# Patient Record
Sex: MALE | Race: BLACK OR AFRICAN AMERICAN | NOT HISPANIC OR LATINO | Employment: UNEMPLOYED | URBAN - METROPOLITAN AREA
[De-identification: names, ages, dates, MRNs, and addresses within clinical notes are randomized per-mention and may not be internally consistent; named-entity substitution may affect disease eponyms.]

---

## 2023-09-07 ENCOUNTER — HOSPITAL ENCOUNTER (OUTPATIENT)
Dept: DATA CONVERSION | Facility: HOSPITAL | Age: 58
Discharge: HOME | End: 2023-09-07
Payer: MEDICARE

## 2023-09-07 DIAGNOSIS — M25.569 PAIN IN UNSPECIFIED KNEE: ICD-10-CM

## 2023-11-24 ENCOUNTER — APPOINTMENT (OUTPATIENT)
Dept: RADIOLOGY | Facility: HOSPITAL | Age: 58
DRG: 270 | End: 2023-11-24
Payer: MEDICARE

## 2023-11-24 ENCOUNTER — HOSPITAL ENCOUNTER (INPATIENT)
Facility: HOSPITAL | Age: 58
LOS: 19 days | Discharge: LEFT BEFORE DISCHARGE | DRG: 270 | End: 2023-12-14
Attending: EMERGENCY MEDICINE | Admitting: STUDENT IN AN ORGANIZED HEALTH CARE EDUCATION/TRAINING PROGRAM
Payer: MEDICARE

## 2023-11-24 DIAGNOSIS — I82.402: ICD-10-CM

## 2023-11-24 DIAGNOSIS — Z01.810 ENCOUNTER FOR PREPROCEDURAL CARDIOVASCULAR EXAMINATION: ICD-10-CM

## 2023-11-24 DIAGNOSIS — I74.3 EMBOLISM AND THROMBOSIS OF ARTERIES OF THE LOWER EXTREMITIES (MULTI): ICD-10-CM

## 2023-11-24 DIAGNOSIS — Z01.818 ENCOUNTER FOR OTHER PREPROCEDURAL EXAMINATION: ICD-10-CM

## 2023-11-24 DIAGNOSIS — I70.90 ARTERIAL OCCLUSION: Primary | ICD-10-CM

## 2023-11-24 DIAGNOSIS — I97.89: ICD-10-CM

## 2023-11-24 DIAGNOSIS — I73.9 PERIPHERAL VASCULAR DISEASE, UNSPECIFIED (CMS-HCC): ICD-10-CM

## 2023-11-24 DIAGNOSIS — I70.222 CRITICAL LIMB ISCHEMIA OF LEFT LOWER EXTREMITY (MULTI): ICD-10-CM

## 2023-11-24 DIAGNOSIS — I79.8 OTHER DISORDERS OF ARTERIES, ARTERIOLES AND CAPILLARIES IN DISEASES CLASSIFIED ELSEWHERE (CMS-HCC): ICD-10-CM

## 2023-11-24 DIAGNOSIS — I70.92 CHRONIC TOTAL OCCLUSION OF ARTERY OF THE EXTREMITIES (CMS-HCC): ICD-10-CM

## 2023-11-24 DIAGNOSIS — Z98.62 S/P PERIPHERAL ARTERY ANGIOPLASTY: ICD-10-CM

## 2023-11-24 DIAGNOSIS — I50.20 HFREF (HEART FAILURE WITH REDUCED EJECTION FRACTION) (MULTI): ICD-10-CM

## 2023-11-24 DIAGNOSIS — Z99.2 ESRD ON HEMODIALYSIS (MULTI): ICD-10-CM

## 2023-11-24 DIAGNOSIS — I70.245 ATHEROSCLEROSIS OF NATIVE ARTERIES OF LEFT LEG WITH ULCERATION OF OTHER PART OF FOOT (MULTI): ICD-10-CM

## 2023-11-24 DIAGNOSIS — I74.3 ARTERIAL EMBOLUS AND THROMBOSIS OF LOWER EXTREMITY (MULTI): ICD-10-CM

## 2023-11-24 DIAGNOSIS — I74.3: ICD-10-CM

## 2023-11-24 DIAGNOSIS — N18.6 ESRD ON HEMODIALYSIS (MULTI): ICD-10-CM

## 2023-11-24 DIAGNOSIS — R29.898 LEFT LEG WEAKNESS: ICD-10-CM

## 2023-11-24 DIAGNOSIS — L97.529: ICD-10-CM

## 2023-11-24 LAB
ANION GAP SERPL CALC-SCNC: 18 MMOL/L (ref 10–20)
BASOPHILS # BLD AUTO: 0.04 X10*3/UL (ref 0–0.1)
BASOPHILS NFR BLD AUTO: 0.6 %
BUN SERPL-MCNC: 75 MG/DL (ref 6–23)
CALCIUM SERPL-MCNC: 9.1 MG/DL (ref 8.6–10.6)
CHLORIDE SERPL-SCNC: 100 MMOL/L (ref 98–107)
CO2 SERPL-SCNC: 20 MMOL/L (ref 21–32)
CREAT SERPL-MCNC: 6.06 MG/DL (ref 0.5–1.3)
CRP SERPL-MCNC: 4.15 MG/DL
EOSINOPHIL # BLD AUTO: 0.2 X10*3/UL (ref 0–0.7)
EOSINOPHIL NFR BLD AUTO: 2.9 %
ERYTHROCYTE [DISTWIDTH] IN BLOOD BY AUTOMATED COUNT: 12.7 % (ref 11.5–14.5)
ERYTHROCYTE [SEDIMENTATION RATE] IN BLOOD BY WESTERGREN METHOD: 105 MM/H (ref 0–20)
GFR SERPL CREATININE-BSD FRML MDRD: 10 ML/MIN/1.73M*2
GLUCOSE SERPL-MCNC: 235 MG/DL (ref 74–99)
HCT VFR BLD AUTO: 29.7 % (ref 41–52)
HGB BLD-MCNC: 9.5 G/DL (ref 13.5–17.5)
IMM GRANULOCYTES # BLD AUTO: 0.03 X10*3/UL (ref 0–0.7)
IMM GRANULOCYTES NFR BLD AUTO: 0.4 % (ref 0–0.9)
INR PPP: 1.1 (ref 0.9–1.1)
LYMPHOCYTES # BLD AUTO: 1.71 X10*3/UL (ref 1.2–4.8)
LYMPHOCYTES NFR BLD AUTO: 24.4 %
MCH RBC QN AUTO: 27.6 PG (ref 26–34)
MCHC RBC AUTO-ENTMCNC: 32 G/DL (ref 32–36)
MCV RBC AUTO: 86 FL (ref 80–100)
MONOCYTES # BLD AUTO: 0.71 X10*3/UL (ref 0.1–1)
MONOCYTES NFR BLD AUTO: 10.1 %
NEUTROPHILS # BLD AUTO: 4.31 X10*3/UL (ref 1.2–7.7)
NEUTROPHILS NFR BLD AUTO: 61.6 %
NRBC BLD-RTO: 0 /100 WBCS (ref 0–0)
PLATELET # BLD AUTO: 391 X10*3/UL (ref 150–450)
POTASSIUM SERPL-SCNC: 5.3 MMOL/L (ref 3.5–5.3)
PROTHROMBIN TIME: 12.7 SECONDS (ref 9.8–12.8)
RBC # BLD AUTO: 3.44 X10*6/UL (ref 4.5–5.9)
SODIUM SERPL-SCNC: 133 MMOL/L (ref 136–145)
WBC # BLD AUTO: 7 X10*3/UL (ref 4.4–11.3)

## 2023-11-24 PROCEDURE — 73502 X-RAY EXAM HIP UNI 2-3 VIEWS: CPT | Mod: LEFT SIDE | Performed by: RADIOLOGY

## 2023-11-24 PROCEDURE — 2500000004 HC RX 250 GENERAL PHARMACY W/ HCPCS (ALT 636 FOR OP/ED): Performed by: EMERGENCY MEDICINE

## 2023-11-24 PROCEDURE — 83036 HEMOGLOBIN GLYCOSYLATED A1C: CPT

## 2023-11-24 PROCEDURE — 86140 C-REACTIVE PROTEIN: CPT | Performed by: NURSE PRACTITIONER

## 2023-11-24 PROCEDURE — 75635 CT ANGIO ABDOMINAL ARTERIES: CPT | Performed by: STUDENT IN AN ORGANIZED HEALTH CARE EDUCATION/TRAINING PROGRAM

## 2023-11-24 PROCEDURE — 85652 RBC SED RATE AUTOMATED: CPT | Performed by: NURSE PRACTITIONER

## 2023-11-24 PROCEDURE — 72131 CT LUMBAR SPINE W/O DYE: CPT | Performed by: RADIOLOGY

## 2023-11-24 PROCEDURE — 99222 1ST HOSP IP/OBS MODERATE 55: CPT | Performed by: SURGERY

## 2023-11-24 PROCEDURE — 70450 CT HEAD/BRAIN W/O DYE: CPT

## 2023-11-24 PROCEDURE — 73552 X-RAY EXAM OF FEMUR 2/>: CPT | Mod: LT,FY

## 2023-11-24 PROCEDURE — 73502 X-RAY EXAM HIP UNI 2-3 VIEWS: CPT | Mod: LT,FY

## 2023-11-24 PROCEDURE — 75635 CT ANGIO ABDOMINAL ARTERIES: CPT

## 2023-11-24 PROCEDURE — 36415 COLL VENOUS BLD VENIPUNCTURE: CPT | Performed by: NURSE PRACTITIONER

## 2023-11-24 PROCEDURE — 99221 1ST HOSP IP/OBS SF/LOW 40: CPT | Performed by: STUDENT IN AN ORGANIZED HEALTH CARE EDUCATION/TRAINING PROGRAM

## 2023-11-24 PROCEDURE — 96361 HYDRATE IV INFUSION ADD-ON: CPT

## 2023-11-24 PROCEDURE — 73552 X-RAY EXAM OF FEMUR 2/>: CPT | Mod: LEFT SIDE | Performed by: RADIOLOGY

## 2023-11-24 PROCEDURE — 99285 EMERGENCY DEPT VISIT HI MDM: CPT | Performed by: NURSE PRACTITIONER

## 2023-11-24 PROCEDURE — 85610 PROTHROMBIN TIME: CPT | Performed by: NURSE PRACTITIONER

## 2023-11-24 PROCEDURE — 72131 CT LUMBAR SPINE W/O DYE: CPT

## 2023-11-24 PROCEDURE — 99285 EMERGENCY DEPT VISIT HI MDM: CPT | Performed by: EMERGENCY MEDICINE

## 2023-11-24 PROCEDURE — 70450 CT HEAD/BRAIN W/O DYE: CPT | Performed by: RADIOLOGY

## 2023-11-24 PROCEDURE — 2500000004 HC RX 250 GENERAL PHARMACY W/ HCPCS (ALT 636 FOR OP/ED)

## 2023-11-24 PROCEDURE — 96374 THER/PROPH/DIAG INJ IV PUSH: CPT

## 2023-11-24 PROCEDURE — 85025 COMPLETE CBC W/AUTO DIFF WBC: CPT | Performed by: NURSE PRACTITIONER

## 2023-11-24 PROCEDURE — 2500000001 HC RX 250 WO HCPCS SELF ADMINISTERED DRUGS (ALT 637 FOR MEDICARE OP): Performed by: EMERGENCY MEDICINE

## 2023-11-24 PROCEDURE — 2550000001 HC RX 255 CONTRASTS: Performed by: EMERGENCY MEDICINE

## 2023-11-24 PROCEDURE — 80048 BASIC METABOLIC PNL TOTAL CA: CPT | Performed by: NURSE PRACTITIONER

## 2023-11-24 PROCEDURE — 80061 LIPID PANEL: CPT

## 2023-11-24 RX ORDER — HEPARIN SODIUM 5000 [USP'U]/ML
10000 INJECTION, SOLUTION INTRAVENOUS; SUBCUTANEOUS ONCE
Status: COMPLETED | OUTPATIENT
Start: 2023-11-24 | End: 2023-11-24

## 2023-11-24 RX ORDER — GABAPENTIN 300 MG/1
300 CAPSULE ORAL ONCE
Status: COMPLETED | OUTPATIENT
Start: 2023-11-24 | End: 2023-11-24

## 2023-11-24 RX ORDER — HEPARIN SODIUM 5000 [USP'U]/ML
5000-10000 INJECTION, SOLUTION INTRAVENOUS; SUBCUTANEOUS EVERY 4 HOURS PRN
Status: DISCONTINUED | OUTPATIENT
Start: 2023-11-24 | End: 2023-12-06

## 2023-11-24 RX ORDER — HEPARIN SODIUM 10000 [USP'U]/100ML
0-4500 INJECTION, SOLUTION INTRAVENOUS CONTINUOUS
Status: DISCONTINUED | OUTPATIENT
Start: 2023-11-24 | End: 2023-12-06

## 2023-11-24 RX ORDER — CARVEDILOL 12.5 MG/1
25 TABLET ORAL ONCE
Status: COMPLETED | OUTPATIENT
Start: 2023-11-24 | End: 2023-11-24

## 2023-11-24 RX ADMIN — HEPARIN SODIUM 2000 UNITS/HR: 10000 INJECTION, SOLUTION INTRAVENOUS at 20:40

## 2023-11-24 RX ADMIN — HEPARIN SODIUM 10000 UNITS: 5000 INJECTION INTRAVENOUS; SUBCUTANEOUS at 20:46

## 2023-11-24 RX ADMIN — IOHEXOL 150 ML: 350 INJECTION, SOLUTION INTRAVENOUS at 19:03

## 2023-11-24 RX ADMIN — SODIUM CHLORIDE, SODIUM LACTATE, POTASSIUM CHLORIDE, AND CALCIUM CHLORIDE 500 ML: 600; 310; 30; 20 INJECTION, SOLUTION INTRAVENOUS at 20:00

## 2023-11-24 RX ADMIN — GABAPENTIN 300 MG: 300 CAPSULE ORAL at 22:03

## 2023-11-24 RX ADMIN — CARVEDILOL 25 MG: 12.5 TABLET, FILM COATED ORAL at 22:03

## 2023-11-24 ASSESSMENT — LIFESTYLE VARIABLES
EVER HAD A DRINK FIRST THING IN THE MORNING TO STEADY YOUR NERVES TO GET RID OF A HANGOVER: NO
REASON UNABLE TO ASSESS: YES
EVER FELT BAD OR GUILTY ABOUT YOUR DRINKING: NO
HAVE PEOPLE ANNOYED YOU BY CRITICIZING YOUR DRINKING: NO
HAVE YOU EVER FELT YOU SHOULD CUT DOWN ON YOUR DRINKING: NO

## 2023-11-24 ASSESSMENT — PAIN DESCRIPTION - LOCATION: LOCATION: LEG

## 2023-11-24 ASSESSMENT — ENCOUNTER SYMPTOMS
DYSURIA: 0
JOINT SWELLING: 0
LIGHT-HEADEDNESS: 0
VOMITING: 0
SHORTNESS OF BREATH: 0
NAUSEA: 0
PALPITATIONS: 0
CHEST TIGHTNESS: 0
FATIGUE: 0
BLOOD IN STOOL: 0
APPETITE CHANGE: 0
CHILLS: 0
CONSTIPATION: 0
WHEEZING: 0
FEVER: 0
WEAKNESS: 0
HEADACHES: 0
ABDOMINAL DISTENTION: 0
DIARRHEA: 0
TROUBLE SWALLOWING: 0
MYALGIAS: 0
SORE THROAT: 0
FREQUENCY: 0
HEMATURIA: 0
COUGH: 0
ABDOMINAL PAIN: 0
DIZZINESS: 0

## 2023-11-24 ASSESSMENT — COLUMBIA-SUICIDE SEVERITY RATING SCALE - C-SSRS
1. IN THE PAST MONTH, HAVE YOU WISHED YOU WERE DEAD OR WISHED YOU COULD GO TO SLEEP AND NOT WAKE UP?: NO
6. HAVE YOU EVER DONE ANYTHING, STARTED TO DO ANYTHING, OR PREPARED TO DO ANYTHING TO END YOUR LIFE?: NO
2. HAVE YOU ACTUALLY HAD ANY THOUGHTS OF KILLING YOURSELF?: NO

## 2023-11-24 ASSESSMENT — PAIN SCALES - GENERAL: PAINLEVEL_OUTOF10: 7

## 2023-11-24 ASSESSMENT — PAIN - FUNCTIONAL ASSESSMENT: PAIN_FUNCTIONAL_ASSESSMENT: 0-10

## 2023-11-24 ASSESSMENT — PAIN DESCRIPTION - PAIN TYPE: TYPE: ACUTE PAIN
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
## 2023-11-24 NOTE — Clinical Note
Vessel(s): left SFA, left popliteal artery, left peroneal artery, left tibio-peroneal trunk and left dorsalis pedis artery. Injected with hand injections. Single view taken.

## 2023-11-24 NOTE — Clinical Note
Vessel(s): left peroneal artery and left tibio-peroneal trunk. Injected with hand injections. Multiple views taken.

## 2023-11-24 NOTE — PROGRESS NOTES
The patient has an emergent need for contrast administration during CT scan to evaluate for life and/or limb threatening pathology. The risks of possible kidney injury are outweighed by the risks of delay or inability to make the diagnosis. Risks vs benefits were discussed with the patient and is in full agreement to proceed with diagnostic testing with contrast.     Iris Palomino MD 11/24/23 at 6:16 PM

## 2023-11-24 NOTE — Clinical Note
Mechanical thrombectomy. Aspiration catheter inserted. Pass # = 1. Aspirations started. Total fluid removed = 27 mL. Duration = 25 sec. Left Popliteal Artery

## 2023-11-24 NOTE — Clinical Note
Vessel(s): left SFA, left popliteal artery, left PTA, left peroneal artery, left TATE, left tibio-peroneal trunk and left dorsalis pedis artery. Injected with hand injections. Multiple views taken.

## 2023-11-24 NOTE — Clinical Note
Sheath was inserted in the left dorsalis pedis artery. Ultrasound guidance was used. Micropuncture sheath 4F

## 2023-11-24 NOTE — Clinical Note
Vessel(s): left iliac artery, left CFA, left PFA, left SFA, left popliteal artery, left PTA, left peroneal artery, left TATE, left tibio-peroneal trunk and left dorsalis pedis artery. Injected with hand injections. Multiple views taken.

## 2023-11-24 NOTE — Clinical Note
Mechanical thrombectomy. Aspiration catheter inserted. Pass # = 2. Aspirations started. Total fluid removed = 15 mL. Duration = 20 sec.

## 2023-11-24 NOTE — Clinical Note
Patient Clipped and Prepped: groin, left pedal and left popliteal. Prepped with Betadine and draped in sterile fashion.

## 2023-11-24 NOTE — ED PROVIDER NOTES
Chief Complaint   Patient presents with    Leg Pain       HPI       57 year old male presents to the Emergency Department today complaining of  a 2-3 day history of left upper leg weakness. Feels like his leg is heavy. Notes that when he attempts to bear weight on his left lower extremity, his left knee will give out causing him to fall to the ground. Reports that he has fallen several times during this time frame. Denies any leg pain. Notes to having paresthesias to the left hand as well. Denies any associated fever, chills, headache, neck pain, chest pain, shortness of breath, abdominal pain, nausea, vomiting, diarrhea, constipation, hematemesis, hematochezia, melena, urinary symptoms, or problems with bowel or bladder function.       History provided by:  Patient             Patient History   History reviewed. No pertinent past medical history.  History reviewed. No pertinent surgical history.  No family history on file.  Social History     Tobacco Use    Smoking status: Not on file    Smokeless tobacco: Not on file   Substance Use Topics    Alcohol use: Not on file    Drug use: Not on file           Physical Exam  Constitutional:       Appearance: Normal appearance.   HENT:      Head: Normocephalic.      Right Ear: Tympanic membrane, ear canal and external ear normal.      Left Ear: Tympanic membrane, ear canal and external ear normal.      Nose: Nose normal. No septal deviation.      Right Turbinates: Not enlarged.      Left Turbinates: Not enlarged.      Mouth/Throat:      Lips: Pink.      Mouth: Mucous membranes are moist.      Dentition: No dental caries.      Tongue: No lesions.      Pharynx: Oropharynx is clear. Uvula midline. No oropharyngeal exudate or posterior oropharyngeal erythema.      Tonsils: No tonsillar exudate. 1+ on the right. 1+ on the left.   Eyes:      General: Lids are normal.      Conjunctiva/sclera: Conjunctivae normal.      Pupils: Pupils are equal, round, and reactive to light.    Cardiovascular:      Rate and Rhythm: Normal rate and regular rhythm.      Pulses:           Radial pulses are 3+ on the right side and 3+ on the left side.        Dorsalis pedis pulses are 3+ on the right side and 3+ on the left side.      Heart sounds: Normal heart sounds. No murmur heard.     No friction rub. No gallop.   Pulmonary:      Effort: Pulmonary effort is normal. No respiratory distress.      Breath sounds: Normal breath sounds. No wheezing, rhonchi or rales.   Abdominal:      General: Abdomen is flat. Bowel sounds are normal.      Palpations: Abdomen is soft.      Tenderness: There is no abdominal tenderness. There is no right CVA tenderness, left CVA tenderness, guarding or rebound. Negative signs include Cortes's sign and McBurney's sign.   Musculoskeletal:         General: No swelling or deformity.      Cervical back: Full passive range of motion without pain and neck supple.      Right lower leg: No edema.      Left lower leg: No edema.      Comments: No edema, cyanosis, or clubbing noted. No spinous process tenderness, but does have bilateral paraspinal muscle tenderness to the lumbar sacral region. No saddle paresthesias. Negative straight leg raises. Able to plantarflex and dorsiflex bilateral great toes without difficulty.   Lymphadenopathy:      Cervical: No cervical adenopathy.   Skin:     General: Skin is warm.      Capillary Refill: Capillary refill takes less than 2 seconds.      Coloration: Skin is not jaundiced.      Findings: No petechiae or rash. Rash is not purpuric.   Neurological:      General: No focal deficit present.      Mental Status: He is alert and oriented to person, place, and time. Mental status is at baseline.      Sensory: Sensation is intact.      Motor: Weakness present.      Comments: Weakness noted of the upper portion of the left lower extremity.    Psychiatric:         Attention and Perception: Attention normal.         Mood and Affect: Mood normal.          Speech: Speech normal.         Behavior: Behavior normal. Behavior is cooperative.         Labs Reviewed - No data to display    No orders to display            ED Course & MDM            Medical Decision Making  Patient was seen and evaluated by Dr. Nicholson. Saline lock was established with labs drawn and results as above. Anemia is baseline. Remaining blood counts, PT/INR, and electrolytes were unremarkable. Kidney function is worse BUN/Creatinine- 75/6.06 today and 52/2.35 back in December 2021. Inflammatory markers are elevated as well. Left hip/femur x-rays and CT scans of his lumbar spine and head all show no acute pathology. Neurology was consulted secondary to the left lower extremity weakness. Care was signed out to oncoming staff pending neuro evaluation. He will need admission based on his worsening renal failure.            Your medication list      You have not been prescribed any medications.           Procedure  Procedures     Ariel Bone, BHARGAV-CNP  11/24/23 2401

## 2023-11-24 NOTE — Clinical Note
Mechanical thrombectomy. Aspiration catheter inserted. Pass # = 3. Aspirations started. Total fluid removed = 15 mL. Duration = 20 sec.

## 2023-11-24 NOTE — ED TRIAGE NOTES
C/o L sided back, hip, and leg pain since yesterday morning. Pt reports that the leg feels weak and heavy also. Hx of sciatica. Reports that it caused him to fall twice yesterday.

## 2023-11-24 NOTE — Clinical Note
R IJ 13Fr x 15cm temp HD catheter placed. All ports aspirated, flushed, locked, and capped. Dressing C/D/I. Total 50mcg fentanyl given. VSS t/o procedure.

## 2023-11-24 NOTE — Clinical Note
Vessel(s): left peroneal artery, left tibio-peroneal trunk and left dorsalis pedis artery. Injected with hand injections. Multiple views taken.

## 2023-11-25 ENCOUNTER — APPOINTMENT (OUTPATIENT)
Dept: RADIOLOGY | Facility: HOSPITAL | Age: 58
DRG: 270 | End: 2023-11-25
Payer: MEDICARE

## 2023-11-25 ENCOUNTER — APPOINTMENT (OUTPATIENT)
Dept: CARDIOLOGY | Facility: HOSPITAL | Age: 58
DRG: 270 | End: 2023-11-25
Payer: MEDICARE

## 2023-11-25 PROBLEM — I70.90 ARTERIAL OCCLUSION: Status: ACTIVE | Noted: 2023-11-25

## 2023-11-25 LAB
AORTIC VALVE PEAK VELOCITY: 1.21
AV PEAK GRADIENT: 5.9
AVA (PEAK VEL): 3.71
CHOLEST SERPL-MCNC: 244 MG/DL (ref 0–199)
CHOLESTEROL/HDL RATIO: 6.8
EST. AVERAGE GLUCOSE BLD GHB EST-MCNC: 315 MG/DL
GLUCOSE BLD MANUAL STRIP-MCNC: 144 MG/DL (ref 74–99)
GLUCOSE BLD MANUAL STRIP-MCNC: 191 MG/DL (ref 74–99)
GLUCOSE BLD MANUAL STRIP-MCNC: 253 MG/DL (ref 74–99)
GLUCOSE BLD MANUAL STRIP-MCNC: 97 MG/DL (ref 74–99)
HBA1C MFR BLD: 12.6 %
HDLC SERPL-MCNC: 36 MG/DL
LDLC SERPL CALC-MCNC: 159 MG/DL
LEFT VENTRICLE INTERNAL DIMENSION DIASTOLE: 5.2 (ref 3.5–6)
LEFT VENTRICULAR OUTFLOW TRACT DIAMETER: 2.5
MITRAL VALVE E/A RATIO: 1.4
MITRAL VALVE E/E' RATIO: 20.37
NON HDL CHOLESTEROL: 208 MG/DL (ref 0–149)
RIGHT VENTRICLE FREE WALL PEAK S': 12.5
TRIGL SERPL-MCNC: 247 MG/DL (ref 0–149)
UFH PPP CHRO-ACNC: 0.4 IU/ML
UFH PPP CHRO-ACNC: 0.5 IU/ML
UFH PPP CHRO-ACNC: 0.9 IU/ML
VLDL: 49 MG/DL (ref 0–40)

## 2023-11-25 PROCEDURE — 99233 SBSQ HOSP IP/OBS HIGH 50: CPT

## 2023-11-25 PROCEDURE — 36415 COLL VENOUS BLD VENIPUNCTURE: CPT | Performed by: EMERGENCY MEDICINE

## 2023-11-25 PROCEDURE — 1200000002 HC GENERAL ROOM WITH TELEMETRY DAILY

## 2023-11-25 PROCEDURE — 2500000001 HC RX 250 WO HCPCS SELF ADMINISTERED DRUGS (ALT 637 FOR MEDICARE OP): Performed by: INTERNAL MEDICINE

## 2023-11-25 PROCEDURE — 2500000001 HC RX 250 WO HCPCS SELF ADMINISTERED DRUGS (ALT 637 FOR MEDICARE OP)

## 2023-11-25 PROCEDURE — 85520 HEPARIN ASSAY: CPT | Performed by: EMERGENCY MEDICINE

## 2023-11-25 PROCEDURE — 2500000004 HC RX 250 GENERAL PHARMACY W/ HCPCS (ALT 636 FOR OP/ED): Performed by: EMERGENCY MEDICINE

## 2023-11-25 PROCEDURE — 2500000004 HC RX 250 GENERAL PHARMACY W/ HCPCS (ALT 636 FOR OP/ED): Performed by: INTERNAL MEDICINE

## 2023-11-25 PROCEDURE — 99223 1ST HOSP IP/OBS HIGH 75: CPT

## 2023-11-25 PROCEDURE — 2500000002 HC RX 250 W HCPCS SELF ADMINISTERED DRUGS (ALT 637 FOR MEDICARE OP, ALT 636 FOR OP/ED)

## 2023-11-25 PROCEDURE — 82947 ASSAY GLUCOSE BLOOD QUANT: CPT

## 2023-11-25 PROCEDURE — 2500000004 HC RX 250 GENERAL PHARMACY W/ HCPCS (ALT 636 FOR OP/ED)

## 2023-11-25 PROCEDURE — 93306 TTE W/DOPPLER COMPLETE: CPT

## 2023-11-25 PROCEDURE — 70450 CT HEAD/BRAIN W/O DYE: CPT | Performed by: RADIOLOGY

## 2023-11-25 PROCEDURE — 70450 CT HEAD/BRAIN W/O DYE: CPT

## 2023-11-25 PROCEDURE — 93306 TTE W/DOPPLER COMPLETE: CPT | Performed by: INTERNAL MEDICINE

## 2023-11-25 RX ORDER — AMLODIPINE BESYLATE 10 MG/1
10 TABLET ORAL DAILY
Status: DISCONTINUED | OUTPATIENT
Start: 2023-11-25 | End: 2023-12-14 | Stop reason: HOSPADM

## 2023-11-25 RX ORDER — ASPIRIN 81 MG/1
81 TABLET ORAL DAILY
COMMUNITY

## 2023-11-25 RX ORDER — ALLOPURINOL 100 MG/1
100 TABLET ORAL DAILY
COMMUNITY

## 2023-11-25 RX ORDER — INSULIN LISPRO 100 [IU]/ML
7 INJECTION, SOLUTION INTRAVENOUS; SUBCUTANEOUS
Status: DISCONTINUED | OUTPATIENT
Start: 2023-11-25 | End: 2023-12-14 | Stop reason: HOSPADM

## 2023-11-25 RX ORDER — LISINOPRIL 40 MG/1
40 TABLET ORAL DAILY
Status: ON HOLD | COMMUNITY
End: 2023-12-05 | Stop reason: ALTCHOICE

## 2023-11-25 RX ORDER — SODIUM BICARBONATE 650 MG/1
650 TABLET ORAL 3 TIMES DAILY
Status: DISCONTINUED | OUTPATIENT
Start: 2023-11-25 | End: 2023-11-28

## 2023-11-25 RX ORDER — CHLORTHALIDONE 50 MG/1
50 TABLET ORAL DAILY
Status: ON HOLD | COMMUNITY
Start: 2021-11-19 | End: 2023-12-05 | Stop reason: ALTCHOICE

## 2023-11-25 RX ORDER — CALCITRIOL 0.25 UG/1
0.25 CAPSULE ORAL DAILY
Status: DISCONTINUED | OUTPATIENT
Start: 2023-11-25 | End: 2023-12-14 | Stop reason: HOSPADM

## 2023-11-25 RX ORDER — POTASSIUM CHLORIDE 20 MEQ/1
20 TABLET, EXTENDED RELEASE ORAL DAILY
Status: ON HOLD | COMMUNITY
Start: 2023-07-29 | End: 2023-12-05 | Stop reason: ALTCHOICE

## 2023-11-25 RX ORDER — CARVEDILOL 25 MG/1
25 TABLET ORAL
COMMUNITY

## 2023-11-25 RX ORDER — ACETAMINOPHEN 325 MG/1
975 TABLET ORAL 3 TIMES DAILY PRN
Status: DISCONTINUED | OUTPATIENT
Start: 2023-11-25 | End: 2023-11-26

## 2023-11-25 RX ORDER — INSULIN GLARGINE 100 [IU]/ML
20 INJECTION, SOLUTION SUBCUTANEOUS NIGHTLY
Status: DISCONTINUED | OUTPATIENT
Start: 2023-11-25 | End: 2023-12-03

## 2023-11-25 RX ORDER — OXYCODONE HYDROCHLORIDE 5 MG/1
5 TABLET ORAL EVERY 4 HOURS PRN
Status: DISCONTINUED | OUTPATIENT
Start: 2023-11-25 | End: 2023-11-25

## 2023-11-25 RX ORDER — CALCITRIOL 0.25 UG/1
0.25 CAPSULE ORAL
COMMUNITY

## 2023-11-25 RX ORDER — DEXTROSE MONOHYDRATE 100 MG/ML
0.3 INJECTION, SOLUTION INTRAVENOUS ONCE AS NEEDED
Status: DISCONTINUED | OUTPATIENT
Start: 2023-11-25 | End: 2023-12-14 | Stop reason: HOSPADM

## 2023-11-25 RX ORDER — PANTOPRAZOLE SODIUM 20 MG/1
20 TABLET, DELAYED RELEASE ORAL
Status: ON HOLD | COMMUNITY
End: 2023-12-05 | Stop reason: ALTCHOICE

## 2023-11-25 RX ORDER — OXYCODONE HYDROCHLORIDE 5 MG/1
5 TABLET ORAL EVERY 6 HOURS PRN
Status: DISCONTINUED | OUTPATIENT
Start: 2023-11-25 | End: 2023-12-14

## 2023-11-25 RX ORDER — SILDENAFIL 50 MG/1
50 TABLET, FILM COATED ORAL AS NEEDED
COMMUNITY
Start: 2021-10-26

## 2023-11-25 RX ORDER — TORSEMIDE 20 MG/1
40 TABLET ORAL 2 TIMES DAILY
COMMUNITY

## 2023-11-25 RX ORDER — TORSEMIDE 20 MG/1
100 TABLET ORAL DAILY
Status: DISCONTINUED | OUTPATIENT
Start: 2023-11-25 | End: 2023-12-14 | Stop reason: HOSPADM

## 2023-11-25 RX ORDER — GABAPENTIN 300 MG/1
300 CAPSULE ORAL 2 TIMES DAILY
Status: DISCONTINUED | OUTPATIENT
Start: 2023-11-25 | End: 2023-11-25

## 2023-11-25 RX ORDER — ASPIRIN 81 MG/1
81 TABLET ORAL DAILY
Status: DISCONTINUED | OUTPATIENT
Start: 2023-11-25 | End: 2023-12-14 | Stop reason: HOSPADM

## 2023-11-25 RX ORDER — INSULIN LISPRO 100 [IU]/ML
10 INJECTION, SOLUTION INTRAVENOUS; SUBCUTANEOUS
COMMUNITY

## 2023-11-25 RX ORDER — PANTOPRAZOLE SODIUM 20 MG/1
20 TABLET, DELAYED RELEASE ORAL
Status: DISCONTINUED | OUTPATIENT
Start: 2023-11-25 | End: 2023-12-14 | Stop reason: HOSPADM

## 2023-11-25 RX ORDER — POLYETHYLENE GLYCOL 3350 17 G/17G
17 POWDER, FOR SOLUTION ORAL DAILY
Status: DISCONTINUED | OUTPATIENT
Start: 2023-11-25 | End: 2023-11-27

## 2023-11-25 RX ORDER — AMLODIPINE BESYLATE 10 MG/1
10 TABLET ORAL DAILY
COMMUNITY

## 2023-11-25 RX ORDER — SIMVASTATIN 20 MG/1
20 TABLET, FILM COATED ORAL NIGHTLY
COMMUNITY
Start: 2023-09-15

## 2023-11-25 RX ORDER — DEXTROSE 50 % IN WATER (D50W) INTRAVENOUS SYRINGE
25
Status: DISCONTINUED | OUTPATIENT
Start: 2023-11-25 | End: 2023-12-14 | Stop reason: HOSPADM

## 2023-11-25 RX ORDER — INSULIN GLARGINE 100 [IU]/ML
40 INJECTION, SOLUTION SUBCUTANEOUS NIGHTLY
COMMUNITY

## 2023-11-25 RX ORDER — ATORVASTATIN CALCIUM 80 MG/1
80 TABLET, FILM COATED ORAL NIGHTLY
Status: DISCONTINUED | OUTPATIENT
Start: 2023-11-25 | End: 2023-12-14 | Stop reason: HOSPADM

## 2023-11-25 RX ORDER — CARVEDILOL 12.5 MG/1
25 TABLET ORAL 2 TIMES DAILY
Status: DISCONTINUED | OUTPATIENT
Start: 2023-11-25 | End: 2023-12-14 | Stop reason: HOSPADM

## 2023-11-25 RX ORDER — GABAPENTIN 100 MG/1
100 CAPSULE ORAL EVERY EVENING
Status: DISCONTINUED | OUTPATIENT
Start: 2023-11-25 | End: 2023-12-14 | Stop reason: HOSPADM

## 2023-11-25 RX ORDER — GABAPENTIN 300 MG/1
300 CAPSULE ORAL 2 TIMES DAILY
COMMUNITY

## 2023-11-25 RX ORDER — INSULIN LISPRO 100 [IU]/ML
0-5 INJECTION, SOLUTION INTRAVENOUS; SUBCUTANEOUS
Status: DISCONTINUED | OUTPATIENT
Start: 2023-11-25 | End: 2023-12-14 | Stop reason: HOSPADM

## 2023-11-25 RX ADMIN — GABAPENTIN 300 MG: 300 CAPSULE ORAL at 02:50

## 2023-11-25 RX ADMIN — INSULIN GLARGINE 20 UNITS: 100 INJECTION, SOLUTION SUBCUTANEOUS at 02:50

## 2023-11-25 RX ADMIN — SODIUM BICARBONATE 650 MG: 650 TABLET ORAL at 20:29

## 2023-11-25 RX ADMIN — INSULIN LISPRO 7 UNITS: 100 INJECTION, SOLUTION INTRAVENOUS; SUBCUTANEOUS at 10:53

## 2023-11-25 RX ADMIN — POLYETHYLENE GLYCOL 3350 17 G: 17 POWDER, FOR SOLUTION ORAL at 10:08

## 2023-11-25 RX ADMIN — OXYCODONE HYDROCHLORIDE 5 MG: 5 TABLET ORAL at 20:28

## 2023-11-25 RX ADMIN — CALCITRIOL CAPSULES 0.25 MCG 0.25 MCG: 0.25 CAPSULE ORAL at 12:48

## 2023-11-25 RX ADMIN — GABAPENTIN 100 MG: 100 CAPSULE ORAL at 20:29

## 2023-11-25 RX ADMIN — INSULIN LISPRO 3 UNITS: 100 INJECTION, SOLUTION INTRAVENOUS; SUBCUTANEOUS at 13:00

## 2023-11-25 RX ADMIN — GABAPENTIN 300 MG: 300 CAPSULE ORAL at 10:08

## 2023-11-25 RX ADMIN — PANTOPRAZOLE SODIUM 20 MG: 20 TABLET, DELAYED RELEASE ORAL at 10:08

## 2023-11-25 RX ADMIN — ATORVASTATIN CALCIUM 80 MG: 80 TABLET, FILM COATED ORAL at 20:29

## 2023-11-25 RX ADMIN — INSULIN LISPRO 7 UNITS: 100 INJECTION, SOLUTION INTRAVENOUS; SUBCUTANEOUS at 13:04

## 2023-11-25 RX ADMIN — INSULIN GLARGINE 20 UNITS: 100 INJECTION, SOLUTION SUBCUTANEOUS at 20:29

## 2023-11-25 RX ADMIN — CARVEDILOL 25 MG: 12.5 TABLET, FILM COATED ORAL at 20:28

## 2023-11-25 RX ADMIN — HEPARIN SODIUM 1800 UNITS/HR: 10000 INJECTION, SOLUTION INTRAVENOUS at 18:37

## 2023-11-25 RX ADMIN — TORSEMIDE 100 MG: 20 TABLET ORAL at 20:28

## 2023-11-25 RX ADMIN — AMLODIPINE BESYLATE 10 MG: 10 TABLET ORAL at 10:08

## 2023-11-25 SDOH — SOCIAL STABILITY: SOCIAL INSECURITY: ARE YOU OR HAVE YOU BEEN THREATENED OR ABUSED PHYSICALLY, EMOTIONALLY, OR SEXUALLY BY ANYONE?: NO

## 2023-11-25 SDOH — SOCIAL STABILITY: SOCIAL INSECURITY: HAS ANYONE EVER THREATENED TO HURT YOUR FAMILY OR YOUR PETS?: NO

## 2023-11-25 SDOH — SOCIAL STABILITY: SOCIAL INSECURITY: ARE THERE ANY APPARENT SIGNS OF INJURIES/BEHAVIORS THAT COULD BE RELATED TO ABUSE/NEGLECT?: NO

## 2023-11-25 SDOH — SOCIAL STABILITY: SOCIAL INSECURITY: ABUSE: ADULT

## 2023-11-25 SDOH — SOCIAL STABILITY: SOCIAL INSECURITY: DO YOU FEEL UNSAFE GOING BACK TO THE PLACE WHERE YOU ARE LIVING?: NO

## 2023-11-25 SDOH — SOCIAL STABILITY: SOCIAL INSECURITY: HAVE YOU HAD THOUGHTS OF HARMING ANYONE ELSE?: NO

## 2023-11-25 SDOH — SOCIAL STABILITY: SOCIAL INSECURITY: DOES ANYONE TRY TO KEEP YOU FROM HAVING/CONTACTING OTHER FRIENDS OR DOING THINGS OUTSIDE YOUR HOME?: NO

## 2023-11-25 SDOH — SOCIAL STABILITY: SOCIAL INSECURITY: DO YOU FEEL ANYONE HAS EXPLOITED OR TAKEN ADVANTAGE OF YOU FINANCIALLY OR OF YOUR PERSONAL PROPERTY?: NO

## 2023-11-25 SDOH — SOCIAL STABILITY: SOCIAL INSECURITY: WERE YOU ABLE TO COMPLETE ALL THE BEHAVIORAL HEALTH SCREENINGS?: YES

## 2023-11-25 ASSESSMENT — ENCOUNTER SYMPTOMS
ABDOMINAL PAIN: 0
CHILLS: 0
SHORTNESS OF BREATH: 0
DIZZINESS: 0
FATIGUE: 0
LIGHT-HEADEDNESS: 0
WEAKNESS: 1
NUMBNESS: 1
HEADACHES: 0
NAUSEA: 0
VOMITING: 0

## 2023-11-25 ASSESSMENT — ACTIVITIES OF DAILY LIVING (ADL)
HEARING - RIGHT EAR: FUNCTIONAL
HEARING - RIGHT EAR: FUNCTIONAL
WALKS IN HOME: NEEDS ASSISTANCE
DRESSING YOURSELF: NEEDS ASSISTANCE
TOILETING: NEEDS ASSISTANCE
LACK_OF_TRANSPORTATION: YES
JUDGMENT_ADEQUATE_SAFELY_COMPLETE_DAILY_ACTIVITIES: YES
FEEDING YOURSELF: NEEDS ASSISTANCE
BATHING: NEEDS ASSISTANCE
GROOMING: NEEDS ASSISTANCE
BATHING: NEEDS ASSISTANCE
WALKS IN HOME: NEEDS ASSISTANCE
ASSISTIVE_DEVICE: WALKER
HEARING - LEFT EAR: FUNCTIONAL
FEEDING YOURSELF: NEEDS ASSISTANCE
JUDGMENT_ADEQUATE_SAFELY_COMPLETE_DAILY_ACTIVITIES: YES
DRESSING YOURSELF: NEEDS ASSISTANCE
ADEQUATE_TO_COMPLETE_ADL: YES
ASSISTIVE_DEVICE: WALKER
PATIENT'S MEMORY ADEQUATE TO SAFELY COMPLETE DAILY ACTIVITIES?: YES
PATIENT'S MEMORY ADEQUATE TO SAFELY COMPLETE DAILY ACTIVITIES?: YES
GROOMING: NEEDS ASSISTANCE
TOILETING: NEEDS ASSISTANCE
ADEQUATE_TO_COMPLETE_ADL: YES
HEARING - LEFT EAR: FUNCTIONAL

## 2023-11-25 ASSESSMENT — COGNITIVE AND FUNCTIONAL STATUS - GENERAL
HELP NEEDED FOR BATHING: A LOT
HELP NEEDED FOR BATHING: A LITTLE
PATIENT BASELINE BEDBOUND: NO
TOILETING: A LITTLE
DRESSING REGULAR UPPER BODY CLOTHING: A LOT
DAILY ACTIVITIY SCORE: 16
TOILETING: A LITTLE
DAILY ACTIVITIY SCORE: 18
MOBILITY SCORE: 10
EATING MEALS: A LITTLE
PERSONAL GROOMING: A LITTLE
MOVING FROM LYING ON BACK TO SITTING ON SIDE OF FLAT BED WITH BEDRAILS: A LITTLE
MOVING TO AND FROM BED TO CHAIR: A LITTLE
DRESSING REGULAR LOWER BODY CLOTHING: A LITTLE
DRESSING REGULAR UPPER BODY CLOTHING: A LITTLE
MOVING TO AND FROM BED TO CHAIR: TOTAL
DRESSING REGULAR LOWER BODY CLOTHING: A LOT
WALKING IN HOSPITAL ROOM: A LITTLE
STANDING UP FROM CHAIR USING ARMS: TOTAL
CLIMB 3 TO 5 STEPS WITH RAILING: A LITTLE
MOVING FROM LYING ON BACK TO SITTING ON SIDE OF FLAT BED WITH BEDRAILS: A LITTLE
MOBILITY SCORE: 18
TURNING FROM BACK TO SIDE WHILE IN FLAT BAD: A LITTLE
CLIMB 3 TO 5 STEPS WITH RAILING: TOTAL
STANDING UP FROM CHAIR USING ARMS: A LITTLE
WALKING IN HOSPITAL ROOM: TOTAL
TURNING FROM BACK TO SIDE WHILE IN FLAT BAD: A LITTLE
PERSONAL GROOMING: A LITTLE

## 2023-11-25 ASSESSMENT — LIFESTYLE VARIABLES
HOW OFTEN DO YOU HAVE 6 OR MORE DRINKS ON ONE OCCASION: MONTHLY
HOW MANY STANDARD DRINKS CONTAINING ALCOHOL DO YOU HAVE ON A TYPICAL DAY: 3 OR 4
SKIP TO QUESTIONS 9-10: 0
AUDIT-C TOTAL SCORE: 5
AUDIT-C TOTAL SCORE: 5
HOW OFTEN DO YOU HAVE A DRINK CONTAINING ALCOHOL: 2-4 TIMES A MONTH

## 2023-11-25 ASSESSMENT — PATIENT HEALTH QUESTIONNAIRE - PHQ9
SUM OF ALL RESPONSES TO PHQ9 QUESTIONS 1 & 2: 0
1. LITTLE INTEREST OR PLEASURE IN DOING THINGS: NOT AT ALL
2. FEELING DOWN, DEPRESSED OR HOPELESS: NOT AT ALL

## 2023-11-25 NOTE — CONSULTS
Reason For Consult  Concern for acute limb ischemia    History Of Present Illness  Kwadwo Hoyt is a 57 y.o. male with PMH of DM neuropathy, multiple toe amputations with podiatry, HTN, CKD 4 presenting with two days of LLE weakness. Patient was walking yesterday when his left leg suddenly gave out causing him to fall. Patient denies any pain but endorses generalized chronic pain. Endorses chronic bilateral leg swelling. Patient was last seen in podiatry clinic 11/17/23 where he had his left great toe debrided. Because of this weakness, patient also was seen by neurology with concern for a stroke.    In the ED, he received a CTA which showed complete occlusion of the P1 and P3 segments of the left popliteal artery with some reconstitution of the P2 segment and likely complete occlusion of the left anterior tibial and posterior tibial arteries. Vascular surgery was consulted for this finding. Heparin gtt was started.      Past Medical History  He has a past medical history of Diabetes mellitus (CMS/[facility]), Hyperlipidemia, and Hypertension.  CKD4    Surgical History  He has a past surgical history that includes Amputation foot / toe.     Social History  He reports that he has never smoked. He has never used smokeless tobacco. He reports current alcohol use. He reports current drug use. Drug: Marijuana.    Family History  No family history on file.     Allergies  Patient has no known allergies.    Review of Systems  Review of Systems   Constitutional:  Negative for appetite change, chills, fatigue and fever.   HENT:  Negative for congestion, hearing loss, sore throat and trouble swallowing.    Respiratory:  Negative for cough, chest tightness, shortness of breath and wheezing.    Cardiovascular:  Negative for chest pain and palpitations.   Gastrointestinal:  Negative for abdominal distention, abdominal pain, blood in stool, constipation, diarrhea, nausea and vomiting.   Genitourinary:  Negative for dysuria, frequency,  "hematuria and urgency.   Musculoskeletal:  Negative for joint swelling and myalgias.   Neurological:  Negative for dizziness, syncope, weakness, light-headedness and headaches.       Physical Exam  Physical Exam  Constitutional:       General: He is not in acute distress.     Appearance: Normal appearance. He is obese. He is not ill-appearing, toxic-appearing or diaphoretic.   HENT:      Head: Normocephalic and atraumatic.   Eyes:      General: No scleral icterus.     Pupils: Pupils are equal, round, and reactive to light.   Cardiovascular:      Rate and Rhythm: Normal rate and regular rhythm.      Pulses:           Radial pulses are 2+ on the right side and 2+ on the left side.        Femoral pulses are 2+ on the right side and 2+ on the left side.       Posterior tibial pulses are detected w/ Doppler on the right side.      Comments: Multiphasic right PT and AT  Monophasic left AT, no dopplerable PT  Pulmonary:      Effort: Pulmonary effort is normal. No respiratory distress.      Breath sounds: Normal breath sounds. No wheezing.   Abdominal:      General: There is no distension.      Palpations: Abdomen is soft.      Tenderness: There is no abdominal tenderness. There is no guarding.   Musculoskeletal:         General: Normal range of motion.      Comments: Left toe amputations, great toe left with necrotic tip  Chronic bilateral lower extremity swelling present   Skin:     General: Skin is warm and dry.      Coloration: Skin is not jaundiced.      Findings: No erythema or rash.   Neurological:      Mental Status: He is alert and oriented to person, place, and time. Mental status is at baseline.   Psychiatric:         Mood and Affect: Mood normal.         Behavior: Behavior normal.         Judgment: Judgment normal.         Last Recorded Vitals  Blood pressure 152/85, pulse 86, temperature 36.9 °C (98.4 °F), resp. rate 18, height 1.854 m (6' 1\"), weight 129 kg (285 lb), SpO2 98 %.    Relevant Results  Results for " orders placed or performed during the hospital encounter of 11/24/23 (from the past 24 hour(s))   CBC and Auto Differential   Result Value Ref Range    WBC 7.0 4.4 - 11.3 x10*3/uL    nRBC 0.0 0.0 - 0.0 /100 WBCs    RBC 3.44 (L) 4.50 - 5.90 x10*6/uL    Hemoglobin 9.5 (L) 13.5 - 17.5 g/dL    Hematocrit 29.7 (L) 41.0 - 52.0 %    MCV 86 80 - 100 fL    MCH 27.6 26.0 - 34.0 pg    MCHC 32.0 32.0 - 36.0 g/dL    RDW 12.7 11.5 - 14.5 %    Platelets 391 150 - 450 x10*3/uL    Neutrophils % 61.6 40.0 - 80.0 %    Immature Granulocytes %, Automated 0.4 0.0 - 0.9 %    Lymphocytes % 24.4 13.0 - 44.0 %    Monocytes % 10.1 2.0 - 10.0 %    Eosinophils % 2.9 0.0 - 6.0 %    Basophils % 0.6 0.0 - 2.0 %    Neutrophils Absolute 4.31 1.20 - 7.70 x10*3/uL    Immature Granulocytes Absolute, Automated 0.03 0.00 - 0.70 x10*3/uL    Lymphocytes Absolute 1.71 1.20 - 4.80 x10*3/uL    Monocytes Absolute 0.71 0.10 - 1.00 x10*3/uL    Eosinophils Absolute 0.20 0.00 - 0.70 x10*3/uL    Basophils Absolute 0.04 0.00 - 0.10 x10*3/uL   Basic metabolic panel   Result Value Ref Range    Glucose 235 (H) 74 - 99 mg/dL    Sodium 133 (L) 136 - 145 mmol/L    Potassium 5.3 3.5 - 5.3 mmol/L    Chloride 100 98 - 107 mmol/L    Bicarbonate 20 (L) 21 - 32 mmol/L    Anion Gap 18 10 - 20 mmol/L    Urea Nitrogen 75 (H) 6 - 23 mg/dL    Creatinine 6.06 (H) 0.50 - 1.30 mg/dL    eGFR 10 (L) >60 mL/min/1.73m*2    Calcium 9.1 8.6 - 10.6 mg/dL   C-Reactive Protein   Result Value Ref Range    C-Reactive Protein 4.15 (H) <1.00 mg/dL   Sedimentation Rate   Result Value Ref Range    Sedimentation Rate 105 (H) 0 - 20 mm/h   Protime-INR   Result Value Ref Range    Protime 12.7 9.8 - 12.8 seconds    INR 1.1 0.9 - 1.1     CT angio aorta and bilateral iliofemoral runoff w and or wo IV contrast   1. Complete occlusion of the P1 and P3 segments of the left popliteal   artery with some reconstitution of the P2 segment.   2. Although difficult to evaluate, likely complete occlusion of the    left anterior tibial and posterior tibial arteries.   3. Right anterior tibial, posterior tibial, and peroneal arteries are   not well evaluated due to poor contrast opacification.   4. Additional areas of atherosclerotic disease and luminal narrowing   as described above     CT head wo IV contrast    There is moderate brain parenchymal volume loss.       The lateral ventricles demonstrate mild disproportionate prominence   when compared with the sulci within the cerebral convexities. No   obstructing mass lesion is noted on this noncontrast CT study. This   mild disproportionate ventriculomegaly may be related to more   pronounced central volume loss ossific a component of communicating   hydrocephalus.      There are patchy and confluent nonspecific white matter changes   within the cerebral hemispheres bilaterally which while nonspecific,   can be seen with small-vessel ischemic change among others.   Additional focal hypodensities are identified within the subinsular   regions and basal ganglia bilaterally suggesting incidental prominent   perivascular spaces and/or scattered lacunar infarctions.     Assessment/Plan   Kwadwo Hoyt is a 57 y.o. male with PMH of DM neuropathy, multiple toe amputations with podiatry, HTN, CKD 4 presenting with two days of LLE weakness causing him to fall. Endorses no pain, movement intact. CTA showed complete occlusion of the P1 and P3 segments of the left popliteal artery with some reconstitution of the P2 segment and likely complete occlusion of the left anterior tibial and posterior tibial arteries. Physical exam showed a monophasic left AT, no dopplerable DP.     - no acute vascular surgery intervention at this time  - q4hr neurovascular checks to evaluate for progression of disease  - please obtain ZAC/PVRs  - okay to continue heparin gtt while workup is being done  - recommend involving medicine team given patient's multiple comorbidities  - vascular surgery will continue  to follow    Patient discussed with fellow Dr. Dobbs and attending Dr. Pepe Portillo MD

## 2023-11-25 NOTE — PROGRESS NOTES
"Kwadwo Hoyt is a 57 y.o. male on day 0 of admission presenting with Arterial occlusion.    Subjective   No acute events overnight.        Objective     Physical Exam:  General: NAD  Respiratory: unlabored breathing on room air   Cardiovascular: regular rate, normotensive  Extremities: Muliphasic right PT, AT  Monophasic left AT, no dopplerable PT   Great left toe with necrotic tip    Last Recorded Vitals  Blood pressure 146/85, pulse 81, temperature 36 °C (96.8 °F), resp. rate 20, height 1.854 m (6' 1\"), weight 129 kg (285 lb), SpO2 99 %.  Intake/Output last 3 Shifts:  I/O last 3 completed shifts:  In: - (0 mL/kg)   Out: 1250 (9.7 mL/kg) [Urine:1250 (0.3 mL/kg/hr)]  Weight: 129.3 kg     Relevant Results  Results for orders placed or performed during the hospital encounter of 11/24/23 (from the past 24 hour(s))   CBC and Auto Differential   Result Value Ref Range    WBC 7.0 4.4 - 11.3 x10*3/uL    nRBC 0.0 0.0 - 0.0 /100 WBCs    RBC 3.44 (L) 4.50 - 5.90 x10*6/uL    Hemoglobin 9.5 (L) 13.5 - 17.5 g/dL    Hematocrit 29.7 (L) 41.0 - 52.0 %    MCV 86 80 - 100 fL    MCH 27.6 26.0 - 34.0 pg    MCHC 32.0 32.0 - 36.0 g/dL    RDW 12.7 11.5 - 14.5 %    Platelets 391 150 - 450 x10*3/uL    Neutrophils % 61.6 40.0 - 80.0 %    Immature Granulocytes %, Automated 0.4 0.0 - 0.9 %    Lymphocytes % 24.4 13.0 - 44.0 %    Monocytes % 10.1 2.0 - 10.0 %    Eosinophils % 2.9 0.0 - 6.0 %    Basophils % 0.6 0.0 - 2.0 %    Neutrophils Absolute 4.31 1.20 - 7.70 x10*3/uL    Immature Granulocytes Absolute, Automated 0.03 0.00 - 0.70 x10*3/uL    Lymphocytes Absolute 1.71 1.20 - 4.80 x10*3/uL    Monocytes Absolute 0.71 0.10 - 1.00 x10*3/uL    Eosinophils Absolute 0.20 0.00 - 0.70 x10*3/uL    Basophils Absolute 0.04 0.00 - 0.10 x10*3/uL   Basic metabolic panel   Result Value Ref Range    Glucose 235 (H) 74 - 99 mg/dL    Sodium 133 (L) 136 - 145 mmol/L    Potassium 5.3 3.5 - 5.3 mmol/L    Chloride 100 98 - 107 mmol/L    Bicarbonate 20 (L) 21 - 32 " mmol/L    Anion Gap 18 10 - 20 mmol/L    Urea Nitrogen 75 (H) 6 - 23 mg/dL    Creatinine 6.06 (H) 0.50 - 1.30 mg/dL    eGFR 10 (L) >60 mL/min/1.73m*2    Calcium 9.1 8.6 - 10.6 mg/dL   C-Reactive Protein   Result Value Ref Range    C-Reactive Protein 4.15 (H) <1.00 mg/dL   Sedimentation Rate   Result Value Ref Range    Sedimentation Rate 105 (H) 0 - 20 mm/h   Protime-INR   Result Value Ref Range    Protime 12.7 9.8 - 12.8 seconds    INR 1.1 0.9 - 1.1   Lipid panel   Result Value Ref Range    Cholesterol 244 (H) 0 - 199 mg/dL    HDL-Cholesterol 36.0 mg/dL    Cholesterol/HDL Ratio 6.8     LDL Calculated 159 (H) <=99 mg/dL    VLDL 49 (H) 0 - 40 mg/dL    Triglycerides 247 (H) 0 - 149 mg/dL    Non HDL Cholesterol 208 (H) 0 - 149 mg/dL   Hemoglobin A1c   Result Value Ref Range    Hemoglobin A1C 12.6 (H) see below %    Estimated Average Glucose 315 Not Established mg/dL   Heparin Assay, UFH   Result Value Ref Range    Heparin Unfractionated 0.9 See Comment Below for Therapeutic Ranges IU/mL   POCT GLUCOSE   Result Value Ref Range    POCT Glucose 191 (H) 74 - 99 mg/dL   Heparin Assay, UFH   Result Value Ref Range    Heparin Unfractionated 0.5 See Comment Below for Therapeutic Ranges IU/mL   CT angio aorta and bilateral iliofemoral runoff w and or wo IV contrast   1. Complete occlusion of the P1 and P3 segments of the left popliteal   artery with some reconstitution of the P2 segment.   2. Although difficult to evaluate, likely complete occlusion of the   left anterior tibial and posterior tibial arteries.   3. Right anterior tibial, posterior tibial, and peroneal arteries are   not well evaluated due to poor contrast opacification.   4. Additional areas of atherosclerotic disease and luminal narrowing   as described above      CT head wo IV contrast    There is moderate brain parenchymal volume loss.       The lateral ventricles demonstrate mild disproportionate prominence   when compared with the sulci within the cerebral  convexities. No   obstructing mass lesion is noted on this noncontrast CT study. This   mild disproportionate ventriculomegaly may be related to more   pronounced central volume loss ossific a component of communicating   hydrocephalus.      There are patchy and confluent nonspecific white matter changes   within the cerebral hemispheres bilaterally which while nonspecific,   can be seen with small-vessel ischemic change among others.   Additional focal hypodensities are identified within the subinsular   regions and basal ganglia bilaterally suggesting incidental prominent   perivascular spaces and/or scattered lacunar infarctions.      Assessment/Plan   Principal Problem:    Arterial occlusion    Kwadwo Hoyt is a 57 y.o. male with PMH of DM neuropathy, multiple toe amputations with podiatry, HTN, CKD 4 presenting with two days of LLE weakness causing him to fall. Endorses no pain, movement intact. CTA showed complete occlusion of the P1 and P3 segments of the left popliteal artery with some reconstitution of the P2 segment and likely complete occlusion of the left anterior tibial and posterior tibial arteries. Physical exam showed a monophasic left AT, no dopplerable DP.      - no acute vascular surgery intervention at this time  - q4hr neurovascular checks to evaluate for progression of disease  - follow up ZAC/PRV  - okay to continue heparin gtt while workup is being done  - vascular surgery will continue to follow      Nino Dominique MD  General Surgery   [phone]

## 2023-11-25 NOTE — PROGRESS NOTES
Pharmacy Medication History Review    Kwadwo Hoyt is a 57 y.o. male admitted for Arterial occlusion. Pharmacy reviewed the patient's [id number] medications and allergies for accuracy.    The list below reflects the updated PTA list. Comments regarding how patient may be taking medications differently can be found in the Admit Orders Activity  Prior to Admission Medications   Prescriptions Last Dose Informant Patient Reported? Taking?   allopurinol (Zyloprim) 100 mg tablet 11/24/2023 Self Yes Yes    Sig: Take 1 tablet (100 mg) by mouth once daily.   amLODIPine (Norvasc) 10 mg tablet Unknown Self Yes Yes    Sig: Take 1 tablet (10 mg) by mouth once daily.   aspirin 81 mg EC tablet Unknown Self Yes Yes    Sig: Take 1 tablet (81 mg) by mouth once daily.   calcitriol (Rocaltrol) 0.25 mcg capsule Unknown Self Yes Yes    Sig: Take 1 capsule (0.25 mcg) by mouth once daily.   carvedilol (Coreg) 25 mg tablet Unknown Self Yes Yes   Sig: Take 1 tablet (25 mg) by mouth 2 times a day with meals.   chlorthalidone (Hygroton) 50 mg tablet Unknown Self Yes Yes   Sig: Take 1 tablet (50 mg) by mouth once daily.   gabapentin (Neurontin) 300 mg capsule Unknown Self Yes Yes    Sig: Take 1 capsule (300 mg) by mouth 2 times a day.   insulin glargine (Lantus U-100 Insulin) 100 unit/mL injection 11/24/2023 Self Yes Yes   Sig: Inject 40 Units under the skin once daily at bedtime. Take as directed per insulin instructions.   insulin lispro (HumaLOG) 100 unit/mL injection 11/24/2023 Self Yes Yes    Sig: Inject 0.1 mL (10 Units) under the skin 3 times a day with meals. Take as directed per insulin instructions.   lisinopril 40 mg tablet Unknown Self Yes Yes    Sig: Take 1 tablet (40 mg) by mouth once daily.   pantoprazole (ProtoNix) 20 mg EC tablet Unknown Self Yes Yes    Sig: Take 1 tablet (20 mg) by mouth once daily in the morning. Take before meals. Do not crush, chew, or split.   potassium chloride CR 20 mEq ER tablet Unknown Self  Yes Yes   Sig: Take 1 tablet (20 mEq) by mouth once daily.   sildenafil (Viagra) 50 mg tablet Unknown Self Yes Yes   Sig: Take 1 tablet (50 mg) by mouth if needed for erectile dysfunction.   simvastatin (Zocor) 20 mg tablet Unknown Self Yes Yes   Sig: Take 1 tablet (20 mg) by mouth once daily at bedtime.   torsemide (Demadex) 20 mg tablet Unknown Self Yes Yes    Sig: Take 2 tablets (40 mg) by mouth 2 times a day.      Facility-Administered Medications: None      The list below reflects the updated allergy list. Please review each documented allergy for additional clarification and justification.  Allergies  Reviewed by Geovanni Lai CPhT on 11/25/2023        Severity Reactions Comments    Nsaids (non-steroidal Anti-inflammatory Drug) Not Specified Other ckd4            Patient accepts M2B at discharge. Pharmacy has been updated to [facility].    Sources used to complete the med history include     Medication list from [facility]  Medication fill history from [facility]  [facility]  Patient interview  10/16/2023 Internal Medicine note [facility]    Below are additional concerns with the patient's PTA list.    Patient is a moderate historian   Patient has an incomplete medication list on his phone.  Patient knows medication name dose direction and frequency when read to him  Fill history suggests that patient is noncompliant with all medications apart from insulin  [facility] - Gabapentin quantity 120, day supply 60, fill date 11/15/2023     Patient request refills on these medications               - Carvedilol 25 mg              -  Amlodipine 10 mg              -  Aspirin 81 mg              -  Calcitriol 0.25              -  Chlorthalidone 50 mg               -  Gabapentin 300 mg               -  Lisinopril 40 mg               -  Pantoprazole 20 mg              -  Potassium Chloride 20 mEq              -  Sildenafil 50 mg               -  Simvastatin 20 mg               - Torsemide 20 mg       Geovanni Lai CPhT  Transitions of  Care Pharmacy Technician  [facility]  Please reach out via [employer] Secure Chat for questions, or if no response call [phone] or [employer] “MedRec”
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
## 2023-11-25 NOTE — PROGRESS NOTES
57 y.o. male admitted for Arterial occlusion [I70.90]  HFrEF (heart failure with reduced ejection fraction) (CMS/HCC) [I50.20]  Acute deep vein thrombosis (DVT) of left lower extremity after procedure (CMS/HCC) [I97.89, I82.402]  Acute occlusion of popliteal artery due to thrombosis (CMS/HCC) [I74.3].     Subjective   Pt seen at bedside complaining of difficulty standing up to urinate. States he is feeling much better today than when he first came in. He is tired as he didn't get much sleep at night.        Objective     Scheduled Medications:   amLODIPine, 10 mg, oral, Daily  [Held by provider] aspirin, 81 mg, oral, Daily  atorvastatin, 80 mg, oral, Nightly  calcitriol, 0.25 mcg, oral, Daily  gabapentin, 300 mg, oral, BID  insulin glargine, 20 Units, subcutaneous, Nightly  insulin lispro, 0-5 Units, subcutaneous, TID with meals  insulin lispro, 7 Units, subcutaneous, TID with meals  pantoprazole, 20 mg, oral, Daily before breakfast  polyethylene glycol, 17 g, oral, Daily         Continuous Medications:   heparin, 0-4,500 Units/hr, Last Rate: 1,800 Units/hr (11/25/23 0639)         PRN Medications:   PRN medications: dextrose 10 % in water (D10W), dextrose, glucagon, heparin, oxyCODONE    Dietary Orders (From admission, onward)       Start     Ordered    11/25/23 0044  Adult diet Carb Controlled; 75 gram carb/meal, 45 gram Carb evening snack  Diet effective now        Question Answer Comment   Diet type Carb Controlled    Carb diet selection: 75 gram carb/meal, 45 gram Carb evening snack        11/25/23 0043                    Vitals:  Most Recent:  Vitals:    11/25/23 1059   BP: (!) 162/104   Pulse: 78   Resp: 20   Temp: 37.2 °C (99 °F)   SpO2: 100%       24hr Min/Max:  Temp  Min: 36 °C (96.8 °F)  Max: 37.2 °C (99 °F)  Pulse  Min: 78  Max: 92  BP  Min: 135/95  Max: 204/95  Resp  Min: 18  Max: 20  SpO2  Min: 96 %  Max: 100 %    Intake/Output x24h:    Intake/Output Summary (Last 24 hours) at 11/25/2023 1152  Last data  filed at 11/25/2023 0638  Gross per 24 hour   Intake --   Output 1250 ml   Net -1250 ml        Physical Exam:  Physical Exam  Constitutional:       General: He is not in acute distress.     Appearance: He is obese. He is not ill-appearing.   Eyes:      Extraocular Movements: Extraocular movements intact.      Conjunctiva/sclera: Conjunctivae normal.   Cardiovascular:      Rate and Rhythm: Normal rate.   Pulmonary:      Effort: Pulmonary effort is normal. No respiratory distress.      Breath sounds: Normal breath sounds. No wheezing or rales.   Abdominal:      General: Abdomen is flat.      Palpations: Abdomen is soft.   Musculoskeletal:      Cervical back: Normal range of motion.      Comments: 5/5 strngth Bilateral UE, 4/5 strength LLE and 5/5 RLE. One great toe on left foot.   Skin:     Comments: No palpable DP pulses on LLE   Neurological:      General: No focal deficit present.      Mental Status: He is alert and oriented to person, place, and time.   Psychiatric:         Mood and Affect: Mood normal.         Behavior: Behavior normal.         Relevant Results  Results for orders placed or performed during the hospital encounter of 11/24/23 (from the past 24 hour(s))   CBC and Auto Differential   Result Value Ref Range    WBC 7.0 4.4 - 11.3 x10*3/uL    nRBC 0.0 0.0 - 0.0 /100 WBCs    RBC 3.44 (L) 4.50 - 5.90 x10*6/uL    Hemoglobin 9.5 (L) 13.5 - 17.5 g/dL    Hematocrit 29.7 (L) 41.0 - 52.0 %    MCV 86 80 - 100 fL    MCH 27.6 26.0 - 34.0 pg    MCHC 32.0 32.0 - 36.0 g/dL    RDW 12.7 11.5 - 14.5 %    Platelets 391 150 - 450 x10*3/uL    Neutrophils % 61.6 40.0 - 80.0 %    Immature Granulocytes %, Automated 0.4 0.0 - 0.9 %    Lymphocytes % 24.4 13.0 - 44.0 %    Monocytes % 10.1 2.0 - 10.0 %    Eosinophils % 2.9 0.0 - 6.0 %    Basophils % 0.6 0.0 - 2.0 %    Neutrophils Absolute 4.31 1.20 - 7.70 x10*3/uL    Immature Granulocytes Absolute, Automated 0.03 0.00 - 0.70 x10*3/uL    Lymphocytes Absolute 1.71 1.20 - 4.80  x10*3/uL    Monocytes Absolute 0.71 0.10 - 1.00 x10*3/uL    Eosinophils Absolute 0.20 0.00 - 0.70 x10*3/uL    Basophils Absolute 0.04 0.00 - 0.10 x10*3/uL   Basic metabolic panel   Result Value Ref Range    Glucose 235 (H) 74 - 99 mg/dL    Sodium 133 (L) 136 - 145 mmol/L    Potassium 5.3 3.5 - 5.3 mmol/L    Chloride 100 98 - 107 mmol/L    Bicarbonate 20 (L) 21 - 32 mmol/L    Anion Gap 18 10 - 20 mmol/L    Urea Nitrogen 75 (H) 6 - 23 mg/dL    Creatinine 6.06 (H) 0.50 - 1.30 mg/dL    eGFR 10 (L) >60 mL/min/1.73m*2    Calcium 9.1 8.6 - 10.6 mg/dL   C-Reactive Protein   Result Value Ref Range    C-Reactive Protein 4.15 (H) <1.00 mg/dL   Sedimentation Rate   Result Value Ref Range    Sedimentation Rate 105 (H) 0 - 20 mm/h   Protime-INR   Result Value Ref Range    Protime 12.7 9.8 - 12.8 seconds    INR 1.1 0.9 - 1.1   Lipid panel   Result Value Ref Range    Cholesterol 244 (H) 0 - 199 mg/dL    HDL-Cholesterol 36.0 mg/dL    Cholesterol/HDL Ratio 6.8     LDL Calculated 159 (H) <=99 mg/dL    VLDL 49 (H) 0 - 40 mg/dL    Triglycerides 247 (H) 0 - 149 mg/dL    Non HDL Cholesterol 208 (H) 0 - 149 mg/dL   Hemoglobin A1c   Result Value Ref Range    Hemoglobin A1C 12.6 (H) see below %    Estimated Average Glucose 315 Not Established mg/dL   Heparin Assay, UFH   Result Value Ref Range    Heparin Unfractionated 0.9 See Comment Below for Therapeutic Ranges IU/mL   POCT GLUCOSE   Result Value Ref Range    POCT Glucose 191 (H) 74 - 99 mg/dL   Heparin Assay, UFH   Result Value Ref Range    Heparin Unfractionated 0.5 See Comment Below for Therapeutic Ranges IU/mL   Transthoracic Echo (TTE) Complete   Result Value Ref Range    AV pk moni 1.21     LVOT diam 2.50     MV avg E/e' ratio 20.37     MV E/A ratio 1.40     RV free wall pk S' 12.50     LVIDd 5.20     Aortic Valve Area by Continuity of Peak Velocity 3.71     AV pk grad 5.9    POCT GLUCOSE   Result Value Ref Range    POCT Glucose 144 (H) 74 - 99 mg/dL   Heparin Assay, UFH   Result  Value Ref Range    Heparin Unfractionated 0.4 See Comment Below for Therapeutic Ranges IU/mL      Transthoracic Echo (TTE) Complete    Result Date: 11/25/2023   [facility], [address] [place] [address]                Tel [phone] and Fax [phone] TRANSTHORACIC ECHOCARDIOGRAM REPORT  Patient Name:      JONO GALLEGO        Reading Physician:    [id number] Michael Dietz MD Study Date:        11/25/2023           Ordering Provider:    [id number] TOVA ORTEGA MRN/PID:           [id number]             Fellow: Accession#:        [id number]         Nurse: Date of Birth/Age: 1965 / 57      Sonographer:          Seng perez RDCS Gender:            M                    Additional Staff: Height:            185.42 cm            Admit Date:           11/24/2023 Weight:            129.28 kg            Admission Status:     Inpatient -                                                               Routine BSA:               2.50 m2              Encounter#:           [id number]                                         Department Location:  [facility]                                                               Invasive Blood Pressure: 146 /85 mmHg Study Type:    TRANSTHORACIC ECHO (TTE) COMPLETE Diagnosis/ICD: Unspecified systolic (congestive) heart failure (CHF)-I50.20 Indication:    HFrEF; Acute DVT CPT Code:      Echo Complete w Full Doppler-[id number] Patient History: Pertinent History: IDDM, PAD; HLD, HtN, obesity; CHF. Study Detail: The following Echo studies were performed: 2D, M-Mode, Doppler and               color flow. Technically challenging study due to body habitus.  PHYSICIAN INTERPRETATION: Left Ventricle: The left ventricular systolic function is normal, with an estimated ejection  fraction of 60-65%. There are no regional wall motion abnormalities. The left ventricular cavity size is normal. There is severe concentric left ventricular hypertrophy. Spectral Doppler shows a pseudonormal pattern of left ventricular diastolic filling. There are elevated left atrial and left ventricular end diastolic pressures. Left Atrium: The left atrium is mildly dilated. Right Ventricle: The right ventricle is normal in size. There is normal right ventricular global systolic function. Right Atrium: The right atrium is normal in size. Aortic Valve: The aortic valve appears structurally normal. There is minimal aortic valve cusp calcification. There is no evidence of aortic valve regurgitation. The peak instantaneous gradient of the aortic valve is 5.9 mmHg. Mitral Valve: The mitral valve is normal in structure. There is no evidence of mitral valve regurgitation. Tricuspid Valve: The tricuspid valve is structurally normal. There is trace tricuspid regurgitation. The right ventricular systolic pressure is unable to be estimated. Pulmonic Valve: The pulmonic valve is structurally normal. There is trace pulmonic valve regurgitation. Pericardium: There is a small pericardial effusion. Aorta: The aortic root is normal. Systemic Veins: The inferior vena cava appears to be of normal size. There is IVC inspiratory collapse greater than 50%. In comparison to the previous echocardiogram(s): Compared with study from 11/17/2021, LVEF seems to have improved from low normal to normal. Concentric LVH is known.  CONCLUSIONS:  1. Left ventricular systolic function is normal with a 60-65% estimated ejection fraction.  2. Spectral Doppler shows a pseudonormal pattern of left ventricular diastolic filling.  3. There are elevated left atrial and left ventricular end diastolic pressures.  4. There is severe concentric left ventricular hypertrophy.  5. Findings consistent with HFpEF.  6. Compared with study from 11/17/2021, LVEF seems  to have improved from low normal to normal. Concentric LVH is known. QUANTITATIVE DATA SUMMARY: 2D MEASUREMENTS:                           Normal Ranges: Ao Root d:     3.60 cm    (2.0-3.7cm) LAs:           5.30 cm    (2.7-4.0cm) IVSd:          1.80 cm    (0.6-1.1cm) LVPWd:         1.70 cm    (0.6-1.1cm) LVIDd:         5.20 cm    (3.9-5.9cm) LVIDs:         3.10 cm LV Mass Index: 172.5 g/m2 LV % FS        40.4 % LA VOLUME:                             Normal Ranges: LA Volume Index: 34.1 ml/m2 RA VOLUME BY A/L METHOD:                       Normal Ranges: RA Area A4C: 16.3 cm2 AORTA MEASUREMENTS:                    Normal Ranges: Asc Ao, d: 3.63 cm (2.1-3.4cm) LV DIASTOLIC FUNCTION:                         Normal Ranges: MV Peak E:  0.89 m/s    (0.7-1.2 m/s) MV Peak A:  0.63 m/s    (0.42-0.7 m/s) E/A Ratio:  1.40        (1.0-2.2) MV e'       0.04 m/s    (>8.0) MV A Dur:   119.00 msec E/e' Ratio: 20.37       (<8.0) MV DT:      217 msec    (150-240 msec) MITRAL VALVE:                 Normal Ranges: MV DT: 217 msec (150-240msec) AORTIC VALVE:                         Normal Ranges: AoV Vmax:      1.21 m/s (<=1.7m/s) AoV Peak P.9 mmHg (<20mmHg) LVOT Max Shine:  0.91 m/s (<=1.1m/s) LVOT VTI:      16.10 cm LVOT Diameter: 2.50 cm  (1.8-2.4cm) AoV Area,Vmax: 3.71 cm2 (2.5-4.5cm2)  RIGHT VENTRICLE: RV s' 0.12 m/s PULMONIC VALVE:                      Normal Ranges: PV Max Shine: 1.0 m/s  (0.6-0.9m/s) PV Max P.1 mmHg  [id number] Michael Dietz MD Electronically signed on 2023 at 10:40:29 AM  ** Final **     CT angio aorta and bilateral iliofemoral runoff w and or wo IV contrast    Result Date: 2023  Interpreted By:  Obdulio Mendoza and Benza Andrew STUDY: CT ANGIO AORTA AND BILATERAL ILIOFEMORAL RUNOFF W AND OR WO IV CONTRAST;  2023 7:03 pm   INDICATION: Signs/Symptoms:concern for left limb ischemia vs. dissection.   COMPARISON: CT abdomen pelvis dated 10/13/2020   ACCESSION NUMBER(S): [id number]   ORDERING  CLINICIAN: JANNA HUTCHISON   TECHNIQUE: Thin-section axial images of the abdomen, pelvis and bilateral lower extremities were obtained in the arterial phase after intravenous administration of 150 mL of Omnipaque 350 contrast. Delayed images the legs were obtained for assessment of venous patency. Coronal and sagittal reformatted images were reconstructed from the axial data. Multiplanar MIPs and 3D reconstructions were created on an independent workstation and reviewed.   There was contrast extravasation from the IV site on the 1st scan attempt and the arterial and venous phase of the CT scan was repeated. Streak artifact from the upper extremities somewhat limits evaluation of the abdomen.   FINDINGS: VASCULATURE:   ABDOMINAL AORTA: No abdominal aortic aneurysm or dissection. Mild atherosclerotic calcifications of the abdominal aorta.   ABDOMINAL ARTERIES: No hemodynamically significant stenosis.     RIGHT LOWER EXTREMITY:   - Right Common Iliac Artery: Mild-to-moderate atherosclerotic changes with no hemodynamically significant stenosis. - Right External Iliac Artery: No hemodynamically significant stenosis. - Right Internal Iliac Artery:  Noncalcified atherosclerotic plaque in the proximal right internal iliac artery with resultant severe focal stenosis and non opacification of some of the posterior iliac branches.   - Right Common Femoral Artery: No hemodynamically significant stenosis. - Right Profunda Artery: No significant stenosis. - Right Superficial Femoral Artery: Scattered atherosclerotic calcifications without significant stenosis. - Right Popliteal Artery: Mild atherosclerotic calcifications without significant stenosis. - Right Anterior Tibial, Posterior Tibial, Peroneal Arteries: There is heavy atherosclerotic plaque of the right anterior tibial trunk and of the anterior tibial artery with areas of multifocal stenosis or occlusion. There is suspected central noncalcified atherosclerotic plaque within  the posterior tibial artery, for example axial image 295 of 1463 with areas of multifocal stenosis without occlusion. There is also multifocal stenosis of the peroneal artery..     LEFT LOWER EXTREMITY:   - Left Common Iliac Artery: Mild atherosclerotic changes with no hemodynamically significant stenosis. - Left External Iliac Artery: No hemodynamically significant stenosis. - Left Internal Iliac Artery: Calcified and noncalcified atherosclerotic plaque with severe narrowing at the origin of the left internal iliac artery.   - Left Common Femoral Artery: No hemodynamically significant stenosis. - Left Profunda Artery: Noncalcified atherosclerotic plaque at the origin of the left deep femoral artery with focal short segment moderate stenosis, axial image 411 of 1463. - Left Superficial Femoral Artery: Moderate atherosclerotic calcifications throughout with more extensive atherosclerotic plaque distally with a proximally 7 cm focal occlusion of the distal left superficial femoral artery, for example coronal image 130 of 236 and axial image 710 of 1463 with reconstitution at the distal most aspect of the superficial femoral artery. There is some collateral supply via the deep femoral collaterals. - Left Popliteal Artery: Moderate multifocal stenosis of the popliteal due to calcified and noncalcified atherosclerotic plaque/thrombus. There is complete occlusion of the distal popliteal artery, axial image 865 of 1463. -Left Anterior Tibial, Posterior Tibial, Peroneal Arteries: The anterior tibial trunk is occluded and there is occlusion of the anterior tibial artery. There is multifocal severe stenosis and occlusion of the posterior tibial artery. The peroneal artery appears patent.       ABDOMEN/PELVIS:   LIVER: Normal size and contour. No suspicious hepatic lesions.   BILE DUCTS: No significant intrahepatic or extrahepatic dilatation.   GALLBLADDER: Fluid-filled without evidence of distention, wall thickening, or  radiopaque calculi.   SPLEEN: No significant abnormality.   PANCREAS: No significant abnormality.   ADRENALS: No significant abnormality.   KIDNEYS, URETERS, BLADDER: No significant abnormality.   REPRODUCTIVE ORGANS: The prostate is enlarged measuring 6.7 cm in transverse dimension.   VESSELS: See above. No additional significant abnormality.   RETROPERITONEUM/LYMPH NODES: No enlarged lymph nodes.   BOWEL/PERITONEUM: No inflammatory bowel wall thickening or dilatation. Normal appendix.   No pneumoperitoneum, significant ascites, or abscess.     ABDOMINAL WALL: No significant abnormality.   MUSCULOSKELETAL: No acute osseous abnormality. There is diffuse lower extremity edema. Status post plate and screw fixation of the left distal fibula with intact hardware. Osseous remodeling of the distal tibia and chronic medial malleolus fracture fragment. There is suspected moderate tibiotalar joint arthropathy. There are also partially visualized cortical screw fixation of the cuboid through 4th metatarsal joint and of the cuneiform and proximal 1st through 3rd metatarsal joints. There is partial amputation of the 3rd through 5th metatarsals. Suspected chronic fractures at the base of the 3rd through 5th metatarsals.   LOWER CHEST: No acute abnormality involving the lung bases.       Examination is limited by suboptimal arm positioning and consequent beam hardening artifact. With this limitation: 1. Multifocal pelvic and to a greater distal lower extremity atherosclerotic disease. There is aproximally 7 cm in length occlusion of the distal left superficial femoral artery with some collaterals from the deep femoral vessels. There is reconstitution of flow at the distal most aspect of the superficial femoral artery, however there is moderate noncalcified plaque/thrombus throughout the popliteal artery and complete occlusion of the distal popliteal artery. The left peroneal artery appears patent. The anterior tibial trunk,  anterior tibial, and posterior tibial arteries are occluded on the left. 2. There is also heavy atherosclerotic stenosis of the right distal lower extremity vessels with multifocal stenosis or occlusion of the right anterior tibial and peroneal arteries with some flow noted within the right posterior tibial artery, which also however demonstrates severe stenosis. 3. Partially visualized postsurgical changes of the surgical fixation of chronic bimalleolar fractures without gross evidence of hardware complication. Moderate tibiotalar joint arthropathy. Partially visualized fixation of tarsal/metatarsal joints with partial amputation of the 3rd through 5th metatarsals. 4. Prostatomegaly.   I personally reviewed the images/study and I agree with the findings as stated above by resident physician, Thai Torres MD. This study was interpreted at [facility], [place], [place].   MACRO: Thai Torres discussed the significance and urgency of this critical finding by telephone with  JANNA HUTCHISON on 11/24/2023 at 7:35 pm. (**-RCF-**) Findings:  See findings.   Signed by: Obdulio Mendoza 11/24/2023 10:03 PM Dictation workstation:   [id number]    CT lumbar spine wo IV contrast    Result Date: 11/24/2023  Interpreted By:  Charlie Betts, STUDY: CT LUMBAR SPINE WO IV CONTRAST  11/24/2023 4:11 pm   INDICATION: Signs/Symptoms:left lower extremity weakness   COMPARISON: None.   ACCESSION NUMBER(S): [id number]   ORDERING CLINICIAN: CHOLO JORDAN   TECHNIQUE: Thin cut axial CT images through the lumbar spine were obtained and reconstructed in the coronal and sagittal planes.   FINDINGS: No acute fracture of the lumbar spine is noted.   The lumbar vertebral bodies are in anatomic alignment.   There is multilevel spondylosis with degenerative changes noted along endplates within lumbar and visualized lower thoracic region. The soft tissues of the spinal canal and neural foramen are suboptimally  evaluated on this CT study.   At the L5/S1 level, there is a mild posterior disc bulge, degenerative facet changes, and ligamentum flavum hypertrophy contributing to suspected mild overall spinal canal narrowing. There is mild encroachment upon the neural foramen bilaterally.   At the L4/5 level, there is posterior osteophytic spurring, posterior disc bulge, along with degenerative facet changes and ligamentum flavum hypertrophy which in combination with prominence of the epidural fat posteriorly within the spinal canal contributes to suspected at least moderate narrowing of the thecal sac within the spinal canal. There is moderate encroachment upon the neural foramen bilaterally.   At the L3/4 level, is minimal posterior osteophytic spurring and posterior disc bulge along with degenerative facet changes and ligamentum flavum hypertrophy. There is prominence of the epidural fat posteriorly within the spinal canal. There is suspected moderate narrowing of the thecal sac within the spinal canal. There is moderate encroachment upon the neural foramen bilaterally.   At the L2/3 level, there is a suspected mild posterior disc bulge along with mild degenerative facet changes and ligamentum flavum hypertrophy. There is prominence of the epidural fat posteriorly within the spinal canal. There is mild overall narrowing of the thecal sac within the spinal canal. There is mild encroachment upon the neural foramen bilaterally. There is right far lateral osteophytic spurring.   At the L1/2 level, there are mild degenerative facet changes without significant bony encroachment upon the spinal canal or neural foramen. There is right far lateral osteophytic spurring.   At the T12/L1 level, there are mild degenerative facet changes without significant bony encroachment upon the spinal canal or neural foramen.   At the T11/12 level, there are degenerative facet changes and minimal posterior osteophytic spurring contributing to  suspected mild spinal canal narrowing. There is mild-to-moderate bony encroachment upon the neural foramen bilaterally.   There is a component of bony ankylosis along the sacroiliac joints bilaterally.   Atherosclerotic calcifications are noted along the descending aorta and iliac arteries.         No acute fracture of the lumbar spine is noted.   The lumbar vertebral bodies are in anatomic alignment.   There is multilevel spondylosis with degenerative changes noted along endplates within lumbar and visualized lower thoracic region. There are varying degrees of spinal canal and neural foraminal narrowing as described above. The soft tissues of the spinal canal and neural foramen are suboptimally evaluated on this CT study.   MACRO: None   Signed by: Charlie Betts 11/24/2023 4:30 PM Dictation workstation:   [id number]    CT head wo IV contrast    Result Date: 11/24/2023  Interpreted By:  Charlie Betts, STUDY: CT HEAD WO IV CONTRAST;  11/24/2023 4:11 pm   INDICATION: Signs/Symptoms:left lower extremity weakness.   COMPARISON: None.   ACCESSION NUMBER(S): [id number]   ORDERING CLINICIAN: CHOLO JORDAN   TECHNIQUE: Axial CT images of the head were obtained without intravenous contrast administration.   FINDINGS: There is moderate brain parenchymal volume loss.   The lateral ventricles demonstrate mild disproportionate prominence when compared with the sulci within the cerebral convexities. No obstructing mass lesion is noted on this noncontrast CT study. This mild disproportionate ventriculomegaly may be related to more pronounced central volume loss ossific a component of communicating hydrocephalus.   There are patchy and confluent nonspecific white matter changes within the cerebral hemispheres bilaterally which while nonspecific, can be seen with small-vessel ischemic change among others.   Additional focal hypodensities are identified within the subinsular regions and basal ganglia bilaterally suggesting  incidental prominent perivascular spaces and/or scattered lacunar infarctions.   No hyperdense acute intracranial hemorrhage is noted.   There is no midline shift.   There are scattered retention cysts or polyps within the bilateral maxillary sinuses. The remaining paranasal sinuses are clear.   There is opacification of the left middle ear cavity and left mastoid air cells.   There is a metallic foreign body within the right facial soft tissues surrounding the anterior margin of the right parotid gland.       There is moderate brain parenchymal volume loss.   The lateral ventricles demonstrate mild disproportionate prominence when compared with the sulci within the cerebral convexities. No obstructing mass lesion is noted on this noncontrast CT study. This mild disproportionate ventriculomegaly may be related to more pronounced central volume loss ossific a component of communicating hydrocephalus.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

## 2023-11-25 NOTE — CARE PLAN
Problem: Psychosocial Needs  Goal: Collaborate with me, my family, and caregiver to identify my specific goals  Recent Flowsheet Documentation  Taken 11/25/2023 0225 by Cuong Molina RN  Cultural Requests During Hospitalization: n/a  Spiritual Requests During Hospitalization: n/a

## 2023-11-25 NOTE — HOSPITAL COURSE
Kwadwo Hoyt is a 57 y.o. male with a PMHx of DM c/b neuropathy, HTN, CKD stage 5, HFrEF (EF 50% in 11/2021), & ALEXANDER, who presents to the ER with left leg and arm weakness. LLE distal pulses were not palpable and CTA aorta & iliofemoral runoff showed complete left popliteal occlusion. Heparin gtt was started and vascular medicine were consulted but recommended no surgical intervention, recommend PVR/ZAC first which showed severe disease at the femoral popliteal level.   Neuro were also consulted for concern of stroke given patients left sided weakness. Stroke workup negative, although Neuro suggested stroke follow up in 4-6 weeks, discharge with ziopatch / loop recorder. Echo obtained 11/25 showed HFpEF (EF 60-65%). Vascular Surgery planned for angiogram, however since Cr and electrolytes continued to worsen, Nephrology was consulted and the angiogram delayed. Upon admission, Cr 6.06 and increased to 11.17 plus potassium stayed elevated at 5.5 mmol/L. A temporary HD catheter place by IR on 11/28th and received HD. Nephrology suggested worsening creatinine likely due to DONA (11/24) with hemodynamic injury (fluctuating BP). Patient with labile BP ranging from 165, 193, to 122.  Neprology Podiatry consulted for dry gangrene of left hallux. Nephrology consulted for worsening Cr and electrolyte status.  Tunneled catheter placed by IR 11/30. Pt to c/w dialysis and underwent LLE angiogram with Vascular Surgery on 12/1 and peripheral angioplasty 12/6. Vascular surgery signed off.  Vascular Medicine consulted for evaluation of provoked vs. Unprovoked arterial occlusion workup as well as provide recommendation on transition to DOAC with duration of treatment. Vasc Medicine recommended to continue heparin gtt, ASA, Plavix, and to obtain Limited echo with bubble study and CTA chest, along with hypercoagulable lab workup. Podiatry scheduled TMA of left foot with dry gangrene on 12/11 who recommend hgb >7.5 for procedure. Due to  OR schedules and emergenices, the procedure was scheduled for 12/13.  Patient underwent L foot TMA on 12/13 with podiatry. PT/OT were consulted to see patient for post-surgery evaluation. On 12/14, a code violet was called on the patient at around 1545. The patient refused to have his wound vac and IV removed. He verbally told us he knows the risks of leaving including getting an infection, losing his Left foot and death.       Follow up:   SW have set up patient for outpatient HD with [facility] form [facility] on TTS.

## 2023-11-26 LAB
ALBUMIN SERPL BCP-MCNC: 3.1 G/DL (ref 3.4–5)
ANION GAP SERPL CALC-SCNC: 19 MMOL/L (ref 10–20)
BASOPHILS # BLD AUTO: 0.04 X10*3/UL (ref 0–0.1)
BASOPHILS NFR BLD AUTO: 0.6 %
BUN SERPL-MCNC: 88 MG/DL (ref 6–23)
CALCIUM SERPL-MCNC: 8.5 MG/DL (ref 8.6–10.6)
CHLORIDE SERPL-SCNC: 98 MMOL/L (ref 98–107)
CO2 SERPL-SCNC: 19 MMOL/L (ref 21–32)
CREAT SERPL-MCNC: 8.96 MG/DL (ref 0.5–1.3)
EOSINOPHIL # BLD AUTO: 0.19 X10*3/UL (ref 0–0.7)
EOSINOPHIL NFR BLD AUTO: 2.8 %
ERYTHROCYTE [DISTWIDTH] IN BLOOD BY AUTOMATED COUNT: 12.8 % (ref 11.5–14.5)
GFR SERPL CREATININE-BSD FRML MDRD: 6 ML/MIN/1.73M*2
GLUCOSE BLD MANUAL STRIP-MCNC: 142 MG/DL (ref 74–99)
GLUCOSE BLD MANUAL STRIP-MCNC: 86 MG/DL (ref 74–99)
GLUCOSE BLD MANUAL STRIP-MCNC: 95 MG/DL (ref 74–99)
GLUCOSE SERPL-MCNC: 142 MG/DL (ref 74–99)
HCT VFR BLD AUTO: 27.5 % (ref 41–52)
HGB BLD-MCNC: 8.5 G/DL (ref 13.5–17.5)
IMM GRANULOCYTES # BLD AUTO: 0.02 X10*3/UL (ref 0–0.7)
IMM GRANULOCYTES NFR BLD AUTO: 0.3 % (ref 0–0.9)
LYMPHOCYTES # BLD AUTO: 2 X10*3/UL (ref 1.2–4.8)
LYMPHOCYTES NFR BLD AUTO: 29.1 %
MAGNESIUM SERPL-MCNC: 2.09 MG/DL (ref 1.6–2.4)
MCH RBC QN AUTO: 27.5 PG (ref 26–34)
MCHC RBC AUTO-ENTMCNC: 30.9 G/DL (ref 32–36)
MCV RBC AUTO: 89 FL (ref 80–100)
MONOCYTES # BLD AUTO: 0.94 X10*3/UL (ref 0.1–1)
MONOCYTES NFR BLD AUTO: 13.7 %
NEUTROPHILS # BLD AUTO: 3.68 X10*3/UL (ref 1.2–7.7)
NEUTROPHILS NFR BLD AUTO: 53.5 %
NRBC BLD-RTO: 0 /100 WBCS (ref 0–0)
PHOSPHATE SERPL-MCNC: 6.9 MG/DL (ref 2.5–4.9)
PLATELET # BLD AUTO: 337 X10*3/UL (ref 150–450)
POTASSIUM SERPL-SCNC: 5.5 MMOL/L (ref 3.5–5.3)
RBC # BLD AUTO: 3.09 X10*6/UL (ref 4.5–5.9)
SODIUM SERPL-SCNC: 130 MMOL/L (ref 136–145)
UFH PPP CHRO-ACNC: 0.4 IU/ML
WBC # BLD AUTO: 6.9 X10*3/UL (ref 4.4–11.3)

## 2023-11-26 PROCEDURE — 2500000004 HC RX 250 GENERAL PHARMACY W/ HCPCS (ALT 636 FOR OP/ED): Performed by: INTERNAL MEDICINE

## 2023-11-26 PROCEDURE — 85025 COMPLETE CBC W/AUTO DIFF WBC: CPT

## 2023-11-26 PROCEDURE — 85520 HEPARIN ASSAY: CPT | Performed by: EMERGENCY MEDICINE

## 2023-11-26 PROCEDURE — 36415 COLL VENOUS BLD VENIPUNCTURE: CPT | Performed by: EMERGENCY MEDICINE

## 2023-11-26 PROCEDURE — 2500000002 HC RX 250 W HCPCS SELF ADMINISTERED DRUGS (ALT 637 FOR MEDICARE OP, ALT 636 FOR OP/ED)

## 2023-11-26 PROCEDURE — 82947 ASSAY GLUCOSE BLOOD QUANT: CPT

## 2023-11-26 PROCEDURE — 1200000002 HC GENERAL ROOM WITH TELEMETRY DAILY

## 2023-11-26 PROCEDURE — 2500000004 HC RX 250 GENERAL PHARMACY W/ HCPCS (ALT 636 FOR OP/ED): Performed by: EMERGENCY MEDICINE

## 2023-11-26 PROCEDURE — 2500000004 HC RX 250 GENERAL PHARMACY W/ HCPCS (ALT 636 FOR OP/ED)

## 2023-11-26 PROCEDURE — 2500000001 HC RX 250 WO HCPCS SELF ADMINISTERED DRUGS (ALT 637 FOR MEDICARE OP)

## 2023-11-26 PROCEDURE — 36415 COLL VENOUS BLD VENIPUNCTURE: CPT

## 2023-11-26 PROCEDURE — 2500000001 HC RX 250 WO HCPCS SELF ADMINISTERED DRUGS (ALT 637 FOR MEDICARE OP): Performed by: INTERNAL MEDICINE

## 2023-11-26 PROCEDURE — 99233 SBSQ HOSP IP/OBS HIGH 50: CPT

## 2023-11-26 PROCEDURE — 80069 RENAL FUNCTION PANEL: CPT

## 2023-11-26 PROCEDURE — 83735 ASSAY OF MAGNESIUM: CPT

## 2023-11-26 RX ORDER — HYDROMORPHONE HYDROCHLORIDE 1 MG/ML
1 INJECTION, SOLUTION INTRAMUSCULAR; INTRAVENOUS; SUBCUTANEOUS EVERY 6 HOURS PRN
Status: DISCONTINUED | OUTPATIENT
Start: 2023-11-26 | End: 2023-12-14 | Stop reason: HOSPADM

## 2023-11-26 RX ORDER — HYDROMORPHONE HYDROCHLORIDE 1 MG/ML
1 INJECTION, SOLUTION INTRAMUSCULAR; INTRAVENOUS; SUBCUTANEOUS EVERY 4 HOURS PRN
Status: DISCONTINUED | OUTPATIENT
Start: 2023-11-26 | End: 2023-11-26

## 2023-11-26 RX ORDER — ACETAMINOPHEN 325 MG/1
975 TABLET ORAL 3 TIMES DAILY
Status: COMPLETED | OUTPATIENT
Start: 2023-11-26 | End: 2023-11-28

## 2023-11-26 RX ADMIN — TORSEMIDE 100 MG: 20 TABLET ORAL at 09:54

## 2023-11-26 RX ADMIN — ATORVASTATIN CALCIUM 80 MG: 80 TABLET, FILM COATED ORAL at 20:32

## 2023-11-26 RX ADMIN — POLYETHYLENE GLYCOL 3350 17 G: 17 POWDER, FOR SOLUTION ORAL at 09:58

## 2023-11-26 RX ADMIN — CALCITRIOL CAPSULES 0.25 MCG 0.25 MCG: 0.25 CAPSULE ORAL at 10:00

## 2023-11-26 RX ADMIN — CARVEDILOL 25 MG: 12.5 TABLET, FILM COATED ORAL at 20:32

## 2023-11-26 RX ADMIN — SODIUM BICARBONATE 650 MG: 650 TABLET ORAL at 10:00

## 2023-11-26 RX ADMIN — GABAPENTIN 100 MG: 100 CAPSULE ORAL at 20:32

## 2023-11-26 RX ADMIN — PANTOPRAZOLE SODIUM 20 MG: 20 TABLET, DELAYED RELEASE ORAL at 06:46

## 2023-11-26 RX ADMIN — OXYCODONE HYDROCHLORIDE 5 MG: 5 TABLET ORAL at 06:47

## 2023-11-26 RX ADMIN — INSULIN LISPRO 7 UNITS: 100 INJECTION, SOLUTION INTRAVENOUS; SUBCUTANEOUS at 09:53

## 2023-11-26 RX ADMIN — HEPARIN SODIUM 1800 UNITS/HR: 10000 INJECTION, SOLUTION INTRAVENOUS at 06:47

## 2023-11-26 RX ADMIN — CARVEDILOL 25 MG: 12.5 TABLET, FILM COATED ORAL at 10:00

## 2023-11-26 RX ADMIN — SODIUM BICARBONATE 650 MG: 650 TABLET ORAL at 20:32

## 2023-11-26 RX ADMIN — HEPARIN SODIUM 1800 UNITS/HR: 10000 INJECTION, SOLUTION INTRAVENOUS at 20:32

## 2023-11-26 RX ADMIN — INSULIN GLARGINE 20 UNITS: 100 INJECTION, SOLUTION SUBCUTANEOUS at 20:33

## 2023-11-26 RX ADMIN — ACETAMINOPHEN 975 MG: 325 TABLET ORAL at 20:32

## 2023-11-26 RX ADMIN — SODIUM BICARBONATE 650 MG: 650 TABLET ORAL at 14:22

## 2023-11-26 RX ADMIN — AMLODIPINE BESYLATE 10 MG: 10 TABLET ORAL at 10:00

## 2023-11-26 ASSESSMENT — COGNITIVE AND FUNCTIONAL STATUS - GENERAL
HELP NEEDED FOR BATHING: A LITTLE
CLIMB 3 TO 5 STEPS WITH RAILING: TOTAL
STANDING UP FROM CHAIR USING ARMS: A LOT
MOBILITY SCORE: 15
MOVING TO AND FROM BED TO CHAIR: A LOT
WALKING IN HOSPITAL ROOM: A LOT
DRESSING REGULAR UPPER BODY CLOTHING: A LITTLE
PERSONAL GROOMING: A LITTLE
DAILY ACTIVITIY SCORE: 19
DRESSING REGULAR LOWER BODY CLOTHING: A LITTLE
TOILETING: A LITTLE

## 2023-11-26 ASSESSMENT — PAIN SCALES - GENERAL
PAINLEVEL_OUTOF10: 0 - NO PAIN
PAINLEVEL_OUTOF10: 0 - NO PAIN

## 2023-11-26 ASSESSMENT — PAIN - FUNCTIONAL ASSESSMENT: PAIN_FUNCTIONAL_ASSESSMENT: 0-10

## 2023-11-26 ASSESSMENT — PAIN SCALES - WONG BAKER: WONGBAKER_NUMERICALRESPONSE: NO HURT

## 2023-11-26 NOTE — NURSING NOTE
Physician called requesting IV site be relocated to the left arm. Nurse discussed with patient the need to change site which requires new IV site, patient refused new site stating right site was ok to keep.

## 2023-11-26 NOTE — SIGNIFICANT EVENT
Stroke Updated Recommendations:    Kwadwo Hoyt is a 57 y.o. right handed, AA man, with PMH notable for HTN, T2DM, diabetic nephropathy (CKD stage 4 - with ongoing HD discussion with [facility]), who presented to the ED today with 1 day history of left leg weakness and numbness. Neurology team consulted for possible stroke. Exam shows left arm pronator drift, dysmetria on FTN, and weakness on left leg (proximal > distal), along with decreased sensation, NIHSS 4. CTH shows bilateral subcortical WM changes, and subtle right frontal hypodensity concerning for possible subacute infract.      Not a candidate for IV thrombolysis given out of window. No cortical signs to indicated emergent CTA head and neck.      Given isolated left leg weakness and discomfort, CTA angio pelvis and LE were obtained, which showed left popliteal occlusion. Patient was started on heparin drip, and vascular surgery involved; no surgical intervention from their side.     Presentation highly concerning for cardio-embolic R hemispheric stroke. Echocardiogram showed EF 60-65%, LA mildly dilated, concentric LVH; no mention of bubble study or thrombus. Current A1C is 12.6.%, secondary stroke prevention recommendation is A1C <7%, recommend aggressive glucose control. Current LDL is 159, secondary stroke prevention recommendation is LDL < 70, agree with initiation of high intensity statin.    Impression:  Left side weakness, leg > arm (pronator drift and dysmetria), concerning for ischemic infarct.   Given concomitant popliteal artery occlusion, that would be concerning for embolic source.      Recommendations:   - Continue holding Aspirin 81mg  - Agree with heparin infusion and likely anticoagulation after discharge for LE arterial thrombus  - Please order ziopatch or loop recorder upon discharge  - Stroke follow up with Dr. Story 4-6 weeks after discharge (can request virtual appt)  - Would consider CTA head and neck when able.   - Please page BAT  and order STAT CTH if there is any changes in neuro exam.     Stroke neurology will sign off.    Wendy Benito MD  PGY-3 Neurology  Stroke [id number]
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
## 2023-11-26 NOTE — CARE PLAN
The patient's goals for the shift include      The clinical goals for the shift include pt would like to remain comfortable  Pt was comfortable through out the shift. Pt pain was controlled through repositioning.   Most goals met. Pt was free from injury during the shift.     Problem: Safety  Goal: Patient will be injury free during hospitalization  Outcome: Met  Goal: I will remain free of falls  Outcome: Met  Goal: Ability to express needs and understand communication  Outcome: Met  Goal: Ability to express needs and understand communication  Outcome: Met     Problem: Daily Care  Goal: Daily care needs are met  Outcome: Met  Goal: Ability to function at adequate level  Outcome: Met     Problem: Psychosocial Needs  Goal: Demonstrates ability to cope with hospitalization/illness  Outcome: Met  Goal: Collaborate with me, my family, and caregiver to identify my specific goals  Outcome: Met     Problem: Discharge Barriers  Goal: My discharge needs are met  Outcome: Met

## 2023-11-27 ENCOUNTER — APPOINTMENT (OUTPATIENT)
Dept: RADIOLOGY | Facility: HOSPITAL | Age: 58
DRG: 270 | End: 2023-11-27
Payer: MEDICARE

## 2023-11-27 ENCOUNTER — APPOINTMENT (OUTPATIENT)
Dept: VASCULAR MEDICINE | Facility: HOSPITAL | Age: 58
DRG: 270 | End: 2023-11-27
Payer: MEDICARE

## 2023-11-27 PROBLEM — I73.9 PERIPHERAL VASCULAR DISEASE, UNSPECIFIED (CMS-HCC): Status: ACTIVE | Noted: 2023-11-24

## 2023-11-27 LAB
ABO GROUP (TYPE) IN BLOOD: NORMAL
ALBUMIN SERPL BCP-MCNC: 3.3 G/DL (ref 3.4–5)
ANION GAP SERPL CALC-SCNC: 18 MMOL/L (ref 10–20)
ANTIBODY SCREEN: NORMAL
BUN SERPL-MCNC: 87 MG/DL (ref 6–23)
CALCIUM SERPL-MCNC: 9 MG/DL (ref 8.6–10.6)
CHLORIDE SERPL-SCNC: 99 MMOL/L (ref 98–107)
CO2 SERPL-SCNC: 18 MMOL/L (ref 21–32)
CREAT SERPL-MCNC: 11.17 MG/DL (ref 0.5–1.3)
ERYTHROCYTE [DISTWIDTH] IN BLOOD BY AUTOMATED COUNT: 12.8 % (ref 11.5–14.5)
GFR SERPL CREATININE-BSD FRML MDRD: 5 ML/MIN/1.73M*2
GLUCOSE BLD MANUAL STRIP-MCNC: 111 MG/DL (ref 74–99)
GLUCOSE BLD MANUAL STRIP-MCNC: 86 MG/DL (ref 74–99)
GLUCOSE SERPL-MCNC: 77 MG/DL (ref 74–99)
HCT VFR BLD AUTO: 27.6 % (ref 41–52)
HGB BLD-MCNC: 8.7 G/DL (ref 13.5–17.5)
MAGNESIUM SERPL-MCNC: 2.2 MG/DL (ref 1.6–2.4)
MCH RBC QN AUTO: 27.2 PG (ref 26–34)
MCHC RBC AUTO-ENTMCNC: 31.5 G/DL (ref 32–36)
MCV RBC AUTO: 86 FL (ref 80–100)
NRBC BLD-RTO: 0 /100 WBCS (ref 0–0)
PHOSPHATE SERPL-MCNC: 8 MG/DL (ref 2.5–4.9)
PLATELET # BLD AUTO: 351 X10*3/UL (ref 150–450)
POTASSIUM SERPL-SCNC: 5.4 MMOL/L (ref 3.5–5.3)
RBC # BLD AUTO: 3.2 X10*6/UL (ref 4.5–5.9)
RH FACTOR (ANTIGEN D): NORMAL
SODIUM SERPL-SCNC: 130 MMOL/L (ref 136–145)
WBC # BLD AUTO: 6.9 X10*3/UL (ref 4.4–11.3)

## 2023-11-27 PROCEDURE — 2500000004 HC RX 250 GENERAL PHARMACY W/ HCPCS (ALT 636 FOR OP/ED): Performed by: EMERGENCY MEDICINE

## 2023-11-27 PROCEDURE — 2500000004 HC RX 250 GENERAL PHARMACY W/ HCPCS (ALT 636 FOR OP/ED)

## 2023-11-27 PROCEDURE — 2500000001 HC RX 250 WO HCPCS SELF ADMINISTERED DRUGS (ALT 637 FOR MEDICARE OP): Performed by: INTERNAL MEDICINE

## 2023-11-27 PROCEDURE — 2500000001 HC RX 250 WO HCPCS SELF ADMINISTERED DRUGS (ALT 637 FOR MEDICARE OP)

## 2023-11-27 PROCEDURE — 1200000002 HC GENERAL ROOM WITH TELEMETRY DAILY

## 2023-11-27 PROCEDURE — 82947 ASSAY GLUCOSE BLOOD QUANT: CPT

## 2023-11-27 PROCEDURE — 36415 COLL VENOUS BLD VENIPUNCTURE: CPT | Performed by: STUDENT IN AN ORGANIZED HEALTH CARE EDUCATION/TRAINING PROGRAM

## 2023-11-27 PROCEDURE — 76770 US EXAM ABDO BACK WALL COMP: CPT

## 2023-11-27 PROCEDURE — 93923 UPR/LXTR ART STDY 3+ LVLS: CPT

## 2023-11-27 PROCEDURE — 93923 UPR/LXTR ART STDY 3+ LVLS: CPT | Performed by: SURGERY

## 2023-11-27 PROCEDURE — 99233 SBSQ HOSP IP/OBS HIGH 50: CPT

## 2023-11-27 PROCEDURE — 80069 RENAL FUNCTION PANEL: CPT

## 2023-11-27 PROCEDURE — 86901 BLOOD TYPING SEROLOGIC RH(D): CPT | Performed by: STUDENT IN AN ORGANIZED HEALTH CARE EDUCATION/TRAINING PROGRAM

## 2023-11-27 PROCEDURE — 76770 US EXAM ABDO BACK WALL COMP: CPT | Performed by: STUDENT IN AN ORGANIZED HEALTH CARE EDUCATION/TRAINING PROGRAM

## 2023-11-27 PROCEDURE — 97162 PT EVAL MOD COMPLEX 30 MIN: CPT | Mod: GP | Performed by: PHYSICAL THERAPIST

## 2023-11-27 PROCEDURE — 2500000004 HC RX 250 GENERAL PHARMACY W/ HCPCS (ALT 636 FOR OP/ED): Performed by: INTERNAL MEDICINE

## 2023-11-27 PROCEDURE — 86900 BLOOD TYPING SEROLOGIC ABO: CPT | Performed by: STUDENT IN AN ORGANIZED HEALTH CARE EDUCATION/TRAINING PROGRAM

## 2023-11-27 PROCEDURE — 85027 COMPLETE CBC AUTOMATED: CPT

## 2023-11-27 PROCEDURE — 36415 COLL VENOUS BLD VENIPUNCTURE: CPT

## 2023-11-27 PROCEDURE — 2500000002 HC RX 250 W HCPCS SELF ADMINISTERED DRUGS (ALT 637 FOR MEDICARE OP, ALT 636 FOR OP/ED)

## 2023-11-27 PROCEDURE — 83735 ASSAY OF MAGNESIUM: CPT

## 2023-11-27 RX ORDER — HYDRALAZINE HYDROCHLORIDE 20 MG/ML
5 INJECTION INTRAMUSCULAR; INTRAVENOUS EVERY 6 HOURS PRN
Status: DISCONTINUED | OUTPATIENT
Start: 2023-11-27 | End: 2023-12-14 | Stop reason: HOSPADM

## 2023-11-27 RX ORDER — SODIUM CHLORIDE 9 MG/ML
100 INJECTION, SOLUTION INTRAVENOUS CONTINUOUS
Status: ACTIVE | OUTPATIENT
Start: 2023-11-27 | End: 2023-11-28

## 2023-11-27 RX ORDER — POLYETHYLENE GLYCOL 3350 17 G/17G
17 POWDER, FOR SOLUTION ORAL 2 TIMES DAILY
Status: DISCONTINUED | OUTPATIENT
Start: 2023-11-27 | End: 2023-12-14 | Stop reason: HOSPADM

## 2023-11-27 RX ORDER — AMOXICILLIN 250 MG
1 CAPSULE ORAL NIGHTLY
Status: DISCONTINUED | OUTPATIENT
Start: 2023-11-27 | End: 2023-12-14 | Stop reason: HOSPADM

## 2023-11-27 RX ORDER — LIDOCAINE AND PRILOCAINE 25; 25 MG/G; MG/G
CREAM TOPICAL DAILY
Status: DISCONTINUED | OUTPATIENT
Start: 2023-11-27 | End: 2023-12-14 | Stop reason: HOSPADM

## 2023-11-27 RX ADMIN — CALCITRIOL CAPSULES 0.25 MCG 0.25 MCG: 0.25 CAPSULE ORAL at 09:44

## 2023-11-27 RX ADMIN — TORSEMIDE 100 MG: 20 TABLET ORAL at 09:44

## 2023-11-27 RX ADMIN — CARVEDILOL 25 MG: 12.5 TABLET, FILM COATED ORAL at 20:13

## 2023-11-27 RX ADMIN — SODIUM BICARBONATE 650 MG: 650 TABLET ORAL at 15:33

## 2023-11-27 RX ADMIN — POLYETHYLENE GLYCOL 3350 17 G: 17 POWDER, FOR SOLUTION ORAL at 20:13

## 2023-11-27 RX ADMIN — ACETAMINOPHEN 975 MG: 325 TABLET ORAL at 20:13

## 2023-11-27 RX ADMIN — INSULIN LISPRO 7 UNITS: 100 INJECTION, SOLUTION INTRAVENOUS; SUBCUTANEOUS at 13:38

## 2023-11-27 RX ADMIN — INSULIN GLARGINE 20 UNITS: 100 INJECTION, SOLUTION SUBCUTANEOUS at 20:13

## 2023-11-27 RX ADMIN — SODIUM CHLORIDE 100 ML/HR: 9 INJECTION, SOLUTION INTRAVENOUS at 16:27

## 2023-11-27 RX ADMIN — CARVEDILOL 25 MG: 12.5 TABLET, FILM COATED ORAL at 09:44

## 2023-11-27 RX ADMIN — INSULIN LISPRO 7 UNITS: 100 INJECTION, SOLUTION INTRAVENOUS; SUBCUTANEOUS at 09:43

## 2023-11-27 RX ADMIN — OXYCODONE HYDROCHLORIDE 5 MG: 5 TABLET ORAL at 20:17

## 2023-11-27 RX ADMIN — SENNOSIDES AND DOCUSATE SODIUM 1 TABLET: 8.6; 5 TABLET ORAL at 20:13

## 2023-11-27 RX ADMIN — AMLODIPINE BESYLATE 10 MG: 10 TABLET ORAL at 09:44

## 2023-11-27 RX ADMIN — ACETAMINOPHEN 975 MG: 325 TABLET ORAL at 15:33

## 2023-11-27 RX ADMIN — POLYETHYLENE GLYCOL 3350 17 G: 17 POWDER, FOR SOLUTION ORAL at 09:44

## 2023-11-27 RX ADMIN — ATORVASTATIN CALCIUM 80 MG: 80 TABLET, FILM COATED ORAL at 20:13

## 2023-11-27 RX ADMIN — HEPARIN SODIUM 1800 UNITS/HR: 10000 INJECTION, SOLUTION INTRAVENOUS at 10:39

## 2023-11-27 RX ADMIN — INSULIN LISPRO 7 UNITS: 100 INJECTION, SOLUTION INTRAVENOUS; SUBCUTANEOUS at 17:53

## 2023-11-27 RX ADMIN — OXYCODONE HYDROCHLORIDE 5 MG: 5 TABLET ORAL at 09:53

## 2023-11-27 RX ADMIN — SODIUM BICARBONATE 650 MG: 650 TABLET ORAL at 20:13

## 2023-11-27 RX ADMIN — SODIUM BICARBONATE 650 MG: 650 TABLET ORAL at 09:44

## 2023-11-27 RX ADMIN — ACETAMINOPHEN 975 MG: 325 TABLET ORAL at 09:44

## 2023-11-27 RX ADMIN — GABAPENTIN 100 MG: 100 CAPSULE ORAL at 20:13

## 2023-11-27 RX ADMIN — PANTOPRAZOLE SODIUM 20 MG: 20 TABLET, DELAYED RELEASE ORAL at 06:13

## 2023-11-27 ASSESSMENT — PAIN SCALES - GENERAL
PAINLEVEL_OUTOF10: 7
PAINLEVEL_OUTOF10: 7

## 2023-11-27 ASSESSMENT — COGNITIVE AND FUNCTIONAL STATUS - GENERAL
MOVING TO AND FROM BED TO CHAIR: A LITTLE
STANDING UP FROM CHAIR USING ARMS: A LITTLE
WALKING IN HOSPITAL ROOM: A LITTLE
CLIMB 3 TO 5 STEPS WITH RAILING: A LOT
MOBILITY SCORE: 19

## 2023-11-27 ASSESSMENT — PAIN - FUNCTIONAL ASSESSMENT: PAIN_FUNCTIONAL_ASSESSMENT: 0-10

## 2023-11-27 NOTE — CONSULTS
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   Signed by: Charlie Betts 11/24/2023 4:30 PM Dictation workstation:   [id number]    CT head wo IV contrast    Result Date: 11/24/2023  Interpreted By:  Charlie Betts, STUDY: CT HEAD WO IV CONTRAST;  11/24/2023 4:11 pm   INDICATION: Signs/Symptoms:left lower extremity weakness.   COMPARISON: None.   ACCESSION NUMBER(S): [id number]   ORDERING CLINICIAN: CHOLO JORDAN   TECHNIQUE: Axial CT images of the head were obtained without intravenous contrast administration.   FINDINGS: There is moderate brain parenchymal volume loss.   The lateral ventricles demonstrate mild disproportionate prominence when compared with the sulci within the cerebral convexities. No obstructing mass lesion is noted on this noncontrast CT study. This mild disproportionate ventriculomegaly may be related to more pronounced central volume loss ossific a component of communicating hydrocephalus.   There are patchy and confluent  nonspecific white matter changes within the cerebral hemispheres bilaterally which while nonspecific, can be seen with small-vessel ischemic change among others.   Additional focal hypodensities are identified within the subinsular regions and basal ganglia bilaterally suggesting incidental prominent perivascular spaces and/or scattered lacunar infarctions.   No hyperdense acute intracranial hemorrhage is noted.   There is no midline shift.   There are scattered retention cysts or polyps within the bilateral maxillary sinuses. The remaining paranasal sinuses are clear.   There is opacification of the left middle ear cavity and left mastoid air cells.   There is a metallic foreign body within the right facial soft tissues surrounding the anterior margin of the right parotid gland.       There is moderate brain parenchymal volume loss.   The lateral ventricles demonstrate mild disproportionate prominence when compared with the sulci within the cerebral convexities. No obstructing mass lesion is noted on this noncontrast CT study. This mild disproportionate ventriculomegaly may be related to more pronounced central volume loss ossific a component of communicating hydrocephalus.   There are patchy and confluent nonspecific white matter changes within the cerebral hemispheres bilaterally which while nonspecific, can be seen with small-vessel ischemic change among others. Additional focal hypodensities are identified within the subinsular regions and basal ganglia bilaterally suggesting incidental prominent perivascular spaces and/or scattered lacunar infarctions.   There is opacification of the left middle ear cavity and left mastoid air cells.   There is a metallic foreign body within the right facial soft tissues surrounding the anterior margin of the right parotid gland.   MACRO: None.   Signed by: Charlie Betts 11/24/2023 4:21 PM Dictation workstation:   [id number]    XR hip left 2 or 3 views    Result Date:  11/24/2023  Interpreted By:  Nadir Bender, STUDY: XR HIP LEFT 2 OR 3 VIEWS; XR FEMUR LEFT 2+ VIEWS; ;  11/24/2023 3:58 pm   INDICATION: Signs/Symptoms:weakness.   COMPARISON: None.   ACCESSION NUMBER(S): [id number]; [id number]   ORDERING CLINICIAN: CHOLO JORDAN   FINDINGS: Left hip, three views. Left femur, two views.   There is no fracture. There is no dislocation. Moderate degenerative changes present in the hip and in the left knee. There is no malalignment. Linear heterotopic ossification at the lateral aspect of the left hip.       No acute abnormality seen. Moderate degenerative changes     MACRO: None   Signed by: Nadir Bender 11/24/2023 4:02 PM Dictation workstation:   [id number]    XR femur left 2+ views    Result Date: 11/24/2023  Interpreted By:  Nadir Bender, STUDY: XR HIP LEFT 2 OR 3 VIEWS; XR FEMUR LEFT 2+ VIEWS; ;  11/24/2023 3:58 pm   INDICATION: Signs/Symptoms:weakness.   COMPARISON: None.   ACCESSION NUMBER(S): [id number]; [id number]   ORDERING CLINICIAN: CHOLO JORDAN   FINDINGS: Left hip, three views. Left femur, two views.   There is no fracture. There is no dislocation. Moderate degenerative changes present in the hip and in the left knee. There is no malalignment. Linear heterotopic ossification at the lateral aspect of the left hip.       No acute abnormality seen. Moderate degenerative changes     MACRO: None   Signed by: Nadir Bender 11/24/2023 4:02 PM Dictation workstation:   [id number]          Assessment/Plan     Atherosclerosis of native arteries with Dry Gangrene, left hallux  DM2 with peripheral neuropathy  Acquired absence of other digits, right foot  Acquired absence of other digits, left foot    Patient was seen and evaluated at bedside. All findings and questions were discussed.  -Afebrile, no leukocytosis, blood glucose 77  -PVR shows severe arterial disease to the left leg, no DP/PT to left.  -Recommend Daily dressing changes consisting of Betadine  paint/soaked gauze and DSD.  -Left hallux gangrene is dry and stable in nature.  -No immediate podiatric surgical intervention planned at this time, pending vascular recommendation.     Case to be discussed with attending, A&P above reflects a tentative plan. Please await for the final signature from the attending physician on service.    Sukhwinder Cruz DPM PGY-2  Podiatric Medicine & Surgery  Please contact for any questions or concerns.      SIGNATURE: Sukhwinder Cruz DPM PGY-2 PATIENT NAME: Kwadwo Hoyt   DATE: November 27, 2023 MRN: [id number]   TIME: 2:15 PM P: [id number]

## 2023-11-27 NOTE — CONSULTS
NEPHROLOGY NEW CONSULT NOTE   Kwadwo Hoyt   57 y.o.    @WT@  MRN/Room: [id number]    Reason for consult: SELENA on CKD, likely contrast induced    HPI:  Kwadwo Hoyt is a 57 y.o. male with a PMHx of DM c/b neuropathy, HTN, CKD stage 5, HFrEF (EF 50% in 11/2021), & ALEXANDER, who presents with left leg weakness.  Presented for left arm and leg weakness. LLE distal pulses were not palpable and CTA aorta and iliofemoral runoff showed complete left popliteal occlusion. No acute surgical intervention per vascular surgery. Placed on heparin drip. Concern for stroke, CTA head and neck 11/26. Now with worsening SELENA on CKD. Creatine worsened 6.06->8.96->11.17. Baseline creatinine unclear, ~5 in July 2023. Still making urine.  Nephrology consulted for SELEAN on CKD, likely contrast induced    Baseline creatinine is ~5 (as of 7/23)  Creatinine at the time of admission was 6.06 and started trending up from 8.96 and now up to 11.17  Blood pressure remained stable/high  Urine output was 600 mL last 24hrs  Recent contrast studies on 11/24: CTA aorta and bilateral iliofemoral, 11/26: CTA head and neck  Recent nephrotoxic medication use (vanco/vanco+zosyn, amphotericin B, gentamicin, chemo drugs, NsAIDS): None  Recent ACE/ARB/diuretic use: Torsemide 100 mg x3 doses      ?fever, skin rash or eosinophilia on CBC: NO  Recent nausea, vomiting, loose stools, evidence of acute blood loss, joint pains, URI/viral infection: NO  Lightheadedness, dizziness, dry mouth: NO  Recent changes in urine output: NO  frequency/foamy urine, dysuria, Incomplete bladder emptying, dark/bloody urine, new back pain: NO  BPH history:: NO  Swelling in leg, weight gain/loss, SOB/orthopnea/PND: NO  Prior history of NSAID use: No  Prior history of renal stones: No  Surgeries/procedures during hospitalization with drop in Bp/reivew of anesthesia event: No  Herbal remedies: NO, PPI: Protonix  Smoking and drug use HX: Smokes marijuana, denies ciagrette use  Hx of  "vascular disease (CVA, MI, TIA, PVD): Popliteal artery occlusion (current admission)  Hx of cancers: NO      In The ER: BP (!) 182/95 (BP Location: Left arm, Patient Position: Sitting) Comment: 168/84  Pulse 76 Comment: 78  Temp 36.9 °C (98.4 °F) (Temporal)   Resp 21   Ht 1.854 m (6' 1\")   Wt 129 kg (285 lb)   SpO2 99%   BMI 37.60 kg/m²      Past Medical History:   Diagnosis Date    Diabetes mellitus (CMS/HCC)     Hyperlipidemia     Hypertension       Past Surgical History:   Procedure Laterality Date    AMPUTATION FOOT / TOE      CT AORTA AND BILATERAL ILIOFEMORAL RUNOFF ANGIOGRAM W AND/OR WO IV CONTRAST  11/24/2023    CT AORTA AND BILATERAL ILIOFEMORAL RUNOFF ANGIOGRAM W AND/OR WO IV CONTRAST 11/24/2023 [facility] CT      No family history on file.  Social History     Socioeconomic History    Marital status: Legally      Spouse name: Not on file    Number of children: Not on file    Years of education: Not on file    Highest education level: Not on file   Occupational History    Not on file   Tobacco Use    Smoking status: Never    Smokeless tobacco: Never   Substance and Sexual Activity    Alcohol use: Yes    Drug use: Yes     Types: Marijuana    Sexual activity: Not on file   Other Topics Concern    Not on file   Social History Narrative    Not on file     Social Determinants of Health     Financial Resource Strain: Medium Risk (11/25/2023)    Overall Financial Resource Strain (CARDIA)     Difficulty of Paying Living Expenses: Somewhat hard   Food Insecurity: Not on file   Transportation Needs: Unmet Transportation Needs (11/25/2023)    PRAPARE - Transportation     Lack of Transportation (Medical): No     Lack of Transportation (Non-Medical): Yes   Physical Activity: Not on file   Stress: Not on file   Social Connections: Not on file   Intimate Partner Violence: Not on file   Housing Stability: Low Risk  (11/25/2023)    Housing Stability Vital Sign     Unable to Pay for Housing in the Last Year: No     " Number of Places Lived in the Last Year: 1     Unstable Housing in the Last Year: No       Allergies   Allergen Reactions    Nsaids (Non-Steroidal Anti-Inflammatory Drug) Other     ckd4        Medications Prior to Admission   Medication Sig Dispense Refill Last Dose    chlorthalidone (Hygroton) 50 mg tablet Take 1 tablet (50 mg) by mouth once daily.   Unknown    potassium chloride CR 20 mEq ER tablet Take 1 tablet (20 mEq) by mouth once daily.   Unknown    sildenafil (Viagra) 50 mg tablet Take 1 tablet (50 mg) by mouth if needed for erectile dysfunction.   Unknown    simvastatin (Zocor) 20 mg tablet Take 1 tablet (20 mg) by mouth once daily at bedtime.   Unknown    allopurinol (Zyloprim) 100 mg tablet Take 1 tablet (100 mg) by mouth once daily.   11/24/2023    amLODIPine (Norvasc) 10 mg tablet Take 1 tablet (10 mg) by mouth once daily.   Unknown    aspirin 81 mg EC tablet Take 1 tablet (81 mg) by mouth once daily.   Unknown    calcitriol (Rocaltrol) 0.25 mcg capsule Take 1 capsule (0.25 mcg) by mouth once daily.   Unknown    carvedilol (Coreg) 25 mg tablet Take 1 tablet (25 mg) by mouth 2 times a day with meals.   Unknown    gabapentin (Neurontin) 300 mg capsule Take 1 capsule (300 mg) by mouth 2 times a day.   Unknown    insulin glargine (Lantus U-100 Insulin) 100 unit/mL injection Inject 40 Units under the skin once daily at bedtime. Take as directed per insulin instructions.   11/24/2023    insulin lispro (HumaLOG) 100 unit/mL injection Inject 0.1 mL (10 Units) under the skin 3 times a day with meals. Take as directed per insulin instructions.   11/24/2023    lisinopril 40 mg tablet Take 1 tablet (40 mg) by mouth once daily.   Unknown    pantoprazole (ProtoNix) 20 mg EC tablet Take 1 tablet (20 mg) by mouth once daily in the morning. Take before meals. Do not crush, chew, or split.   Unknown    torsemide (Demadex) 20 mg tablet Take 2 tablets (40 mg) by mouth 2 times a day.   Unknown        Meds:   acetaminophen,  975 mg, TID  amLODIPine, 10 mg, Daily  [Held by provider] aspirin, 81 mg, Daily  atorvastatin, 80 mg, Nightly  calcitriol, 0.25 mcg, Daily  carvedilol, 25 mg, BID  gabapentin, 100 mg, q PM  insulin glargine, 20 Units, Nightly  insulin lispro, 0-5 Units, TID with meals  insulin lispro, 7 Units, TID with meals  lidocaine-prilocaine, , Daily  pantoprazole, 20 mg, Daily before breakfast  polyethylene glycol, 17 g, Daily  sodium bicarbonate, 650 mg, TID  sodium chloride, 1,000 mL, Once  torsemide, 100 mg, Daily      heparin, Last Rate: 1,800 Units/hr (11/27/23 1121)      dextrose 10 % in water (D10W), 0.3 g/kg/hr, Once PRN  dextrose, 25 g, q15 min PRN  eucerin, , PRN  glucagon, 1 mg, q15 min PRN  heparin, 5,000-10,000 Units, q4h PRN  HYDROmorphone, 1 mg, q6h PRN  oxyCODONE, 5 mg, q6h PRN        Vitals:    11/27/23 1000   BP: (!) 182/95   Pulse: 76   Resp:    Temp:    SpO2: 99%        11/25 1900 - 11/27 0659  In: 240 [P.O.:240]  Out: 600 [Urine:600]   Weight change:      General: AAOx3, NAD  Eyes: non-icteric  Skin: no apparent rash  Heart: Normal rate and rhythm  Lungs: CTA bilat  Abdomen: soft, nt/nd  Extremities: no edema bilat  Neuro: No FND  ACCESS: No HD access      Blood Labs:  Results for orders placed or performed during the hospital encounter of 11/24/23 (from the past 24 hour(s))   Heparin Assay, UFH   Result Value Ref Range    Heparin Unfractionated 0.4 See Comment Below for Therapeutic Ranges IU/mL   POCT GLUCOSE   Result Value Ref Range    POCT Glucose 95 74 - 99 mg/dL   POCT GLUCOSE   Result Value Ref Range    POCT Glucose 86 74 - 99 mg/dL   CBC   Result Value Ref Range    WBC 6.9 4.4 - 11.3 x10*3/uL    nRBC 0.0 0.0 - 0.0 /100 WBCs    RBC 3.20 (L) 4.50 - 5.90 x10*6/uL    Hemoglobin 8.7 (L) 13.5 - 17.5 g/dL    Hematocrit 27.6 (L) 41.0 - 52.0 %    MCV 86 80 - 100 fL    MCH 27.2 26.0 - 34.0 pg    MCHC 31.5 (L) 32.0 - 36.0 g/dL    RDW 12.8 11.5 - 14.5 %    Platelets 351 150 - 450 x10*3/uL   Renal function panel    Result Value Ref Range    Glucose 77 74 - 99 mg/dL    Sodium 130 (L) 136 - 145 mmol/L    Potassium 5.4 (H) 3.5 - 5.3 mmol/L    Chloride 99 98 - 107 mmol/L    Bicarbonate 18 (L) 21 - 32 mmol/L    Anion Gap 18 10 - 20 mmol/L    Urea Nitrogen 87 (H) 6 - 23 mg/dL    Creatinine 11.17 (H) 0.50 - 1.30 mg/dL    eGFR 5 (L) >60 mL/min/1.73m*2    Calcium 9.0 8.6 - 10.6 mg/dL    Phosphorus 8.0 (H) 2.5 - 4.9 mg/dL    Albumin 3.3 (L) 3.4 - 5.0 g/dL   Magnesium   Result Value Ref Range    Magnesium 2.20 1.60 - 2.40 mg/dL         ASSESSMENT:  Kwadwo Hoyt is a  57 y.o.  Year old , with PMHx of     Kidney   - SELENA on CKD, nonoliguric, Stage 5  - Baseline creatinine: ~5-6  - Etiology: CKD stage 5 secondary to T2DM  - Clinical volume status: Euvolemic  - Electrolytes (Na, K, Ca, Phos) HyperK, HyopNa      SELENA on CKD stage 5  - Patient's baseline creatinine around 5 (as of July 2023)  - Follows with nephrologist, Dr. Draper  - Cr on admission 6.06->8.96->11.17  - Received 2 CTAs with contrast  - UOP over last 24 hours 600 mL  - K: 5.4  - Patient denies changes in urinary frequency, output, color, consistency. Denies dysuria    Recommendations:  - Check UA, Urine electrolytes, and renal ultrasound  - strict Is/Os  - Can give Lokelma for increased K  - No current indication for dialysis now but will monitor closely given recent exposure to contrast  - Avoid nephrotoxins, contrast if possible  - Renal dosing for medications for latest eGFR, follow medication trough as appropriate  - Avoid hypotension/rapid fluctuations in BPs    Colby Morales MD  Nephrology Resident  24 hour Renal Pager - [phone]    Discussed with attending nephrologist

## 2023-11-27 NOTE — CARE PLAN
Per attending still waiting on vascular ultrasound results and vascular recommendations.  Estimated ADOD 3 days.

## 2023-11-27 NOTE — PROGRESS NOTES
Occupational Therapy                 Therapy Communication Note    Patient Name: Kwadwo Hoyt  MRN: [id number]  Today's Date: 11/27/2023     Discipline: Occupational Therapy    Missed Visit Reason: Missed Visit Reason:  (Pt off floor for procedure, will reattempt when able.)    Missed Time: Attempt

## 2023-11-27 NOTE — CARE PLAN
The patient's goals for the shift include  decrease in leg pain.    The clinical goals for the shift include to be free from falls and/or injury for duration of day shift.     Over the shift, the patient made progress towards all goals.

## 2023-11-27 NOTE — PROGRESS NOTES
Physical Therapy    Physical Therapy Evaluation    Patient Name: Kwadwo Hoyt  MRN: [id number]  Today's Date: 11/27/2023   Time Calculation  Start Time: 1000  Stop Time: 1020  Time Calculation (min): 20 min    Assessment/Plan   PT Assessment  PT Assessment Results: Decreased strength, Decreased endurance, Impaired balance, Decreased mobility, Decreased coordination  End of Session Communication: Bedside nurse  Assessment Comment: pt presented with L LE weakness, with anterior and posterior tib arteries occluded. c/f for stroke with decresase mobiltiy and balance. pt would benefit from skilled services to improve functional mobility  End of Session Patient Position:  (sitting EOB)  IP OR SWING BED PT PLAN  Inpatient or Swing Bed: Inpatient  PT Plan  PT Plan: Skilled PT  PT Frequency: 4 times per week  PT Discharge Recommendations: Low intensity level of continued care  Equipment Recommended upon Discharge: Wheeled walker  PT Recommended Transfer Status: Assist x1  PT - OK to Discharge: Yes (eval completed and recs made)      Subjective   General Visit Information:  General  Reason for Referral: pt admitted with L LE waeakness with fall, c/f stroke with L anterior tib and posterior tib artery occlusion.  Past Medical History Relevant to Rehab: pt with h/o DM, neuropathy, toe amputation, HTN, stage 5 HFrEF and ALEXANDER  Family/Caregiver Present: Yes  Caregiver Feedback: wife present and supportive during session.  Prior to Session Communication: Bedside nurse  Patient Position Received:  (sitting EOB)  General Comment: pt willing to participate (pt with tele and PIV)  Home Living:  Home Living  Type of Home: House  Lives With: Alone (son checks on him)  Home Layout: One level  Home Access: No concerns  Prior Level of Function:  Prior Function Per Pt/Caregiver Report  Level of [place]: Independent with ADLs and functional transfers  Precautions:  Precautions  Precautions Comment: falls - [facility]  Vital Signs:  Vital Signs  Heart Rate: 76 (78)  SpO2: 99 %  BP: (!) 182/95 (168/84)  BP Location: Left arm  BP Method: Automatic  Patient Position: Sitting    Objective   Pain:  Pain Assessment  Pain Assessment: 0-10  Pain Score: 7  Pain Location: Leg (left)  Cognition:  Cognition  Overall Cognitive Status: Within Functional Limits  Orientation Level: Oriented X4    General Assessments:  Activity Tolerance  Endurance: Tolerates 30 min exercise with multiple rests    Sensation  Light Touch: No apparent deficits    Static Sitting Balance  Static Sitting-Balance Support: Feet supported  Static Sitting-Level of Assistance: Independent    Static Standing Balance  Static Standing-Balance Support: Bilateral upper extremity supported  Static Standing-Level of Assistance: Minimum assistance  Functional Assessments:  Bed Mobility  Bed Mobility: No (pt sitting EOB at start and end of session.)    Transfers  Transfer: Yes  Transfer 1  Transfer From 1: Sit to  Transfer to 1: Stand  Technique 1: Sit to stand  Transfer Device 1: Walker  Transfer Level of Assistance 1: Minimum assistance, Minimal verbal cues  Transfers 2  Transfer From 2: Stand to  Transfer to 2: Sit  Technique 2: Stand to sit  Transfer Device 2: Walker  Transfer Level of Assistance 2: Minimum assistance    Ambulation/Gait Training  Ambulation/Gait Training Performed: Yes  Ambulation/Gait Training 1  Surface 1: Level tile  Device 1: Rolling walker  Assistance 1: Minimum assistance  Quality of Gait 1: Wide base of support, Inconsistent stride length, Decreased step length, Forward flexed posture, Antalgic (decrease SLS on L)  Comments/Distance (ft) 1: pt ambulated 150 ft with ww and cueing for staying within the ww    Stairs  Stairs: No  Extremity/Trunk Assessments:  Strength RLE  RLE Overall Strength: Within Functional Limits - strength 5/5  LLE   LLE : Exceptions to WFL  Strength LLE  LLE Overall Strength: Greater than or equal to 3/5 as evidenced by  functional mobility  L Hip Flexion: 3+/5  L Hip ABduction: 4+/5  L Hip ADduction: 4+/5  L Knee Flexion: 3+/5  L Knee Extension: 3+/5  L Ankle Dorsiflexion: 4/5  L Ankle Plantar Flexion: 4/5  Outcome Measures:  [facility] Basic Mobility  Turning from your back to your side while in a flat bed without using bedrails: None  Moving from lying on your back to sitting on the side of a flat bed without using bedrails: None  Moving to and from bed to chair (including a wheelchair): A little  Standing up from a chair using your arms (e.g. wheelchair or bedside chair): A little  To walk in hospital room: A little  Climbing 3-5 steps with railing: A lot  Basic Mobility - Total Score: 19    Encounter Problems       Encounter Problems (Active)       Balance       STG - Maintains dynamic standing balance with upper extremity support for 2 min with ww  (Progressing)       Start:  11/27/23    Expected End:  12/11/23       INTERVENTIONS:  1. Practice standing with minimal support.  2. Educate patient about standing tolerance.  3. Educate patient about independence with gait, transfers, and ADL's.  4. Educate patient about use of assistive device.  5. Educate patient about self-directed care.            Mobility       STG - Patient will ambulate 150 ft with WW I  (Progressing)       Start:  11/27/23    Expected End:  12/11/23               Transfers       STG - Transfer from bed to chair with ww  (Progressing)       Start:  11/27/23    Expected End:  12/11/23            STG - Patient will transfer sit to and from stand with ww  (Progressing)       Start:  11/27/23    Expected End:  12/11/23                   Education Documentation  Precautions, taught by Tea Dacosta PT at 11/27/2023 11:04 AM.  Learner: Caregiver, Patient  Readiness: Eager  Method: Explanation  Response: Demonstrated Understanding    Home Exercise Program, taught by Tea Dacosta PT at 11/27/2023 11:04 AM.  Learner: Caregiver, Patient  Readiness:  Eager  Method: Explanation  Response: Demonstrated Understanding    Mobility Training, taught by Tea Dacosta PT at 11/27/2023 11:04 AM.  Learner: Caregiver, Patient  Readiness: Eager  Method: Explanation  Response: Demonstrated Understanding    Education Comments  No comments found.
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
## 2023-11-27 NOTE — PROGRESS NOTES
"Kwadwo Hoyt is a 57 y.o. male on day 2 of admission presenting with Arterial occlusion.    Subjective   No overnight events. Pain in either leg is improved, still worse on left.    Objective     Physical Exam:  General: NAD  Respiratory: unlabored breathing on room air   Cardiovascular: regular rate, normotensive  Extremities: Muliphasic right PT, AT, unable to insonate left pedal signals  Great left toe with necrotic tip s/p amputation of digits 2-5    Last Recorded Vitals  Blood pressure (!) 161/102, pulse 71, temperature 36.9 °C (98.4 °F), temperature source Temporal, resp. rate 21, height 1.854 m (6' 1\"), weight 129 kg (285 lb), SpO2 97 %.  Intake/Output last 3 Shifts:  I/O last 3 completed shifts:  In: 240 (1.9 mL/kg) [P.O.:240]  Out: 600 (4.6 mL/kg) [Urine:600 (0.1 mL/kg/hr)]  Weight: 129.3 kg     Relevant Results  Results for orders placed or performed during the hospital encounter of 11/24/23 (from the past 24 hour(s))   POCT GLUCOSE   Result Value Ref Range    POCT Glucose 86 74 - 99 mg/dL   Heparin Assay, UFH   Result Value Ref Range    Heparin Unfractionated 0.4 See Comment Below for Therapeutic Ranges IU/mL   POCT GLUCOSE   Result Value Ref Range    POCT Glucose 95 74 - 99 mg/dL   POCT GLUCOSE   Result Value Ref Range    POCT Glucose 86 74 - 99 mg/dL   CBC   Result Value Ref Range    WBC 6.9 4.4 - 11.3 x10*3/uL    nRBC 0.0 0.0 - 0.0 /100 WBCs    RBC 3.20 (L) 4.50 - 5.90 x10*6/uL    Hemoglobin 8.7 (L) 13.5 - 17.5 g/dL    Hematocrit 27.6 (L) 41.0 - 52.0 %    MCV 86 80 - 100 fL    MCH 27.2 26.0 - 34.0 pg    MCHC 31.5 (L) 32.0 - 36.0 g/dL    RDW 12.8 11.5 - 14.5 %    Platelets 351 150 - 450 x10*3/uL   CT angio aorta and bilateral iliofemoral runoff w and or wo IV contrast   1. Complete occlusion of the P1 and P3 segments of the left popliteal   artery with some reconstitution of the P2 segment.   2. Although difficult to evaluate, likely complete occlusion of the   left anterior tibial and posterior tibial " arteries.   3. Right anterior tibial, posterior tibial, and peroneal arteries are   not well evaluated due to poor contrast opacification.   4. Additional areas of atherosclerotic disease and luminal narrowing   as described above      CT head wo IV contrast    There is moderate brain parenchymal volume loss.       The lateral ventricles demonstrate mild disproportionate prominence   when compared with the sulci within the cerebral convexities. No   obstructing mass lesion is noted on this noncontrast CT study. This   mild disproportionate ventriculomegaly may be related to more   pronounced central volume loss ossific a component of communicating   hydrocephalus.      There are patchy and confluent nonspecific white matter changes   within the cerebral hemispheres bilaterally which while nonspecific,   can be seen with small-vessel ischemic change among others.   Additional focal hypodensities are identified within the subinsular   regions and basal ganglia bilaterally suggesting incidental prominent   perivascular spaces and/or scattered lacunar infarctions.     Assessment/Plan   Principal Problem:    Arterial occlusion    Kwadwo Hoyt is a 57 y.o. male with PMH of DM neuropathy, multiple toe amputations with podiatry, HTN, CKD 4 presenting with two days of LLE weakness causing him to fall. Endorses no pain, movement intact. CTA showed complete occlusion of the P1 and P3 segments of the left popliteal artery with some reconstitution of the P2 segment and likely complete occlusion of the left anterior tibial and posterior tibial arteries. Physical exam showed a monophasic left AT, no dopplerable DP.      - q4hr neurovascular checks to evaluate for progression of disease  - pending ZAC/PRV  - continue heparin gtt         Brendon Hilton MD  General Surgery, pgy3  [facility] [id number]    Patient d/w attending.       Added to OR for LLE angiogram 11/28, NPO at midnight (ordered), will stop hep gtt on call to OR, please  order updated T+S.

## 2023-11-28 ENCOUNTER — APPOINTMENT (OUTPATIENT)
Dept: RADIOLOGY | Facility: HOSPITAL | Age: 58
DRG: 270 | End: 2023-11-28
Payer: MEDICARE

## 2023-11-28 ENCOUNTER — APPOINTMENT (OUTPATIENT)
Dept: DIALYSIS | Facility: HOSPITAL | Age: 58
End: 2023-11-28
Payer: MEDICARE

## 2023-11-28 LAB
ALBUMIN SERPL BCP-MCNC: 3 G/DL (ref 3.4–5)
ANION GAP SERPL CALC-SCNC: 23 MMOL/L (ref 10–20)
APPEARANCE UR: CLEAR
BASOPHILS # BLD AUTO: 0.03 X10*3/UL (ref 0–0.1)
BASOPHILS NFR BLD AUTO: 0.5 %
BILIRUB UR STRIP.AUTO-MCNC: NEGATIVE MG/DL
BUN SERPL-MCNC: 96 MG/DL (ref 6–23)
CALCIUM SERPL-MCNC: 8.4 MG/DL (ref 8.6–10.6)
CHLORIDE SERPL-SCNC: 99 MMOL/L (ref 98–107)
CHLORIDE UR-SCNC: 32 MMOL/L
CHLORIDE/CREATININE (MMOL/G) IN URINE: 57 MMOL/G CREAT (ref 23–275)
CO2 SERPL-SCNC: 15 MMOL/L (ref 21–32)
COLOR UR: ABNORMAL
CREAT SERPL-MCNC: 12.65 MG/DL (ref 0.5–1.3)
CREAT UR-MCNC: 56.2 MG/DL (ref 20–370)
EOSINOPHIL # BLD AUTO: 0.15 X10*3/UL (ref 0–0.7)
EOSINOPHIL NFR BLD AUTO: 2.7 %
ERYTHROCYTE [DISTWIDTH] IN BLOOD BY AUTOMATED COUNT: 13 % (ref 11.5–14.5)
GFR SERPL CREATININE-BSD FRML MDRD: 4 ML/MIN/1.73M*2
GLUCOSE BLD MANUAL STRIP-MCNC: 102 MG/DL (ref 74–99)
GLUCOSE BLD MANUAL STRIP-MCNC: 152 MG/DL (ref 74–99)
GLUCOSE BLD MANUAL STRIP-MCNC: 209 MG/DL (ref 74–99)
GLUCOSE SERPL-MCNC: 170 MG/DL (ref 74–99)
GLUCOSE UR STRIP.AUTO-MCNC: ABNORMAL MG/DL
HBV SURFACE AB SER-ACNC: 4.2 MIU/ML
HBV SURFACE AG SERPL QL IA: NONREACTIVE
HCT VFR BLD AUTO: 25.9 % (ref 41–52)
HGB BLD-MCNC: 8.5 G/DL (ref 13.5–17.5)
HOLD SPECIMEN: NORMAL
IMM GRANULOCYTES # BLD AUTO: 0.02 X10*3/UL (ref 0–0.7)
IMM GRANULOCYTES NFR BLD AUTO: 0.4 % (ref 0–0.9)
KETONES UR STRIP.AUTO-MCNC: NEGATIVE MG/DL
LEUKOCYTE ESTERASE UR QL STRIP.AUTO: NEGATIVE
LYMPHOCYTES # BLD AUTO: 1.58 X10*3/UL (ref 1.2–4.8)
LYMPHOCYTES NFR BLD AUTO: 28 %
MAGNESIUM SERPL-MCNC: 2.24 MG/DL (ref 1.6–2.4)
MCH RBC QN AUTO: 28.3 PG (ref 26–34)
MCHC RBC AUTO-ENTMCNC: 32.8 G/DL (ref 32–36)
MCV RBC AUTO: 86 FL (ref 80–100)
MONOCYTES # BLD AUTO: 0.68 X10*3/UL (ref 0.1–1)
MONOCYTES NFR BLD AUTO: 12.1 %
NEUTROPHILS # BLD AUTO: 3.18 X10*3/UL (ref 1.2–7.7)
NEUTROPHILS NFR BLD AUTO: 56.3 %
NITRITE UR QL STRIP.AUTO: NEGATIVE
NRBC BLD-RTO: 0 /100 WBCS (ref 0–0)
PH UR STRIP.AUTO: 6 [PH]
PHOSPHATE SERPL-MCNC: 8.1 MG/DL (ref 2.5–4.9)
PLATELET # BLD AUTO: 346 X10*3/UL (ref 150–450)
POTASSIUM SERPL-SCNC: 5.5 MMOL/L (ref 3.5–5.3)
POTASSIUM UR-SCNC: 16 MMOL/L
POTASSIUM/CREAT UR-RTO: 28 MMOL/G CREAT
PROT UR STRIP.AUTO-MCNC: ABNORMAL MG/DL
RBC # BLD AUTO: 3 X10*6/UL (ref 4.5–5.9)
RBC # UR STRIP.AUTO: ABNORMAL /UL
RBC #/AREA URNS AUTO: NORMAL /HPF
SODIUM SERPL-SCNC: 131 MMOL/L (ref 136–145)
SODIUM UR-SCNC: 41 MMOL/L
SODIUM/CREAT UR-RTO: 73 MMOL/G CREAT
SP GR UR STRIP.AUTO: 1.01
UFH PPP CHRO-ACNC: 0.3 IU/ML
UROBILINOGEN UR STRIP.AUTO-MCNC: <2 MG/DL
WBC # BLD AUTO: 5.6 X10*3/UL (ref 4.4–11.3)
WBC #/AREA URNS AUTO: NORMAL /HPF

## 2023-11-28 PROCEDURE — 2500000004 HC RX 250 GENERAL PHARMACY W/ HCPCS (ALT 636 FOR OP/ED)

## 2023-11-28 PROCEDURE — 1200000002 HC GENERAL ROOM WITH TELEMETRY DAILY

## 2023-11-28 PROCEDURE — 76937 US GUIDE VASCULAR ACCESS: CPT | Performed by: RADIOLOGY

## 2023-11-28 PROCEDURE — 2780000003 HC OR 278 NO HCPCS

## 2023-11-28 PROCEDURE — 85520 HEPARIN ASSAY: CPT | Performed by: EMERGENCY MEDICINE

## 2023-11-28 PROCEDURE — 2500000001 HC RX 250 WO HCPCS SELF ADMINISTERED DRUGS (ALT 637 FOR MEDICARE OP): Performed by: INTERNAL MEDICINE

## 2023-11-28 PROCEDURE — 87340 HEPATITIS B SURFACE AG IA: CPT | Performed by: INTERNAL MEDICINE

## 2023-11-28 PROCEDURE — 80069 RENAL FUNCTION PANEL: CPT

## 2023-11-28 PROCEDURE — C1769 GUIDE WIRE: HCPCS

## 2023-11-28 PROCEDURE — 2500000001 HC RX 250 WO HCPCS SELF ADMINISTERED DRUGS (ALT 637 FOR MEDICARE OP)

## 2023-11-28 PROCEDURE — 82436 ASSAY OF URINE CHLORIDE: CPT

## 2023-11-28 PROCEDURE — 36556 INSERT NON-TUNNEL CV CATH: CPT | Performed by: RADIOLOGY

## 2023-11-28 PROCEDURE — 81001 URINALYSIS AUTO W/SCOPE: CPT

## 2023-11-28 PROCEDURE — C1752 CATH,HEMODIALYSIS,SHORT-TERM: HCPCS

## 2023-11-28 PROCEDURE — 73630 X-RAY EXAM OF FOOT: CPT | Mod: LEFT SIDE | Performed by: RADIOLOGY

## 2023-11-28 PROCEDURE — 2500000004 HC RX 250 GENERAL PHARMACY W/ HCPCS (ALT 636 FOR OP/ED): Performed by: RADIOLOGY

## 2023-11-28 PROCEDURE — 83735 ASSAY OF MAGNESIUM: CPT

## 2023-11-28 PROCEDURE — 2500000004 HC RX 250 GENERAL PHARMACY W/ HCPCS (ALT 636 FOR OP/ED): Performed by: EMERGENCY MEDICINE

## 2023-11-28 PROCEDURE — 86706 HEP B SURFACE ANTIBODY: CPT | Performed by: INTERNAL MEDICINE

## 2023-11-28 PROCEDURE — 99222 1ST HOSP IP/OBS MODERATE 55: CPT

## 2023-11-28 PROCEDURE — 36415 COLL VENOUS BLD VENIPUNCTURE: CPT | Performed by: INTERNAL MEDICINE

## 2023-11-28 PROCEDURE — 99233 SBSQ HOSP IP/OBS HIGH 50: CPT

## 2023-11-28 PROCEDURE — 2500000004 HC RX 250 GENERAL PHARMACY W/ HCPCS (ALT 636 FOR OP/ED): Performed by: INTERNAL MEDICINE

## 2023-11-28 PROCEDURE — 2720000007 HC OR 272 NO HCPCS

## 2023-11-28 PROCEDURE — 82947 ASSAY GLUCOSE BLOOD QUANT: CPT

## 2023-11-28 PROCEDURE — 77001 FLUOROGUIDE FOR VEIN DEVICE: CPT | Performed by: RADIOLOGY

## 2023-11-28 PROCEDURE — 73630 X-RAY EXAM OF FOOT: CPT | Mod: LT

## 2023-11-28 PROCEDURE — 85025 COMPLETE CBC W/AUTO DIFF WBC: CPT

## 2023-11-28 PROCEDURE — 36415 COLL VENOUS BLD VENIPUNCTURE: CPT | Performed by: EMERGENCY MEDICINE

## 2023-11-28 PROCEDURE — 82570 ASSAY OF URINE CREATININE: CPT

## 2023-11-28 PROCEDURE — 8010000001 HC DIALYSIS - HEMODIALYSIS PER DAY

## 2023-11-28 RX ORDER — FENTANYL CITRATE 50 UG/ML
INJECTION, SOLUTION INTRAMUSCULAR; INTRAVENOUS
Status: COMPLETED | OUTPATIENT
Start: 2023-11-28 | End: 2023-11-28

## 2023-11-28 RX ORDER — SODIUM BICARBONATE 650 MG/1
1300 TABLET ORAL 3 TIMES DAILY
Status: DISCONTINUED | OUTPATIENT
Start: 2023-11-28 | End: 2023-11-30

## 2023-11-28 RX ADMIN — HEPARIN SODIUM 1800 UNITS/HR: 10000 INJECTION, SOLUTION INTRAVENOUS at 16:54

## 2023-11-28 RX ADMIN — CARVEDILOL 25 MG: 12.5 TABLET, FILM COATED ORAL at 10:55

## 2023-11-28 RX ADMIN — SODIUM BICARBONATE 1300 MG: 650 TABLET ORAL at 10:55

## 2023-11-28 RX ADMIN — POLYETHYLENE GLYCOL 3350 17 G: 17 POWDER, FOR SOLUTION ORAL at 21:29

## 2023-11-28 RX ADMIN — GABAPENTIN 100 MG: 100 CAPSULE ORAL at 21:29

## 2023-11-28 RX ADMIN — FENTANYL CITRATE 50 MCG: 50 INJECTION, SOLUTION INTRAMUSCULAR; INTRAVENOUS at 14:53

## 2023-11-28 RX ADMIN — SODIUM ZIRCONIUM CYCLOSILICATE 10 G: 10 POWDER, FOR SUSPENSION ORAL at 10:56

## 2023-11-28 RX ADMIN — PANTOPRAZOLE SODIUM 20 MG: 20 TABLET, DELAYED RELEASE ORAL at 10:55

## 2023-11-28 RX ADMIN — ACETAMINOPHEN 975 MG: 325 TABLET ORAL at 16:49

## 2023-11-28 RX ADMIN — INSULIN GLARGINE 20 UNITS: 100 INJECTION, SOLUTION SUBCUTANEOUS at 21:36

## 2023-11-28 RX ADMIN — INSULIN LISPRO 7 UNITS: 100 INJECTION, SOLUTION INTRAVENOUS; SUBCUTANEOUS at 16:49

## 2023-11-28 RX ADMIN — ACETAMINOPHEN 975 MG: 325 TABLET ORAL at 10:56

## 2023-11-28 RX ADMIN — SENNOSIDES AND DOCUSATE SODIUM 1 TABLET: 8.6; 5 TABLET ORAL at 21:29

## 2023-11-28 RX ADMIN — INSULIN LISPRO 7 UNITS: 100 INJECTION, SOLUTION INTRAVENOUS; SUBCUTANEOUS at 12:55

## 2023-11-28 RX ADMIN — SODIUM BICARBONATE 1300 MG: 650 TABLET ORAL at 16:49

## 2023-11-28 RX ADMIN — POLYETHYLENE GLYCOL 3350 17 G: 17 POWDER, FOR SOLUTION ORAL at 10:57

## 2023-11-28 RX ADMIN — INSULIN LISPRO 7 UNITS: 100 INJECTION, SOLUTION INTRAVENOUS; SUBCUTANEOUS at 10:57

## 2023-11-28 RX ADMIN — AMLODIPINE BESYLATE 10 MG: 10 TABLET ORAL at 10:56

## 2023-11-28 RX ADMIN — TORSEMIDE 100 MG: 20 TABLET ORAL at 10:55

## 2023-11-28 RX ADMIN — CALCITRIOL CAPSULES 0.25 MCG 0.25 MCG: 0.25 CAPSULE ORAL at 10:56

## 2023-11-28 RX ADMIN — CARVEDILOL 25 MG: 12.5 TABLET, FILM COATED ORAL at 21:29

## 2023-11-28 RX ADMIN — SODIUM BICARBONATE 1300 MG: 650 TABLET ORAL at 21:29

## 2023-11-28 RX ADMIN — HEPARIN SODIUM 1800 UNITS/HR: 10000 INJECTION, SOLUTION INTRAVENOUS at 03:21

## 2023-11-28 RX ADMIN — ATORVASTATIN CALCIUM 80 MG: 80 TABLET, FILM COATED ORAL at 21:29

## 2023-11-28 RX ADMIN — OXYCODONE HYDROCHLORIDE 5 MG: 5 TABLET ORAL at 11:03

## 2023-11-28 ASSESSMENT — COGNITIVE AND FUNCTIONAL STATUS - GENERAL
DRESSING REGULAR LOWER BODY CLOTHING: A LITTLE
TOILETING: A LITTLE
WALKING IN HOSPITAL ROOM: A LOT
STANDING UP FROM CHAIR USING ARMS: A LITTLE
DAILY ACTIVITIY SCORE: 21
HELP NEEDED FOR BATHING: A LITTLE
CLIMB 3 TO 5 STEPS WITH RAILING: TOTAL
MOVING TO AND FROM BED TO CHAIR: A LITTLE
MOBILITY SCORE: 17

## 2023-11-28 ASSESSMENT — PAIN SCALES - GENERAL
PAINLEVEL_OUTOF10: 8
PAINLEVEL_OUTOF10: 0 - NO PAIN

## 2023-11-28 ASSESSMENT — PAIN - FUNCTIONAL ASSESSMENT
PAIN_FUNCTIONAL_ASSESSMENT: 0-10
PAIN_FUNCTIONAL_ASSESSMENT: NO/DENIES PAIN
PAIN_FUNCTIONAL_ASSESSMENT: NO/DENIES PAIN

## 2023-11-28 NOTE — NURSING NOTE
End of Shift Note:    Pt. A&Ox4, V/S stable throughout shift. Complaints of pain, given PRN Roxicodone given. Pt. Agreeable for dialysis. Temporary Dialysis cath placed in IR. R internal jugular Trialysis 13 fr at 15cm. Pt. Tolerated procedure well. Diet order changed to renal. Pt. To be taken for dialysis tonight.

## 2023-11-28 NOTE — NURSING NOTE
.Report from Sending RN:    Report From: MANOJ Kemp  Recent Surgery of Procedure: Yes, HD catheter placement  Baseline Level of Consciousness (LOC): A&OX3  Oxygen Use: No  Type: room air  Diabetic: No  Last BP Med Given Day of Dialysis: Amlodipine  Last Pain Med Given: none  Lab Tests to be Obtained with Dialysis: No  Blood Transfusion to be Given During Dialysis: No  Available IV Access: Yes  Medications to be Administered During Dialysis: No  Continuous IV Infusion Running: Heparin 18 ml/hr  Restraints on Currently or in the Last 24 Hours: No  Hand-Off Communication: Pt had no acute event overnight, vital signs stable. Not on precaution, all due medication given.

## 2023-11-28 NOTE — CARE PLAN
The patient's goals for the shift include  less pain.    The clinical goals for the shift include pt. to be free from falls and/or injury for duration of shift.    Over the shift, the patient made progress towards all goals.

## 2023-11-28 NOTE — PROGRESS NOTES
NEPHROLOGY FOLLOW UP NOTE    Kwadwo Hoyt   57 y.o.    @WT@  MRN/Room: [id number]    Subjective: This morning the patient was upset. Said he's seen 15 doctors and they ask him the same things. No acute complaints today. Urinating well. Discussed with him the possibility of dialysis while he's in the hospital    Objective:     Meds:   acetaminophen, 975 mg, TID  amLODIPine, 10 mg, Daily  [Held by provider] aspirin, 81 mg, Daily  atorvastatin, 80 mg, Nightly  calcitriol, 0.25 mcg, Daily  carvedilol, 25 mg, BID  gabapentin, 100 mg, q PM  insulin glargine, 20 Units, Nightly  insulin lispro, 0-5 Units, TID with meals  insulin lispro, 7 Units, TID with meals  lidocaine-prilocaine, , Daily  pantoprazole, 20 mg, Daily before breakfast  polyethylene glycol, 17 g, BID  sennosides-docusate sodium, 1 tablet, Nightly  sodium bicarbonate, 1,300 mg, TID  sodium zirconium cyclosilicate, 10 g, Daily  torsemide, 100 mg, Daily      heparin, Last Rate: 1,800 Units/hr (11/28/23 0321)      dextrose 10 % in water (D10W), 0.3 g/kg/hr, Once PRN  dextrose, 25 g, q15 min PRN  eucerin, , PRN  glucagon, 1 mg, q15 min PRN  heparin, 5,000-10,000 Units, q4h PRN  hydrALAZINE, 5 mg, q6h PRN  HYDROmorphone, 1 mg, q6h PRN  oxyCODONE, 5 mg, q6h PRN        Vitals:    11/28/23 1125   BP: 161/87   Pulse: 79   Resp: 18   Temp: 37.1 °C (98.8 °F)   SpO2: 96%          Intake/Output Summary (Last 24 hours) at 11/28/2023 1322  Last data filed at 11/28/2023 0900  Gross per 24 hour   Intake --   Output 1800 ml   Net -1800 ml       General appearance: Awake and alert, oriented, . No distress  HEENT: NC/AT, MMM  Skin: no apparent rash  Heart: heart sounds 1 & 2 present and normal, no murmurs heard or rub  Lungs: CTAB. No wheezing/crackles  Abdomen: soft, non tender  Extremities: No edema, no joint swelling  Neuro: No FND  ACCESS: No HD access    Blood Labs:  Results for orders placed or performed during the hospital encounter of 11/24/23 (from the past  24 hour(s))   POCT GLUCOSE   Result Value Ref Range    POCT Glucose 111 (H) 74 - 99 mg/dL   Type And Screen   Result Value Ref Range    ABO TYPE B     Rh TYPE POS     ANTIBODY SCREEN NEG    Urine electrolytes   Result Value Ref Range    Sodium, Urine Random 41 mmol/L    Sodium/Creatinine Ratio 73 Not established. mmol/g Creat    Potassium, Urine Random 16 mmol/L    Potassium/Creatinine Ratio 28 Not established mmol/g Creat    Chloride, Urine Random 32 mmol/L    Chloride/Creatinine Ratio 57 23 - 275 mmol/g creat    Creatinine, Urine Random 56.2 20.0 - 370.0 mg/dL   Urinalysis with Reflex Microscopic and Culture   Result Value Ref Range    Color, Urine Straw Straw, Yellow    Appearance, Urine Clear Clear    Specific Gravity, Urine 1.009 1.005 - 1.035    pH, Urine 6.0 5.0, 5.5, 6.0, 6.5, 7.0, 7.5, 8.0    Protein, Urine >=500 (3+) (N) NEGATIVE mg/dL    Glucose, Urine 50 (1+) (A) NEGATIVE mg/dL    Blood, Urine SMALL (1+) (A) NEGATIVE    Ketones, Urine NEGATIVE NEGATIVE mg/dL    Bilirubin, Urine NEGATIVE NEGATIVE    Urobilinogen, Urine <2.0 <2.0 mg/dL    Nitrite, Urine NEGATIVE NEGATIVE    Leukocyte Esterase, Urine NEGATIVE NEGATIVE   Extra Urine Gray Tube   Result Value Ref Range    Extra Tube Hold for add-ons.    Urinalysis Microscopic   Result Value Ref Range    WBC, Urine 1-5 1-5, NONE /HPF    RBC, Urine NONE NONE, 1-2, 3-5 /HPF   CBC and Auto Differential   Result Value Ref Range    WBC 5.6 4.4 - 11.3 x10*3/uL    nRBC 0.0 0.0 - 0.0 /100 WBCs    RBC 3.00 (L) 4.50 - 5.90 x10*6/uL    Hemoglobin 8.5 (L) 13.5 - 17.5 g/dL    Hematocrit 25.9 (L) 41.0 - 52.0 %    MCV 86 80 - 100 fL    MCH 28.3 26.0 - 34.0 pg    MCHC 32.8 32.0 - 36.0 g/dL    RDW 13.0 11.5 - 14.5 %    Platelets 346 150 - 450 x10*3/uL    Neutrophils % 56.3 40.0 - 80.0 %    Immature Granulocytes %, Automated 0.4 0.0 - 0.9 %    Lymphocytes % 28.0 13.0 - 44.0 %    Monocytes % 12.1 2.0 - 10.0 %    Eosinophils % 2.7 0.0 - 6.0 %    Basophils % 0.5 0.0 - 2.0 %     Neutrophils Absolute 3.18 1.20 - 7.70 x10*3/uL    Immature Granulocytes Absolute, Automated 0.02 0.00 - 0.70 x10*3/uL    Lymphocytes Absolute 1.58 1.20 - 4.80 x10*3/uL    Monocytes Absolute 0.68 0.10 - 1.00 x10*3/uL    Eosinophils Absolute 0.15 0.00 - 0.70 x10*3/uL    Basophils Absolute 0.03 0.00 - 0.10 x10*3/uL   Renal function panel   Result Value Ref Range    Glucose 170 (H) 74 - 99 mg/dL    Sodium 131 (L) 136 - 145 mmol/L    Potassium 5.5 (H) 3.5 - 5.3 mmol/L    Chloride 99 98 - 107 mmol/L    Bicarbonate 15 (L) 21 - 32 mmol/L    Anion Gap 23 (H) 10 - 20 mmol/L    Urea Nitrogen 96 (HH) 6 - 23 mg/dL    Creatinine 12.65 (H) 0.50 - 1.30 mg/dL    eGFR 4 (L) >60 mL/min/1.73m*2    Calcium 8.4 (L) 8.6 - 10.6 mg/dL    Phosphorus 8.1 (H) 2.5 - 4.9 mg/dL    Albumin 3.0 (L) 3.4 - 5.0 g/dL   Magnesium   Result Value Ref Range    Magnesium 2.24 1.60 - 2.40 mg/dL   Heparin Assay, UFH   Result Value Ref Range    Heparin Unfractionated 0.3 See Comment Below for Therapeutic Ranges IU/mL   POCT GLUCOSE   Result Value Ref Range    POCT Glucose 209 (H) 74 - 99 mg/dL   POCT GLUCOSE   Result Value Ref Range    POCT Glucose 152 (H) 74 - 99 mg/dL          ASSESSMENT:  Kwadwo Hoyt is a 57 y.o. male with a PMHx of DM c/b neuropathy, HTN, CKD stage 5, HFrEF (EF 50% in 11/2021), & ALEXANDER being managed for left popliteal occlusion. Hospital course complicated by SELENA on CKD, likely contrast induced.     Kidney   - SELENA on CKD, nonoliguric, Stage 5  - Baseline creatinine: ~5-6, follows with Dr. Draper nephrologist outpatient   - Etiology: CKD stage 5 secondary to T2DM  - Clinical volume status: Euvolemic  - Electrolytes (Na, K, Ca, Phos) HyperK, HyopNa        SELENA on CKD stage 5  - Worsening creatinine likely due to DONA (11/24) with hemodynamic injury (fluctuating BP)   - UOP over last 24 hours 1.3L for net -160 mL  - K: 5.5  - Urinalysis with 3+ protein  - Renal US unremarkable     Recommendations:  - Given his advanced CKD on admission and  acutely worsening azotemia there is low likelihood of renal recovery at this time and given his risk of future contrast exposure for CLI needing angiogram will recommend dialysis initiation.   - Please place TDC, discussed with primary team. Once access established will plan for HD.   - strict Is/Os  - Can give Lokelma for increased K  - Avoid nephrotoxins, contrast if possible;  - Renal dosing for medications for latest eGFR, follow medication trough as appropriate  - Avoid hypotension/rapid fluctuations in BPs       Colby Morales MD  Nephrology Resident  24 hour Renal Pager - [phone]    Discussed with attending nephrologist

## 2023-11-28 NOTE — POST-PROCEDURE NOTE
Interventional Radiology Brief Postprocedure Note    Attending: Dr. Compa Clark    Assistant: None    Diagnosis: SELENA on CKD    Description of procedure: Technically successful placement of temporary HD catheter under image guidance. OK to use. Please see PACS for full procedural report.     Anesthesia:  Local    Complications: None    Estimated Blood Loss: none    Medications  As of 11/28/23 1502      iohexol (OMNIPaque) 350 mg iodine/mL solution 100 mL (mL) Total volume:  150 mL Dosing weight:  129      Date/Time Rate/Dose/Volume Action       11/24/23  1903 150 mL Given               lactated Ringer's bolus 500 mL (mL/hr) Total volume:  Not documented* Dosing weight:  129   *Total volume has not been documented. View each administration to see the amount administered.     Date/Time Rate/Dose/Volume Action       11/24/23  2000 500 mL - 500 mL/hr (over 60 min) New Bag      2100  (over 60 min) Stopped               heparin (porcine) injection 10,000 Units (Units) Total dose:  10,000 Units Dosing weight:  129      Date/Time Rate/Dose/Volume Action       11/24/23  2046 10,000 Units Given               heparin 25,000 Units in dextrose 5% 250 mL (100 Units/mL) infusion (premix) (Units/hr) Total dose:  113,630 Units* Dosing weight:  129   *From user-documented volume     Date/Time Rate/Dose/Volume Action       11/24/23  2040 2,000 Units/hr - 20 mL/hr New Bag     11/25/23  0040 1,800 Units/hr - 18 mL/hr Rate Change      0639 1,800 Units/hr - 18 mL/hr Rate Verify      1837 1,800 Units/hr - 18 mL/hr New Bag     11/26/23  0647 1,800 Units/hr - 18 mL/hr New Bag      1122 1,800 Units/hr - 18 mL/hr Rate Verify      2032 1,800 Units/hr - 18 mL/hr New Bag     11/27/23  1039 1,800 Units/hr - 18 mL/hr New Bag      1121 1,800 Units/hr - 18 mL/hr Rate Verify     11/28/23  0321 1,800 Units/hr - 18 mL/hr New Bag               carvedilol (Coreg) tablet 25 mg (mg) Total dose:  175 mg Dosing weight:  129      Date/Time Rate/Dose/Volume  Action       11/24/23 2203 25 mg Given     11/25/23 2028 25 mg Given     11/26/23  1000 25 mg Given      2032 25 mg Given     11/27/23 0944 25 mg Given      2013 25 mg Given     11/28/23 1055 25 mg Given               gabapentin (Neurontin) capsule 300 mg (mg) Total dose:  900 mg Dosing weight:  129      Date/Time Rate/Dose/Volume Action       11/24/23 2203 300 mg Given     11/25/23  0250 300 mg Given      1008 300 mg Given               polyethylene glycol (Glycolax, Miralax) packet 17 g (g) Total dose:  85 g Dosing weight:  129      Date/Time Rate/Dose/Volume Action       11/25/23  1008 17 g Given     11/26/23 0958 17 g Given     11/27/23 0944 17 g Given      2013 17 g Given     11/28/23  1057 17 g Given               amLODIPine (Norvasc) tablet 10 mg (mg) Total dose:  40 mg Dosing weight:  129      Date/Time Rate/Dose/Volume Action       11/25/23  1008 10 mg Given     11/26/23  1000 10 mg Given     11/27/23 0944 10 mg Given     11/28/23  1056 10 mg Given               aspirin EC tablet 81 mg (mg) Total dose:  Cannot be calculated* Dosing weight:  129   *Administration dose not documented     Date/Time Rate/Dose/Volume Action       11/25/23  0801 *Not included in total Held by provider      0900 *81 mg Missed     11/26/23  0900 *81 mg Missed     11/27/23  0900 *81 mg Missed     11/28/23  0900 *81 mg Missed      1336 *Not included in total Unheld by provider               calcitriol (Rocaltrol) capsule 0.25 mcg (mcg) Total dose:  1 mcg Dosing weight:  129      Date/Time Rate/Dose/Volume Action       11/25/23  1248 0.25 mcg Given     11/26/23  1000 0.25 mcg Given     11/27/23  0944 0.25 mcg Given     11/28/23  1056 0.25 mcg Given               pantoprazole (ProtoNix) EC tablet 20 mg (mg) Total dose:  80 mg Dosing weight:  129      Date/Time Rate/Dose/Volume Action       11/25/23  1008 20 mg Given     11/26/23  0646 20 mg Given     11/27/23  0613 20 mg Given     11/28/23  1055 20 mg Given                insulin glargine (Lantus) injection 20 Units (Units) Total dose:  80 Units Dosing weight:  129      Date/Time Rate/Dose/Volume Action       11/25/23  0250 20 Units Given      2029 20 Units Given     11/26/23 2033 20 Units Given     11/27/23 2013 20 Units Given               insulin lispro (HumaLOG) injection 7 Units (Units) Total dose:  56 Units* Dosing weight:  129   *Administration not included in total     Date/Time Rate/Dose/Volume Action       11/25/23  1053 7 Units Given      1304 7 Units Given      1700 *7 Units Missed     11/26/23  0953 7 Units Given      1200 *7 Units Missed      1700 *7 Units Missed     11/27/23  0943 7 Units Given      1338 7 Units Given      1753 7 Units Given     11/28/23  1057 7 Units Given      1255 7 Units Given               insulin lispro (HumaLOG) injection 0-5 Units (Units) Total dose:  3 Units* Dosing weight:  129   *Administration not included in total     Date/Time Rate/Dose/Volume Action       11/25/23  1009 *300 Units Missed      1300 3 Units Given      1700 *Not included in total Missed     11/26/23  0800 *Not included in total Missed      1200 *Not included in total Missed      1700 *Not included in total Missed     11/27/23  0800 *Not included in total Missed      1200 *Not included in total Missed      1700 *Not included in total Missed     11/28/23  0800 *Not included in total Missed      1200 *Not included in total Missed               atorvastatin (Lipitor) tablet 80 mg (mg) Total dose:  240 mg Dosing weight:  129      Date/Time Rate/Dose/Volume Action       11/25/23 2029 80 mg Given     11/26/23 2032 80 mg Given     11/27/23 2013 80 mg Given               sodium bicarbonate tablet 650 mg (mg) Total dose:  4,550 mg Dosing weight:  129      Date/Time Rate/Dose/Volume Action       11/25/23 2029 650 mg Given     11/26/23  1000 650 mg Given      1422 650 mg Given      2032 650 mg Given     11/27/23  0944 650 mg Given      1533 650 mg Given      2013 650 mg Given                sodium bicarbonate tablet 1,300 mg (mg) Total dose:  1,300 mg Dosing weight:  129      Date/Time Rate/Dose/Volume Action       11/28/23  1055 1,300 mg Given               torsemide (Demadex) tablet 100 mg (mg) Total dose:  400 mg Dosing weight:  129      Date/Time Rate/Dose/Volume Action       11/25/23 2028 100 mg Given     11/26/23  0954 100 mg Given     11/27/23 0944 100 mg Given     11/28/23  1055 100 mg Given               gabapentin (Neurontin) capsule 100 mg (mg) Total dose:  300 mg Dosing weight:  129      Date/Time Rate/Dose/Volume Action       11/25/23 2029 100 mg Given     11/26/23 2032 100 mg Given     11/27/23 2013 100 mg Given               oxyCODONE (Roxicodone) immediate release tablet 5 mg (mg) Total dose:  25 mg Dosing weight:  129      Date/Time Rate/Dose/Volume Action       11/25/23 2028 5 mg Given     11/26/23  0647 5 mg Given     11/27/23  0953 5 mg Given      2017 5 mg Given     11/28/23  1103 5 mg Given               acetaminophen (Tylenol) tablet 975 mg (mg) Total dose:  4,875 mg Dosing weight:  129      Date/Time Rate/Dose/Volume Action       11/26/23 2032 975 mg Given     11/27/23 0944 975 mg Given      1533 975 mg Given      2013 975 mg Given     11/28/23  1056 975 mg Given               lidocaine-prilocaine (Emla) cream Total dose:  Cannot be calculated* Dosing weight:  129   *Administration dose not documented     Date/Time Rate/Dose/Volume Action       11/27/23  1300 *Not included in total Missed     11/28/23  0900 *Not included in total Missed               emollient combination no.61 cream 1 Application (Application) Total dose:  0 Application* Dosing weight:  129   *Administration not included in total     Date/Time Rate/Dose/Volume Action       11/27/23  1300 *1 Application Missed               sodium chloride 0.9 % bolus 1,000 mL (mL) Total volume:  0 mL* Dosing weight:  129   *Administration not included in total     Date/Time Rate/Dose/Volume Action        11/27/23  1330 *1,000 mL - 1,000 mL/hr (over 60 min) Missed      1530 *1,000 mL - 1,000 mL/hr (over 60 min) Missed               sennosides-docusate sodium (Katerina-Colace) 8.6-50 mg per tablet 1 tablet (tablet) Total dose:  1 tablet Dosing weight:  129      Date/Time Rate/Dose/Volume Action       11/27/23 2013 1 tablet Given               sodium chloride 0.9% infusion (mL/hr) Total volume:  Not documented* Dosing weight:  129   *Total volume has not been documented. View each administration to see the amount administered.     Date/Time Rate/Dose/Volume Action       11/27/23  1627 100 mL/hr New Bag               sodium zirconium cyclosilicate (Lokelma) packet 10 g (g) Total dose:  10 g Dosing weight:  129      Date/Time Rate/Dose/Volume Action       11/28/23  1056 10 g Given               fentaNYL PF (Sublimaze) injection (mcg) Total dose:  50 mcg      Date/Time Rate/Dose/Volume Action       11/28/23  1453 50 mcg Given                   No specimens collected      See detailed result report with images in PACS.    The patient tolerated the procedure well without incident or complication and is in stable condition.
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
## 2023-11-28 NOTE — PRE-PROCEDURE NOTE
Interventional Radiology Preprocedure Note    Indication for procedure: The primary encounter diagnosis was Arterial occlusion. Diagnoses of Acute occlusion of popliteal artery due to thrombosis (CMS/HCC), HFrEF (heart failure with reduced ejection fraction) (CMS/HCC), Acute deep vein thrombosis (DVT) of left lower extremity after procedure (CMS/HCC), Left leg weakness, and Peripheral vascular disease, unspecified (CMS/[facility]) were also pertinent to this visit.    Relevant review of systems: NA    Relevant Labs:   Lab Results   Component Value Date    CREATININE 12.65 (H) 11/28/2023    EGFR 4 (L) 11/28/2023    INR 1.1 11/24/2023    PROTIME 12.7 11/24/2023       Planned Sedation/Anesthesia: Minimal    Airway assessment: normal    Directed physical examination:    Physical Exam  Constitutional:       Appearance: Normal appearance.   HENT:      Head: Normocephalic.      Nose: Nose normal.      Mouth/Throat:      Pharynx: Oropharynx is clear.   Cardiovascular:      Rate and Rhythm: Normal rate and regular rhythm.   Pulmonary:      Effort: Pulmonary effort is normal.      Breath sounds: Normal breath sounds.   Neurological:      Mental Status: He is oriented to person, place, and time.          Mallampati: III (soft and hard palate and base of uvula visible)    ASA Score: ASA 3 - Patient with moderate systemic disease with functional limitations    Benefits, risks and alternatives of procedure and planned sedation have been discussed with the patient and/or their representative. All questions answered and they agree to proceed.

## 2023-11-28 NOTE — SIGNIFICANT EVENT
Patient will require dialysis before LLE angiogram. Patient received tunneled line today. Patient is scheduled for LLE angiogram this Friday 12/1.   Please make NPO at midnight 11/30. Hold heparin drip on call to OR.     Nino Dominique PGY1  Vascular Surgery  [id number]

## 2023-11-28 NOTE — H&P
Interval H&P update for OR 11/28 - significant changes as noted in A/P    History Of Present Illness  Kwadwo Hoyt is a 57 y.o. male with PMH of DM neuropathy, multiple toe amputations with podiatry, HTN, CKD 4 presented with two days of LLE weakness. Patient was walking yesterday when his left leg suddenly gave out causing him to fall. Patient denies any pain but endorses generalized chronic pain. Endorses chronic bilateral leg swelling. Patient was last seen in podiatry clinic 11/17/23 where he had his left great toe debrided. Because of this weakness, patient also was seen by neurology with concern for a stroke.    In the ED, he received a CTA which showed complete occlusion of the P1 and P3 segments of the left popliteal artery with some reconstitution of the P2 segment and likely complete occlusion of the left anterior tibial and posterior tibial arteries. Vascular surgery was consulted for this finding. Heparin gtt was started.   Past Medical History  He has a past medical history of Diabetes mellitus (CMS/[facility]), Hyperlipidemia, and Hypertension.  CKD4  Surgical History  He has a past surgical history that includes Amputation foot / toe and CT angio aorta and bilateral iliofemoral runoff w and or wo IV contrast (11/24/2023).  Social History  He reports that he has never smoked. He has never used smokeless tobacco. He reports current alcohol use. He reports current drug use. Drug: Marijuana.  Family History  No family history on file.  Allergies  Nsaids (non-steroidal anti-inflammatory drug)  Review of Systems  Review of Systems   Constitutional:  Negative for appetite change, chills, fatigue and fever.   HENT:  Negative for congestion, hearing loss, sore throat and trouble swallowing.    Respiratory:  Negative for cough, chest tightness, shortness of breath and wheezing.    Cardiovascular:  Negative for chest pain and palpitations.   Gastrointestinal:  Negative for abdominal distention, abdominal pain, blood  in stool, constipation, diarrhea, nausea and vomiting.   Genitourinary:  Negative for dysuria, frequency, hematuria and urgency.   Musculoskeletal:  Negative for joint swelling and myalgias.   Neurological:  Negative for dizziness, syncope, weakness, light-headedness and headaches.     Physical Exam  Constitutional:       General: He is not in acute distress.     Appearance: Normal appearance. He is obese. He is not ill-appearing, toxic-appearing or diaphoretic.   HENT:      Head: Normocephalic and atraumatic.   Eyes:      General: No scleral icterus.     Pupils: Pupils are equal, round, and reactive to light.   Cardiovascular:      Rate and Rhythm: Normal rate and regular rhythm.      Pulses:           Radial pulses are 2+ on the right side and 2+ on the left side.        Femoral pulses are 2+ on the right side and 2+ on the left side.       Posterior tibial pulses are detected w/ Doppler on the right side.      Comments: Multiphasic right PT and AT  Monophasic left AT, no dopplerable PT  Pulmonary:      Effort: Pulmonary effort is normal. No respiratory distress.      Breath sounds: Normal breath sounds. No wheezing.   Abdominal:      General: There is no distension.      Palpations: Abdomen is soft.      Tenderness: There is no abdominal tenderness. There is no guarding.   Musculoskeletal:         General: Normal range of motion.      Comments: Left toe amputations, great toe left with necrotic tip  Chronic bilateral lower extremity swelling present   Skin:     General: Skin is warm and dry.      Coloration: Skin is not jaundiced.      Findings: No erythema or rash.   Neurological:      Mental Status: He is alert and oriented to person, place, and time. Mental status is at baseline.   Psychiatric:         Mood and Affect: Mood normal.         Behavior: Behavior normal.         Judgment: Judgment normal.     Last Recorded Vitals  Blood pressure 158/84, pulse 74, temperature 36.8 °C (98.2 °F), resp. rate 22, height  "1.854 m (6' 1\"), weight 129 kg (285 lb), SpO2 93 %.    PVR 11/27  Left Lower PVR: There is evidence of severe disease at the femoral popliteal level. Biphasic flow is noted in the left popliteal artery. Triphasic flow is noted in the left common femoral artery. Unable to obtain pressures.  The posterior tibial and dorasalis pedis arteries are not audible.    Assessment/Plan   Kwadwo Hoyt is a 57 y.o. male with PMH of DM neuropathy, multiple toe amputations with podiatry, HTN, CKD 4 presenting with two days of LLE weakness causing him to fall. Endorses no pain, movement intact. CTA showed complete occlusion of the P1 and P3 segments of the left popliteal artery with some reconstitution of the P2 segment and likely complete occlusion of the left anterior tibial and posterior tibial arteries. Physical exam showed a monophasic left AT, no dopplerable DP. PVR/ZAC as above.    -  OR today for LLE angiogram. Hold hep gtt on-call to OR    Brendon Hilton MD  General Surgery, pgy3  Vascular [id number]         "

## 2023-11-28 NOTE — PROGRESS NOTES
Transitional Care Coordination Progress Note:  PLAN: Will be speaking to nephrology about possible dialysis. Vascular surgery holding off on any intervention until kidney function improves.     PAYOR: [employer]    DISPO: TBD. Pending PT/OT tg. ADOD 2-3 days.     SUPPORT/CONTACT: Milka Hoyt, [phone]    Norma Parekh RN, TCC

## 2023-11-28 NOTE — PROGRESS NOTES
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 significance and urgency of this critical finding by telephone with  JANNA HUTCHISON on 11/24/2023 at 7:35 pm. (**-RCF-**) Findings:  See findings.   Signed by: Obdulio Mendoza 11/24/2023 10:03 PM Dictation workstation:   [id number]    CT lumbar spine wo IV contrast    Result Date: 11/24/2023  Interpreted By:  Charlie Betts, STUDY: CT LUMBAR SPINE WO IV CONTRAST  11/24/2023 4:11 pm   INDICATION: Signs/Symptoms:left lower extremity weakness   COMPARISON: None.   ACCESSION NUMBER(S): [id number]   ORDERING CLINICIAN: CHOLO JORDAN   TECHNIQUE: Thin cut axial CT images through the lumbar spine were obtained and reconstructed in the coronal and sagittal planes.   FINDINGS: No acute fracture of the lumbar spine is noted.   The lumbar  vertebral bodies are in anatomic alignment.   There is multilevel spondylosis with degenerative changes noted along endplates within lumbar and visualized lower thoracic region. The soft tissues of the spinal canal and neural foramen are suboptimally evaluated on this CT study.   At the L5/S1 level, there is a mild posterior disc bulge, degenerative facet changes, and ligamentum flavum hypertrophy contributing to suspected mild overall spinal canal narrowing. There is mild encroachment upon the neural foramen bilaterally.   At the L4/5 level, there is posterior osteophytic spurring, posterior disc bulge, along with degenerative facet changes and ligamentum flavum hypertrophy which in combination with prominence of the epidural fat posteriorly within the spinal canal contributes to suspected at least moderate narrowing of the thecal sac within the spinal canal. There is moderate encroachment upon the neural foramen bilaterally.   At the L3/4 level, is minimal posterior osteophytic spurring and posterior disc bulge along with degenerative facet changes and ligamentum flavum hypertrophy. There is prominence of the epidural fat posteriorly within the spinal canal. There is suspected moderate narrowing of the thecal sac within the spinal canal. There is moderate encroachment upon the neural foramen bilaterally.   At the L2/3 level, there is a suspected mild posterior disc bulge along with mild degenerative facet changes and ligamentum flavum hypertrophy. There is prominence of the epidural fat posteriorly within the spinal canal. There is mild overall narrowing of the thecal sac within the spinal canal. There is mild encroachment upon the neural foramen bilaterally. There is right far lateral osteophytic spurring.   At the L1/2 level, there are mild degenerative facet changes without significant bony encroachment upon the spinal canal or neural foramen. There is right far lateral osteophytic spurring.   At the T12/L1  level, there are mild degenerative facet changes without significant bony encroachment upon the spinal canal or neural foramen.   At the T11/12 level, there are degenerative facet changes and minimal posterior osteophytic spurring contributing to suspected mild spinal canal narrowing. There is mild-to-moderate bony encroachment upon the neural foramen bilaterally.   There is a component of bony ankylosis along the sacroiliac joints bilaterally.   Atherosclerotic calcifications are noted along the descending aorta and iliac arteries.         No acute fracture of the lumbar spine is noted.   The lumbar vertebral bodies are in anatomic alignment.   There is multilevel spondylosis with degenerative changes noted along endplates within lumbar and visualized lower thoracic region. There are varying degrees of spinal canal and neural foraminal narrowing as described above. The soft tissues of the spinal canal and neural foramen are suboptimally evaluated on this CT study.   MACRO: None   Signed by: Charlie Betts 11/24/2023 4:30 PM Dictation workstation:   [id number]    CT head wo IV contrast    Result Date: 11/24/2023  Interpreted By:  Charlie Betts, STUDY: CT HEAD WO IV CONTRAST;  11/24/2023 4:11 pm   INDICATION: Signs/Symptoms:left lower extremity weakness.   COMPARISON: None.   ACCESSION NUMBER(S): [id number]   ORDERING CLINICIAN: CHOLO JORDAN   TECHNIQUE: Axial CT images of the head were obtained without intravenous contrast administration.   FINDINGS: There is moderate brain parenchymal volume loss.   The lateral ventricles demonstrate mild disproportionate prominence when compared with the sulci within the cerebral convexities. No obstructing mass lesion is noted on this noncontrast CT study. This mild disproportionate ventriculomegaly may be related to more pronounced central volume loss ossific a component of communicating hydrocephalus.   There are patchy and confluent nonspecific white matter changes within  the cerebral hemispheres bilaterally which while nonspecific, can be seen with small-vessel ischemic change among others.   Additional focal hypodensities are identified within the subinsular regions and basal ganglia bilaterally suggesting incidental prominent perivascular spaces and/or scattered lacunar infarctions.   No hyperdense acute intracranial hemorrhage is noted.   There is no midline shift.   There are scattered retention cysts or polyps within the bilateral maxillary sinuses. The remaining paranasal sinuses are clear.   There is opacification of the left middle ear cavity and left mastoid air cells.   There is a metallic foreign body within the right facial soft tissues surrounding the anterior margin of the right parotid gland.       There is moderate brain parenchymal volume loss.   The lateral ventricles demonstrate mild disproportionate prominence when compared with the sulci within the cerebral convexities. No obstructing mass lesion is noted on this noncontrast CT study. This mild disproportionate ventriculomegaly may be related to more pronounced central volume loss ossific a component of communicating hydrocephalus.   There are patchy and confluent nonspecific white matter changes within the cerebral hemispheres bilaterally which while nonspecific, can be seen with small-vessel ischemic change among others. Additional focal hypodensities are identified within the subinsular regions and basal ganglia bilaterally suggesting incidental prominent perivascular spaces and/or scattered lacunar infarctions.   There is opacification of the left middle ear cavity and left mastoid air cells.   There is a metallic foreign body within the right facial soft tissues surrounding the anterior margin of the right parotid gland.   MACRO: None.   Signed by: Charlie Betts 11/24/2023 4:21 PM Dictation workstation:   [id number]    XR hip left 2 or 3 views    Result Date: 11/24/2023  Interpreted By:  Naidr Bender  STUDY: XR HIP LEFT 2 OR 3 VIEWS; XR FEMUR LEFT 2+ VIEWS; ;  11/24/2023 3:58 pm   INDICATION: Signs/Symptoms:weakness.   COMPARISON: None.   ACCESSION NUMBER(S): [id number]; [id number]   ORDERING CLINICIAN: CHOLO JORDAN   FINDINGS: Left hip, three views. Left femur, two views.   There is no fracture. There is no dislocation. Moderate degenerative changes present in the hip and in the left knee. There is no malalignment. Linear heterotopic ossification at the lateral aspect of the left hip.       No acute abnormality seen. Moderate degenerative changes     MACRO: None   Signed by: Nadir Bender 11/24/2023 4:02 PM Dictation workstation:   [id number]    XR femur left 2+ views    Result Date: 11/24/2023  Interpreted By:  Nadir Bender, STUDY: XR HIP LEFT 2 OR 3 VIEWS; XR FEMUR LEFT 2+ VIEWS; ;  11/24/2023 3:58 pm   INDICATION: Signs/Symptoms:weakness.   COMPARISON: None.   ACCESSION NUMBER(S): [id number]; [id number]   ORDERING CLINICIAN: CHOLO JORDAN   FINDINGS: Left hip, three views. Left femur, two views.   There is no fracture. There is no dislocation. Moderate degenerative changes present in the hip and in the left knee. There is no malalignment. Linear heterotopic ossification at the lateral aspect of the left hip.       No acute abnormality seen. 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  - Charts, labs, vitals and imaging all reviewed.   - Labs: WBC 5.6, Hgb 8.5, CRP 4.15, , lactate 0.6  - PVRs: Critical Result: Posterior  tibial and dorsalis pedis are not audible on the left. Severe occlusive disease B/L.  RIGHT Lower PVR                Pressures Ratios  Right High Thigh               256 mmHg  1.54  Right Low Thigh                256 mmHg  1.54  Right Calf                     182 mmHg  1.10  Right Posterior Tibial (Ankle) 126 mmHg  0.76  Right Dorsalis Pedis (Ankle)   106 mmHg  0.64  Right Digit (Great Toe)        51 mmHg   0.31  - Blood culture: no growth    Plan:  - Abx: per primary/ID  - Pain Regimen: per primary/ID  - Dressings: betadine soaked gauze, dry gauze, aga, tape. Recommend daily dressing changes.   - Nursing staff is able to change/reinforce dressing if & as necessary until next day’s dressing change. Thank you.  -left hallux is clinically dry and stable. With critical results of complete occlusion of the DP/PT and popliteal, patient is at a very high risk for limb loss.  -No current plans for podiatric surgical intervention. Patient will likely need surgical intervention this admission, likely more a proximal amputation.  -No podiatric surgical intervention without restoration of blood flow to foot. If flow can not be established, recommend vascular surgery consider proximal amputation.   -Will continue to assess following vascular intervention attempts  - Podiatry will continue to follow while in house.    DVT ppx: per primary  Weightbearing: WBAT    Case to be discussed with attending, A&P above reflects a tentative plan. Please await for the final signature from the attending physician on service.    Phylicia Espinoza DPM PGY-2  Podiatric Medicine & Surgery  Please Rachana message me with any questions or concerns.            SIGNATURE: Phylicia Espinoza DPM PATIENT NAME: Kwadwo Hoyt   DATE: November 28, 2023 MRN: [id number]   TIME: 9:46 AM CONTACT: Haikpaulette Huang

## 2023-11-29 ENCOUNTER — APPOINTMENT (OUTPATIENT)
Dept: DIALYSIS | Facility: HOSPITAL | Age: 58
End: 2023-11-29
Payer: MEDICARE

## 2023-11-29 LAB
ALBUMIN SERPL BCP-MCNC: 3 G/DL (ref 3.4–5)
ANION GAP SERPL CALC-SCNC: 20 MMOL/L (ref 10–20)
BASOPHILS # BLD AUTO: 0.03 X10*3/UL (ref 0–0.1)
BASOPHILS NFR BLD AUTO: 0.5 %
BUN SERPL-MCNC: 93 MG/DL (ref 6–23)
CALCIUM SERPL-MCNC: 8.5 MG/DL (ref 8.6–10.6)
CHLORIDE SERPL-SCNC: 102 MMOL/L (ref 98–107)
CO2 SERPL-SCNC: 20 MMOL/L (ref 21–32)
CREAT SERPL-MCNC: 12.31 MG/DL (ref 0.5–1.3)
EOSINOPHIL # BLD AUTO: 0.15 X10*3/UL (ref 0–0.7)
EOSINOPHIL NFR BLD AUTO: 2.4 %
ERYTHROCYTE [DISTWIDTH] IN BLOOD BY AUTOMATED COUNT: 13.1 % (ref 11.5–14.5)
GFR SERPL CREATININE-BSD FRML MDRD: 4 ML/MIN/1.73M*2
GLUCOSE BLD MANUAL STRIP-MCNC: 117 MG/DL (ref 74–99)
GLUCOSE BLD MANUAL STRIP-MCNC: 155 MG/DL (ref 74–99)
GLUCOSE BLD MANUAL STRIP-MCNC: 87 MG/DL (ref 74–99)
GLUCOSE SERPL-MCNC: 101 MG/DL (ref 74–99)
HCT VFR BLD AUTO: 25.8 % (ref 41–52)
HGB BLD-MCNC: 8.2 G/DL (ref 13.5–17.5)
IMM GRANULOCYTES # BLD AUTO: 0.02 X10*3/UL (ref 0–0.7)
IMM GRANULOCYTES NFR BLD AUTO: 0.3 % (ref 0–0.9)
LYMPHOCYTES # BLD AUTO: 1.48 X10*3/UL (ref 1.2–4.8)
LYMPHOCYTES NFR BLD AUTO: 23.6 %
MAGNESIUM SERPL-MCNC: 2.02 MG/DL (ref 1.6–2.4)
MCH RBC QN AUTO: 27.2 PG (ref 26–34)
MCHC RBC AUTO-ENTMCNC: 31.8 G/DL (ref 32–36)
MCV RBC AUTO: 85 FL (ref 80–100)
MONOCYTES # BLD AUTO: 0.77 X10*3/UL (ref 0.1–1)
MONOCYTES NFR BLD AUTO: 12.3 %
NEUTROPHILS # BLD AUTO: 3.81 X10*3/UL (ref 1.2–7.7)
NEUTROPHILS NFR BLD AUTO: 60.9 %
NRBC BLD-RTO: 0 /100 WBCS (ref 0–0)
PHOSPHATE SERPL-MCNC: 7.3 MG/DL (ref 2.5–4.9)
PLATELET # BLD AUTO: 316 X10*3/UL (ref 150–450)
POTASSIUM SERPL-SCNC: 6.1 MMOL/L (ref 3.5–5.3)
RBC # BLD AUTO: 3.02 X10*6/UL (ref 4.5–5.9)
SODIUM SERPL-SCNC: 136 MMOL/L (ref 136–145)
UFH PPP CHRO-ACNC: 0.3 IU/ML
WBC # BLD AUTO: 6.3 X10*3/UL (ref 4.4–11.3)

## 2023-11-29 PROCEDURE — 85025 COMPLETE CBC W/AUTO DIFF WBC: CPT

## 2023-11-29 PROCEDURE — 36415 COLL VENOUS BLD VENIPUNCTURE: CPT | Performed by: EMERGENCY MEDICINE

## 2023-11-29 PROCEDURE — 2500000001 HC RX 250 WO HCPCS SELF ADMINISTERED DRUGS (ALT 637 FOR MEDICARE OP)

## 2023-11-29 PROCEDURE — 36415 COLL VENOUS BLD VENIPUNCTURE: CPT

## 2023-11-29 PROCEDURE — 76937 US GUIDE VASCULAR ACCESS: CPT

## 2023-11-29 PROCEDURE — 83735 ASSAY OF MAGNESIUM: CPT

## 2023-11-29 PROCEDURE — 2500000001 HC RX 250 WO HCPCS SELF ADMINISTERED DRUGS (ALT 637 FOR MEDICARE OP): Performed by: INTERNAL MEDICINE

## 2023-11-29 PROCEDURE — 2500000004 HC RX 250 GENERAL PHARMACY W/ HCPCS (ALT 636 FOR OP/ED): Performed by: EMERGENCY MEDICINE

## 2023-11-29 PROCEDURE — 2500000004 HC RX 250 GENERAL PHARMACY W/ HCPCS (ALT 636 FOR OP/ED)

## 2023-11-29 PROCEDURE — 99233 SBSQ HOSP IP/OBS HIGH 50: CPT

## 2023-11-29 PROCEDURE — 1200000002 HC GENERAL ROOM WITH TELEMETRY DAILY

## 2023-11-29 PROCEDURE — 80069 RENAL FUNCTION PANEL: CPT

## 2023-11-29 PROCEDURE — 2500000004 HC RX 250 GENERAL PHARMACY W/ HCPCS (ALT 636 FOR OP/ED): Performed by: INTERNAL MEDICINE

## 2023-11-29 PROCEDURE — 8010000001 HC DIALYSIS - HEMODIALYSIS PER DAY

## 2023-11-29 PROCEDURE — 85520 HEPARIN ASSAY: CPT | Performed by: EMERGENCY MEDICINE

## 2023-11-29 PROCEDURE — 82947 ASSAY GLUCOSE BLOOD QUANT: CPT

## 2023-11-29 PROCEDURE — 90935 HEMODIALYSIS ONE EVALUATION: CPT

## 2023-11-29 RX ORDER — HYDROXYZINE HYDROCHLORIDE 25 MG/1
25 TABLET, FILM COATED ORAL EVERY 6 HOURS PRN
Status: DISCONTINUED | OUTPATIENT
Start: 2023-11-29 | End: 2023-12-14 | Stop reason: HOSPADM

## 2023-11-29 RX ADMIN — ATORVASTATIN CALCIUM 80 MG: 80 TABLET, FILM COATED ORAL at 21:32

## 2023-11-29 RX ADMIN — OXYCODONE HYDROCHLORIDE 5 MG: 5 TABLET ORAL at 12:08

## 2023-11-29 RX ADMIN — LIDOCAINE AND PRILOCAINE: 25; 25 CREAM TOPICAL at 08:03

## 2023-11-29 RX ADMIN — INSULIN GLARGINE 20 UNITS: 100 INJECTION, SOLUTION SUBCUTANEOUS at 21:32

## 2023-11-29 RX ADMIN — CARVEDILOL 25 MG: 12.5 TABLET, FILM COATED ORAL at 08:02

## 2023-11-29 RX ADMIN — HEPARIN SODIUM 1800 UNITS/HR: 10000 INJECTION, SOLUTION INTRAVENOUS at 08:20

## 2023-11-29 RX ADMIN — SODIUM BICARBONATE 1300 MG: 650 TABLET ORAL at 21:32

## 2023-11-29 RX ADMIN — HEPARIN SODIUM 1800 UNITS/HR: 10000 INJECTION, SOLUTION INTRAVENOUS at 22:46

## 2023-11-29 RX ADMIN — POLYETHYLENE GLYCOL 3350 17 G: 17 POWDER, FOR SOLUTION ORAL at 08:05

## 2023-11-29 RX ADMIN — CALCITRIOL CAPSULES 0.25 MCG 0.25 MCG: 0.25 CAPSULE ORAL at 08:03

## 2023-11-29 RX ADMIN — SENNOSIDES AND DOCUSATE SODIUM 1 TABLET: 8.6; 5 TABLET ORAL at 21:32

## 2023-11-29 RX ADMIN — GABAPENTIN 100 MG: 100 CAPSULE ORAL at 21:32

## 2023-11-29 RX ADMIN — AMLODIPINE BESYLATE 10 MG: 10 TABLET ORAL at 08:03

## 2023-11-29 RX ADMIN — CARVEDILOL 25 MG: 12.5 TABLET, FILM COATED ORAL at 21:32

## 2023-11-29 RX ADMIN — ASPIRIN 81 MG: 81 TABLET, COATED ORAL at 08:00

## 2023-11-29 RX ADMIN — SODIUM BICARBONATE 1300 MG: 650 TABLET ORAL at 08:03

## 2023-11-29 RX ADMIN — SODIUM BICARBONATE 1300 MG: 650 TABLET ORAL at 15:29

## 2023-11-29 RX ADMIN — HYDROMORPHONE HYDROCHLORIDE 1 MG: 1 INJECTION, SOLUTION INTRAMUSCULAR; INTRAVENOUS; SUBCUTANEOUS at 08:08

## 2023-11-29 RX ADMIN — OXYCODONE HYDROCHLORIDE 5 MG: 5 TABLET ORAL at 21:32

## 2023-11-29 RX ADMIN — PANTOPRAZOLE SODIUM 20 MG: 20 TABLET, DELAYED RELEASE ORAL at 08:03

## 2023-11-29 RX ADMIN — TORSEMIDE 100 MG: 20 TABLET ORAL at 08:02

## 2023-11-29 ASSESSMENT — COGNITIVE AND FUNCTIONAL STATUS - GENERAL
DAILY ACTIVITIY SCORE: 22
MOVING TO AND FROM BED TO CHAIR: A LITTLE
EATING MEALS: A LOT
TOILETING: A LOT
MOBILITY SCORE: 18
CLIMB 3 TO 5 STEPS WITH RAILING: A LOT
WALKING IN HOSPITAL ROOM: A LOT
DAILY ACTIVITIY SCORE: 12
MOVING FROM LYING ON BACK TO SITTING ON SIDE OF FLAT BED WITH BEDRAILS: A LITTLE
DRESSING REGULAR UPPER BODY CLOTHING: A LOT
MOVING TO AND FROM BED TO CHAIR: A LOT
WALKING IN HOSPITAL ROOM: A LOT
TOILETING: A LITTLE
CLIMB 3 TO 5 STEPS WITH RAILING: A LOT
HELP NEEDED FOR BATHING: A LITTLE
TURNING FROM BACK TO SIDE WHILE IN FLAT BAD: A LOT
STANDING UP FROM CHAIR USING ARMS: A LOT
HELP NEEDED FOR BATHING: A LOT
DRESSING REGULAR LOWER BODY CLOTHING: A LOT
STANDING UP FROM CHAIR USING ARMS: A LITTLE
MOBILITY SCORE: 13
PERSONAL GROOMING: A LOT

## 2023-11-29 ASSESSMENT — PAIN - FUNCTIONAL ASSESSMENT
PAIN_FUNCTIONAL_ASSESSMENT: 0-10

## 2023-11-29 ASSESSMENT — PAIN DESCRIPTION - DESCRIPTORS
DESCRIPTORS: ACHING
DESCRIPTORS: ACHING

## 2023-11-29 ASSESSMENT — PAIN SCALES - GENERAL
PAINLEVEL_OUTOF10: 7
PAINLEVEL_OUTOF10: 6
PAINLEVEL_OUTOF10: 7

## 2023-11-29 ASSESSMENT — PAIN DESCRIPTION - LOCATION: LOCATION: FOOT

## 2023-11-29 ASSESSMENT — PAIN DESCRIPTION - ORIENTATION: ORIENTATION: LEFT

## 2023-11-29 NOTE — CARE PLAN
The patient's goals for the shift include      The clinical goals for the shift include pt. to be free from falls for duration of shift.

## 2023-11-29 NOTE — PROGRESS NOTES
Occupational Therapy                 Therapy Communication Note    Patient Name: Kwadwo Hoyt  MRN: [id number]  Today's Date: 11/29/2023     Discipline: Occupational Therapy    Missed Visit Reason: Missed Visit Reason: Patient in a medical procedure (off floor at HD, re-attempt as time permits)    Missed Time: Attempt

## 2023-11-29 NOTE — NURSING NOTE
Report from Sending RN:    Report From: Birdie ( RN)  Recent Surgery of Procedure: Yes, right tunnel chest catheter 11/28/23  Baseline Level of Consciousness (LOC): A/O x 4  Oxygen Use: No  Type: none  Diabetic: No  Last BP Med Given Day of Dialysis: torsemide 100 mg; coreg 25 mg; Norvasc 10 mg 0808  Last Pain Med Given: dilaudid 1 mg 0808 am  Lab Tests to be Obtained with Dialysis: No  Blood Transfusion to be Given During Dialysis: No  Available IV Access: Yes  Medications to be Administered During Dialysis: No  Continuous IV Infusion Running: Yes; heparin drip @ 18/ml/hr  Restraints on Currently or in the Last 24 Hours: No  Hand-Off Communication: no acute overnight or morning events; vss; Pt did take morning events; no labs needed; Pt may go off unit without telemetry; Pt is a full code; Kiara Domingo RN.

## 2023-11-29 NOTE — SIGNIFICANT EVENT
Provider was notified that patient was refusing to wear telemetry monitors.  Went to bedside to talk with patient about this and discuss reasoning as to why it was important to wear telemetry.  Patient reported he had no issues with wearing the monitors, his issues has been with the battery pack for the monitors that keeps pulling off monitors when he adjusts himself in bed.  Patient endorse additional complaints not related to his ongoing medical treatment including that the windows in his room have a drafty air flow.  Offered to obtain the patient an additional blanket, which he declined.    Plan:  - place patient back on telemetry monitor     JAMAL Carpenter MD MS  PGY-2 Family Medicine

## 2023-11-29 NOTE — PROGRESS NOTES
Physical Therapy                 Therapy Communication Note    Patient Name: Kwadwo Hoyt  MRN: [id number]  Today's Date: 11/29/2023     Discipline: Physical Therapy    Missed Visit Reason: Missed Visit Reason: Other (Comment) (Pt off unit at HD, will re-attempt as time permits.)    Missed Time: Attempt    Erendira Sandhu PTA  Rehab Office: [phone]

## 2023-11-29 NOTE — PROGRESS NOTES
Transitional Care Coordination Progress Note:  PLAN: IR consult placed to see if patient can have permanent dialysis line placed. [place] is planning angio on 12/1.     PAYOR: [employer] Medicare Dual Advantage    DISPO: PT recommending low intensity. ADOD after 12/1.     SUPPORT/CONTACT: Milka Evelina, [phone]    Norma Parekh RN, TCC    1212: Attempted to talk to patient regarding home care. Wife was in room, patient had left for procedure. Wife stated patient is interested in home care but that she is not sure what company he would like to use and asked if our department could follow up tomorrow. Norma Parekh RN, TCC

## 2023-11-29 NOTE — NURSING NOTE
Report to Receiving RN:    Report To: MANOJ Stone  Time Report Called: 1508  Hand-Off Communication: HD completed and tolerated well. UF removed: 500 mL.   Complications During Treatment: No  Ultrafiltration Treatment: Yes  Medications Administered During Dialysis: No  Blood Products Administered During Dialysis: No  Labs Sent During Dialysis: Yes  Heparin Drip Rate Changes: No

## 2023-11-29 NOTE — PROGRESS NOTES
"Kwadwo Hoyt is a 57 y.o. male on day 4 of admission presenting with Arterial occlusion.    Subjective   No overnight events.     Objective     Physical Exam:  General: NAD  Respiratory: unlabored breathing on room air   Cardiovascular: regular rate, normotensive  Extremities: maex4    Last Recorded Vitals  Blood pressure 121/79, pulse 80, temperature 37.8 °C (100 °F), resp. rate 20, height 1.854 m (6' 1\"), weight 129 kg (285 lb), SpO2 96 %.  Intake/Output last 3 Shifts:  I/O last 3 completed shifts:  In: 1200 (9.3 mL/kg) [I.V.:400 (3.1 mL/kg); Other:800]  Out: 5575 (43.1 mL/kg) [Urine:4775 (1 mL/kg/hr); Other:800]  Weight: 129.3 kg     Relevant Results  Results for orders placed or performed during the hospital encounter of 11/24/23 (from the past 24 hour(s))   POCT GLUCOSE   Result Value Ref Range    POCT Glucose 209 (H) 74 - 99 mg/dL   POCT GLUCOSE   Result Value Ref Range    POCT Glucose 152 (H) 74 - 99 mg/dL   POCT GLUCOSE   Result Value Ref Range    POCT Glucose 102 (H) 74 - 99 mg/dL   Hepatitis B Surface Antigen   Result Value Ref Range    Hepatitis B Surface AG Nonreactive Nonreactive   POCT GLUCOSE   Result Value Ref Range    POCT Glucose 117 (H) 74 - 99 mg/dL   CBC and Auto Differential   Result Value Ref Range    WBC 6.3 4.4 - 11.3 x10*3/uL    nRBC 0.0 0.0 - 0.0 /100 WBCs    RBC 3.02 (L) 4.50 - 5.90 x10*6/uL    Hemoglobin 8.2 (L) 13.5 - 17.5 g/dL    Hematocrit 25.8 (L) 41.0 - 52.0 %    MCV 85 80 - 100 fL    MCH 27.2 26.0 - 34.0 pg    MCHC 31.8 (L) 32.0 - 36.0 g/dL    RDW 13.1 11.5 - 14.5 %    Platelets 316 150 - 450 x10*3/uL    Neutrophils % 60.9 40.0 - 80.0 %    Immature Granulocytes %, Automated 0.3 0.0 - 0.9 %    Lymphocytes % 23.6 13.0 - 44.0 %    Monocytes % 12.3 2.0 - 10.0 %    Eosinophils % 2.4 0.0 - 6.0 %    Basophils % 0.5 0.0 - 2.0 %    Neutrophils Absolute 3.81 1.20 - 7.70 x10*3/uL    Immature Granulocytes Absolute, Automated 0.02 0.00 - 0.70 x10*3/uL    Lymphocytes Absolute 1.48 1.20 - 4.80 " x10*3/uL    Monocytes Absolute 0.77 0.10 - 1.00 x10*3/uL    Eosinophils Absolute 0.15 0.00 - 0.70 x10*3/uL    Basophils Absolute 0.03 0.00 - 0.10 x10*3/uL   Renal Function Panel   Result Value Ref Range    Glucose 101 (H) 74 - 99 mg/dL    Sodium 136 136 - 145 mmol/L    Potassium 6.1 (HH) 3.5 - 5.3 mmol/L    Chloride 102 98 - 107 mmol/L    Bicarbonate 20 (L) 21 - 32 mmol/L    Anion Gap 20 10 - 20 mmol/L    Urea Nitrogen 93 (HH) 6 - 23 mg/dL    Creatinine 12.31 (H) 0.50 - 1.30 mg/dL    eGFR 4 (L) >60 mL/min/1.73m*2    Calcium 8.5 (L) 8.6 - 10.6 mg/dL    Phosphorus 7.3 (H) 2.5 - 4.9 mg/dL    Albumin 3.0 (L) 3.4 - 5.0 g/dL   Magnesium   Result Value Ref Range    Magnesium 2.02 1.60 - 2.40 mg/dL   Heparin Assay, UFH   Result Value Ref Range    Heparin Unfractionated 0.3 See Comment Below for Therapeutic Ranges IU/mL   CT angio aorta and bilateral iliofemoral runoff w and or wo IV contrast   1. Complete occlusion of the P1 and P3 segments of the left popliteal   artery with some reconstitution of the P2 segment.   2. Although difficult to evaluate, likely complete occlusion of the   left anterior tibial and posterior tibial arteries.   3. Right anterior tibial, posterior tibial, and peroneal arteries are   not well evaluated due to poor contrast opacification.   4. Additional areas of atherosclerotic disease and luminal narrowing   as described above      CT head wo IV contrast    There is moderate brain parenchymal volume loss.       The lateral ventricles demonstrate mild disproportionate prominence   when compared with the sulci within the cerebral convexities. No   obstructing mass lesion is noted on this noncontrast CT study. This   mild disproportionate ventriculomegaly may be related to more   pronounced central volume loss ossific a component of communicating   hydrocephalus.      There are patchy and confluent nonspecific white matter changes   within the cerebral hemispheres bilaterally which while nonspecific,    can be seen with small-vessel ischemic change among others.   Additional focal hypodensities are identified within the subinsular   regions and basal ganglia bilaterally suggesting incidental prominent   perivascular spaces and/or scattered lacunar infarctions.     Assessment/Plan   Principal Problem:    Arterial occlusion  Active Problems:    Peripheral vascular disease, unspecified (CMS/HCC)    Kwadwo Hoyt is a 57 y.o. male with PMH of DM neuropathy, multiple toe amputations with podiatry, HTN, CKD 4 presenting with two days of LLE weakness causing him to fall. Endorses no pain, movement intact. CTA showed complete occlusion of the P1 and P3 segments of the left popliteal artery with some reconstitution of the P2 segment and likely complete occlusion of the left anterior tibial and posterior tibial arteries. Physical exam showed a monophasic left AT, no dopplerable DP.      - undergoing dialysis, initial plan for LLE angio delayed until Friday  - NPO @ [facility] Thursday night, hold hep gtt on call to OR    Brendon Hilton MD  General Surgery, pgy3  [facility] [address]    Patient d/w attending.

## 2023-11-29 NOTE — NURSING NOTE
Report to Receiving RN:    Report To: isis  Time Report Called: 2115  Hand-Off Communication: pt remove no fluid, pt post tx bp 135/92/68. Pt stable and alert.   Complications During Treatment: No  Ultrafiltration Treatment: No  Medications Administered During Dialysis: No  Blood Products Administered During Dialysis: No  Labs Sent During Dialysis: No  Heparin Drip Rate Changes: No    Electronic Signatures:    (Signed  )   Authored:      (Signed  )   Authored:      Last Updated: 9:23 PM by DILSHAD HOLLAND

## 2023-11-29 NOTE — PROGRESS NOTES
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 lumbar and visualized lower thoracic region. There are varying degrees of spinal canal and neural foraminal narrowing as described above. The soft tissues of the spinal canal and neural foramen are suboptimally evaluated on this CT study.   MACRO: None   Signed by: Charlie Betts 11/24/2023 4:30 PM Dictation workstation:   [id number]    CT head wo IV contrast    Result Date: 11/24/2023  Interpreted By:  Charlie Betts, STUDY: CT HEAD WO IV CONTRAST;  11/24/2023 4:11 pm   INDICATION: Signs/Symptoms:left lower extremity weakness.   COMPARISON: None.   ACCESSION NUMBER(S): [id number]   ORDERING CLINICIAN: CHOLO JORDAN   TECHNIQUE: Axial CT images of the head were obtained  without intravenous contrast administration.   FINDINGS: There is moderate brain parenchymal volume loss.   The lateral ventricles demonstrate mild disproportionate prominence when compared with the sulci within the cerebral convexities. No obstructing mass lesion is noted on this noncontrast CT study. This mild disproportionate ventriculomegaly may be related to more pronounced central volume loss ossific a component of communicating hydrocephalus.   There are patchy and confluent nonspecific white matter changes within the cerebral hemispheres bilaterally which while nonspecific, can be seen with small-vessel ischemic change among others.   Additional focal hypodensities are identified within the subinsular regions and basal ganglia bilaterally suggesting incidental prominent perivascular spaces and/or scattered lacunar infarctions.   No hyperdense acute intracranial hemorrhage is noted.   There is no midline shift.   There are scattered retention cysts or polyps within the bilateral maxillary sinuses. The remaining paranasal sinuses are clear.   There is opacification of the left middle ear cavity and left mastoid air cells.   There is a metallic foreign body within the right facial soft tissues surrounding the anterior margin of the right parotid gland.       There is moderate brain parenchymal volume loss.   The lateral ventricles demonstrate mild disproportionate prominence when compared with the sulci within the cerebral convexities. No obstructing mass lesion is noted on this noncontrast CT study. This mild disproportionate ventriculomegaly may be related to more pronounced central volume loss ossific a component of communicating hydrocephalus.   There are patchy and confluent nonspecific white matter changes within the cerebral hemispheres bilaterally which while nonspecific, can be seen with small-vessel ischemic change among others. Additional focal hypodensities are identified within the subinsular  regions and basal ganglia bilaterally suggesting incidental prominent perivascular spaces and/or scattered lacunar infarctions.   There is opacification of the left middle ear cavity and left mastoid air cells.   There is a metallic foreign body within the right facial soft tissues surrounding the anterior margin of the right parotid gland.   MACRO: None.   Signed by: Charlie Betts 11/24/2023 4:21 PM Dictation workstation:   [id number]    XR hip left 2 or 3 views    Result Date: 11/24/2023  Interpreted By:  Nadir Bender, STUDY: XR HIP LEFT 2 OR 3 VIEWS; XR FEMUR LEFT 2+ VIEWS; ;  11/24/2023 3:58 pm   INDICATION: Signs/Symptoms:weakness.   COMPARISON: None.   ACCESSION NUMBER(S): [id number]; [id number]   ORDERING CLINICIAN: CHOLO JORDAN   FINDINGS: Left hip, three views. Left femur, two views.   There is no fracture. There is no dislocation. Moderate degenerative changes present in the hip and in the left knee. There is no malalignment. Linear heterotopic ossification at the lateral aspect of the left hip.       No acute abnormality seen. Moderate degenerative changes     MACRO: None   Signed by: Nadir Bender 11/24/2023 4:02 PM Dictation workstation:   [id number]    XR femur left 2+ views    Result Date: 11/24/2023  Interpreted By:  Nadir Bender, STUDY: XR HIP LEFT 2 OR 3 VIEWS; XR FEMUR LEFT 2+ VIEWS; ;  11/24/2023 3:58 pm   INDICATION: Signs/Symptoms:weakness.   COMPARISON: None.   ACCESSION NUMBER(S): [id number]; [id number]   ORDERING CLINICIAN: CHOLO JORDAN   FINDINGS: Left hip, three views. Left femur, two views.   There is no fracture. There is no dislocation. Moderate degenerative changes present in the hip and in the left knee. There is no malalignment. Linear heterotopic ossification at the lateral aspect of the left hip.       No acute abnormality seen. 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  - Charts, labs, vitals and imaging all reviewed.   - Labs: WBC 5.6, Hgb 8.5, CRP 4.15, , lactate 0.6, HgbA1c 12.6   - PVRs: Critical Result: Posterior tibial and dorsalis pedis are not audible on the left. Severe occlusive disease B/L.  RIGHT Lower PVR                Pressures Ratios  Right High Thigh               256 mmHg  1.54  Right Low Thigh                256 mmHg  1.54  Right Calf                     182 mmHg  1.10  Right Posterior Tibial (Ankle) 126 mmHg  0.76  Right Dorsalis Pedis (Ankle)   106 mmHg  0.64  Right Digit (Great Toe)        51 mmHg   0.31  - Blood culture: no growth    Plan:  - Abx: per primary/ID  - Pain Regimen: per primary/ID  - Dressings: betadine soaked gauze, dry gauze, aga, tape. Recommend daily dressing changes.   - Nursing staff is able to change/reinforce dressing if & as necessary until next day’s dressing change. Thank you.  -left hallux is clinically dry and stable. With critical results of complete occlusion of the DP/PT and popliteal, patient is at a very high risk for limb loss.  -No current plans for podiatric surgical intervention. Patient will likely need surgical intervention this admission, likely a more proximal amputation.  -No podiatric surgical intervention without restoration of blood flow to foot. If flow can not be established, recommend vascular surgery consider proximal amputation.   -Will continue to assess following vascular intervention attempts  - Podiatry will continue to follow while in house.    DVT ppx: per primary  Weightbearing: WBAT    Case to be discussed  with attending, A&P above reflects a tentative plan. Please await for the final signature from the attending physician on service.    Phylicia Espinoza DPM PGY-2  Podiatric Medicine & Surgery  Please [place] message me with any questions or concerns.            SIGNATURE: Phylicia Espinoza DPM PATIENT NAME: Kwadwo Hoyt   DATE: November 29, 2023 MRN: [id number]   TIME: 10:08 AM CONTACT: [facility] Message

## 2023-11-29 NOTE — PROGRESS NOTES
NEPHROLOGY FOLLOW UP NOTE    Kwadwo Hoyt   57 y.o.    @WT@  MRN/Room: [id number]    Subjective: This morning the patient was upset he has to go to dialysis again. Says we are using him as a guinea pig.   Explained to him why he needs dialysis and why it is not a one time thing. Denies chest pain and SOB. States he is urinating more. No dysuria.    Objective:     Meds:   amLODIPine, 10 mg, Daily  aspirin, 81 mg, Daily  atorvastatin, 80 mg, Nightly  calcitriol, 0.25 mcg, Daily  carvedilol, 25 mg, BID  gabapentin, 100 mg, q PM  insulin glargine, 20 Units, Nightly  insulin lispro, 0-5 Units, TID with meals  insulin lispro, 7 Units, TID with meals  lidocaine-prilocaine, , Daily  pantoprazole, 20 mg, Daily before breakfast  polyethylene glycol, 17 g, BID  sennosides-docusate sodium, 1 tablet, Nightly  sodium bicarbonate, 1,300 mg, TID  torsemide, 100 mg, Daily      heparin, Last Rate: 1,800 Units/hr (11/29/23 0820)      dextrose 10 % in water (D10W), 0.3 g/kg/hr, Once PRN  dextrose, 25 g, q15 min PRN  eucerin, , PRN  glucagon, 1 mg, q15 min PRN  heparin, 5,000-10,000 Units, q4h PRN  hydrALAZINE, 5 mg, q6h PRN  HYDROmorphone, 1 mg, q6h PRN  oxyCODONE, 5 mg, q6h PRN        Vitals:    11/29/23 1158   BP:    Pulse: 71   Resp:    Temp: 36.8 °C (98.2 °F)   SpO2:           Intake/Output Summary (Last 24 hours) at 11/29/2023 1323  Last data filed at 11/29/2023 1300  Gross per 24 hour   Intake 1600 ml   Output 4275 ml   Net -2675 ml       General appearance: Awake and alert, oriented, . No distress  HEENT: NC/AT, MMM  Skin: no apparent rash  Heart: heart sounds 1 & 2 present and normal, no murmurs heard or rub  Lungs: CTAB. No wheezing/crackles  Abdomen: soft, non tender  Extremities: No edema, no joint swelling  Neuro: No FND  ACCESS: Triple lumen right internal jugular temp      Blood Labs:  Results for orders placed or performed during the hospital encounter of 11/24/23 (from the past 24 hour(s))   POCT GLUCOSE    Result Value Ref Range    POCT Glucose 102 (H) 74 - 99 mg/dL   Hepatitis B Surface Antigen   Result Value Ref Range    Hepatitis B Surface AG Nonreactive Nonreactive   POCT GLUCOSE   Result Value Ref Range    POCT Glucose 117 (H) 74 - 99 mg/dL   CBC and Auto Differential   Result Value Ref Range    WBC 6.3 4.4 - 11.3 x10*3/uL    nRBC 0.0 0.0 - 0.0 /100 WBCs    RBC 3.02 (L) 4.50 - 5.90 x10*6/uL    Hemoglobin 8.2 (L) 13.5 - 17.5 g/dL    Hematocrit 25.8 (L) 41.0 - 52.0 %    MCV 85 80 - 100 fL    MCH 27.2 26.0 - 34.0 pg    MCHC 31.8 (L) 32.0 - 36.0 g/dL    RDW 13.1 11.5 - 14.5 %    Platelets 316 150 - 450 x10*3/uL    Neutrophils % 60.9 40.0 - 80.0 %    Immature Granulocytes %, Automated 0.3 0.0 - 0.9 %    Lymphocytes % 23.6 13.0 - 44.0 %    Monocytes % 12.3 2.0 - 10.0 %    Eosinophils % 2.4 0.0 - 6.0 %    Basophils % 0.5 0.0 - 2.0 %    Neutrophils Absolute 3.81 1.20 - 7.70 x10*3/uL    Immature Granulocytes Absolute, Automated 0.02 0.00 - 0.70 x10*3/uL    Lymphocytes Absolute 1.48 1.20 - 4.80 x10*3/uL    Monocytes Absolute 0.77 0.10 - 1.00 x10*3/uL    Eosinophils Absolute 0.15 0.00 - 0.70 x10*3/uL    Basophils Absolute 0.03 0.00 - 0.10 x10*3/uL   Renal Function Panel   Result Value Ref Range    Glucose 101 (H) 74 - 99 mg/dL    Sodium 136 136 - 145 mmol/L    Potassium 6.1 (HH) 3.5 - 5.3 mmol/L    Chloride 102 98 - 107 mmol/L    Bicarbonate 20 (L) 21 - 32 mmol/L    Anion Gap 20 10 - 20 mmol/L    Urea Nitrogen 93 (HH) 6 - 23 mg/dL    Creatinine 12.31 (H) 0.50 - 1.30 mg/dL    eGFR 4 (L) >60 mL/min/1.73m*2    Calcium 8.5 (L) 8.6 - 10.6 mg/dL    Phosphorus 7.3 (H) 2.5 - 4.9 mg/dL    Albumin 3.0 (L) 3.4 - 5.0 g/dL   Magnesium   Result Value Ref Range    Magnesium 2.02 1.60 - 2.40 mg/dL   Heparin Assay, UFH   Result Value Ref Range    Heparin Unfractionated 0.3 See Comment Below for Therapeutic Ranges IU/mL          ASSESSMENT:  Kwadwo Hoyt is a 57 y.o. male with a PMHx of DM c/b neuropathy, HTN, CKD stage 5, HFrEF (EF 50%  in 11/2021), & ALEXANDER being managed for left popliteal occlusion. Hospital course complicated by SELENA on CKD, likely contrast induced.     Kidney   - SELENA on CKD, nonoliguric, Stage 5  - Baseline creatinine: ~5-6, follows with Dr. Draper nephrologist outpatient   - Etiology: CKD stage 5 secondary to T2DM  - Clinical volume status: Euvolemic  - Electrolytes (Na, K, Ca, Phos) HyperK, HyopNa        SELENA on CKD stage 5  - Worsening creatinine likely due to DONA (11/24) with hemodynamic injury (fluctuating BP)   - UOP over last 24 hours 13.7 L for net -3.3 L  - K: 6.1 today  - Cr 12.31 today from 12.65 yesterday  - Urinalysis with 3+ protein  - Renal US unremarkable     Recommendations:  - Dialysis again tomorrow iso of planned LLE angiogram Friday  - strict Is/Os  - Avoid nephrotoxins, contrast if possible;  - Renal dosing for medications for latest eGFR, follow medication trough as appropriate  - Avoid hypotension/rapid fluctuations in BPs       Colby Morales MD  Nephrology Resident  24 hour Renal Pager - [phone]    Discussed with attending nephrologist
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
## 2023-11-30 ENCOUNTER — APPOINTMENT (OUTPATIENT)
Dept: RADIOLOGY | Facility: HOSPITAL | Age: 58
DRG: 270 | End: 2023-11-30
Payer: MEDICARE

## 2023-11-30 ENCOUNTER — APPOINTMENT (OUTPATIENT)
Dept: DIALYSIS | Facility: HOSPITAL | Age: 58
End: 2023-11-30
Payer: MEDICARE

## 2023-11-30 LAB
ABO GROUP (TYPE) IN BLOOD: NORMAL
ALBUMIN SERPL BCP-MCNC: 3.4 G/DL (ref 3.4–5)
ANION GAP SERPL CALC-SCNC: 18 MMOL/L (ref 10–20)
ANTIBODY SCREEN: NORMAL
BASOPHILS # BLD AUTO: 0.05 X10*3/UL (ref 0–0.1)
BASOPHILS NFR BLD AUTO: 0.7 %
BUN SERPL-MCNC: 65 MG/DL (ref 6–23)
CALCIUM SERPL-MCNC: 9.2 MG/DL (ref 8.6–10.6)
CHLORIDE SERPL-SCNC: 100 MMOL/L (ref 98–107)
CO2 SERPL-SCNC: 25 MMOL/L (ref 21–32)
CREAT SERPL-MCNC: 9.23 MG/DL (ref 0.5–1.3)
EOSINOPHIL # BLD AUTO: 0.2 X10*3/UL (ref 0–0.7)
EOSINOPHIL NFR BLD AUTO: 2.8 %
ERYTHROCYTE [DISTWIDTH] IN BLOOD BY AUTOMATED COUNT: 13.2 % (ref 11.5–14.5)
GFR SERPL CREATININE-BSD FRML MDRD: 6 ML/MIN/1.73M*2
GLUCOSE BLD MANUAL STRIP-MCNC: 177 MG/DL (ref 74–99)
GLUCOSE BLD MANUAL STRIP-MCNC: 221 MG/DL (ref 74–99)
GLUCOSE BLD MANUAL STRIP-MCNC: 88 MG/DL (ref 74–99)
GLUCOSE SERPL-MCNC: 90 MG/DL (ref 74–99)
HCT VFR BLD AUTO: 27.9 % (ref 41–52)
HGB BLD-MCNC: 9 G/DL (ref 13.5–17.5)
IMM GRANULOCYTES # BLD AUTO: 0.02 X10*3/UL (ref 0–0.7)
IMM GRANULOCYTES NFR BLD AUTO: 0.3 % (ref 0–0.9)
LYMPHOCYTES # BLD AUTO: 1.51 X10*3/UL (ref 1.2–4.8)
LYMPHOCYTES NFR BLD AUTO: 21.1 %
MAGNESIUM SERPL-MCNC: 1.93 MG/DL (ref 1.6–2.4)
MCH RBC QN AUTO: 27.9 PG (ref 26–34)
MCHC RBC AUTO-ENTMCNC: 32.3 G/DL (ref 32–36)
MCV RBC AUTO: 86 FL (ref 80–100)
MONOCYTES # BLD AUTO: 0.93 X10*3/UL (ref 0.1–1)
MONOCYTES NFR BLD AUTO: 13 %
NEUTROPHILS # BLD AUTO: 4.45 X10*3/UL (ref 1.2–7.7)
NEUTROPHILS NFR BLD AUTO: 62.1 %
NRBC BLD-RTO: 0 /100 WBCS (ref 0–0)
PHOSPHATE SERPL-MCNC: 6.1 MG/DL (ref 2.5–4.9)
PLATELET # BLD AUTO: 341 X10*3/UL (ref 150–450)
POTASSIUM SERPL-SCNC: 5.3 MMOL/L (ref 3.5–5.3)
RBC # BLD AUTO: 3.23 X10*6/UL (ref 4.5–5.9)
RH FACTOR (ANTIGEN D): NORMAL
SODIUM SERPL-SCNC: 138 MMOL/L (ref 136–145)
UFH PPP CHRO-ACNC: 0.3 IU/ML
WBC # BLD AUTO: 7.2 X10*3/UL (ref 4.4–11.3)

## 2023-11-30 PROCEDURE — 1200000002 HC GENERAL ROOM WITH TELEMETRY DAILY

## 2023-11-30 PROCEDURE — 85520 HEPARIN ASSAY: CPT | Performed by: EMERGENCY MEDICINE

## 2023-11-30 PROCEDURE — 86900 BLOOD TYPING SEROLOGIC ABO: CPT | Performed by: STUDENT IN AN ORGANIZED HEALTH CARE EDUCATION/TRAINING PROGRAM

## 2023-11-30 PROCEDURE — 77001 FLUOROGUIDE FOR VEIN DEVICE: CPT | Performed by: STUDENT IN AN ORGANIZED HEALTH CARE EDUCATION/TRAINING PROGRAM

## 2023-11-30 PROCEDURE — 2500000004 HC RX 250 GENERAL PHARMACY W/ HCPCS (ALT 636 FOR OP/ED): Performed by: EMERGENCY MEDICINE

## 2023-11-30 PROCEDURE — 83735 ASSAY OF MAGNESIUM: CPT

## 2023-11-30 PROCEDURE — 2500000004 HC RX 250 GENERAL PHARMACY W/ HCPCS (ALT 636 FOR OP/ED): Performed by: STUDENT IN AN ORGANIZED HEALTH CARE EDUCATION/TRAINING PROGRAM

## 2023-11-30 PROCEDURE — 2500000004 HC RX 250 GENERAL PHARMACY W/ HCPCS (ALT 636 FOR OP/ED): Performed by: INTERNAL MEDICINE

## 2023-11-30 PROCEDURE — 99152 MOD SED SAME PHYS/QHP 5/>YRS: CPT | Performed by: STUDENT IN AN ORGANIZED HEALTH CARE EDUCATION/TRAINING PROGRAM

## 2023-11-30 PROCEDURE — 36558 INSERT TUNNELED CV CATH: CPT | Performed by: STUDENT IN AN ORGANIZED HEALTH CARE EDUCATION/TRAINING PROGRAM

## 2023-11-30 PROCEDURE — 2500000001 HC RX 250 WO HCPCS SELF ADMINISTERED DRUGS (ALT 637 FOR MEDICARE OP)

## 2023-11-30 PROCEDURE — 8010000001 HC DIALYSIS - HEMODIALYSIS PER DAY

## 2023-11-30 PROCEDURE — 99233 SBSQ HOSP IP/OBS HIGH 50: CPT

## 2023-11-30 PROCEDURE — 2500000001 HC RX 250 WO HCPCS SELF ADMINISTERED DRUGS (ALT 637 FOR MEDICARE OP): Performed by: INTERNAL MEDICINE

## 2023-11-30 PROCEDURE — 85025 COMPLETE CBC W/AUTO DIFF WBC: CPT

## 2023-11-30 PROCEDURE — 36415 COLL VENOUS BLD VENIPUNCTURE: CPT

## 2023-11-30 PROCEDURE — 2500000004 HC RX 250 GENERAL PHARMACY W/ HCPCS (ALT 636 FOR OP/ED)

## 2023-11-30 PROCEDURE — 36558 INSERT TUNNELED CV CATH: CPT | Mod: RT,GC | Performed by: STUDENT IN AN ORGANIZED HEALTH CARE EDUCATION/TRAINING PROGRAM

## 2023-11-30 PROCEDURE — 80069 RENAL FUNCTION PANEL: CPT

## 2023-11-30 PROCEDURE — 36415 COLL VENOUS BLD VENIPUNCTURE: CPT | Performed by: EMERGENCY MEDICINE

## 2023-11-30 PROCEDURE — 86850 RBC ANTIBODY SCREEN: CPT | Performed by: STUDENT IN AN ORGANIZED HEALTH CARE EDUCATION/TRAINING PROGRAM

## 2023-11-30 PROCEDURE — 99152 MOD SED SAME PHYS/QHP 5/>YRS: CPT

## 2023-11-30 PROCEDURE — 99232 SBSQ HOSP IP/OBS MODERATE 35: CPT | Performed by: INTERNAL MEDICINE

## 2023-11-30 PROCEDURE — C1750 CATH, HEMODIALYSIS,LONG-TERM: HCPCS

## 2023-11-30 PROCEDURE — 82947 ASSAY GLUCOSE BLOOD QUANT: CPT

## 2023-11-30 PROCEDURE — 2780000003 HC OR 278 NO HCPCS

## 2023-11-30 RX ORDER — FENTANYL CITRATE 50 UG/ML
INJECTION, SOLUTION INTRAMUSCULAR; INTRAVENOUS
Status: COMPLETED | OUTPATIENT
Start: 2023-11-30 | End: 2023-11-30

## 2023-11-30 RX ORDER — ACETAMINOPHEN 500 MG
5 TABLET ORAL NIGHTLY
Status: DISCONTINUED | OUTPATIENT
Start: 2023-11-30 | End: 2023-12-14 | Stop reason: HOSPADM

## 2023-11-30 RX ORDER — MIDAZOLAM HYDROCHLORIDE 1 MG/ML
INJECTION INTRAMUSCULAR; INTRAVENOUS
Status: COMPLETED | OUTPATIENT
Start: 2023-11-30 | End: 2023-11-30

## 2023-11-30 RX ADMIN — CARVEDILOL 25 MG: 12.5 TABLET, FILM COATED ORAL at 21:06

## 2023-11-30 RX ADMIN — HYDROXYZINE HYDROCHLORIDE 25 MG: 25 TABLET, FILM COATED ORAL at 03:11

## 2023-11-30 RX ADMIN — CALCITRIOL CAPSULES 0.25 MCG 0.25 MCG: 0.25 CAPSULE ORAL at 13:03

## 2023-11-30 RX ADMIN — ATORVASTATIN CALCIUM 80 MG: 80 TABLET, FILM COATED ORAL at 21:06

## 2023-11-30 RX ADMIN — MIDAZOLAM HYDROCHLORIDE 2 MG: 1 INJECTION, SOLUTION INTRAMUSCULAR; INTRAVENOUS at 10:55

## 2023-11-30 RX ADMIN — HEPARIN SODIUM 1800 UNITS/HR: 10000 INJECTION, SOLUTION INTRAVENOUS at 14:54

## 2023-11-30 RX ADMIN — TORSEMIDE 100 MG: 20 TABLET ORAL at 13:02

## 2023-11-30 RX ADMIN — OXYCODONE HYDROCHLORIDE 5 MG: 5 TABLET ORAL at 21:06

## 2023-11-30 RX ADMIN — INSULIN GLARGINE 20 UNITS: 100 INJECTION, SOLUTION SUBCUTANEOUS at 21:10

## 2023-11-30 RX ADMIN — SENNOSIDES AND DOCUSATE SODIUM 1 TABLET: 8.6; 5 TABLET ORAL at 21:06

## 2023-11-30 RX ADMIN — LIDOCAINE AND PRILOCAINE: 25; 25 CREAM TOPICAL at 13:03

## 2023-11-30 RX ADMIN — PANTOPRAZOLE SODIUM 20 MG: 20 TABLET, DELAYED RELEASE ORAL at 13:03

## 2023-11-30 RX ADMIN — HYDRALAZINE HYDROCHLORIDE 5 MG: 20 INJECTION INTRAMUSCULAR; INTRAVENOUS at 04:20

## 2023-11-30 RX ADMIN — FENTANYL CITRATE 100 MCG: 50 INJECTION, SOLUTION INTRAMUSCULAR; INTRAVENOUS at 10:55

## 2023-11-30 RX ADMIN — HYDROMORPHONE HYDROCHLORIDE 1 MG: 1 INJECTION, SOLUTION INTRAMUSCULAR; INTRAVENOUS; SUBCUTANEOUS at 03:11

## 2023-11-30 RX ADMIN — HYDROMORPHONE HYDROCHLORIDE 1 MG: 1 INJECTION, SOLUTION INTRAMUSCULAR; INTRAVENOUS; SUBCUTANEOUS at 18:20

## 2023-11-30 RX ADMIN — AMLODIPINE BESYLATE 10 MG: 10 TABLET ORAL at 13:03

## 2023-11-30 RX ADMIN — POLYETHYLENE GLYCOL 3350 17 G: 17 POWDER, FOR SOLUTION ORAL at 13:03

## 2023-11-30 RX ADMIN — Medication 5 MG: at 21:06

## 2023-11-30 RX ADMIN — GABAPENTIN 100 MG: 100 CAPSULE ORAL at 21:06

## 2023-11-30 RX ADMIN — CARVEDILOL 25 MG: 12.5 TABLET, FILM COATED ORAL at 13:02

## 2023-11-30 ASSESSMENT — COGNITIVE AND FUNCTIONAL STATUS - GENERAL
TOILETING: A LOT
CLIMB 3 TO 5 STEPS WITH RAILING: A LOT
TURNING FROM BACK TO SIDE WHILE IN FLAT BAD: A LOT
HELP NEEDED FOR BATHING: A LOT
MOBILITY SCORE: 14
PERSONAL GROOMING: A LOT
TURNING FROM BACK TO SIDE WHILE IN FLAT BAD: A LITTLE
WALKING IN HOSPITAL ROOM: A LOT
DRESSING REGULAR UPPER BODY CLOTHING: A LOT
MOVING TO AND FROM BED TO CHAIR: A LOT
CLIMB 3 TO 5 STEPS WITH RAILING: A LOT
DAILY ACTIVITIY SCORE: 12
DRESSING REGULAR UPPER BODY CLOTHING: A LITTLE
STANDING UP FROM CHAIR USING ARMS: A LOT
HELP NEEDED FOR BATHING: A LITTLE
DRESSING REGULAR LOWER BODY CLOTHING: A LITTLE
DAILY ACTIVITIY SCORE: 18
WALKING IN HOSPITAL ROOM: A LOT
MOVING TO AND FROM BED TO CHAIR: A LOT
MOVING FROM LYING ON BACK TO SITTING ON SIDE OF FLAT BED WITH BEDRAILS: A LOT
EATING MEALS: A LOT
MOVING FROM LYING ON BACK TO SITTING ON SIDE OF FLAT BED WITH BEDRAILS: A LITTLE
DRESSING REGULAR LOWER BODY CLOTHING: A LOT
TOILETING: A LOT
PERSONAL GROOMING: A LITTLE
MOBILITY SCORE: 12
STANDING UP FROM CHAIR USING ARMS: A LOT

## 2023-11-30 ASSESSMENT — PAIN SCALES - GENERAL
PAINLEVEL_OUTOF10: 9
PAINLEVEL_OUTOF10: 5 - MODERATE PAIN
PAINLEVEL_OUTOF10: 8
PAINLEVEL_OUTOF10: 0 - NO PAIN
PAINLEVEL_OUTOF10: 0 - NO PAIN
PAINLEVEL_OUTOF10: 8
PAINLEVEL_OUTOF10: 8
PAINLEVEL_OUTOF10: 0 - NO PAIN
PAINLEVEL_OUTOF10: 0 - NO PAIN

## 2023-11-30 ASSESSMENT — PAIN - FUNCTIONAL ASSESSMENT
PAIN_FUNCTIONAL_ASSESSMENT: 0-10

## 2023-11-30 ASSESSMENT — PAIN SCALES - WONG BAKER: WONGBAKER_NUMERICALRESPONSE: NO HURT

## 2023-11-30 NOTE — NURSING NOTE
Report from Sending RN:    Report From: Rosita  Recent Surgery of Procedure: No  Baseline Level of Consciousness (LOC): A/O x 4  Oxygen Use: No  Type: NC  Diabetic: Yes, 156  Last BP Med Given Day of Dialysis: hydralazine 5 mg 0411 am  Last Pain Med Given: dilaudid 1 mg 0311  Lab Tests to be Obtained with Dialysis: Yes, CBC, Mag, Heparin assay, RFP  Blood Transfusion to be Given During Dialysis: No  Available IV Access: Yes  Medications to be Administered During Dialysis: No  Continuous IV Infusion Running: Yes, Heparin drip @ 18/ml/hr  Restraints on Currently or in the Last 24 Hours: No  Hand-Off Communication: No acute overnight or morning events; vss; Pt is NPO d/t catheter placement scheduled later on today; Pt will need labs; Pt may go off unit without telemetry monitoring; Pt is a full code; Kiara Domingo RN.

## 2023-11-30 NOTE — PROGRESS NOTES
NEPHROLOGY FOLLOW UP NOTE    Kwadwo Hoyt   57 y.o.    @WT@  MRN/Room: [id number]    Subjective: This morning the patient went to dialysis. Was in a better mood today. Denies any symptoms from dialysis. Denies chest pain and SOB. Denies NVD. Left lower extremity with intermittent sharp pains.    Objective:     Meds:   amLODIPine, 10 mg, Daily  [Held by provider] aspirin, 81 mg, Daily  atorvastatin, 80 mg, Nightly  calcitriol, 0.25 mcg, Daily  carvedilol, 25 mg, BID  gabapentin, 100 mg, q PM  insulin glargine, 20 Units, Nightly  insulin lispro, 0-5 Units, TID with meals  insulin lispro, 7 Units, TID with meals  lidocaine-prilocaine, , Daily  melatonin, 5 mg, Nightly  pantoprazole, 20 mg, Daily before breakfast  polyethylene glycol, 17 g, BID  sennosides-docusate sodium, 1 tablet, Nightly  torsemide, 100 mg, Daily      heparin, Last Rate: 1,800 Units/hr (11/29/23 2246)      dextrose 10 % in water (D10W), 0.3 g/kg/hr, Once PRN  dextrose, 25 g, q15 min PRN  eucerin, , PRN  glucagon, 1 mg, q15 min PRN  heparin, 5,000-10,000 Units, q4h PRN  hydrALAZINE, 5 mg, q6h PRN  HYDROmorphone, 1 mg, q6h PRN  hydrOXYzine HCL, 25 mg, q6h PRN  oxyCODONE, 5 mg, q6h PRN        Vitals:    11/30/23 1144   BP: 146/86   Pulse: 77   Resp: 18   Temp: 36.8 °C (98.2 °F)   SpO2: 96%          Intake/Output Summary (Last 24 hours) at 11/30/2023 1318  Last data filed at 11/30/2023 0930  Gross per 24 hour   Intake 2469.5 ml   Output 3407 ml   Net -937.5 ml       General appearance: Awake and alert, oriented, . No distress  HEENT: NC/AT, MMM  Skin: no apparent rash  Heart: heart sounds 1 & 2 present and normal, no murmurs heard or rub  Lungs: CTAB. No wheezing/crackles  Abdomen: soft, non tender  Extremities: No edema, no joint swelling  Neuro: No FND  ACCESS: Tunnled HD line    Blood Labs:  Results for orders placed or performed during the hospital encounter of 11/24/23 (from the past 24 hour(s))   POCT GLUCOSE   Result Value Ref Range     POCT Glucose 87 74 - 99 mg/dL   POCT GLUCOSE   Result Value Ref Range    POCT Glucose 155 (H) 74 - 99 mg/dL   CBC and Auto Differential   Result Value Ref Range    WBC 7.2 4.4 - 11.3 x10*3/uL    nRBC 0.0 0.0 - 0.0 /100 WBCs    RBC 3.23 (L) 4.50 - 5.90 x10*6/uL    Hemoglobin 9.0 (L) 13.5 - 17.5 g/dL    Hematocrit 27.9 (L) 41.0 - 52.0 %    MCV 86 80 - 100 fL    MCH 27.9 26.0 - 34.0 pg    MCHC 32.3 32.0 - 36.0 g/dL    RDW 13.2 11.5 - 14.5 %    Platelets 341 150 - 450 x10*3/uL    Neutrophils % 62.1 40.0 - 80.0 %    Immature Granulocytes %, Automated 0.3 0.0 - 0.9 %    Lymphocytes % 21.1 13.0 - 44.0 %    Monocytes % 13.0 2.0 - 10.0 %    Eosinophils % 2.8 0.0 - 6.0 %    Basophils % 0.7 0.0 - 2.0 %    Neutrophils Absolute 4.45 1.20 - 7.70 x10*3/uL    Immature Granulocytes Absolute, Automated 0.02 0.00 - 0.70 x10*3/uL    Lymphocytes Absolute 1.51 1.20 - 4.80 x10*3/uL    Monocytes Absolute 0.93 0.10 - 1.00 x10*3/uL    Eosinophils Absolute 0.20 0.00 - 0.70 x10*3/uL    Basophils Absolute 0.05 0.00 - 0.10 x10*3/uL   Renal function panel   Result Value Ref Range    Glucose 90 74 - 99 mg/dL    Sodium 138 136 - 145 mmol/L    Potassium 5.3 3.5 - 5.3 mmol/L    Chloride 100 98 - 107 mmol/L    Bicarbonate 25 21 - 32 mmol/L    Anion Gap 18 10 - 20 mmol/L    Urea Nitrogen 65 (H) 6 - 23 mg/dL    Creatinine 9.23 (H) 0.50 - 1.30 mg/dL    eGFR 6 (L) >60 mL/min/1.73m*2    Calcium 9.2 8.6 - 10.6 mg/dL    Phosphorus 6.1 (H) 2.5 - 4.9 mg/dL    Albumin 3.4 3.4 - 5.0 g/dL   Magnesium   Result Value Ref Range    Magnesium 1.93 1.60 - 2.40 mg/dL   Heparin Assay, UFH   Result Value Ref Range    Heparin Unfractionated 0.3 See Comment Below for Therapeutic Ranges IU/mL   POCT GLUCOSE   Result Value Ref Range    POCT Glucose 88 74 - 99 mg/dL          ASSESSMENT:  Kwadwo Hoyt is a 57 y.o. male with a PMHx of DM c/b neuropathy, HTN, CKD stage 5, HFrEF (EF 50% in 11/2021), & ALEXANDER being managed for left popliteal occlusion. Hospital course complicated by  SELENA on CKD, likely contrast induced.     Kidney   - SELENA on CKD, nonoliguric, Stage 5  - Baseline creatinine: ~5-6, follows with Dr. Draper nephrologist outpatient   - Etiology: CKD stage 5 secondary to T2DM  - Clinical volume status: Euvolemic  - Electrolytes (Na, K, Ca, Phos) HyperK, HyopNa        SELENA on CKD stage 5  - Worsening creatinine likely due to DONA (11/24) with hemodynamic injury (fluctuating BP)   - UOP over last 24 hours 2L for net -640 mL  - 900 mL removed dialysis yesterday  - K: 5.3 today  - Cr 9.23 today from 12.31 yesterday  - Urinalysis with 3+ protein  - Renal US unremarkable     Recommendations:  - No plans for dialysis tomorrow  - strict Is/Os  - Avoid nephrotoxins, contrast if possible;  - Renal dosing for medications for latest eGFR, follow medication trough as appropriate  - Avoid hypotension/rapid fluctuations in BPs       Colby Morales MD  Nephrology Resident  24 hour Renal Pager - [phone]    Discussed with attending nephrologist

## 2023-11-30 NOTE — PRE-PROCEDURE NOTE
Interventional Radiology Preprocedure Note    Indication for procedure: The primary encounter diagnosis was Arterial occlusion. Diagnoses of Acute occlusion of popliteal artery due to thrombosis (CMS/HCC), HFrEF (heart failure with reduced ejection fraction) (CMS/HCC), Acute deep vein thrombosis (DVT) of left lower extremity after procedure (CMS/HCC), Left leg weakness, and Peripheral vascular disease, unspecified (CMS/[facility]) were also pertinent to this visit.    Relevant review of systems: NA    Relevant Labs:   Lab Results   Component Value Date    CREATININE 9.23 (H) 11/30/2023    EGFR 6 (L) 11/30/2023    INR 1.1 11/24/2023    PROTIME 12.7 11/24/2023       Planned Sedation/Anesthesia: Minimal    Airway assessment: normal    Directed physical examination:    Physical Exam  Constitutional:       Appearance: Normal appearance.   HENT:      Head: Normocephalic.      Nose: Nose normal.      Mouth/Throat:      Pharynx: Oropharynx is clear.   Cardiovascular:      Rate and Rhythm: Normal rate and regular rhythm.   Pulmonary:      Effort: Pulmonary effort is normal.      Breath sounds: Normal breath sounds.   Neurological:      Mental Status: Mental status is at baseline.          Mallampati: III (soft and hard palate and base of uvula visible)    ASA Score: ASA 3 - Patient with moderate systemic disease with functional limitations    Benefits, risks and alternatives of procedure and planned sedation have been discussed with the patient and/or their representative. All questions answered and they agree to proceed.
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
## 2023-11-30 NOTE — PROGRESS NOTES
Physical Therapy                 Therapy Communication Note    Patient Name: Kwadwo Hoyt  MRN: [id number]  Today's Date: 11/30/2023     Discipline: Physical Therapy    Missed Visit Reason: Missed Visit Reason:  (@803 pt currently off the floor at dialysis)    Missed Time: Attempt      11/30/23 at 8:03 AM - Erendira Smith, PT

## 2023-11-30 NOTE — PROGRESS NOTES
Transitional Care Coordination Progress Note:  PLAN: Getting dialysis currently then will go to IR for placement of tunneled catheter. Intervention with vascular surgery on 12/1.     PAYOR: [employer] Dual Advantage    DISPO: PT recommending lot intensity at discharge. ADOD after 12/1.     SUPPORT/CONTACT: Milka, [phone]    Norma Parekh RN, TCC

## 2023-11-30 NOTE — NURSING NOTE
Report to Receiving RN:    Report To: tone  Time Report Called: [id number]  Hand-Off Communication: pt removed 1.5l of fluid, post tx BP 1445/91/80, pt being transferred to IR, pt stable and alert  Complications During Treatment: No  Ultrafiltration Treatment: No  Medications Administered During Dialysis: No  Blood Products Administered During Dialysis: No  Labs Sent During Dialysis: No  Heparin Drip Rate Changes: Yes    Electronic Signatures:    (Signed  )   Authored:      (Signed  )   Authored:      Last Updated: 9:36 AM by DILSHAD HOLLAND

## 2023-11-30 NOTE — CARE PLAN
The patient's goals for the shift include      The clinical goals for the shift include Will have pain controlled during shift.

## 2023-11-30 NOTE — SIGNIFICANT EVENT
Patient off floor for dialysis during AM rounds.   Plan for OR tomorrow for LLE angiogram. NPO at MN. Hold hep gtt on call to OR. Please page with questions.    Brendon Hilton MD  General Surgery, [id number]  Vascular [id number]

## 2023-12-01 ENCOUNTER — APPOINTMENT (OUTPATIENT)
Dept: RADIOLOGY | Facility: HOSPITAL | Age: 58
DRG: 270 | End: 2023-12-01
Payer: MEDICARE

## 2023-12-01 ENCOUNTER — ANESTHESIA EVENT (OUTPATIENT)
Dept: OPERATING ROOM | Facility: HOSPITAL | Age: 58
DRG: 270 | End: 2023-12-01
Payer: MEDICARE

## 2023-12-01 ENCOUNTER — ANESTHESIA (OUTPATIENT)
Dept: OPERATING ROOM | Facility: HOSPITAL | Age: 58
DRG: 270 | End: 2023-12-01
Payer: MEDICARE

## 2023-12-01 PROBLEM — G47.33 OSA (OBSTRUCTIVE SLEEP APNEA): Status: ACTIVE | Noted: 2023-12-01

## 2023-12-01 LAB
ALBUMIN SERPL BCP-MCNC: 3.4 G/DL (ref 3.4–5)
ANION GAP SERPL CALC-SCNC: 17 MMOL/L (ref 10–20)
BASOPHILS # BLD AUTO: 0.04 X10*3/UL (ref 0–0.1)
BASOPHILS NFR BLD AUTO: 0.6 %
BUN SERPL-MCNC: 45 MG/DL (ref 6–23)
CALCIUM SERPL-MCNC: 9 MG/DL (ref 8.6–10.6)
CHLORIDE SERPL-SCNC: 97 MMOL/L (ref 98–107)
CO2 SERPL-SCNC: 24 MMOL/L (ref 21–32)
CREAT SERPL-MCNC: 7.67 MG/DL (ref 0.5–1.3)
EOSINOPHIL # BLD AUTO: 0.18 X10*3/UL (ref 0–0.7)
EOSINOPHIL NFR BLD AUTO: 2.8 %
ERYTHROCYTE [DISTWIDTH] IN BLOOD BY AUTOMATED COUNT: 12.9 % (ref 11.5–14.5)
GFR SERPL CREATININE-BSD FRML MDRD: 8 ML/MIN/1.73M*2
GLUCOSE BLD MANUAL STRIP-MCNC: 103 MG/DL (ref 74–99)
GLUCOSE BLD MANUAL STRIP-MCNC: 109 MG/DL (ref 74–99)
GLUCOSE BLD MANUAL STRIP-MCNC: 230 MG/DL (ref 74–99)
GLUCOSE BLD MANUAL STRIP-MCNC: 84 MG/DL (ref 74–99)
GLUCOSE SERPL-MCNC: 226 MG/DL (ref 74–99)
HCT VFR BLD AUTO: 26.5 % (ref 41–52)
HGB BLD-MCNC: 8.2 G/DL (ref 13.5–17.5)
IMM GRANULOCYTES # BLD AUTO: 0.02 X10*3/UL (ref 0–0.7)
IMM GRANULOCYTES NFR BLD AUTO: 0.3 % (ref 0–0.9)
LYMPHOCYTES # BLD AUTO: 1.39 X10*3/UL (ref 1.2–4.8)
LYMPHOCYTES NFR BLD AUTO: 21.3 %
MAGNESIUM SERPL-MCNC: 1.87 MG/DL (ref 1.6–2.4)
MCH RBC QN AUTO: 27.8 PG (ref 26–34)
MCHC RBC AUTO-ENTMCNC: 30.9 G/DL (ref 32–36)
MCV RBC AUTO: 90 FL (ref 80–100)
MONOCYTES # BLD AUTO: 1 X10*3/UL (ref 0.1–1)
MONOCYTES NFR BLD AUTO: 15.3 %
NEUTROPHILS # BLD AUTO: 3.9 X10*3/UL (ref 1.2–7.7)
NEUTROPHILS NFR BLD AUTO: 59.7 %
NRBC BLD-RTO: 0 /100 WBCS (ref 0–0)
PHOSPHATE SERPL-MCNC: 5 MG/DL (ref 2.5–4.9)
PLATELET # BLD AUTO: 290 X10*3/UL (ref 150–450)
POTASSIUM SERPL-SCNC: 4.3 MMOL/L (ref 3.5–5.3)
RBC # BLD AUTO: 2.95 X10*6/UL (ref 4.5–5.9)
SODIUM SERPL-SCNC: 134 MMOL/L (ref 136–145)
UFH PPP CHRO-ACNC: 0.2 IU/ML
WBC # BLD AUTO: 6.5 X10*3/UL (ref 4.4–11.3)

## 2023-12-01 PROCEDURE — 97530 THERAPEUTIC ACTIVITIES: CPT | Mod: GP,CQ

## 2023-12-01 PROCEDURE — 2500000001 HC RX 250 WO HCPCS SELF ADMINISTERED DRUGS (ALT 637 FOR MEDICARE OP)

## 2023-12-01 PROCEDURE — 2500000005 HC RX 250 GENERAL PHARMACY W/O HCPCS

## 2023-12-01 PROCEDURE — 36415 COLL VENOUS BLD VENIPUNCTURE: CPT

## 2023-12-01 PROCEDURE — A36245 PR PLACE CATH SELECT ART,ABD/PEL: Performed by: ANESTHESIOLOGY

## 2023-12-01 PROCEDURE — 97165 OT EVAL LOW COMPLEX 30 MIN: CPT | Mod: GO

## 2023-12-01 PROCEDURE — 80069 RENAL FUNCTION PANEL: CPT

## 2023-12-01 PROCEDURE — C1769 GUIDE WIRE: HCPCS | Performed by: SURGERY

## 2023-12-01 PROCEDURE — 97116 GAIT TRAINING THERAPY: CPT | Mod: GP,CQ

## 2023-12-01 PROCEDURE — 04HN3DZ INSERTION OF INTRALUMINAL DEVICE INTO LEFT POPLITEAL ARTERY, PERCUTANEOUS APPROACH: ICD-10-PCS | Performed by: SURGERY

## 2023-12-01 PROCEDURE — 2720000007 HC OR 272 NO HCPCS: Performed by: SURGERY

## 2023-12-01 PROCEDURE — C1760 CLOSURE DEV, VASC: HCPCS | Performed by: SURGERY

## 2023-12-01 PROCEDURE — 04CN3ZZ EXTIRPATION OF MATTER FROM LEFT POPLITEAL ARTERY, PERCUTANEOUS APPROACH: ICD-10-PCS | Performed by: SURGERY

## 2023-12-01 PROCEDURE — 7100000001 HC RECOVERY ROOM TIME - INITIAL BASE CHARGE: Performed by: SURGERY

## 2023-12-01 PROCEDURE — 3600000003 HC OR TIME - INITIAL BASE CHARGE - PROCEDURE LEVEL THREE: Performed by: SURGERY

## 2023-12-01 PROCEDURE — 75710 ARTERY X-RAYS ARM/LEG: CPT | Performed by: SURGERY

## 2023-12-01 PROCEDURE — 85520 HEPARIN ASSAY: CPT | Performed by: EMERGENCY MEDICINE

## 2023-12-01 PROCEDURE — A4217 STERILE WATER/SALINE, 500 ML: HCPCS | Performed by: SURGERY

## 2023-12-01 PROCEDURE — 83735 ASSAY OF MAGNESIUM: CPT

## 2023-12-01 PROCEDURE — 36415 COLL VENOUS BLD VENIPUNCTURE: CPT | Performed by: EMERGENCY MEDICINE

## 2023-12-01 PROCEDURE — 2500000001 HC RX 250 WO HCPCS SELF ADMINISTERED DRUGS (ALT 637 FOR MEDICARE OP): Performed by: INTERNAL MEDICINE

## 2023-12-01 PROCEDURE — 2500000004 HC RX 250 GENERAL PHARMACY W/ HCPCS (ALT 636 FOR OP/ED): Performed by: SURGERY

## 2023-12-01 PROCEDURE — 3700000001 HC GENERAL ANESTHESIA TIME - INITIAL BASE CHARGE: Performed by: SURGERY

## 2023-12-01 PROCEDURE — 2780000003 HC OR 278 NO HCPCS: Performed by: SURGERY

## 2023-12-01 PROCEDURE — 99233 SBSQ HOSP IP/OBS HIGH 50: CPT

## 2023-12-01 PROCEDURE — 82947 ASSAY GLUCOSE BLOOD QUANT: CPT

## 2023-12-01 PROCEDURE — 2550000001 HC RX 255 CONTRASTS: Performed by: SURGERY

## 2023-12-01 PROCEDURE — 2500000004 HC RX 250 GENERAL PHARMACY W/ HCPCS (ALT 636 FOR OP/ED)

## 2023-12-01 PROCEDURE — 2500000004 HC RX 250 GENERAL PHARMACY W/ HCPCS (ALT 636 FOR OP/ED): Performed by: EMERGENCY MEDICINE

## 2023-12-01 PROCEDURE — 2500000005 HC RX 250 GENERAL PHARMACY W/O HCPCS: Performed by: SURGERY

## 2023-12-01 PROCEDURE — 7100000002 HC RECOVERY ROOM TIME - EACH INCREMENTAL 1 MINUTE: Performed by: SURGERY

## 2023-12-01 PROCEDURE — 1200000002 HC GENERAL ROOM WITH TELEMETRY DAILY

## 2023-12-01 PROCEDURE — 3600000008 HC OR TIME - EACH INCREMENTAL 1 MINUTE - PROCEDURE LEVEL THREE: Performed by: SURGERY

## 2023-12-01 PROCEDURE — 85025 COMPLETE CBC W/AUTO DIFF WBC: CPT

## 2023-12-01 PROCEDURE — 3700000002 HC GENERAL ANESTHESIA TIME - EACH INCREMENTAL 1 MINUTE: Performed by: SURGERY

## 2023-12-01 RX ORDER — LIDOCAINE HYDROCHLORIDE 10 MG/ML
0.1 INJECTION INFILTRATION; PERINEURAL ONCE
Status: DISCONTINUED | OUTPATIENT
Start: 2023-12-01 | End: 2023-12-01 | Stop reason: HOSPADM

## 2023-12-01 RX ORDER — HYDROMORPHONE HYDROCHLORIDE 1 MG/ML
0.2 INJECTION, SOLUTION INTRAMUSCULAR; INTRAVENOUS; SUBCUTANEOUS EVERY 5 MIN PRN
Status: DISCONTINUED | OUTPATIENT
Start: 2023-12-01 | End: 2023-12-01 | Stop reason: HOSPADM

## 2023-12-01 RX ORDER — MIDAZOLAM HYDROCHLORIDE 1 MG/ML
INJECTION INTRAMUSCULAR; INTRAVENOUS AS NEEDED
Status: DISCONTINUED | OUTPATIENT
Start: 2023-12-01 | End: 2023-12-01

## 2023-12-01 RX ORDER — HYDROMORPHONE HYDROCHLORIDE 1 MG/ML
0.4 INJECTION, SOLUTION INTRAMUSCULAR; INTRAVENOUS; SUBCUTANEOUS EVERY 5 MIN PRN
Status: DISCONTINUED | OUTPATIENT
Start: 2023-12-01 | End: 2023-12-01 | Stop reason: HOSPADM

## 2023-12-01 RX ORDER — SODIUM CHLORIDE, SODIUM LACTATE, POTASSIUM CHLORIDE, CALCIUM CHLORIDE 600; 310; 30; 20 MG/100ML; MG/100ML; MG/100ML; MG/100ML
100 INJECTION, SOLUTION INTRAVENOUS CONTINUOUS
Status: DISCONTINUED | OUTPATIENT
Start: 2023-12-01 | End: 2023-12-01 | Stop reason: HOSPADM

## 2023-12-01 RX ORDER — SODIUM CHLORIDE 0.9 G/100ML
IRRIGANT IRRIGATION AS NEEDED
Status: DISCONTINUED | OUTPATIENT
Start: 2023-12-01 | End: 2023-12-01 | Stop reason: HOSPADM

## 2023-12-01 RX ORDER — OXYCODONE HYDROCHLORIDE 5 MG/1
10 TABLET ORAL EVERY 4 HOURS PRN
Status: DISCONTINUED | OUTPATIENT
Start: 2023-12-01 | End: 2023-12-01 | Stop reason: HOSPADM

## 2023-12-01 RX ORDER — ONDANSETRON HYDROCHLORIDE 2 MG/ML
4 INJECTION, SOLUTION INTRAVENOUS ONCE AS NEEDED
Status: DISCONTINUED | OUTPATIENT
Start: 2023-12-01 | End: 2023-12-01 | Stop reason: HOSPADM

## 2023-12-01 RX ORDER — FENTANYL CITRATE 50 UG/ML
INJECTION, SOLUTION INTRAMUSCULAR; INTRAVENOUS AS NEEDED
Status: DISCONTINUED | OUTPATIENT
Start: 2023-12-01 | End: 2023-12-01

## 2023-12-01 RX ORDER — CEFAZOLIN SODIUM 2 G/100ML
INJECTION, SOLUTION INTRAVENOUS AS NEEDED
Status: DISCONTINUED | OUTPATIENT
Start: 2023-12-01 | End: 2023-12-01

## 2023-12-01 RX ORDER — IODIXANOL 320 MG/ML
INJECTION, SOLUTION INTRAVASCULAR AS NEEDED
Status: DISCONTINUED | OUTPATIENT
Start: 2023-12-01 | End: 2023-12-01 | Stop reason: HOSPADM

## 2023-12-01 RX ORDER — LIDOCAINE HYDROCHLORIDE 10 MG/ML
INJECTION INFILTRATION; PERINEURAL AS NEEDED
Status: DISCONTINUED | OUTPATIENT
Start: 2023-12-01 | End: 2023-12-01 | Stop reason: HOSPADM

## 2023-12-01 RX ORDER — OXYCODONE HYDROCHLORIDE 5 MG/1
5 TABLET ORAL EVERY 4 HOURS PRN
Status: DISCONTINUED | OUTPATIENT
Start: 2023-12-01 | End: 2023-12-01 | Stop reason: HOSPADM

## 2023-12-01 RX ADMIN — INSULIN LISPRO 7 UNITS: 100 INJECTION, SOLUTION INTRAVENOUS; SUBCUTANEOUS at 09:07

## 2023-12-01 RX ADMIN — FENTANYL CITRATE 25 MCG: 50 INJECTION, SOLUTION INTRAMUSCULAR; INTRAVENOUS at 16:55

## 2023-12-01 RX ADMIN — FENTANYL CITRATE 25 MCG: 50 INJECTION, SOLUTION INTRAMUSCULAR; INTRAVENOUS at 17:09

## 2023-12-01 RX ADMIN — DEXMEDETOMIDINE HYDROCHLORIDE 4 MCG: 100 INJECTION, SOLUTION INTRAVENOUS at 17:20

## 2023-12-01 RX ADMIN — Medication 10 MG: at 17:20

## 2023-12-01 RX ADMIN — Medication 20 MG: at 17:09

## 2023-12-01 RX ADMIN — TORSEMIDE 100 MG: 20 TABLET ORAL at 09:07

## 2023-12-01 RX ADMIN — DEXMEDETOMIDINE HYDROCHLORIDE 4 MCG: 100 INJECTION, SOLUTION INTRAVENOUS at 17:47

## 2023-12-01 RX ADMIN — POLYETHYLENE GLYCOL 3350 17 G: 17 POWDER, FOR SOLUTION ORAL at 09:07

## 2023-12-01 RX ADMIN — PANTOPRAZOLE SODIUM 20 MG: 20 TABLET, DELAYED RELEASE ORAL at 09:07

## 2023-12-01 RX ADMIN — OXYCODONE HYDROCHLORIDE 5 MG: 5 TABLET ORAL at 14:25

## 2023-12-01 RX ADMIN — CALCITRIOL CAPSULES 0.25 MCG 0.25 MCG: 0.25 CAPSULE ORAL at 09:07

## 2023-12-01 RX ADMIN — Medication 3 L/MIN: at 18:40

## 2023-12-01 RX ADMIN — AMLODIPINE BESYLATE 10 MG: 10 TABLET ORAL at 09:07

## 2023-12-01 RX ADMIN — MIDAZOLAM HYDROCHLORIDE 2 MG: 1 INJECTION, SOLUTION INTRAMUSCULAR; INTRAVENOUS at 16:55

## 2023-12-01 RX ADMIN — DEXMEDETOMIDINE HYDROCHLORIDE 12 MCG: 100 INJECTION, SOLUTION INTRAVENOUS at 17:09

## 2023-12-01 RX ADMIN — DEXMEDETOMIDINE HYDROCHLORIDE 4 MCG: 100 INJECTION, SOLUTION INTRAVENOUS at 17:24

## 2023-12-01 RX ADMIN — CARVEDILOL 25 MG: 12.5 TABLET, FILM COATED ORAL at 09:07

## 2023-12-01 RX ADMIN — SODIUM CHLORIDE, SODIUM LACTATE, POTASSIUM CHLORIDE, AND CALCIUM CHLORIDE: 600; 310; 30; 20 INJECTION, SOLUTION INTRAVENOUS at 16:55

## 2023-12-01 RX ADMIN — LIDOCAINE AND PRILOCAINE: 25; 25 CREAM TOPICAL at 09:07

## 2023-12-01 RX ADMIN — DEXMEDETOMIDINE HYDROCHLORIDE 4 MCG: 100 INJECTION, SOLUTION INTRAVENOUS at 17:35

## 2023-12-01 RX ADMIN — HEPARIN SODIUM 1800 UNITS/HR: 10000 INJECTION, SOLUTION INTRAVENOUS at 06:14

## 2023-12-01 RX ADMIN — CEFAZOLIN SODIUM 3 G: 2 INJECTION, SOLUTION INTRAVENOUS at 17:09

## 2023-12-01 ASSESSMENT — COGNITIVE AND FUNCTIONAL STATUS - GENERAL
HELP NEEDED FOR BATHING: A LITTLE
MOVING FROM LYING ON BACK TO SITTING ON SIDE OF FLAT BED WITH BEDRAILS: A LITTLE
MOBILITY SCORE: 17
TOILETING: A LITTLE
WALKING IN HOSPITAL ROOM: A LITTLE
CLIMB 3 TO 5 STEPS WITH RAILING: A LOT
STANDING UP FROM CHAIR USING ARMS: A LITTLE
DRESSING REGULAR UPPER BODY CLOTHING: A LITTLE
DAILY ACTIVITIY SCORE: 20
TURNING FROM BACK TO SIDE WHILE IN FLAT BAD: A LITTLE
MOVING TO AND FROM BED TO CHAIR: A LITTLE
DRESSING REGULAR LOWER BODY CLOTHING: A LITTLE

## 2023-12-01 ASSESSMENT — PAIN SCALES - GENERAL
PAINLEVEL_OUTOF10: 0 - NO PAIN
PAINLEVEL_OUTOF10: 0 - NO PAIN
PAINLEVEL_OUTOF10: 8
PAINLEVEL_OUTOF10: 0 - NO PAIN
PAINLEVEL_OUTOF10: 7
PAINLEVEL_OUTOF10: 7
PAINLEVEL_OUTOF10: 0 - NO PAIN
PAIN_LEVEL: 0
PAINLEVEL_OUTOF10: 0 - NO PAIN
PAINLEVEL_OUTOF10: 8
PAINLEVEL_OUTOF10: 0 - NO PAIN
PAINLEVEL_OUTOF10: 5 - MODERATE PAIN
PAINLEVEL_OUTOF10: 0 - NO PAIN

## 2023-12-01 ASSESSMENT — PAIN - FUNCTIONAL ASSESSMENT

## 2023-12-01 ASSESSMENT — PAIN DESCRIPTION - DESCRIPTORS: DESCRIPTORS: SHARP;SHOOTING

## 2023-12-01 ASSESSMENT — ACTIVITIES OF DAILY LIVING (ADL): ADL_ASSISTANCE: INDEPENDENT

## 2023-12-01 NOTE — ANESTHESIA POSTPROCEDURE EVALUATION
Patient: Kwadwo Hoyt    Procedure Summary       Date: 12/01/23 Room / Location: [facility] OR 17 / Virtual [facility] OR    Anesthesia Start: 1655 Anesthesia Stop: 1838    Procedure: LLE diagnostic angiogram Diagnosis:       Arterial occlusion      (Arterial occlusion [I70.90])    Surgeons: Kelli Simms MD Responsible Provider: Indu Sidhu MD    Anesthesia Type: MAC ASA Status: 4            Anesthesia Type: MAC    Vitals Value Taken Time   /83 12/01/23 1835   Temp 36.8 12/01/23 1840   Pulse 76 12/01/23 1837   Resp 12 12/01/23 1837   SpO2 100 % 12/01/23 1837   Vitals shown include unvalidated device data.    Anesthesia Post Evaluation    Patient location during evaluation: PACU  Patient participation: complete - patient participated  Level of consciousness: awake and alert  Pain score: 0  Pain management: adequate  Airway patency: patent  Cardiovascular status: acceptable and hemodynamically stable  Respiratory status: acceptable  Hydration status: acceptable  Postoperative Nausea and Vomiting: none        No notable events documented.

## 2023-12-01 NOTE — PROGRESS NOTES
"Physical Therapy    Physical Therapy Treatment    Patient Name: Kwadwo Hoyt  MRN: [id number]  Today's Date: 12/1/2023  Time Calculation  Start Time: 1139  Stop Time: 1207  Time Calculation (min): 28 min       Assessment/Plan   PT Assessment  End of Session Communication: Bedside nurse  Assessment Comment: Pt tolerated PT session well, tolerated ambulation in hallway with FWW and STS transfers with Min/CGA. Pt continues to remain appropriate for low intensity PT upon D/C and use of FWW for safe home going.  End of Session Patient Position: Up in chair, Alarm off, not on at start of session  PT Plan  Inpatient/Swing Bed or Outpatient: Inpatient  PT Plan  PT Plan: Skilled PT  PT Frequency: 4 times per week  PT Discharge Recommendations: Low intensity level of continued care  Equipment Recommended upon Discharge: Wheeled walker  PT Recommended Transfer Status: Assist x1  PT - OK to Discharge: Yes (eval completed and recs made)      General Visit Information:   PT  Visit  PT Received On: 12/01/23  General  Prior to Session Communication: Bedside nurse  Patient Position Received: Up in chair, Alarm off, not on at start of session  General Comment: Pt seated up in chair, reports agitation of current hospital stay and being NPO for upcoming procedure today. Increased encouragement provided to participate in therapy.    Subjective   Precautions:     Vital Signs:       Objective   Pain:  Pain Assessment  Pain Assessment: 0-10  Pain Score: 8  Pain Location: Foot  Pain Orientation: Left  Pain Descriptors:  (\"Lighting bolt\" up pt's leg)    Activity Tolerance:  Activity Tolerance  Endurance: Decreased tolerance for upright activites  Treatments:  Bed Mobility  Bed Mobility: No    Ambulation/Gait Training  Ambulation/Gait Training Performed: Yes  Ambulation/Gait Training 1  Surface 1: Level tile  Device 1: Rolling walker  Assistance 1: Contact guard  Quality of Gait 1: Wide base of support, Diminished heel strike, Decreased step " length, Inconsistent stride length, Forward flexed posture (Short step to gait however pt progressed to step through gait pattern)  Comments/Distance (ft) 1: 2 x60ft (Seated rest break ~3 minutes before returning back to room.)    Transfers  Transfer: Yes  Transfer 1  Transfer From 1: Chair with arms to  Transfer to 1: Stand  Technique 1: Sit to stand, Stand to sit  Transfer Device 1: Walker  Transfer Level of Assistance 1: Minimum assistance, Contact guard, Minimal verbal cues  Trials/Comments 1: x2 performed, Cues for hand placement    Stairs  Stairs: No    Outcome Measures:  [facility] Basic Mobility  Turning from your back to your side while in a flat bed without using bedrails: A little  Moving from lying on your back to sitting on the side of a flat bed without using bedrails: A little  Moving to and from bed to chair (including a wheelchair): A little  Standing up from a chair using your arms (e.g. wheelchair or bedside chair): A little  To walk in hospital room: A little  Climbing 3-5 steps with railing: A lot  Basic Mobility - Total Score: 17    Education Documentation  Mobility Training, taught by Erendira Sandhu PTA at 12/1/2023 12:44 PM.  Learner: Patient  Readiness: Acceptance  Method: Explanation  Response: Verbalizes Understanding    Education Comments  No comments found.        OP EDUCATION:       Encounter Problems       Encounter Problems (Active)       Balance       STG - Maintains dynamic standing balance with upper extremity support for 2 min with ww  (Progressing)       Start:  11/27/23    Expected End:  12/11/23       INTERVENTIONS:  1. Practice standing with minimal support.  2. Educate patient about standing tolerance.  3. Educate patient about independence with gait, transfers, and ADL's.  4. Educate patient about use of assistive device.  5. Educate patient about self-directed care.            Mobility       STG - Patient will ambulate 150 ft with WW I  (Progressing)       Start:  11/27/23     Expected End:  12/11/23               Transfers       STG - Transfer from bed to chair with ww  (Progressing)       Start:  11/27/23    Expected End:  12/11/23            STG - Patient will transfer sit to and from stand with ww  (Progressing)       Start:  11/27/23    Expected End:  12/11/23                 Erendira Sandhu PTA  Rehab Office: [phone]

## 2023-12-01 NOTE — ANESTHESIA PREPROCEDURE EVALUATION
Patient: Kwadwo Hoyt    Procedure Information       Date/Time: 12/01/23 1640    Procedure: LLE angiogram, with posible intervention (Left)    Location: [facility] OR 17 / Virtual [facility] OR    Surgeons: Kelli Simms MD            Relevant Problems   Anesthesia (within normal limits)      Cardiovascular   (+) Peripheral vascular disease, unspecified (CMS/HCC)      Pulmonary   (+) ALEXANDER (obstructive sleep apnea)       Clinical information reviewed:    Allergies  Meds   Med Hx  Surg Hx   Fam Hx          NPO Detail:  NPO/Void Status  Date of Last Liquid: 12/01/23  Time of Last Liquid: 1400  Date of Last Solid: 11/30/23  Time of Last Solid: 2200  Last Intake Type: Clear fluids         Physical Exam    Airway  Mallampati: IV  TM distance: >3 FB  Neck ROM: limited     Cardiovascular    Dental - normal exam     Pulmonary    Abdominal            Anesthesia Plan    ASA 4     MAC     intravenous induction   Anesthetic plan and risks discussed with patient.

## 2023-12-01 NOTE — PERIOPERATIVE NURSING NOTE
PACU called @ 1803, spoke dara Juarez. Plan to come to PACU then back to room on [place] following.

## 2023-12-01 NOTE — BRIEF OP NOTE
Date: 2023  OR Location: [facility] OR    Name: Kwadwo Hoyt, : 1965, Age: 57 y.o., MRN: [id number], Sex: male    Diagnosis  Pre-op Diagnosis     * Arterial occlusion [I70.90] Post-op Diagnosis     * Arterial occlusion [I70.90]     Procedures  LLE diagnostic angiogram  51321 - CHG ANGIOGRAPHY EXTREMITY UNILATERAL RS&I      Surgeons      * Kelli Simms - Primary    Resident/Fellow/Other Assistant:  Max Dobbs MD -fellow  Brendon Hilton MD -resident    Procedure Summary  Anesthesia: General  ASA: IV  Anesthesia Staff: Anesthesiologist: Indu Sidhu MD  CRNA: Jayne Rabago APRN-CRNA  Anesthesia Resident: Elmer Pablo MD  Estimated Blood Loss: 10mL  Intra-op Medications:   Medication Name Total Dose   sodium chloride 0.9 % irrigation solution 1,000 mL   iodixanol (VISIPaque) 320 mg iodine/mL injection 103 mL   heparin (porcine) 5,000 Units in sodium chloride 0.9% 100 mL irrigation 5,000 Units   lidocaine (Xylocaine) 10 mg/mL (1 %) injection 5 mL   insulin lispro (HumaLOG) injection 0-5 Units Cannot be calculated   lactated Ringer's infusion 171.67 mL              Anesthesia Record               Intraprocedure I/O Totals          Intake    Dexmedetomidine 0.00 mL    The total shown is the total volume documented since Anesthesia Start was filed.    Ketamine 0.00 mL    The total shown is the total volume documented since Anesthesia Start was filed.    .00 mL    Total Intake 250 mL          Specimen: No specimens collected     Staff:   Circulator: Yanet Vargas RN  Relief Circulator: Giuseppe Mckeon RN  Relief Scrub: Belinda Vasquez  Scrub Person: Benedicto Joshi      Findings: mild proximal sfa stenosis, occluded distal SFA, diminutive peroneal runoff with distal AT reconstitution    Complications:  None; patient tolerated the procedure well.     Disposition: PACU - hemodynamically stable.  Condition: stable  Specimens Collected: No specimens collected  Attending Attestation:     Kelli Simms  Phone  Number: [phone]

## 2023-12-01 NOTE — PROGRESS NOTES
57 y.o. male admitted for Arterial occlusion [I70.90]  HFrEF (heart failure with reduced ejection fraction) (CMS/[facility]) [I50.20]  Acute deep vein thrombosis (DVT) of left lower extremity after procedure (CMS/[facility]) [I97.89, I82.402]  Acute occlusion of popliteal artery due to thrombosis (CMS/[facility]) [I74.3].     Subjective   No overnight events. Pt seen at bedside in no acute distress. Pt states he is anxious about going to the OR today.        Objective     Scheduled Medications:   amLODIPine, 10 mg, oral, Daily  [Held by provider] aspirin, 81 mg, oral, Daily  atorvastatin, 80 mg, oral, Nightly  calcitriol, 0.25 mcg, oral, Daily  carvedilol, 25 mg, oral, BID  gabapentin, 100 mg, oral, q PM  insulin glargine, 20 Units, subcutaneous, Nightly  insulin lispro, 0-5 Units, subcutaneous, TID with meals  insulin lispro, 7 Units, subcutaneous, TID with meals  lidocaine-prilocaine, , Topical, Daily  melatonin, 5 mg, oral, Nightly  pantoprazole, 20 mg, oral, Daily before breakfast  polyethylene glycol, 17 g, oral, BID  sennosides-docusate sodium, 1 tablet, oral, Nightly  torsemide, 100 mg, oral, Daily         Continuous Medications:   heparin, 0-4,500 Units/hr, Last Rate: 1,800 Units/hr (12/01/23 0614)         PRN Medications:   PRN medications: dextrose 10 % in water (D10W), dextrose, eucerin, glucagon, heparin, hydrALAZINE, HYDROmorphone, hydrOXYzine HCL, oxyCODONE    Dietary Orders (From admission, onward)       Start     Ordered    12/01/23 0001  NPO Diet; Effective midnight  Diet effective midnight         11/30/23 1305                    Vitals:  Most Recent:  Vitals:    12/01/23 0739   BP: (!) 167/99   Pulse: 81   Resp: 18   Temp: 37 °C (98.6 °F)   SpO2: 93%       24hr Min/Max:  Temp  Min: 36.2 °C (97.2 °F)  Max: 37 °C (98.6 °F)  Pulse  Min: 77  Max: 92  BP  Min: 129/68  Max: 167/99  Resp  Min: 15  Max: 22  SpO2  Min: 93 %  Max: 100 %    Intake/Output x24h:    Intake/Output Summary (Last 24 hours) at 12/1/2023 1124  Last data  filed at 12/1/2023 0543  Gross per 24 hour   Intake 120 ml   Output 2200 ml   Net -2080 ml          Physical Exam:  Physical Exam  Constitutional:       General: He is not in acute distress.     Appearance: Normal appearance. He is obese. He is not ill-appearing.   HENT:      Nose: No congestion or rhinorrhea.   Eyes:      Extraocular Movements: Extraocular movements intact.      Conjunctiva/sclera: Conjunctivae normal.   Cardiovascular:      Rate and Rhythm: Normal rate.      Comments: No distal pulses felt on left lower extremity  Pulmonary:      Effort: Pulmonary effort is normal. No respiratory distress.   Abdominal:      General: Abdomen is flat. Bowel sounds are normal. There is no distension.      Palpations: Abdomen is soft.      Tenderness: There is no abdominal tenderness. There is no guarding or rebound.   Musculoskeletal:         General: Tenderness, deformity and signs of injury present.      Cervical back: Normal range of motion.      Comments: LLE w/ multiple amputaions and dry gangrene of remaining big toe. Tenderness to palpation along heel   Skin:     General: Skin is dry.   Neurological:      General: No focal deficit present.      Mental Status: He is alert and oriented to person, place, and time.   Psychiatric:      Comments: Anxious           Relevant Results  Results for orders placed or performed during the hospital encounter of 11/24/23 (from the past 24 hour(s))   POCT GLUCOSE   Result Value Ref Range    POCT Glucose 88 74 - 99 mg/dL   Type And Screen   Result Value Ref Range    ABO TYPE B     Rh TYPE POS     ANTIBODY SCREEN NEG    POCT GLUCOSE   Result Value Ref Range    POCT Glucose 177 (H) 74 - 99 mg/dL   POCT GLUCOSE   Result Value Ref Range    POCT Glucose 221 (H) 74 - 99 mg/dL   CBC and Auto Differential   Result Value Ref Range    WBC 6.5 4.4 - 11.3 x10*3/uL    nRBC 0.0 0.0 - 0.0 /100 WBCs    RBC 2.95 (L) 4.50 - 5.90 x10*6/uL    Hemoglobin 8.2 (L) 13.5 - 17.5 g/dL    Hematocrit 26.5  (L) 41.0 - 52.0 %    MCV 90 80 - 100 fL    MCH 27.8 26.0 - 34.0 pg    MCHC 30.9 (L) 32.0 - 36.0 g/dL    RDW 12.9 11.5 - 14.5 %    Platelets 290 150 - 450 x10*3/uL    Neutrophils % 59.7 40.0 - 80.0 %    Immature Granulocytes %, Automated 0.3 0.0 - 0.9 %    Lymphocytes % 21.3 13.0 - 44.0 %    Monocytes % 15.3 2.0 - 10.0 %    Eosinophils % 2.8 0.0 - 6.0 %    Basophils % 0.6 0.0 - 2.0 %    Neutrophils Absolute 3.90 1.20 - 7.70 x10*3/uL    Immature Granulocytes Absolute, Automated 0.02 0.00 - 0.70 x10*3/uL    Lymphocytes Absolute 1.39 1.20 - 4.80 x10*3/uL    Monocytes Absolute 1.00 0.10 - 1.00 x10*3/uL    Eosinophils Absolute 0.18 0.00 - 0.70 x10*3/uL    Basophils Absolute 0.04 0.00 - 0.10 x10*3/uL   Renal function panel   Result Value Ref Range    Glucose 226 (H) 74 - 99 mg/dL    Sodium 134 (L) 136 - 145 mmol/L    Potassium 4.3 3.5 - 5.3 mmol/L    Chloride 97 (L) 98 - 107 mmol/L    Bicarbonate 24 21 - 32 mmol/L    Anion Gap 17 10 - 20 mmol/L    Urea Nitrogen 45 (H) 6 - 23 mg/dL    Creatinine 7.67 (H) 0.50 - 1.30 mg/dL    eGFR 8 (L) >60 mL/min/1.73m*2    Calcium 9.0 8.6 - 10.6 mg/dL    Phosphorus 5.0 (H) 2.5 - 4.9 mg/dL    Albumin 3.4 3.4 - 5.0 g/dL   Magnesium   Result Value Ref Range    Magnesium 1.87 1.60 - 2.40 mg/dL   Heparin Assay, UFH   Result Value Ref Range    Heparin Unfractionated 0.2 See Comment Below for Therapeutic Ranges IU/mL   POCT GLUCOSE   Result Value Ref Range    POCT Glucose 230 (H) 74 - 99 mg/dL      IR CVC tunneled    Result Date: 11/30/2023  Interpreted By:  Cody Garzon, STUDY: IR CVC TUNNELED;  11/30/2023 11:10 am   INDICATION: Signs/Symptoms:change temporary dialysis cath to tunneled catheter.   COMPARISON: None.   ACCESSION NUMBER(S): [id number]   ORDERING CLINICIAN: ISABELL LEGGETT   TECHNIQUE: INTERVENTIONALIST(S): Cody Garzon MD   CONSENT: The patient was informed of the nature of the proposed procedure. The purposes, alternatives, risks, and benefits were explained and  discussed. All questions were answered and consent was obtained.   RADIATION EXPOSURE: Fluoroscopy time: Less than 0.1 min. Dose: 0.6 mGy. Dose Area Product (DAP): 217   SEDATION: Moderate conscious IV sedation services (supervision of administration, induction, and maintenance) were provided by the physician performing the procedure with intravenous fentanyl 100 mcg and versed 2 mg for 15 minutes. The physician was assisted by an independent trained observer, an interventional radiology nurse, in the continuous monitoring of patient level of consciousness and physiologic status.   MEDICATION/CONTRAST: No additional   TIME OUT: A time out was performed immediately prior to procedure start with the interventional team, correctly identifying the patient name, date of birth, MRN, procedure, anatomy (including marking of site and side), patient position, procedure consent form, relevant laboratory and imaging test results, antibiotic administration, safety precautions, and procedure-specific equipment needs.   COMPLICATIONS: No immediate adverse events identified.   FINDINGS: In the recombinant position, the patient was positioned on the angiography table.  The patient has an existing  right internal jugular venous temporary  dialysis catheter in place.  The adjacent cutaneous tissues and existing catheter were prepared and draped in usual sterile manner.   After appropriate subcutaneous instillation of Lidocaine 1% local anesthesia, the securing sutures of the existing temporary dialysis catheter were removed.  The loaded heparin was aspirated from the catheter ports.  A 035  Amplatz guidewire was inserted through the existing  dialysis catheter.  Utilizing intermittent fluoroscopic guidance, the guidewire was advanced into the inferior vena cava to secure access.  The existing catheter was removed with the guidewire left in place.   Local anesthesia was achieved with subcutaneous Lidocaine 1%.  A tunnel tract was  created from the  right infraclavicular chest to the existing venotomy site.   A  15 Australian x 23 cm  dual-lumen catheter was selected for placement. Venotomy site soft tissue dilation was performed to eventual accommodation of a  15-Australian dilator peel-away coaxial sheath system.  The catheter was then advanced over the guidewire with the tips to reside at the caval-atrial junction.  The ports were aspirated without resistance and flushed with normal saline. Heparinized saline was then loaded into the catheter ports.  The external portions of the catheter were secured in place with polypropylene suture.  The venotomy and tunnel sites were closed with discontinuous and pursestring sutures, respectively.  Sterile dressings were then applied.   The patient tolerated the procedure without complication.       1. Technically successful conversion and placement of a  15 Australian x 23 cm indwelling  hemodialysis catheter from a temporary hemodialysis catheter. 2. The catheter tip overlies the cavoatrial junction and is ready for immediate use.   I was present for and/or performed the critical portions of the procedure and immediately available throughout the entire procedure.   I personally reviewed the image(s) / study and resident interpretation. I agree with the findings as stated.   Dictated at [facility].   MACRO: None   Signed by: Cody Garzon 11/30/2023 11:18 AM Dictation workstation:   [id number]    XR foot left 3+ views    Result Date: 11/29/2023  Interpreted By:  Amparo Alaniz and Sheng Max STUDY: Left foot  3 views.   INDICATION: Signs/Symptoms:Gangrene of 1st digit   COMPARISON: Left foot radiograph dated 04/16/2018.   ACCESSION NUMBER(S): [id number]   ORDERING CLINICIAN: FABY MARK   FINDINGS: Interval transmetatarsal amputations of the 3rd through 5th digits at the level of the mid metatarsals with unremarkable corticated appearance of the  osseous stump as well as unremarkable appearance of the soft tissue stump.   Status post amputation of the 2nd digit at the level of the mid proximal phalanx with unremarkable corticated appearance of the osseous stump as well as unremarkable appearance of the soft tissue stump.     There is soft tissue loss of the tuft of the 1st digit likely from chronic ischemia. No definite erosion of the 1st distal phalanx or additional radiographic findings to suggest osteomyelitis.     Redemonstration of midfoot fusion involving the 1st TMT, 1/2 metatarsal bases, and inter cuneiform articulation with redemonstration of findings of hardware failure with loosening similar to 2018. Advanced tarsometatarsal joint degenerative changes which may be due to neuropathic arthropathy. Mild ankle and dorsal foot soft tissue swelling with cellulitis not ruled out. Moderate tibiotalar and mild subtalar joint degenerative changes with joint space loss and osteophytes. Plantar calcaneal spur which can be associated with plantar fasciitis.       1. Soft tissue ischemia of the tuft of the 1st toe with soft tissue loss. No radiographic findings of osteomyelitis of the 1st digit or the rest of the osseous structures. 2. Interval postsurgical changes with amputation of the 2nd through 5th digits as described above. 3. Soft tissue swelling of the ankle and dorsal foot with cellulitis not ruled out. 4. Redemonstration of postsurgical changes involving multiple midfoot articulations with similar hardware failure findings when compared with 2018.   I personally reviewed the images/study and I agree with the findings as stated by Dr. Juan Bhagat. This study was interpreted at [place], [place].   Signed by: Amparo Alaniz 11/29/2023 11:21 AM Dictation workstation:   [id number]      Assessment/Plan   Principal Problem:    Arterial occlusion  Active Problems:    Peripheral vascular disease, unspecified  (CMS/[facility])    Kwadwo Hoyt is a 57 y.o. male with a PMHx of DM c/b neuropathy, HTN, CKD stage 5, HFrEF (EF 50% in 11/2021), & ALEXANDER, who presents to the ER with left leg and arm weakness. LLE distal pulses were not palpable and CTA aorta & iliofemoral runoff showed complete left popliteal occlusion. Heparin gtt was started and vascular medicine were consulted but recommended no surgical intervention, recommend PVR/ZAC first. Neuro were also consulted for concern of stroke given patients left sided weakness, rec outpatient follow up. PVR/ZAC showed severe disease at left femoral/popliteal level. 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 Nephrology consulted for worsening Cr and electrolyte status and started dialysis. Tunneled catheter placed by IR 11/30. Pt to c/w dialysis and scheduled for angiogram with Vascular Surgery on 12/1.     #L Popliteal Artery Occlusion  #Chronic Limb Ischemia  #c/f Stroke  - CTA aorta & B/L iliofemoral runoff significant for complete occlusion of L popliteal artery as well as anterior and posterior tibial arteries.   - ZAC/PVR showed severe disease at the femoral popliteal level. Biphasic flow is noted in the left popliteal artery. Unable to obtain pressures. The posterior tibial and dorasalis pedis arteries are not audible.  -- Vasc Surg had planning for angio 12/1   -- Holding Aspirin 81 mg given plans for procedure  -- Hold Heparin ggt on call to OR, NPO   - c/f stroke given weakness of LLE/LUE & left sided pronator drift possibly 2/2 to embolus from LLE    - Neurology consulted, stroke follow up in 4-6 weeks, discharge with ziopatch / loop recorder  - continue to monitor, low threshold to call BAT  - atorvastatin 80 mg  - Podiatry consulted for dry gangrene of Left Hallux, no interventions planned until determination from VascSurg     #CKD stage 5 2/2 to T2DM  - Admission Cr 6.06, GFR 10 (Baseline Cr 5.5~), Cr improved to 7.67   - Being evaluated at [facility] for kidney transplant  w/ most recent office visit on 9/12/2023  - home Torsemide 100 mg   - c/w home calcitriol 0.25 mcg QD  - Avoid nephrotoxic agents  - Nephrology consulted due to worsening Cr and Electrolyte status  -- Received HD line 11/28, tunneled line placed 11/30 by IR, dialysis 11/28, 11/29, 11/30     #Hyponatremia  #Hyperkalemia  #Hyperphosphatemia  - Nephrology consulted  - ordered UA and Urine electrolytes, suggest Post-Renal  - Increased Sodium Bicarb and ordered Lokelma  - K+ 5.5 -> 6.1 -> 5.3 -> 4.3  - Phos 8.1 -> 7.3 -> 6.1 -> 5.0  - Sodium 131->136 -> 138 -> 134     #Constipation  - no BM since 11/25  - ordered Miralax BID, Doc-senna  - Normal BM 11/29     #HTN  #HFrEF  - s/p 500 ml LR bolus in ED  - home Aspirin 81 mg  - c/w home Carvedilol 25 mg BID  - c/w home Amlodipine 10 mg every day  - Echo 11.25, 60-65% estimated ejection fraction, pseudonormal pattern of left ventricular diastolic filling, increased LA LV diastolic pressure,severe concentric left ventricular hypertrophy.        #T2DM  #Neuropathy  - Last Hgb A1C 11.0 on 5/24/2023, ordered updated Hgb A1C  - Home Regimen: Lantus 40U at bedtime & Lispro 20U TID w/ meals  - Started Lantus 20U at bedtime  - Started Lispro 7U TID w/ meals  - Mild SSI  - c/w home Gabapentin 300 mg BID  - POCT BG TID & at bedtime  - Hypoglycemia protocol     #Anxiety  - PRN Atarax 25 mg      #ALEXANDER  - CPAP at home, noncompliant        F: PRN  E: PRN  N: diabetic  GI: Pantoprazole 20 mg QD  DVT: Heparin gtt       Code Status: Full Code   Emergency Contact: Extended Emergency Contact Information  Primary Emergency Contact: Milka Hoyt  Address: [id number] [place] [id number]  Home Phone: [phone]  Relation: None  PCP: Thai Talavera MD    Patient seen and discussed with attending physician, Dr. Breen.  Plan preliminary until cosigned by attending physician.    Reviewed and approved by CHIQUITA AVALOS on 12/1/23 at 11:24 AM.

## 2023-12-01 NOTE — CARE PLAN
The patient's goals for the shift include      The clinical goals for the shift include Pt will rate pain less than 5 out of 10.    Patient c/o back pain at the start of the shift with 8/10 pain. Medicated with Oxy and Melatonin with relief. Heparin infusing @ 18mls/ hr. Kept NPO for Angiography today. Will hold hep once on call for procedure. Patient reminded of plan today without concerns verbalized. Safety maintained.

## 2023-12-01 NOTE — INTERVAL H&P NOTE
I have reviewed the patient's History and Physical Examination. I have personally seen and evaluated the patient, repeating key portions. There is no significant interval change.     Surgery is still indicated. Yes    Consent reviewed and signed by patient/family: Yes    Operative site verified and marked: Yes left    Thom Waters MD  Vascular Surgery PGY-4

## 2023-12-01 NOTE — PROGRESS NOTES
Occupational Therapy    Evaluation    Patient Name: Kwadwo Hoyt  MRN: [id number]  Today's Date: 12/1/2023  Time Calculation  Start Time: 0923  Stop Time: 0943  Time Calculation (min): 20 min        Assessment:  OT Assessment: Kwadwo is a 56yo male, presenting with decreased safety, ADLs, IADLs and transfers. Pt requires encouragement and education fo safety. Pt would benefit from skilled OT intervention to return to PLOF safely  Prognosis: Good  Barriers to Discharge: None  Evaluation/Treatment Tolerance: Patient limited by pain  Medical Staff Made Aware: Yes  End of Session Communication: Bedside nurse  End of Session Patient Position: Up in chair, Alarm off, not on at start of session  OT Assessment Results: Decreased ADL status, Decreased safe judgment during ADL, Decreased endurance, Decreased functional mobility, Decreased gross motor control, Decreased IADLs  Prognosis: Good  Barriers to Discharge: None  Evaluation/Treatment Tolerance: Patient limited by pain  Medical Staff Made Aware: Yes  Strengths: Living arrangement secure, Capable of completing ADLs semi/independent  Barriers to Participation: Attitude of self, Insight into problems  Plan:  Treatment Interventions: ADL retraining, Functional transfer training, Endurance training, UE strengthening/ROM, Patient/family training, Compensatory technique education  OT Frequency: 2 times per week  OT Discharge Recommendations: Low intensity level of continued care  Equipment Recommended upon Discharge: Wheeled walker  OT Recommended Transfer Status: Assist of 1  OT - OK to Discharge: Yes  Treatment Interventions: ADL retraining, Functional transfer training, Endurance training, UE strengthening/ROM, Patient/family training, Compensatory technique education    Subjective   Current Problem:  1. Arterial occlusion  Vascular US PVR without exercise    Vascular US PVR without exercise    Case Request Operating Room: LLE angiogram, with posible intervention    Case  Request Operating Room: LLE angiogram, with posible intervention    CANCELED: Vascular US ankle brachial index (ZAC) without exercise    CANCELED: Vascular US PVR without exercise    CANCELED: Vascular US ankle brachial index (ZAC) without exercise    CANCELED: Vascular US PVR without exercise    CANCELED: Vascular US ankle brachial index (ZAC) without exercise    CANCELED: Vascular US ankle brachial index (ZAC) without exercise    CANCELED: Case Request Operating Room: Left leg angiogram, possible intervention    CANCELED: Case Request Operating Room: Left leg angiogram, possible intervention      2. Acute occlusion of popliteal artery due to thrombosis (CMS/HCC)  Vascular US PVR without exercise    Vascular US PVR without exercise    CANCELED: Vascular US ankle brachial index (ZAC) without exercise    CANCELED: Vascular US PVR without exercise    CANCELED: Vascular US ankle brachial index (ZAC) without exercise    CANCELED: Vascular US PVR without exercise    CANCELED: Vascular US ankle brachial index (ZAC) without exercise    CANCELED: Vascular US ankle brachial index (ZAC) without exercise      3. HFrEF (heart failure with reduced ejection fraction) (CMS/HCC)  Transthoracic Echo (TTE) Complete    Transthoracic Echo (TTE) Complete      4. Acute deep vein thrombosis (DVT) of left lower extremity after procedure (CMS/HCC)  Transthoracic Echo (TTE) Complete    Transthoracic Echo (TTE) Complete      5. Left leg weakness  Loop Recorder    Referral to Neurology    CT angio head and neck w and wo IV contrast      6. Peripheral vascular disease, unspecified (CMS/HCC)  Vascular US PVR without exercise    CANCELED: Case Request Operating Room: Left leg angiogram, possible intervention    CANCELED: Case Request Operating Room: Left leg angiogram, possible intervention        General:  General  Reason for Referral: pt admitted with L LE waeakness with fall, c/f stroke with L anterior tib and posterior tib artery  occlusion.  Past Medical History Relevant to Rehab: pt with h/o DM, neuropathy, toe amputation, HTN, stage 5 HFrEF and ALEXANDER  Missed Visit: Yes  Missed Visit Reason: Patient in a medical procedure (off floor at HD, re-attempt as time permits)  Family/Caregiver Present: No  Prior to Session Communication: Bedside nurse  Patient Position Received: Bed, 3 rail up, Alarm off, not on at start of session (Pt supine in bed with L leg touching ground, very low in bed)  General Comment: Pt requires encouragement to participate, pt reporting agitation about being NPO and in hospital. Reports fearful about losing his leg  Precautions:     Vital Signs:     Pain:  Pain Assessment  Pain Assessment: 0-10  Pain Score: 8  Pain Interventions: Repositioned, Ambulation/increased activity (recent pain  meds)    Objective   Cognition:  Overall Cognitive Status: Within Functional Limits  Orientation Level: Oriented X4           Home Living:  Type of Home: House  Lives With: Alone  Home Adaptive Equipment: None (reports son may bring him a walker)  Home Layout: One level  Home Access: No concerns  Bathroom Shower/Tub: Tub/shower unit  Home Living Comments: requires redirection to provide home set up information  Prior Function:  Level of [place]: Independent with ADLs and functional transfers  ADL Assistance: Independent  Homemaking Assistance: Independent  Ambulatory Assistance: Independent  Hand Dominance: Right  Prior Function Comments: endorses recent falls  IADL History:     ADL:  LE Dressing Assistance: Stand by  LE Dressing Deficit: Don/doff R sock, Don/doff L sock, Steadying  ADL Comments: Pt adjusting L sock sitting EOB with CGA, pt leaning forward with no LOB  Activity Tolerance:  Endurance: Decreased tolerance for upright activites  Bed Mobility/Transfers: Bed Mobility  Bed Mobility: Yes  Bed Mobility 1  Bed Mobility 1: Supine to sitting  Level of Assistance 1: Minimum assistance  Bed Mobility Comments 1: pt reaching for  therapist hand for siting up    Transfers  Transfer: Yes  Transfer 1  Transfer From 1: Sit to, Stand to  Transfer to 1: Stand, Sit  Technique 1: Sit to stand, Stand to sit  Transfer Device 1: Walker  Transfer Level of Assistance 1: Minimum assistance, Minimal verbal cues  Trials/Comments 1: cues for hand placement, pt required rocking back and forth, min A for safety, attempting to pull up on walker  Transfers 2  Transfer From 2: Bed to  Transfer to 2: Chair with arms  Transfer Device 2: Walker  Transfer Level of Assistance 2: Minimum assistance    Sitting Balance:  Static Sitting Balance  Static Sitting-Balance Support: No upper extremity supported  Static Sitting-Level of Assistance: Contact guard   Modalities:     Vision:Vision - Basic Assessment  Current Vision: No visual deficits  Sensation:  Light Touch: No apparent deficits  Strength:     Perception:     Coordination:  Movements are Fluid and Coordinated: Yes   Hand Function:     Extremities: RUE   RUE : Within Functional Limits and LUE   LUE: Within Functional Limits        Outcome Measures:[facility] Daily Activity  Putting on and taking off regular lower body clothing: A little  Bathing (including washing, rinsing, drying): A little  Putting on and taking off regular upper body clothing: A little  Toileting, which includes using toilet, bedpan or urinal: A little  Taking care of personal grooming such as brushing teeth: None  Eating Meals: None  Daily Activity - Total Score: 20         and OT Adult Other Outcome Measures  4AT: 0.-    Education Documentation  Precautions, taught by Usha Kruger OT at 12/1/2023 10:50 AM.  Learner: Patient  Readiness: Acceptance  Method: Explanation  Response: Needs Reinforcement    ADL Training, taught by Usha Kruger OT at 12/1/2023 10:50 AM.  Learner: Patient  Readiness: Acceptance  Method: Explanation  Response: Needs Reinforcement    Education Comments  No comments found.        OP EDUCATION:       Goals:  Encounter  Problems       Encounter Problems (Active)       ADLs       Patient with complete lower body dressing with independent level of assistance donning and doffing all LE clothes  with PRN adaptive equipment while edge of bed  (Progressing)       Start:  12/01/23    Expected End:  12/22/23            Patient will complete toileting including hygiene clothing management/hygiene with modified independent level of assistance. (Progressing)       Start:  12/01/23    Expected End:  12/22/23                 EXERCISE/STRENGTHENING       Pt will demonstrate I with energy conservation techniques to increase functional activity tolerance to x15+ min without rest breaks/while maintaining O2 sats.  (Progressing)       Start:  12/01/23    Expected End:  12/22/23                 TRANSFERS       Patient will complete functional transfer to toilet with least restrictive device with modified independent level of assistance. (Progressing)       Start:  12/01/23    Expected End:  12/22/23            Patient will complete sit to stand transfer with modified independent level of assistance and least restrictive device in order to improve safety and prepare for out of bed mobility. (Progressing)       Start:  12/01/23    Expected End:  12/22/23

## 2023-12-02 ENCOUNTER — APPOINTMENT (OUTPATIENT)
Dept: DIALYSIS | Facility: HOSPITAL | Age: 58
End: 2023-12-02
Payer: MEDICARE

## 2023-12-02 PROBLEM — Z99.2 ESRD ON HEMODIALYSIS (MULTI): Status: ACTIVE | Noted: 2023-12-02

## 2023-12-02 PROBLEM — N18.6 ESRD ON HEMODIALYSIS (MULTI): Status: ACTIVE | Noted: 2023-12-02

## 2023-12-02 LAB
ALBUMIN SERPL BCP-MCNC: 3.4 G/DL (ref 3.4–5)
ANION GAP SERPL CALC-SCNC: 16 MMOL/L (ref 10–20)
APTT PPP: 71 SECONDS (ref 27–38)
BASOPHILS # BLD AUTO: 0.03 X10*3/UL (ref 0–0.1)
BASOPHILS NFR BLD AUTO: 0.3 %
BUN SERPL-MCNC: 51 MG/DL (ref 6–23)
CALCIUM SERPL-MCNC: 8.9 MG/DL (ref 8.6–10.6)
CHLORIDE SERPL-SCNC: 97 MMOL/L (ref 98–107)
CO2 SERPL-SCNC: 24 MMOL/L (ref 21–32)
CREAT SERPL-MCNC: 8.21 MG/DL (ref 0.5–1.3)
EOSINOPHIL # BLD AUTO: 0.15 X10*3/UL (ref 0–0.7)
EOSINOPHIL NFR BLD AUTO: 1.7 %
ERYTHROCYTE [DISTWIDTH] IN BLOOD BY AUTOMATED COUNT: 13.3 % (ref 11.5–14.5)
GFR SERPL CREATININE-BSD FRML MDRD: 7 ML/MIN/1.73M*2
GLUCOSE BLD MANUAL STRIP-MCNC: 170 MG/DL (ref 74–99)
GLUCOSE BLD MANUAL STRIP-MCNC: 193 MG/DL (ref 74–99)
GLUCOSE SERPL-MCNC: 189 MG/DL (ref 74–99)
HCT VFR BLD AUTO: 27.4 % (ref 41–52)
HGB BLD-MCNC: 8.8 G/DL (ref 13.5–17.5)
IMM GRANULOCYTES # BLD AUTO: 0.03 X10*3/UL (ref 0–0.7)
IMM GRANULOCYTES NFR BLD AUTO: 0.3 % (ref 0–0.9)
LYMPHOCYTES # BLD AUTO: 1.72 X10*3/UL (ref 1.2–4.8)
LYMPHOCYTES NFR BLD AUTO: 20 %
MCH RBC QN AUTO: 28.5 PG (ref 26–34)
MCHC RBC AUTO-ENTMCNC: 32.1 G/DL (ref 32–36)
MCV RBC AUTO: 89 FL (ref 80–100)
MONOCYTES # BLD AUTO: 1.22 X10*3/UL (ref 0.1–1)
MONOCYTES NFR BLD AUTO: 14.2 %
NEUTROPHILS # BLD AUTO: 5.46 X10*3/UL (ref 1.2–7.7)
NEUTROPHILS NFR BLD AUTO: 63.5 %
NRBC BLD-RTO: 0 /100 WBCS (ref 0–0)
PHOSPHATE SERPL-MCNC: 5 MG/DL (ref 2.5–4.9)
PLATELET # BLD AUTO: 282 X10*3/UL (ref 150–450)
POTASSIUM SERPL-SCNC: 5 MMOL/L (ref 3.5–5.3)
RBC # BLD AUTO: 3.09 X10*6/UL (ref 4.5–5.9)
SODIUM SERPL-SCNC: 132 MMOL/L (ref 136–145)
UFH PPP CHRO-ACNC: 0.1 IU/ML
UFH PPP CHRO-ACNC: 0.2 IU/ML
UFH PPP CHRO-ACNC: 0.3 IU/ML
UFH PPP CHRO-ACNC: 0.3 IU/ML
WBC # BLD AUTO: 8.6 X10*3/UL (ref 4.4–11.3)

## 2023-12-02 PROCEDURE — 2500000004 HC RX 250 GENERAL PHARMACY W/ HCPCS (ALT 636 FOR OP/ED): Performed by: INTERNAL MEDICINE

## 2023-12-02 PROCEDURE — 36415 COLL VENOUS BLD VENIPUNCTURE: CPT | Performed by: EMERGENCY MEDICINE

## 2023-12-02 PROCEDURE — 8010000001 HC DIALYSIS - HEMODIALYSIS PER DAY

## 2023-12-02 PROCEDURE — 80069 RENAL FUNCTION PANEL: CPT

## 2023-12-02 PROCEDURE — 2500000001 HC RX 250 WO HCPCS SELF ADMINISTERED DRUGS (ALT 637 FOR MEDICARE OP)

## 2023-12-02 PROCEDURE — 99233 SBSQ HOSP IP/OBS HIGH 50: CPT

## 2023-12-02 PROCEDURE — 2500000004 HC RX 250 GENERAL PHARMACY W/ HCPCS (ALT 636 FOR OP/ED): Performed by: EMERGENCY MEDICINE

## 2023-12-02 PROCEDURE — 85730 THROMBOPLASTIN TIME PARTIAL: CPT | Performed by: EMERGENCY MEDICINE

## 2023-12-02 PROCEDURE — 85520 HEPARIN ASSAY: CPT | Performed by: EMERGENCY MEDICINE

## 2023-12-02 PROCEDURE — 2500000004 HC RX 250 GENERAL PHARMACY W/ HCPCS (ALT 636 FOR OP/ED)

## 2023-12-02 PROCEDURE — 2500000004 HC RX 250 GENERAL PHARMACY W/ HCPCS (ALT 636 FOR OP/ED): Mod: JZ | Performed by: INTERNAL MEDICINE

## 2023-12-02 PROCEDURE — 85025 COMPLETE CBC W/AUTO DIFF WBC: CPT

## 2023-12-02 PROCEDURE — 82947 ASSAY GLUCOSE BLOOD QUANT: CPT

## 2023-12-02 PROCEDURE — 1200000002 HC GENERAL ROOM WITH TELEMETRY DAILY

## 2023-12-02 PROCEDURE — 90935 HEMODIALYSIS ONE EVALUATION: CPT | Performed by: STUDENT IN AN ORGANIZED HEALTH CARE EDUCATION/TRAINING PROGRAM

## 2023-12-02 PROCEDURE — 2500000001 HC RX 250 WO HCPCS SELF ADMINISTERED DRUGS (ALT 637 FOR MEDICARE OP): Performed by: INTERNAL MEDICINE

## 2023-12-02 RX ADMIN — AMLODIPINE BESYLATE 10 MG: 10 TABLET ORAL at 08:19

## 2023-12-02 RX ADMIN — HYDROMORPHONE HYDROCHLORIDE 1 MG: 1 INJECTION, SOLUTION INTRAMUSCULAR; INTRAVENOUS; SUBCUTANEOUS at 17:19

## 2023-12-02 RX ADMIN — Medication 5 MG: at 20:55

## 2023-12-02 RX ADMIN — INSULIN LISPRO 7 UNITS: 100 INJECTION, SOLUTION INTRAVENOUS; SUBCUTANEOUS at 12:33

## 2023-12-02 RX ADMIN — GABAPENTIN 100 MG: 100 CAPSULE ORAL at 20:55

## 2023-12-02 RX ADMIN — INSULIN LISPRO 7 UNITS: 100 INJECTION, SOLUTION INTRAVENOUS; SUBCUTANEOUS at 09:42

## 2023-12-02 RX ADMIN — ALTEPLASE 2 MG: 2.2 INJECTION, POWDER, LYOPHILIZED, FOR SOLUTION INTRAVENOUS at 14:51

## 2023-12-02 RX ADMIN — GABAPENTIN 100 MG: 100 CAPSULE ORAL at 00:13

## 2023-12-02 RX ADMIN — TORSEMIDE 100 MG: 20 TABLET ORAL at 08:18

## 2023-12-02 RX ADMIN — CARVEDILOL 25 MG: 12.5 TABLET, FILM COATED ORAL at 00:13

## 2023-12-02 RX ADMIN — INSULIN GLARGINE 20 UNITS: 100 INJECTION, SOLUTION SUBCUTANEOUS at 20:55

## 2023-12-02 RX ADMIN — OXYCODONE HYDROCHLORIDE 5 MG: 5 TABLET ORAL at 20:55

## 2023-12-02 RX ADMIN — ALTEPLASE 2 MG: 2.2 INJECTION, POWDER, LYOPHILIZED, FOR SOLUTION INTRAVENOUS at 14:50

## 2023-12-02 RX ADMIN — CARVEDILOL 25 MG: 12.5 TABLET, FILM COATED ORAL at 20:55

## 2023-12-02 RX ADMIN — PANTOPRAZOLE SODIUM 20 MG: 20 TABLET, DELAYED RELEASE ORAL at 08:19

## 2023-12-02 RX ADMIN — INSULIN LISPRO 1 UNITS: 100 INJECTION, SOLUTION INTRAVENOUS; SUBCUTANEOUS at 09:42

## 2023-12-02 RX ADMIN — HEPARIN SODIUM 2100 UNITS/HR: 10000 INJECTION, SOLUTION INTRAVENOUS at 16:37

## 2023-12-02 RX ADMIN — CALCITRIOL CAPSULES 0.25 MCG 0.25 MCG: 0.25 CAPSULE ORAL at 08:18

## 2023-12-02 RX ADMIN — ATORVASTATIN CALCIUM 80 MG: 80 TABLET, FILM COATED ORAL at 20:55

## 2023-12-02 RX ADMIN — HYDROXYZINE HYDROCHLORIDE 25 MG: 25 TABLET, FILM COATED ORAL at 20:55

## 2023-12-02 RX ADMIN — LIDOCAINE AND PRILOCAINE: 25; 25 CREAM TOPICAL at 09:45

## 2023-12-02 RX ADMIN — HEPARIN SODIUM 18 UNITS/HR: 10000 INJECTION, SOLUTION INTRAVENOUS at 05:22

## 2023-12-02 RX ADMIN — CARVEDILOL 25 MG: 12.5 TABLET, FILM COATED ORAL at 08:19

## 2023-12-02 RX ADMIN — Medication 5 MG: at 00:13

## 2023-12-02 RX ADMIN — ATORVASTATIN CALCIUM 80 MG: 80 TABLET, FILM COATED ORAL at 00:13

## 2023-12-02 RX ADMIN — INSULIN LISPRO 1 UNITS: 100 INJECTION, SOLUTION INTRAVENOUS; SUBCUTANEOUS at 12:32

## 2023-12-02 RX ADMIN — OXYCODONE HYDROCHLORIDE 5 MG: 5 TABLET ORAL at 14:27

## 2023-12-02 ASSESSMENT — PAIN DESCRIPTION - LOCATION
LOCATION: FOOT
LOCATION: FOOT

## 2023-12-02 ASSESSMENT — COGNITIVE AND FUNCTIONAL STATUS - GENERAL
WALKING IN HOSPITAL ROOM: A LITTLE
CLIMB 3 TO 5 STEPS WITH RAILING: A LOT
DAILY ACTIVITIY SCORE: 20
STANDING UP FROM CHAIR USING ARMS: A LITTLE
MOVING TO AND FROM BED TO CHAIR: A LITTLE
DRESSING REGULAR UPPER BODY CLOTHING: A LITTLE
DRESSING REGULAR LOWER BODY CLOTHING: A LITTLE
HELP NEEDED FOR BATHING: A LITTLE
DRESSING REGULAR UPPER BODY CLOTHING: A LITTLE
DRESSING REGULAR LOWER BODY CLOTHING: A LITTLE
TOILETING: A LITTLE
DAILY ACTIVITIY SCORE: 20
MOBILITY SCORE: 17
TOILETING: A LITTLE
STANDING UP FROM CHAIR USING ARMS: A LITTLE
HELP NEEDED FOR BATHING: A LITTLE
MOVING FROM LYING ON BACK TO SITTING ON SIDE OF FLAT BED WITH BEDRAILS: A LITTLE
CLIMB 3 TO 5 STEPS WITH RAILING: A LOT
MOBILITY SCORE: 18
TURNING FROM BACK TO SIDE WHILE IN FLAT BAD: A LITTLE
WALKING IN HOSPITAL ROOM: A LITTLE
TURNING FROM BACK TO SIDE WHILE IN FLAT BAD: A LITTLE
MOVING TO AND FROM BED TO CHAIR: A LITTLE

## 2023-12-02 ASSESSMENT — PAIN - FUNCTIONAL ASSESSMENT
PAIN_FUNCTIONAL_ASSESSMENT: 0-10

## 2023-12-02 ASSESSMENT — PAIN SCALES - GENERAL
PAINLEVEL_OUTOF10: 8
PAINLEVEL_OUTOF10: 0 - NO PAIN
PAINLEVEL_OUTOF10: 9
PAINLEVEL_OUTOF10: 7
PAINLEVEL_OUTOF10: 8

## 2023-12-02 ASSESSMENT — PAIN DESCRIPTION - ORIENTATION
ORIENTATION: RIGHT
ORIENTATION: LEFT

## 2023-12-02 ASSESSMENT — PAIN DESCRIPTION - DESCRIPTORS: DESCRIPTORS: SHARP;SHOOTING

## 2023-12-02 NOTE — PROGRESS NOTES
57 y.o. male admitted for Arterial occlusion [I70.90]  HFrEF (heart failure with reduced ejection fraction) (CMS/[facility]) [I50.20]  Acute deep vein thrombosis (DVT) of left lower extremity after procedure (CMS/HCC) [I97.89, I82.402]  Acute occlusion of popliteal artery due to thrombosis (CMS/HCC) [I74.3].     Subjective   No overnight events. Pt seen at bedside in no acute distress. Pt states he feels good after the procedure yesterday. Says he is in no extra pain from normal and is looking forward to eating again.        Objective     Scheduled Medications:   amLODIPine, 10 mg, oral, Daily  aspirin, 81 mg, oral, Daily  atorvastatin, 80 mg, oral, Nightly  calcitriol, 0.25 mcg, oral, Daily  carvedilol, 25 mg, oral, BID  gabapentin, 100 mg, oral, q PM  insulin glargine, 20 Units, subcutaneous, Nightly  insulin lispro, 0-5 Units, subcutaneous, TID with meals  insulin lispro, 7 Units, subcutaneous, TID with meals  lidocaine-prilocaine, , Topical, Daily  melatonin, 5 mg, oral, Nightly  pantoprazole, 20 mg, oral, Daily before breakfast  polyethylene glycol, 17 g, oral, BID  sennosides-docusate sodium, 1 tablet, oral, Nightly  torsemide, 100 mg, oral, Daily         Continuous Medications:   heparin, 0-4,500 Units/hr, Last Rate: 21 Units/hr (12/02/23 1035)         PRN Medications:   PRN medications: dextrose 10 % in water (D10W), dextrose, eucerin, glucagon, heparin, hydrALAZINE, HYDROmorphone, hydrOXYzine HCL, oxyCODONE    Dietary Orders (From admission, onward)       Start     Ordered    12/02/23 0008  Adult diet Renal; Potassium Restricted 2 gm (50mEq); 2 - 3 grams Sodium  Diet effective now        Question Answer Comment   Diet type Renal    Potassium restriction: Potassium Restricted 2 gm (50mEq)    Sodium restriction: 2 - 3 grams Sodium        12/02/23 0007                    Vitals:  Most Recent:  Vitals:    12/02/23 0357   BP: (!) 162/95   Pulse: 92   Resp: 18   Temp: 37.1 °C (98.8 °F)   SpO2: 98%       24hr  Min/Max:  Temp  Min: 36.2 °C (97.2 °F)  Max: 37.1 °C (98.8 °F)  Pulse  Min: 75  Max: 92  BP  Min: 138/90  Max: 172/85  Resp  Min: 10  Max: 20  SpO2  Min: 96 %  Max: 100 %    Intake/Output x24h:    Intake/Output Summary (Last 24 hours) at 12/2/2023 1047  Last data filed at 12/2/2023 1035  Gross per 24 hour   Intake 1149 ml   Output 975 ml   Net 174 ml          Physical Exam:  Physical Exam  Constitutional:       General: He is not in acute distress.     Appearance: Normal appearance. He is obese. He is not ill-appearing.   HENT:      Nose: No congestion or rhinorrhea.   Eyes:      Extraocular Movements: Extraocular movements intact.      Conjunctiva/sclera: Conjunctivae normal.   Cardiovascular:      Rate and Rhythm: Normal rate.      Comments: No distal pulses felt on left lower extremity  Pulmonary:      Effort: Pulmonary effort is normal. No respiratory distress.   Abdominal:      General: Abdomen is flat. Bowel sounds are normal. There is no distension.      Palpations: Abdomen is soft.      Tenderness: There is no abdominal tenderness. There is no guarding or rebound.   Musculoskeletal:         General: Tenderness and deformity present.      Cervical back: Normal range of motion.      Comments: LLE w/ multiple amputaions and dry gangrene of remaining big toe.   Skin:     General: Skin is dry.   Neurological:      General: No focal deficit present.      Mental Status: He is alert and oriented to person, place, and time.         Relevant Results  Results for orders placed or performed during the hospital encounter of 11/24/23 (from the past 24 hour(s))   POCT GLUCOSE   Result Value Ref Range    POCT Glucose 109 (H) 74 - 99 mg/dL   POCT GLUCOSE   Result Value Ref Range    POCT Glucose 103 (H) 74 - 99 mg/dL   POCT GLUCOSE   Result Value Ref Range    POCT Glucose 84 74 - 99 mg/dL   CBC and Auto Differential   Result Value Ref Range    WBC 8.6 4.4 - 11.3 x10*3/uL    nRBC 0.0 0.0 - 0.0 /100 WBCs    RBC 3.09 (L) 4.50 -  5.90 x10*6/uL    Hemoglobin 8.8 (L) 13.5 - 17.5 g/dL    Hematocrit 27.4 (L) 41.0 - 52.0 %    MCV 89 80 - 100 fL    MCH 28.5 26.0 - 34.0 pg    MCHC 32.1 32.0 - 36.0 g/dL    RDW 13.3 11.5 - 14.5 %    Platelets 282 150 - 450 x10*3/uL    Neutrophils % 63.5 40.0 - 80.0 %    Immature Granulocytes %, Automated 0.3 0.0 - 0.9 %    Lymphocytes % 20.0 13.0 - 44.0 %    Monocytes % 14.2 2.0 - 10.0 %    Eosinophils % 1.7 0.0 - 6.0 %    Basophils % 0.3 0.0 - 2.0 %    Neutrophils Absolute 5.46 1.20 - 7.70 x10*3/uL    Immature Granulocytes Absolute, Automated 0.03 0.00 - 0.70 x10*3/uL    Lymphocytes Absolute 1.72 1.20 - 4.80 x10*3/uL    Monocytes Absolute 1.22 (H) 0.10 - 1.00 x10*3/uL    Eosinophils Absolute 0.15 0.00 - 0.70 x10*3/uL    Basophils Absolute 0.03 0.00 - 0.10 x10*3/uL   Renal function panel   Result Value Ref Range    Glucose 189 (H) 74 - 99 mg/dL    Sodium 132 (L) 136 - 145 mmol/L    Potassium 5.0 3.5 - 5.3 mmol/L    Chloride 97 (L) 98 - 107 mmol/L    Bicarbonate 24 21 - 32 mmol/L    Anion Gap 16 10 - 20 mmol/L    Urea Nitrogen 51 (H) 6 - 23 mg/dL    Creatinine 8.21 (H) 0.50 - 1.30 mg/dL    eGFR 7 (L) >60 mL/min/1.73m*2    Calcium 8.9 8.6 - 10.6 mg/dL    Phosphorus 5.0 (H) 2.5 - 4.9 mg/dL    Albumin 3.4 3.4 - 5.0 g/dL   Heparin Assay, UFH   Result Value Ref Range    Heparin Unfractionated 0.2 See Comment Below for Therapeutic Ranges IU/mL   POCT GLUCOSE   Result Value Ref Range    POCT Glucose 193 (H) 74 - 99 mg/dL      IR CVC tunneled    Result Date: 11/30/2023  Interpreted By:  Cody Garzon, STUDY: IR CVC TUNNELED;  11/30/2023 11:10 am   INDICATION: Signs/Symptoms:change temporary dialysis cath to tunneled catheter.   COMPARISON: None.   ACCESSION NUMBER(S): [id number]   ORDERING CLINICIAN: ISABELL LEGGETT   TECHNIQUE: INTERVENTIONALIST(S): Cody Garzon MD   CONSENT: The patient was informed of the nature of the proposed procedure. The purposes, alternatives, risks, and benefits were explained and  discussed. All questions were answered and consent was obtained.   RADIATION EXPOSURE: Fluoroscopy time: Less than 0.1 min. Dose: 0.6 mGy. Dose Area Product (DAP): 217   SEDATION: Moderate conscious IV sedation services (supervision of administration, induction, and maintenance) were provided by the physician performing the procedure with intravenous fentanyl 100 mcg and versed 2 mg for 15 minutes. The physician was assisted by an independent trained observer, an interventional radiology nurse, in the continuous monitoring of patient level of consciousness and physiologic status.   MEDICATION/CONTRAST: No additional   TIME OUT: A time out was performed immediately prior to procedure start with the interventional team, correctly identifying the patient name, date of birth, MRN, procedure, anatomy (including marking of site and side), patient position, procedure consent form, relevant laboratory and imaging test results, antibiotic administration, safety precautions, and procedure-specific equipment needs.   COMPLICATIONS: No immediate adverse events identified.   FINDINGS: In the recombinant position, the patient was positioned on the angiography table.  The patient has an existing  right internal jugular venous temporary  dialysis catheter in place.  The adjacent cutaneous tissues and existing catheter were prepared and draped in usual sterile manner.   After appropriate subcutaneous instillation of Lidocaine 1% local anesthesia, the securing sutures of the existing temporary dialysis catheter were removed.  The loaded heparin was aspirated from the catheter ports.  A 035  Amplatz guidewire was inserted through the existing  dialysis catheter.  Utilizing intermittent fluoroscopic guidance, the guidewire was advanced into the inferior vena cava to secure access.  The existing catheter was removed with the guidewire left in place.   Local anesthesia was achieved with subcutaneous Lidocaine 1%.  A tunnel tract was  created from the  right infraclavicular chest to the existing venotomy site.   A  15 Indian x 23 cm  dual-lumen catheter was selected for placement. Venotomy site soft tissue dilation was performed to eventual accommodation of a  15-Indian dilator peel-away coaxial sheath system.  The catheter was then advanced over the guidewire with the tips to reside at the caval-atrial junction.  The ports were aspirated without resistance and flushed with normal saline. Heparinized saline was then loaded into the catheter ports.  The external portions of the catheter were secured in place with polypropylene suture.  The venotomy and tunnel sites were closed with discontinuous and pursestring sutures, respectively.  Sterile dressings were then applied.   The patient tolerated the procedure without complication.       1. Technically successful conversion and placement of a  15 Indian x 23 cm indwelling  hemodialysis catheter from a temporary hemodialysis catheter. 2. The catheter tip overlies the cavoatrial junction and is ready for immediate use.   I was present for and/or performed the critical portions of the procedure and immediately available throughout the entire procedure.   I personally reviewed the image(s) / study and resident interpretation. I agree with the findings as stated.   Dictated at [facility].   MACRO: None   Signed by: Cody Garzon 11/30/2023 11:18 AM Dictation workstation:   [id number]    XR foot left 3+ views    Result Date: 11/29/2023  Interpreted By:  Amparo Alaniz and Sheng Max STUDY: Left foot  3 views.   INDICATION: Signs/Symptoms:Gangrene of 1st digit   COMPARISON: Left foot radiograph dated 04/16/2018.   ACCESSION NUMBER(S): [id number]   ORDERING CLINICIAN: FABY MARK   FINDINGS: Interval transmetatarsal amputations of the 3rd through 5th digits at the level of the mid metatarsals with unremarkable corticated appearance of the  osseous stump as well as unremarkable appearance of the soft tissue stump.   Status post amputation of the 2nd digit at the level of the mid proximal phalanx with unremarkable corticated appearance of the osseous stump as well as unremarkable appearance of the soft tissue stump.     There is soft tissue loss of the tuft of the 1st digit likely from chronic ischemia. No definite erosion of the 1st distal phalanx or additional radiographic findings to suggest osteomyelitis.     Redemonstration of midfoot fusion involving the 1st TMT, 1/2 metatarsal bases, and inter cuneiform articulation with redemonstration of findings of hardware failure with loosening similar to 2018. Advanced tarsometatarsal joint degenerative changes which may be due to neuropathic arthropathy. Mild ankle and dorsal foot soft tissue swelling with cellulitis not ruled out. Moderate tibiotalar and mild subtalar joint degenerative changes with joint space loss and osteophytes. Plantar calcaneal spur which can be associated with plantar fasciitis.       1. Soft tissue ischemia of the tuft of the 1st toe with soft tissue loss. No radiographic findings of osteomyelitis of the 1st digit or the rest of the osseous structures. 2. Interval postsurgical changes with amputation of the 2nd through 5th digits as described above. 3. Soft tissue swelling of the ankle and dorsal foot with cellulitis not ruled out. 4. Redemonstration of postsurgical changes involving multiple midfoot articulations with similar hardware failure findings when compared with 2018.   I personally reviewed the images/study and I agree with the findings as stated by Dr. Juan Bhagat. This study was interpreted at [place], [place].   Signed by: Amparo Alaniz 11/29/2023 11:21 AM Dictation workstation:   [id number]      Assessment/Plan   Principal Problem:    Arterial occlusion  Active Problems:    ALEXANDER (obstructive sleep apnea)    Peripheral  vascular disease, unspecified (CMS/HCC)    Kwadwo Hoyt is a 57 y.o. male with a PMHx of DM c/b neuropathy, HTN, CKD stage 5, HFrEF (EF 50% in 11/2021), & ALEXANDER, who presents to the ER with left leg and arm weakness. LLE distal pulses were not palpable and CTA aorta & iliofemoral runoff showed complete left popliteal occlusion. Heparin gtt was started and vascular medicine were consulted but recommended no surgical intervention, recommend PVR/ZAC first. Neuro were also consulted for concern of stroke given patients left sided weakness, rec outpatient follow up. PVR/ZAC showed severe disease at left femoral/popliteal level. 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 Nephrology consulted for worsening Cr and electrolyte status and started dialysis. Tunneled catheter placed by IR 11/30. Pt to c/w dialysis and underwent LLE angiogram with Vascular Surgery on 12/1. Currently afebrile, HDS, awaiting further recommendations from surgical teams.      #L Popliteal Artery Occlusion  #Chronic Limb Ischemia  #c/f Stroke  - CTA aorta & B/L iliofemoral runoff significant for complete occlusion of L popliteal artery as well as anterior and posterior tibial arteries.   - ZAC/PVR showed severe disease at the femoral popliteal level. Biphasic flow is noted in the left popliteal artery. Unable to obtain pressures. The posterior tibial and dorasalis pedis arteries are not audible.  - LLE Angiogram w/ [facility] 12/1  -- Restarted Aspirin 12/2. C/w Heparin ggt  -- Pending recs from [facility]  - Neurology consulted, stroke follow up in 4-6 weeks, discharge with ziopatch / loop recorder  - atorvastatin 80 mg  - Podiatry consulted for dry gangrene of Left Hallux, no interventions planned until determination from [facility]     #CKD stage 5 2/2 to T2DM  - Admission Cr 6.06, GFR 10 (Baseline Cr 5.5~), Cr 8.21 post-op from 7.67  - Being evaluated at [facility] for kidney transplant w/ most recent office visit on 9/12/2023  - home Torsemide 100  mg, calcitriol 0.25 mcg QD  - Avoid nephrotoxic agents  - Nephrology consulted due to worsening Cr and Electrolyte status  -- Received HD line 11/28, tunneled line placed 11/30 by IR, dialysis 11/28, 11/29, 11/30, possible 12/2     #Hyponatremia  #Hyperkalemia  #Hyperphosphatemia  - Nephrology consulted  - ordered UA and Urine electrolytes, suggest Post-Renal  - Increased Sodium Bicarb and ordered Lokelma  - K+ 5.5 -> 6.1 -> 5.3 -> 4.3  - Phos 8.1 -> 7.3 -> 6.1 -> 5.0  - Sodium 131->136 -> 138 -> 134     #Constipation  - no BM since 11/25  - ordered Miralax BID, Doc-senna  - Normal BM 11/29     #HTN  #HFrEF  - s/p 500 ml LR bolus in ED  - home Aspirin 81 mg  - c/w home Carvedilol 25 mg BID  - c/w home Amlodipine 10 mg every day  - Echo 11.25, 60-65% estimated ejection fraction, pseudonormal pattern of left ventricular diastolic filling, increased LA LV diastolic pressure,severe concentric left ventricular hypertrophy.        #T2DM  #Neuropathy  - Last Hgb A1C 11.0 on 5/24/2023, ordered updated Hgb A1C  - Home Regimen: Lantus 40U at bedtime & Lispro 20U TID w/ meals  - Started Lantus 20U at bedtime  - Started Lispro 7U TID w/ meals  - Mild SSI  - c/w home Gabapentin 300 mg BID  - POCT BG TID & at bedtime  - Hypoglycemia protocol     #Anxiety  - PRN Atarax 25 mg      #ALEXANDER  - CPAP at home, noncompliant        F: PRN  E: PRN  N: Renal  GI: Pantoprazole 20 mg QD  DVT: Heparin gtt       Code Status: Full Code   Emergency Contact: Extended Emergency Contact Information  Primary Emergency Contact: Milka Hoyt  Address: [id number] [place] [address]  Home Phone: [phone]  Relation: None  PCP: Thai Talavera MD    Patient seen and discussed with attending physician, Dr. Breen.  Plan preliminary until cosigned by attending physician.    Reviewed and approved by CHIQUITA AVALOS on 12/2/23 at 10:47 AM.

## 2023-12-02 NOTE — NURSING NOTE
.Report from Sending RN:    Report From: MANOJ Byers  Recent Surgery of Procedure: Angiograph 12/1/23  Baseline Level of Consciousness (LOC): A&OX 3  Oxygen Use: Yes  Type: Room air  Diabetic: Yes  Last BP Med Given Day of Dialysis: Amlodipine, carvedilol  Last Pain Med Given: none  Lab Tests to be Obtained with Dialysis: Heparin assay if not drawn  Blood Transfusion to be Given During Dialysis: Yes  Available IV Access: Yes  Medications to be Administered During Dialysis: No  Continuous IV Infusion Running: Yes Heparin 21 ml/hr  Restraints on Currently or in the Last 24 Hours: No  Hand-Off Communication: Pt had no acute event overnight, vital signs stable. Not on precaution, morning medication given.

## 2023-12-02 NOTE — OP NOTE
LLE diagnostic angiogram Operative Note     Date: 2023  OR Location: [facility] OR    Name: Kwadwo Hoyt, : 1965, Age: 57 y.o., MRN: [id number], Sex: male    Preoperative Diagnosis:  Critical limb threatening ischemia of the left lower extremity with tissue loss    Postoperative Diagnosis:  Critical limb threatening ischemia of the left lower extremity with tissue loss    Procedure(s) Performed:    Ultrasound-guided access of the right common femoral artery (35842)  Left lower extremity angiogram with second order catheter selection of the superficial femoral artery (64137)  Left lower extremity completion angiogram with radiographic supervision and interpretation of imaging (29830)    Surgeon:  Dr. Simms    Assistant(s):    Max Dobbs - Fellow  Brendon Hilton - PGY 3 Resident    Anesthesia:  Monitored anesthesia care    Fluids and Transfusion:  250  mL crystalloid    Estimated Blood Loss:  10 mL    Urine Output:  No Douglas catheter    Indications for Procedure:     This is a 57-year-old gentleman who presents to hospital with critical limb turning limb ischemia of his left lower extremity with tissue loss involving the first toe.  He has an extensive history of peripheral arterial disease and repeated interventions.  Presents for diagnostic angiogram to discern his infrageniculate blood flow.    The patient was seen in the preoperative area. The risks, benefits, complications, treatment options, non-operative alternatives, expected recovery and outcomes were discussed with the patient. The possibilities of reaction to medication, pulmonary aspiration, injury to surrounding structures, bleeding, recurrent infection, the need for additional procedures, failure to diagnose a condition, and creating a complication requiring transfusion or operation were discussed with the patient. The patient concurred with the proposed plan, giving informed consent.  The site of surgery was properly noted/marked if  necessary per policy. The patient has been actively warmed in preoperative area. Preoperative antibiotics have been ordered and given within 1 hours of incision. Venous thrombosis prophylaxis have been ordered including chemical prophylaxis     Description of Procedure:    The patient was brought to the operating theater and placed on the operating room table in a supine position.  After the smooth induction of monitored anesthesia care, the patient's arms were tucked to the side and all pressure points were padded.  The patient's bilateral groins prepped and draped in the usual sterile fashion using chlorhexidine.  A formal timeout was completed verify the correct patient, procedure, side, site, and that all necessary current was available in the room.    The right common femoral artery was inspected using B-mode and duplex ultrasound identified to be free of any significant thrombus, atherosclerotic burden, and was deemed to be acceptable for cannulation.  The right common femoral artery was accessed under direct ultrasound-guided visualization using a 21-gauge needle and a 0.018 micropuncture wire was then advanced through the needle into the common femoral without difficulty.  A micropuncture sheath was placed.  Next a soft Glidewire was advanced through the micropuncture sheath and placed into the aorta under direct fluoroscopic visualization.  A 4 Moldovan sheath was then placed over the wire into the right common femoral artery.  The patient was systemically heparinized with 5000 units of heparin.  Using the Glidewire and a Omni catheter the aortic bifurcation was crossed and the catheter was advanced down into the left external iliac artery.  With a catheter in this position we then obtained serial digital subtraction angiography of the patient's left lower extremity.  The patient's common femoral artery and profunda appear to be minimally diseased with no significant stenoses.  The superficial femoral artery  in its mid and distal portion appears to have increasing amounts of disease and stenoses before does finally occlude in the supra popliteal region.  There is minimal reconstitution of the popliteal artery just behind the knee however this does reocclude near the tibial peroneal trunk.  The distal runoff was unable to be visualized with the pigtail catheter sitting in the common femoral artery.  Next a Glidewire is advanced down and positioned deep within the superficial femoral artery and a straight flush catheter was advanced over the wire to rest in the superficial femoral artery.  We then obtained final runoff images of the infrageniculate vasculature and all the way down to the foot.  The patient has reconstitution of the peroneal artery after significant disease of the TP trunk and this comes down all the way to the ankle.  There are collaterals filling off of the peroneal artery they do feel a very distal anterior tibial and dorsalis pedis artery that does appear to be moderately diseased.  This completed the procedure wire was replaced and all catheters and wires were removed.  The right 4 Sinhala common femoral artery sheath was removed and hemostasis obtained with manual compression for 20 minutes.    The patient was awakened from anesthesia and transported to the postanesthesia care unit in stable condition.  There were no complications and the patient tolerated the procedure well.    Dr. Simms was present, scrubbed, and participated or performed all critical aspects of the procedure.     Interpretation of Imaging:  Aortic bifurcation and common iliac arteries without significant stenoses. External iliac with minimal to no disease bilaterally. Left common femoral artery with mild non-flow-limiting disease. The profunda has some mild disease causing <30% stenosis. The left superficial femoral artery has multilevel disease with occlusion in its distal segment. Reconstitution of the below-the-knee popliteal  artery with heavy disease of the TP trunk causing >75% stenosis. AT and PT occluded at their origins. PT does not reconstitute. The peroneal fills with multilevel disease, but flows to the ankle. Distal AT at the ankle reconstitutes via collaterals from peroneal and carries on as a diminutive DP.     Contrast Administered:  103 mL    Implants:  None    Specimens:  None    Complications:  There were no complication.    Disposition:  PACU and back to floor bed.    Attending Attestation: I was present and scrubbed for the key portions of the procedure.    Kelli Simms  Phone Number: [phone]

## 2023-12-02 NOTE — SIGNIFICANT EVENT
Post-Op Check    S:    POD 0 from LLE angiogram (findings: mild proximal SFA stenosis, distal SFA occlusion, diminutive peroneal runoff with distal AT reconstitution. PMHx DM neuropathy, multiple toe amputations (podiatry), HTN, CKD 4.  -Patient pain moderately controlled (RN to give pain meds)  -patient passing flatus, no bm yet since procedure, urinating appropriately  -Patient agitated that no intervention was done  -No chest pain, SOB    O:   Vital signs are stable, normotensive, afebrile, no new or worsening oxygen requirement, not tachycardic  Visit Vitals  /74   Pulse 86   Temp 36.2 °C (97.2 °F) (Temporal)   Resp 15      Physical Exam:   Constitutional: no acute distress  Skin: warm and dry overall   Neuro: A/O x4, mild weakness on attempting to lift LLE (per patient this strength is baseline for him prior to angio on 12/1)  HEENT: Atraumatic, no scleral icterus  Cardiac: RRR  Pulmonary: Unlabored respirations on RA  Abdomen: soft, mildly distended, non-tender to palpation  : voiding in urinal  Ext:  RLE - PT/DP pulses present on doppler, motor/sensory intact; R groin access point dressed with gauze and tape, CDI, no signs of hematoma on palpation  LLE - PT/DP not present on doppler; multiphasic AT (difficult to find), motor/sensory intact    A/P:  Overall, patient is doing well postoperatively with no acute concerns.  Will continue to optimize pain control as needed.  Will continue to monitor clinical exam, vitals, I&O's, and labs when available.  Will follow up on the patient in the a.m. or sooner as needed.  -encourage IS  -Bedrest until evaluated by Vascular Surgery day team  -resume heparin gtt    Plan discussed with primary team and RN at bedside    Michael Seymour MD  PGY1  General Surgery  Vascular Surgery [phone]

## 2023-12-02 NOTE — PROGRESS NOTES
NEPHROLOGY FOLLOW UP NOTE    Kwadwo Hoyt   57 y.o.    @WT@  MRN/Room: [id number]    Subjective:  No acute events, had angiogram yesterday    Objective:     Meds:   alteplase, ,   amLODIPine, 10 mg, Daily  aspirin, 81 mg, Daily  atorvastatin, 80 mg, Nightly  calcitriol, 0.25 mcg, Daily  carvedilol, 25 mg, BID  gabapentin, 100 mg, q PM  insulin glargine, 20 Units, Nightly  insulin lispro, 0-5 Units, TID with meals  insulin lispro, 7 Units, TID with meals  lidocaine-prilocaine, , Daily  melatonin, 5 mg, Nightly  pantoprazole, 20 mg, Daily before breakfast  polyethylene glycol, 17 g, BID  sennosides-docusate sodium, 1 tablet, Nightly  torsemide, 100 mg, Daily      heparin, Last Rate: 21 Units/hr (12/02/23 1035)      alteplase, ,   dextrose 10 % in water (D10W), 0.3 g/kg/hr, Once PRN  dextrose, 25 g, q15 min PRN  eucerin, , PRN  glucagon, 1 mg, q15 min PRN  heparin, 5,000-10,000 Units, q4h PRN  hydrALAZINE, 5 mg, q6h PRN  HYDROmorphone, 1 mg, q6h PRN  hydrOXYzine HCL, 25 mg, q6h PRN  oxyCODONE, 5 mg, q6h PRN        Vitals:    12/02/23 1428   BP:    Pulse: 79   Resp:    Temp: 36.6 °C (97.9 °F)   SpO2:           Intake/Output Summary (Last 24 hours) at 12/2/2023 1512  Last data filed at 12/2/2023 1428  Gross per 24 hour   Intake 1549 ml   Output 475 ml   Net 1074 ml         General appearance: Awake and alert, oriented. No distress  HEENT: NC/AT, MMM  Skin: no apparent rash  Heart: heart sounds 1 & 2 present and normal, no murmurs heard or rub  Lungs: CTAB. No wheezing/crackles  Abdomen: soft, non tender  Extremities: No edema  Neuro: No FND  ACCESS: Tunnled HD line    Blood Labs:  Results for orders placed or performed during the hospital encounter of 11/24/23 (from the past 24 hour(s))   POCT GLUCOSE   Result Value Ref Range    POCT Glucose 103 (H) 74 - 99 mg/dL   POCT GLUCOSE   Result Value Ref Range    POCT Glucose 84 74 - 99 mg/dL   CBC and Auto Differential   Result Value Ref Range    WBC 8.6 4.4 - 11.3  x10*3/uL    nRBC 0.0 0.0 - 0.0 /100 WBCs    RBC 3.09 (L) 4.50 - 5.90 x10*6/uL    Hemoglobin 8.8 (L) 13.5 - 17.5 g/dL    Hematocrit 27.4 (L) 41.0 - 52.0 %    MCV 89 80 - 100 fL    MCH 28.5 26.0 - 34.0 pg    MCHC 32.1 32.0 - 36.0 g/dL    RDW 13.3 11.5 - 14.5 %    Platelets 282 150 - 450 x10*3/uL    Neutrophils % 63.5 40.0 - 80.0 %    Immature Granulocytes %, Automated 0.3 0.0 - 0.9 %    Lymphocytes % 20.0 13.0 - 44.0 %    Monocytes % 14.2 2.0 - 10.0 %    Eosinophils % 1.7 0.0 - 6.0 %    Basophils % 0.3 0.0 - 2.0 %    Neutrophils Absolute 5.46 1.20 - 7.70 x10*3/uL    Immature Granulocytes Absolute, Automated 0.03 0.00 - 0.70 x10*3/uL    Lymphocytes Absolute 1.72 1.20 - 4.80 x10*3/uL    Monocytes Absolute 1.22 (H) 0.10 - 1.00 x10*3/uL    Eosinophils Absolute 0.15 0.00 - 0.70 x10*3/uL    Basophils Absolute 0.03 0.00 - 0.10 x10*3/uL   Renal function panel   Result Value Ref Range    Glucose 189 (H) 74 - 99 mg/dL    Sodium 132 (L) 136 - 145 mmol/L    Potassium 5.0 3.5 - 5.3 mmol/L    Chloride 97 (L) 98 - 107 mmol/L    Bicarbonate 24 21 - 32 mmol/L    Anion Gap 16 10 - 20 mmol/L    Urea Nitrogen 51 (H) 6 - 23 mg/dL    Creatinine 8.21 (H) 0.50 - 1.30 mg/dL    eGFR 7 (L) >60 mL/min/1.73m*2    Calcium 8.9 8.6 - 10.6 mg/dL    Phosphorus 5.0 (H) 2.5 - 4.9 mg/dL    Albumin 3.4 3.4 - 5.0 g/dL   Heparin Assay, UFH   Result Value Ref Range    Heparin Unfractionated 0.2 See Comment Below for Therapeutic Ranges IU/mL   POCT GLUCOSE   Result Value Ref Range    POCT Glucose 193 (H) 74 - 99 mg/dL   aPTT - baseline   Result Value Ref Range    aPTT 71 (H) 27 - 38 seconds   Heparin Assay, UFH   Result Value Ref Range    Heparin Unfractionated 0.3 See Comment Below for Therapeutic Ranges IU/mL   POCT GLUCOSE   Result Value Ref Range    POCT Glucose 170 (H) 74 - 99 mg/dL          ASSESSMENT:  Kwadwo Hoyt is a 57 y.o. male with a PMHx of DM c/b neuropathy, HTN, CKD stage 5, HFrEF (EF 50% in 11/2021), & ALEXANDER being managed for left popliteal  occlusion. Hospital course complicated by SELENA on CKD, likely contrast induced, now progressed to ESRD     CKD V with progression to ESRD  - Baseline creatinine: ~5-6, follows with Dr. Draper nephrologist outpatient   - Etiology: secondary to T2DM  - Clinical volume status: Euvolemic, making 1.5-2L of urine daily with PO torsemide   - Electrolytes (Na, K, Ca, Phos) HyopNa  - TDC in place  Anemia: hgb 8.8  BMD: calcium and phos WNL           Recommendations:  - HD today, continue TTS schedule while admitted  - Will need SW for OP HD unit placement  - check ferritin, iron, tibc  - start renal MV  - continue calcitrol  - continue torsemide   - renal diet  - strict Is/Os  - Avoid nephrotoxins, contrast if possible;  - dose meds to IHD   - Avoid hypotension/rapid fluctuations in BPs       James Schmid MD  Nephrology Resident  24 hour Renal Pager - [phone]

## 2023-12-02 NOTE — CARE PLAN
Problem: Fall/Injury  Goal: Not fall by end of shift  Outcome: Progressing  Goal: Be free from injury by end of the shift  Outcome: Progressing  Goal: Verbalize understanding of personal risk factors for fall in the hospital  Outcome: Progressing  Goal: Verbalize understanding of risk factor reduction measures to prevent injury from fall in the home  Outcome: Progressing  Goal: Use assistive devices by end of the shift  Outcome: Progressing  Goal: Pace activities to prevent fatigue by end of the shift  Outcome: Progressing     Problem: Skin  Goal: Participates in plan/prevention/treatment measures  Outcome: Progressing  Goal: Prevent/manage excess moisture  Outcome: Progressing  Goal: Promote/optimize nutrition  Outcome: Progressing     Problem: Pain  Goal: Takes deep breaths with improved pain control throughout the shift  Outcome: Progressing  Goal: Turns in bed with improved pain control throughout the shift  Outcome: Progressing  Goal: Walks with improved pain control throughout the shift  Outcome: Progressing  Goal: Performs ADL's with improved pain control throughout shift  Outcome: Progressing  Goal: Participates in PT with improved pain control throughout the shift  Outcome: Progressing  Goal: Free from opioid side effects throughout the shift  Outcome: Progressing  Goal: Free from acute confusion related to pain meds throughout the shift  Outcome: Progressing   The patient's goals for the shift include      The clinical goals for the shift include Pt's pain will be controlled throughout the shift.

## 2023-12-02 NOTE — PROGRESS NOTES
"Kwadwo Hoyt is a 57 y.o. male on day 7 of admission presenting with Arterial occlusion.    Subjective   No acute events overnight. Patient reports some pain in his LLE, currently well-controlled. Denies f/c/n/v.        Objective     Physical Exam:  Constitutional: NAD  Respiratory: unlabored breathing on RA  Cardiovascular: non-tachycardic  MSK: normal ROM  Extremities: groin soft, no evidence of hematoma, island dressing removed at bedside. Multiphasic AT present on LLE, motor/sensory intact     Last Recorded Vitals  Blood pressure (!) 162/95, pulse 92, temperature 37.1 °C (98.8 °F), resp. rate 18, height 1.854 m (6' 1\"), weight 126 kg (278 lb 7.1 oz), SpO2 98 %.  Intake/Output last 3 Shifts:  I/O last 3 completed shifts:  In: 370 (2.9 mL/kg) [P.O.:120; IV Piggyback:250]  Out: 2175 (17.2 mL/kg) [Urine:2175 (0.5 mL/kg/hr)]  Weight: 126.3 kg     Relevant Results    Results for orders placed or performed during the hospital encounter of 11/24/23 (from the past 24 hour(s))   POCT GLUCOSE   Result Value Ref Range    POCT Glucose 109 (H) 74 - 99 mg/dL   POCT GLUCOSE   Result Value Ref Range    POCT Glucose 103 (H) 74 - 99 mg/dL   POCT GLUCOSE   Result Value Ref Range    POCT Glucose 84 74 - 99 mg/dL   CBC and Auto Differential   Result Value Ref Range    WBC 8.6 4.4 - 11.3 x10*3/uL    nRBC 0.0 0.0 - 0.0 /100 WBCs    RBC 3.09 (L) 4.50 - 5.90 x10*6/uL    Hemoglobin 8.8 (L) 13.5 - 17.5 g/dL    Hematocrit 27.4 (L) 41.0 - 52.0 %    MCV 89 80 - 100 fL    MCH 28.5 26.0 - 34.0 pg    MCHC 32.1 32.0 - 36.0 g/dL    RDW 13.3 11.5 - 14.5 %    Platelets 282 150 - 450 x10*3/uL    Neutrophils % 63.5 40.0 - 80.0 %    Immature Granulocytes %, Automated 0.3 0.0 - 0.9 %    Lymphocytes % 20.0 13.0 - 44.0 %    Monocytes % 14.2 2.0 - 10.0 %    Eosinophils % 1.7 0.0 - 6.0 %    Basophils % 0.3 0.0 - 2.0 %    Neutrophils Absolute 5.46 1.20 - 7.70 x10*3/uL    Immature Granulocytes Absolute, Automated 0.03 0.00 - 0.70 x10*3/uL    Lymphocytes " "Absolute 1.72 1.20 - 4.80 x10*3/uL    Monocytes Absolute 1.22 (H) 0.10 - 1.00 x10*3/uL    Eosinophils Absolute 0.15 0.00 - 0.70 x10*3/uL    Basophils Absolute 0.03 0.00 - 0.10 x10*3/uL   Renal function panel   Result Value Ref Range    Glucose 189 (H) 74 - 99 mg/dL    Sodium 132 (L) 136 - 145 mmol/L    Potassium 5.0 3.5 - 5.3 mmol/L    Chloride 97 (L) 98 - 107 mmol/L    Bicarbonate 24 21 - 32 mmol/L    Anion Gap 16 10 - 20 mmol/L    Urea Nitrogen 51 (H) 6 - 23 mg/dL    Creatinine 8.21 (H) 0.50 - 1.30 mg/dL    eGFR 7 (L) >60 mL/min/1.73m*2    Calcium 8.9 8.6 - 10.6 mg/dL    Phosphorus 5.0 (H) 2.5 - 4.9 mg/dL    Albumin 3.4 3.4 - 5.0 g/dL   Heparin Assay, UFH   Result Value Ref Range    Heparin Unfractionated 0.2 See Comment Below for Therapeutic Ranges IU/mL   POCT GLUCOSE   Result Value Ref Range    POCT Glucose 193 (H) 74 - 99 mg/dL        Assessment/Plan   Principal Problem:    Arterial occlusion  Active Problems:    ALEXANDER (obstructive sleep apnea)    Peripheral vascular disease, unspecified (CMS/HCC)    Expand All Collapse All    Kwadwo Hoyt is a 57 y.o. male on day 4 of admission presenting with Arterial occlusion.        Subjective   No overnight events.            Objective   Physical Exam:  General: NAD  Respiratory: unlabored breathing on room air   Cardiovascular: regular rate, normotensive  Extremities: maex4     Last Recorded Vitals  Blood pressure 121/79, pulse 80, temperature 37.8 °C (100 °F), resp. rate 20, height 1.854 m (6' 1\"), weight 129 kg (285 lb), SpO2 96 %.  Intake/Output last 3 Shifts:  I/O last 3 completed shifts:  In: 1200 (9.3 mL/kg) [I.V.:400 (3.1 mL/kg); Other:800]  Out: 5575 (43.1 mL/kg) [Urine:4775 (1 mL/kg/hr); Other:800]  Weight: 129.3 kg      Relevant Results        Results for orders placed or performed during the hospital encounter of 11/24/23 (from the past 24 hour(s))   POCT GLUCOSE   Result Value Ref Range     POCT Glucose 209 (H) 74 - 99 mg/dL   POCT GLUCOSE   Result Value Ref " Range     POCT Glucose 152 (H) 74 - 99 mg/dL   POCT GLUCOSE   Result Value Ref Range     POCT Glucose 102 (H) 74 - 99 mg/dL   Hepatitis B Surface Antigen   Result Value Ref Range     Hepatitis B Surface AG Nonreactive Nonreactive   POCT GLUCOSE   Result Value Ref Range     POCT Glucose 117 (H) 74 - 99 mg/dL   CBC and Auto Differential   Result Value Ref Range     WBC 6.3 4.4 - 11.3 x10*3/uL     nRBC 0.0 0.0 - 0.0 /100 WBCs     RBC 3.02 (L) 4.50 - 5.90 x10*6/uL     Hemoglobin 8.2 (L) 13.5 - 17.5 g/dL     Hematocrit 25.8 (L) 41.0 - 52.0 %     MCV 85 80 - 100 fL     MCH 27.2 26.0 - 34.0 pg     MCHC 31.8 (L) 32.0 - 36.0 g/dL     RDW 13.1 11.5 - 14.5 %     Platelets 316 150 - 450 x10*3/uL     Neutrophils % 60.9 40.0 - 80.0 %     Immature Granulocytes %, Automated 0.3 0.0 - 0.9 %     Lymphocytes % 23.6 13.0 - 44.0 %     Monocytes % 12.3 2.0 - 10.0 %     Eosinophils % 2.4 0.0 - 6.0 %     Basophils % 0.5 0.0 - 2.0 %     Neutrophils Absolute 3.81 1.20 - 7.70 x10*3/uL     Immature Granulocytes Absolute, Automated 0.02 0.00 - 0.70 x10*3/uL     Lymphocytes Absolute 1.48 1.20 - 4.80 x10*3/uL     Monocytes Absolute 0.77 0.10 - 1.00 x10*3/uL     Eosinophils Absolute 0.15 0.00 - 0.70 x10*3/uL     Basophils Absolute 0.03 0.00 - 0.10 x10*3/uL   Renal Function Panel   Result Value Ref Range     Glucose 101 (H) 74 - 99 mg/dL     Sodium 136 136 - 145 mmol/L     Potassium 6.1 (HH) 3.5 - 5.3 mmol/L     Chloride 102 98 - 107 mmol/L     Bicarbonate 20 (L) 21 - 32 mmol/L     Anion Gap 20 10 - 20 mmol/L     Urea Nitrogen 93 (HH) 6 - 23 mg/dL     Creatinine 12.31 (H) 0.50 - 1.30 mg/dL     eGFR 4 (L) >60 mL/min/1.73m*2     Calcium 8.5 (L) 8.6 - 10.6 mg/dL     Phosphorus 7.3 (H) 2.5 - 4.9 mg/dL     Albumin 3.0 (L) 3.4 - 5.0 g/dL   Magnesium   Result Value Ref Range     Magnesium 2.02 1.60 - 2.40 mg/dL   Heparin Assay, UFH   Result Value Ref Range     Heparin Unfractionated 0.3 See Comment Below for Therapeutic Ranges IU/mL   CT angio aorta and  bilateral iliofemoral runoff w and or wo IV contrast   1. Complete occlusion of the P1 and P3 segments of the left popliteal   artery with some reconstitution of the P2 segment.   2. Although difficult to evaluate, likely complete occlusion of the   left anterior tibial and posterior tibial arteries.   3. Right anterior tibial, posterior tibial, and peroneal arteries are   not well evaluated due to poor contrast opacification.   4. Additional areas of atherosclerotic disease and luminal narrowing   as described above      CT head wo IV contrast    There is moderate brain parenchymal volume loss.       The lateral ventricles demonstrate mild disproportionate prominence   when compared with the sulci within the cerebral convexities. No   obstructing mass lesion is noted on this noncontrast CT study. This   mild disproportionate ventriculomegaly may be related to more   pronounced central volume loss ossific a component of communicating   hydrocephalus.      There are patchy and confluent nonspecific white matter changes   within the cerebral hemispheres bilaterally which while nonspecific,   can be seen with small-vessel ischemic change among others.   Additional focal hypodensities are identified within the subinsular   regions and basal ganglia bilaterally suggesting incidental prominent   perivascular spaces and/or scattered lacunar infarctions.      Assessment/Plan   Principal Problem:    Arterial occlusion  Active Problems:    Peripheral vascular disease, unspecified (CMS/HCC)     Kwadwo Hoyt is a 57 y.o. male with PMH of DM neuropathy, multiple toe amputations with podiatry, HTN, CKD 4 presenting with two days of LLE weakness causing him to fall. Endorses no pain, movement intact. CTA showed complete occlusion of the P1 and P3 segments of the left popliteal artery with some reconstitution of the P2 segment and likely complete occlusion of the left anterior tibial and posterior tibial arteries. Physical exam  showed a monophasic left AT, no dopplerable DP.       Patient is now POD0 LLE angiogram. Patient's revascularization options remain difficult at this time, as patient had limited patency distally. Revascularization options will need to be discussed further in a multidisciplinary setting.     - please unhold aspirin  - continue heparin gtt  - ok for regular diet  - rest of care appreciated as per primary team  - patient pending further planning for revascularization options. Vascular surgery will continue to follow.     D/w attending Dr. Cantu.     Nino Dominique PGY1  General Surgery   [phone]

## 2023-12-03 PROBLEM — N17.9 AKI (ACUTE KIDNEY INJURY) (CMS-HCC): Status: ACTIVE | Noted: 2023-12-03

## 2023-12-03 LAB
ALBUMIN SERPL BCP-MCNC: 3.1 G/DL (ref 3.4–5)
ANION GAP SERPL CALC-SCNC: 12 MMOL/L (ref 10–20)
BASOPHILS # BLD AUTO: 0.04 X10*3/UL (ref 0–0.1)
BASOPHILS NFR BLD AUTO: 0.6 %
BUN SERPL-MCNC: 36 MG/DL (ref 6–23)
CALCIUM SERPL-MCNC: 8.4 MG/DL (ref 8.6–10.6)
CHLORIDE SERPL-SCNC: 99 MMOL/L (ref 98–107)
CO2 SERPL-SCNC: 27 MMOL/L (ref 21–32)
CREAT SERPL-MCNC: 5.94 MG/DL (ref 0.5–1.3)
EOSINOPHIL # BLD AUTO: 0.12 X10*3/UL (ref 0–0.7)
EOSINOPHIL NFR BLD AUTO: 1.7 %
ERYTHROCYTE [DISTWIDTH] IN BLOOD BY AUTOMATED COUNT: 13.2 % (ref 11.5–14.5)
FERRITIN SERPL-MCNC: 528 NG/ML (ref 20–300)
GFR SERPL CREATININE-BSD FRML MDRD: 10 ML/MIN/1.73M*2
GLUCOSE BLD MANUAL STRIP-MCNC: 174 MG/DL (ref 74–99)
GLUCOSE BLD MANUAL STRIP-MCNC: 188 MG/DL (ref 74–99)
GLUCOSE BLD MANUAL STRIP-MCNC: 191 MG/DL (ref 74–99)
GLUCOSE BLD MANUAL STRIP-MCNC: 223 MG/DL (ref 74–99)
GLUCOSE BLD MANUAL STRIP-MCNC: 232 MG/DL (ref 74–99)
GLUCOSE SERPL-MCNC: 175 MG/DL (ref 74–99)
HBV SURFACE AG SERPL QL IA: NONREACTIVE
HCT VFR BLD AUTO: 24 % (ref 41–52)
HGB BLD-MCNC: 7.5 G/DL (ref 13.5–17.5)
IMM GRANULOCYTES # BLD AUTO: 0.02 X10*3/UL (ref 0–0.7)
IMM GRANULOCYTES NFR BLD AUTO: 0.3 % (ref 0–0.9)
IRON SATN MFR SERPL: 9 %
IRON SERPL-MCNC: 19 UG/DL (ref 35–150)
LYMPHOCYTES # BLD AUTO: 1.44 X10*3/UL (ref 1.2–4.8)
LYMPHOCYTES NFR BLD AUTO: 20.5 %
MCH RBC QN AUTO: 27.7 PG (ref 26–34)
MCHC RBC AUTO-ENTMCNC: 31.3 G/DL (ref 32–36)
MCV RBC AUTO: 89 FL (ref 80–100)
MONOCYTES # BLD AUTO: 0.99 X10*3/UL (ref 0.1–1)
MONOCYTES NFR BLD AUTO: 14.1 %
NEUTROPHILS # BLD AUTO: 4.43 X10*3/UL (ref 1.2–7.7)
NEUTROPHILS NFR BLD AUTO: 62.8 %
NRBC BLD-RTO: 0 /100 WBCS (ref 0–0)
PHOSPHATE SERPL-MCNC: 3.9 MG/DL (ref 2.5–4.9)
PLATELET # BLD AUTO: 239 X10*3/UL (ref 150–450)
POTASSIUM SERPL-SCNC: 4.4 MMOL/L (ref 3.5–5.3)
RBC # BLD AUTO: 2.71 X10*6/UL (ref 4.5–5.9)
SODIUM SERPL-SCNC: 134 MMOL/L (ref 136–145)
TIBC SERPL-MCNC: 213 UG/DL
UFH PPP CHRO-ACNC: 0.4 IU/ML
UIBC SERPL-MCNC: 194 UG/DL (ref 110–370)
WBC # BLD AUTO: 7 X10*3/UL (ref 4.4–11.3)

## 2023-12-03 PROCEDURE — 82728 ASSAY OF FERRITIN: CPT

## 2023-12-03 PROCEDURE — 99233 SBSQ HOSP IP/OBS HIGH 50: CPT

## 2023-12-03 PROCEDURE — 2500000004 HC RX 250 GENERAL PHARMACY W/ HCPCS (ALT 636 FOR OP/ED)

## 2023-12-03 PROCEDURE — 82947 ASSAY GLUCOSE BLOOD QUANT: CPT

## 2023-12-03 PROCEDURE — 85025 COMPLETE CBC W/AUTO DIFF WBC: CPT

## 2023-12-03 PROCEDURE — 2500000001 HC RX 250 WO HCPCS SELF ADMINISTERED DRUGS (ALT 637 FOR MEDICARE OP)

## 2023-12-03 PROCEDURE — 87340 HEPATITIS B SURFACE AG IA: CPT | Performed by: INTERNAL MEDICINE

## 2023-12-03 PROCEDURE — 2500000001 HC RX 250 WO HCPCS SELF ADMINISTERED DRUGS (ALT 637 FOR MEDICARE OP): Performed by: INTERNAL MEDICINE

## 2023-12-03 PROCEDURE — 36415 COLL VENOUS BLD VENIPUNCTURE: CPT | Performed by: EMERGENCY MEDICINE

## 2023-12-03 PROCEDURE — 2500000004 HC RX 250 GENERAL PHARMACY W/ HCPCS (ALT 636 FOR OP/ED): Performed by: EMERGENCY MEDICINE

## 2023-12-03 PROCEDURE — 85520 HEPARIN ASSAY: CPT | Performed by: EMERGENCY MEDICINE

## 2023-12-03 PROCEDURE — 80069 RENAL FUNCTION PANEL: CPT

## 2023-12-03 PROCEDURE — 83550 IRON BINDING TEST: CPT

## 2023-12-03 PROCEDURE — 1100000001 HC PRIVATE ROOM DAILY

## 2023-12-03 RX ORDER — INSULIN GLARGINE 100 [IU]/ML
25 INJECTION, SOLUTION SUBCUTANEOUS NIGHTLY
Status: DISCONTINUED | OUTPATIENT
Start: 2023-12-03 | End: 2023-12-14 | Stop reason: HOSPADM

## 2023-12-03 RX ADMIN — ATORVASTATIN CALCIUM 80 MG: 80 TABLET, FILM COATED ORAL at 21:46

## 2023-12-03 RX ADMIN — CALCITRIOL CAPSULES 0.25 MCG 0.25 MCG: 0.25 CAPSULE ORAL at 09:06

## 2023-12-03 RX ADMIN — INSULIN LISPRO 1 UNITS: 100 INJECTION, SOLUTION INTRAVENOUS; SUBCUTANEOUS at 17:52

## 2023-12-03 RX ADMIN — Medication 5 MG: at 21:46

## 2023-12-03 RX ADMIN — OXYCODONE HYDROCHLORIDE 5 MG: 5 TABLET ORAL at 09:11

## 2023-12-03 RX ADMIN — INSULIN GLARGINE 25 UNITS: 100 INJECTION, SOLUTION SUBCUTANEOUS at 21:00

## 2023-12-03 RX ADMIN — INSULIN LISPRO 7 UNITS: 100 INJECTION, SOLUTION INTRAVENOUS; SUBCUTANEOUS at 12:51

## 2023-12-03 RX ADMIN — OXYCODONE HYDROCHLORIDE 5 MG: 5 TABLET ORAL at 21:55

## 2023-12-03 RX ADMIN — HEPARIN SODIUM 24 UNITS/HR: 10000 INJECTION, SOLUTION INTRAVENOUS at 06:10

## 2023-12-03 RX ADMIN — INSULIN LISPRO 2 UNITS: 100 INJECTION, SOLUTION INTRAVENOUS; SUBCUTANEOUS at 12:47

## 2023-12-03 RX ADMIN — CARVEDILOL 25 MG: 12.5 TABLET, FILM COATED ORAL at 09:06

## 2023-12-03 RX ADMIN — INSULIN LISPRO 7 UNITS: 100 INJECTION, SOLUTION INTRAVENOUS; SUBCUTANEOUS at 09:15

## 2023-12-03 RX ADMIN — ASPIRIN 81 MG: 81 TABLET, COATED ORAL at 09:06

## 2023-12-03 RX ADMIN — PANTOPRAZOLE SODIUM 20 MG: 20 TABLET, DELAYED RELEASE ORAL at 09:06

## 2023-12-03 RX ADMIN — GABAPENTIN 100 MG: 100 CAPSULE ORAL at 21:46

## 2023-12-03 RX ADMIN — HEPARIN SODIUM 24 UNITS/HR: 10000 INJECTION, SOLUTION INTRAVENOUS at 12:40

## 2023-12-03 RX ADMIN — NEPHROCAP 1 CAPSULE: 1 CAP ORAL at 09:06

## 2023-12-03 RX ADMIN — CARVEDILOL 25 MG: 12.5 TABLET, FILM COATED ORAL at 21:46

## 2023-12-03 RX ADMIN — POLYETHYLENE GLYCOL 3350 17 G: 17 POWDER, FOR SOLUTION ORAL at 09:07

## 2023-12-03 RX ADMIN — AMLODIPINE BESYLATE 10 MG: 10 TABLET ORAL at 09:06

## 2023-12-03 RX ADMIN — TORSEMIDE 100 MG: 20 TABLET ORAL at 09:06

## 2023-12-03 RX ADMIN — INSULIN LISPRO 2 UNITS: 100 INJECTION, SOLUTION INTRAVENOUS; SUBCUTANEOUS at 09:21

## 2023-12-03 RX ADMIN — INSULIN LISPRO 7 UNITS: 100 INJECTION, SOLUTION INTRAVENOUS; SUBCUTANEOUS at 17:52

## 2023-12-03 ASSESSMENT — PAIN SCALES - GENERAL
PAINLEVEL_OUTOF10: 7
PAINLEVEL_OUTOF10: 7

## 2023-12-03 ASSESSMENT — COGNITIVE AND FUNCTIONAL STATUS - GENERAL
TURNING FROM BACK TO SIDE WHILE IN FLAT BAD: A LITTLE
DRESSING REGULAR UPPER BODY CLOTHING: A LITTLE
TOILETING: A LITTLE
MOBILITY SCORE: 17
MOVING TO AND FROM BED TO CHAIR: A LITTLE
STANDING UP FROM CHAIR USING ARMS: A LITTLE
DAILY ACTIVITIY SCORE: 17
DRESSING REGULAR LOWER BODY CLOTHING: A LOT
WALKING IN HOSPITAL ROOM: A LOT
CLIMB 3 TO 5 STEPS WITH RAILING: A LOT
PERSONAL GROOMING: A LITTLE
HELP NEEDED FOR BATHING: A LOT

## 2023-12-03 ASSESSMENT — PAIN - FUNCTIONAL ASSESSMENT: PAIN_FUNCTIONAL_ASSESSMENT: 0-10

## 2023-12-03 NOTE — PROGRESS NOTES
12/3/23 5495 Transitional Care Coordinator Notes:    Per team, patient will need an outpatient HD chair with a schedule of TTS. Will let the SW know.                 Assessment/Plan   Principal Problem:    Arterial occlusion  Active Problems:    ALEXANDER (obstructive sleep apnea)    ESRD on hemodialysis (CMS/[facility])    Peripheral vascular disease, unspecified (CMS/[facility])    Discharge Plans: discharge to home           Charlee Barragan RN

## 2023-12-03 NOTE — NURSING NOTE
Report to Receiving RN:    Report To: Aki RN  Time Report Called: 1940  Hand-Off Communication: cathflo dwelled for 40 minutes D/T high venous pressures, system clotted and required re setup, 200 ml NS given, VSS  Complications During Treatment: Yes, sluggish venous catheter, system clotted  Ultrafiltration Treatment: No  Medications Administered During Dialysis: Yes, oxycodone IR 5mg po, dilaudid 1 mg IV  Blood Products Administered During Dialysis: No  Labs Sent During Dialysis: Yes, heparin assay  Heparin Drip Rate Changes: Yes, increased by 300 units/hr to 42257 units/hour

## 2023-12-03 NOTE — CARE PLAN
The patient's goals for the shift include      The clinical goals for the shift include pt pain level will decrease    Over the shift, the patient did not make progress toward the following goals. Barriers to progression include . Recommendations to address these barriers include .

## 2023-12-03 NOTE — PROGRESS NOTES
57 y.o. male admitted for Arterial occlusion [I70.90]  HFrEF (heart failure with reduced ejection fraction) (CMS/HCC) [I50.20]  Acute deep vein thrombosis (DVT) of left lower extremity after procedure (CMS/HCC) [I97.89, I82.402]  Acute occlusion of popliteal artery due to thrombosis (CMS/HCC) [I74.3].     Subjective   No overnight events. Pt seen at bedside in no acute distress. Pt agitated while on the phone with his bank. States he is feeling fine but frustrated with the bank.        Objective     Scheduled Medications:   amLODIPine, 10 mg, oral, Daily  aspirin, 81 mg, oral, Daily  atorvastatin, 80 mg, oral, Nightly  B complex-vitamin C-folic acid, 1 capsule, oral, Daily  calcitriol, 0.25 mcg, oral, Daily  carvedilol, 25 mg, oral, BID  gabapentin, 100 mg, oral, q PM  insulin glargine, 20 Units, subcutaneous, Nightly  insulin lispro, 0-5 Units, subcutaneous, TID with meals  insulin lispro, 7 Units, subcutaneous, TID with meals  lidocaine-prilocaine, , Topical, Daily  melatonin, 5 mg, oral, Nightly  pantoprazole, 20 mg, oral, Daily before breakfast  polyethylene glycol, 17 g, oral, BID  sennosides-docusate sodium, 1 tablet, oral, Nightly  torsemide, 100 mg, oral, Daily         Continuous Medications:   heparin, 0-4,500 Units/hr, Last Rate: 24 Units/hr (12/03/23 0610)         PRN Medications:   PRN medications: dextrose 10 % in water (D10W), dextrose, eucerin, glucagon, heparin, hydrALAZINE, HYDROmorphone, hydrOXYzine HCL, oxyCODONE    Dietary Orders (From admission, onward)       Start     Ordered    12/02/23 0008  Adult diet Renal; Potassium Restricted 2 gm (50mEq); 2 - 3 grams Sodium  Diet effective now        Question Answer Comment   Diet type Renal    Potassium restriction: Potassium Restricted 2 gm (50mEq)    Sodium restriction: 2 - 3 grams Sodium        12/02/23 0007                    Vitals:  Most Recent:  Vitals:    12/03/23 1102   BP: 134/81   Pulse: 82   Resp: 17   Temp: 37 °C (98.6 °F)   SpO2: 98%        24hr Min/Max:  Temp  Min: 36.5 °C (97.7 °F)  Max: 37.6 °C (99.7 °F)  Pulse  Min: 79  Max: 91  BP  Min: 106/69  Max: 155/78  Resp  Min: 16  Max: 21  SpO2  Min: 93 %  Max: 100 %    Intake/Output x24h:    Intake/Output Summary (Last 24 hours) at 12/3/2023 1141  Last data filed at 12/3/2023 0736  Gross per 24 hour   Intake 1400 ml   Output 825 ml   Net 575 ml          Physical Exam:  Physical Exam  Constitutional:       General: He is not in acute distress.     Appearance: Normal appearance. He is obese. He is not ill-appearing.   HENT:      Nose: No congestion or rhinorrhea.   Eyes:      Extraocular Movements: Extraocular movements intact.      Conjunctiva/sclera: Conjunctivae normal.   Cardiovascular:      Rate and Rhythm: Normal rate.      Comments: No distal pulses felt on left lower extremity  Pulmonary:      Effort: Pulmonary effort is normal. No respiratory distress.   Abdominal:      General: Abdomen is flat. Bowel sounds are normal. There is no distension.      Palpations: Abdomen is soft.      Tenderness: There is no abdominal tenderness. There is no guarding or rebound.   Musculoskeletal:         General: Tenderness and deformity present.      Cervical back: Normal range of motion.      Comments: LLE w/ multiple amputaions and dry gangrene of remaining big toe.   Skin:     General: Skin is dry.   Neurological:      General: No focal deficit present.      Mental Status: He is alert and oriented to person, place, and time.         Relevant Results  Results for orders placed or performed during the hospital encounter of 11/24/23 (from the past 24 hour(s))   POCT GLUCOSE   Result Value Ref Range    POCT Glucose 170 (H) 74 - 99 mg/dL   Heparin Assay, UFH   Result Value Ref Range    Heparin Unfractionated 0.1 See Comment Below for Therapeutic Ranges IU/mL   Heparin Assay, UFH   Result Value Ref Range    Heparin Unfractionated 0.3 See Comment Below for Therapeutic Ranges IU/mL   Hepatitis B surface antigen    Result Value Ref Range    Hepatitis B Surface AG Nonreactive Nonreactive   Renal Function Panel   Result Value Ref Range    Glucose 175 (H) 74 - 99 mg/dL    Sodium 134 (L) 136 - 145 mmol/L    Potassium 4.4 3.5 - 5.3 mmol/L    Chloride 99 98 - 107 mmol/L    Bicarbonate 27 21 - 32 mmol/L    Anion Gap 12 10 - 20 mmol/L    Urea Nitrogen 36 (H) 6 - 23 mg/dL    Creatinine 5.94 (H) 0.50 - 1.30 mg/dL    eGFR 10 (L) >60 mL/min/1.73m*2    Calcium 8.4 (L) 8.6 - 10.6 mg/dL    Phosphorus 3.9 2.5 - 4.9 mg/dL    Albumin 3.1 (L) 3.4 - 5.0 g/dL   CBC and Auto Differential   Result Value Ref Range    WBC 7.0 4.4 - 11.3 x10*3/uL    nRBC 0.0 0.0 - 0.0 /100 WBCs    RBC 2.71 (L) 4.50 - 5.90 x10*6/uL    Hemoglobin 7.5 (L) 13.5 - 17.5 g/dL    Hematocrit 24.0 (L) 41.0 - 52.0 %    MCV 89 80 - 100 fL    MCH 27.7 26.0 - 34.0 pg    MCHC 31.3 (L) 32.0 - 36.0 g/dL    RDW 13.2 11.5 - 14.5 %    Platelets 239 150 - 450 x10*3/uL    Neutrophils % 62.8 40.0 - 80.0 %    Immature Granulocytes %, Automated 0.3 0.0 - 0.9 %    Lymphocytes % 20.5 13.0 - 44.0 %    Monocytes % 14.1 2.0 - 10.0 %    Eosinophils % 1.7 0.0 - 6.0 %    Basophils % 0.6 0.0 - 2.0 %    Neutrophils Absolute 4.43 1.20 - 7.70 x10*3/uL    Immature Granulocytes Absolute, Automated 0.02 0.00 - 0.70 x10*3/uL    Lymphocytes Absolute 1.44 1.20 - 4.80 x10*3/uL    Monocytes Absolute 0.99 0.10 - 1.00 x10*3/uL    Eosinophils Absolute 0.12 0.00 - 0.70 x10*3/uL    Basophils Absolute 0.04 0.00 - 0.10 x10*3/uL   Heparin Assay, UFH   Result Value Ref Range    Heparin Unfractionated 0.4 See Comment Below for Therapeutic Ranges IU/mL   Ferritin   Result Value Ref Range    Ferritin 528 (H) 20 - 300 ng/mL   Iron and TIBC   Result Value Ref Range    Iron 19 (L) 35 - 150 ug/dL    UIBC 194 110 - 370 ug/dL    TIBC 213 ug/dL    % Saturation 9 %   POCT GLUCOSE   Result Value Ref Range    POCT Glucose 223 (H) 74 - 99 mg/dL   POCT GLUCOSE   Result Value Ref Range    POCT Glucose 232 (H) 74 - 99 mg/dL      IR CVC  tunneled    Result Date: 11/30/2023  Interpreted By:  Cody Garzon, STUDY: IR CVC TUNNELED;  11/30/2023 11:10 am   INDICATION: Signs/Symptoms:change temporary dialysis cath to tunneled catheter.   COMPARISON: None.   ACCESSION NUMBER(S): [id number]   ORDERING CLINICIAN: ISABELL LEGGETT   TECHNIQUE: INTERVENTIONALIST(S): Cody Garzon MD   CONSENT: The patient was informed of the nature of the proposed procedure. The purposes, alternatives, risks, and benefits were explained and discussed. All questions were answered and consent was obtained.   RADIATION EXPOSURE: Fluoroscopy time: Less than 0.1 min. Dose: 0.6 mGy. Dose Area Product (DAP): 217   SEDATION: Moderate conscious IV sedation services (supervision of administration, induction, and maintenance) were provided by the physician performing the procedure with intravenous fentanyl 100 mcg and versed 2 mg for 15 minutes. The physician was assisted by an independent trained observer, an interventional radiology nurse, in the continuous monitoring of patient level of consciousness and physiologic status.   MEDICATION/CONTRAST: No additional   TIME OUT: A time out was performed immediately prior to procedure start with the interventional team, correctly identifying the patient name, date of birth, MRN, procedure, anatomy (including marking of site and side), patient position, procedure consent form, relevant laboratory and imaging test results, antibiotic administration, safety precautions, and procedure-specific equipment needs.   COMPLICATIONS: No immediate adverse events identified.   FINDINGS: In the recombinant position, the patient was positioned on the angiography table.  The patient has an existing  right internal jugular venous temporary  dialysis catheter in place.  The adjacent cutaneous tissues and existing catheter were prepared and draped in usual sterile manner.   After appropriate subcutaneous instillation of Lidocaine 1% local  anesthesia, the securing sutures of the existing temporary dialysis catheter were removed.  The loaded heparin was aspirated from the catheter ports.  A 035  Amplatz guidewire was inserted through the existing  dialysis catheter.  Utilizing intermittent fluoroscopic guidance, the guidewire was advanced into the inferior vena cava to secure access.  The existing catheter was removed with the guidewire left in place.   Local anesthesia was achieved with subcutaneous Lidocaine 1%.  A tunnel tract was created from the  right infraclavicular chest to the existing venotomy site.   A  15 Filipino x 23 cm  dual-lumen catheter was selected for placement. Venotomy site soft tissue dilation was performed to eventual accommodation of a  15-Filipino dilator peel-away coaxial sheath system.  The catheter was then advanced over the guidewire with the tips to reside at the caval-atrial junction.  The ports were aspirated without resistance and flushed with normal saline. Heparinized saline was then loaded into the catheter ports.  The external portions of the catheter were secured in place with polypropylene suture.  The venotomy and tunnel sites were closed with discontinuous and pursestring sutures, respectively.  Sterile dressings were then applied.   The patient tolerated the procedure without complication.       1. Technically successful conversion and placement of a  15 Filipino x 23 cm indwelling  hemodialysis catheter from a temporary hemodialysis catheter. 2. The catheter tip overlies the cavoatrial junction and is ready for immediate use.   I was present for and/or performed the critical portions of the procedure and immediately available throughout the entire procedure.   I personally reviewed the image(s) / study and resident interpretation. I agree with the findings as stated.   Dictated at [facility].   MACRO: None   Signed by: Cody Garzon 11/30/2023 11:18 AM  Dictation workstation:   [id number]    XR foot left 3+ views    Result Date: 11/29/2023  Interpreted By:  Amparo Alaniz and Sheng Max STUDY: Left foot  3 views.   INDICATION: Signs/Symptoms:Gangrene of 1st digit   COMPARISON: Left foot radiograph dated 04/16/2018.   ACCESSION NUMBER(S): [id number]   ORDERING CLINICIAN: FABY MARK   FINDINGS: Interval transmetatarsal amputations of the 3rd through 5th digits at the level of the mid metatarsals with unremarkable corticated appearance of the osseous stump as well as unremarkable appearance of the soft tissue stump.   Status post amputation of the 2nd digit at the level of the mid proximal phalanx with unremarkable corticated appearance of the osseous stump as well as unremarkable appearance of the soft tissue stump.     There is soft tissue loss of the tuft of the 1st digit likely from chronic ischemia. No definite erosion of the 1st distal phalanx or additional radiographic findings to suggest osteomyelitis.     Redemonstration of midfoot fusion involving the 1st TMT, 1/2 metatarsal bases, and inter cuneiform articulation with redemonstration of findings of hardware failure with loosening similar to 2018. Advanced tarsometatarsal joint degenerative changes which may be due to neuropathic arthropathy. Mild ankle and dorsal foot soft tissue swelling with cellulitis not ruled out. Moderate tibiotalar and mild subtalar joint degenerative changes with joint space loss and osteophytes. Plantar calcaneal spur which can be associated with plantar fasciitis.       1. Soft tissue ischemia of the tuft of the 1st toe with soft tissue loss. No radiographic findings of osteomyelitis of the 1st digit or the rest of the osseous structures. 2. Interval postsurgical changes with amputation of the 2nd through 5th digits as described above. 3. Soft tissue swelling of the ankle and dorsal foot with cellulitis not ruled out. 4. Redemonstration of postsurgical changes  involving multiple midfoot articulations with similar hardware failure findings when compared with 2018.   I personally reviewed the images/study and I agree with the findings as stated by Dr. Juan Bhagat. This study was interpreted at [facility], [place], [place].   Signed by: Amparo Alaniz 11/29/2023 11:21 AM Dictation workstation:   [id number]      Assessment/Plan   Principal Problem:    Arterial occlusion  Active Problems:    ALEXANDER (obstructive sleep apnea)    ESRD on hemodialysis (CMS/[facility])    Peripheral vascular disease, unspecified (CMS/[facility])    Kwadwo Hoyt is a 57 y.o. male with a PMHx of DM c/b neuropathy, HTN, CKD stage 5, HFrEF (EF 50% in 11/2021), & ALEXANDER, who presents to the ER with left leg and arm weakness. LLE distal pulses were not palpable and CTA aorta & iliofemoral runoff showed complete left popliteal occlusion. Heparin gtt was started and vascular medicine were consulted but recommended no surgical intervention, recommend PVR/ZAC first. Neuro were also consulted for concern of stroke given patients left sided weakness, rec outpatient follow up. PVR/ZAC showed severe disease at left femoral/popliteal level. 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 Nephrology consulted for worsening Cr and electrolyte status and started dialysis. Tunneled catheter placed by IR 11/30. Pt to c/w dialysis and underwent LLE angiogram with Vascular Surgery on 12/1. Currently afebrile, HDS, awaiting further recommendations from surgical teams.      #L Popliteal Artery Occlusion  #Chronic Limb Ischemia  #c/f Stroke  - CTA aorta & B/L iliofemoral runoff significant for complete occlusion of L popliteal artery as well as anterior and posterior tibial arteries.   - ZAC/PVR showed severe disease at the femoral popliteal level. Biphasic flow is noted in the left popliteal artery. Unable to obtain pressures. The posterior tibial and dorasalis pedis arteries are not audible.  - LLE  Angiogram w/ [facility] 12/1  -- Restarted Aspirin 12/2. C/w Heparin ggt  -- Pending recs from [facility]  - Neurology consulted, stroke follow up in 4-6 weeks, discharge with ziopatch / loop recorder  - atorvastatin 80 mg  - Podiatry consulted for dry gangrene of Left Hallux, no interventions planned until determination from [facility]     #CKD stage 5 2/2 to T2DM  - Admission Cr 6.06, GFR 10 (Baseline Cr 5.5~), Cr 5.94 s/p dialysis, TTS moving forward  - Being evaluated at [facility] for kidney transplant w/ most recent office visit on 9/12/2023  - home Torsemide 100 mg, calcitriol 0.25 mcg QD  - Avoid nephrotoxic agents  - Nephrology consulted due to worsening Cr and Electrolyte status  -- Received HD line 11/28, tunneled line placed 11/30 by IR  - Hep B negative, Pending ferritin, iron, tibc, started renal MV     #Hyponatremia  #Hyperkalemia  #Hyperphosphatemia  - Nephrology consulted  - ordered UA and Urine electrolytes, suggest Post-Renal  - Increased Sodium Bicarb and ordered Lokelma  - K+ 4.4  - Phos 3.9  - Sodium 132 -> 134     #Constipation  - no BM since 11/25  - ordered Miralax BID, Doc-senna  - Normal BM 11/29     #HTN  #HFrEF  - s/p 500 ml LR bolus in ED  - home Aspirin 81 mg  - c/w home Carvedilol 25 mg BID  - c/w home Amlodipine 10 mg every day  - Echo 11.25, 60-65% estimated ejection fraction, pseudonormal pattern of left ventricular diastolic filling, increased LA LV diastolic pressure,severe concentric left ventricular hypertrophy.        #T2DM  #Neuropathy  - Last Hgb A1C 11.0 on 5/24/2023, ordered updated Hgb A1C  - Home Regimen: Lantus 40U at bedtime & Lispro 20U TID w/ meals  - Started Lantus 20U at bedtime  - Started Lispro 7U TID w/ meals  - Mild SSI  - c/w home Gabapentin 300 mg BID  - POCT BG TID & at bedtime  - Hypoglycemia protocol     #Anxiety  - PRN Atarax 25 mg      #ALEXANDER  - CPAP at home, noncompliant        F: PRN  E: PRN  N: Renal  GI: Pantoprazole 20 mg QD  DVT: Heparin gtt       Code Status:  Full Code   Emergency Contact: Extended Emergency Contact Information  Primary Emergency Contact: Milka Hoyt  Address: [address] [id number]  Home Phone: [phone]  Relation: None  PCP: Thai Talavera MD    Patient seen and discussed with attending physician, Dr. Breen.  Plan preliminary until cosigned by attending physician.    Reviewed and approved by CHIQUITA AVALOS on 12/3/23 at 11:41 AM.

## 2023-12-04 ENCOUNTER — APPOINTMENT (OUTPATIENT)
Dept: VASCULAR MEDICINE | Facility: HOSPITAL | Age: 58
DRG: 270 | End: 2023-12-04
Payer: MEDICARE

## 2023-12-04 PROBLEM — I74.3: Status: ACTIVE | Noted: 2023-11-24

## 2023-12-04 PROBLEM — R29.898 LEFT LEG WEAKNESS: Status: ACTIVE | Noted: 2023-11-24

## 2023-12-04 PROBLEM — I70.222 CRITICAL LIMB ISCHEMIA OF LEFT LOWER EXTREMITY (MULTI): Status: ACTIVE | Noted: 2023-11-24

## 2023-12-04 PROBLEM — L97.529: Status: ACTIVE | Noted: 2023-11-24

## 2023-12-04 LAB
ALBUMIN SERPL BCP-MCNC: 3.2 G/DL (ref 3.4–5)
ANION GAP SERPL CALC-SCNC: 19 MMOL/L (ref 10–20)
BUN SERPL-MCNC: 45 MG/DL (ref 6–23)
CALCIUM SERPL-MCNC: 8.9 MG/DL (ref 8.6–10.6)
CHLORIDE SERPL-SCNC: 99 MMOL/L (ref 98–107)
CO2 SERPL-SCNC: 23 MMOL/L (ref 21–32)
CREAT SERPL-MCNC: 7.04 MG/DL (ref 0.5–1.3)
GFR SERPL CREATININE-BSD FRML MDRD: 8 ML/MIN/1.73M*2
GLUCOSE BLD MANUAL STRIP-MCNC: 100 MG/DL (ref 74–99)
GLUCOSE BLD MANUAL STRIP-MCNC: 151 MG/DL (ref 74–99)
GLUCOSE BLD MANUAL STRIP-MCNC: 87 MG/DL (ref 74–99)
GLUCOSE SERPL-MCNC: 126 MG/DL (ref 74–99)
PHOSPHATE SERPL-MCNC: 4.5 MG/DL (ref 2.5–4.9)
POTASSIUM SERPL-SCNC: 4.7 MMOL/L (ref 3.5–5.3)
SODIUM SERPL-SCNC: 136 MMOL/L (ref 136–145)
UFH PPP CHRO-ACNC: 0.3 IU/ML

## 2023-12-04 PROCEDURE — 2500000004 HC RX 250 GENERAL PHARMACY W/ HCPCS (ALT 636 FOR OP/ED)

## 2023-12-04 PROCEDURE — 2500000001 HC RX 250 WO HCPCS SELF ADMINISTERED DRUGS (ALT 637 FOR MEDICARE OP)

## 2023-12-04 PROCEDURE — 93970 EXTREMITY STUDY: CPT | Performed by: STUDENT IN AN ORGANIZED HEALTH CARE EDUCATION/TRAINING PROGRAM

## 2023-12-04 PROCEDURE — 82947 ASSAY GLUCOSE BLOOD QUANT: CPT

## 2023-12-04 PROCEDURE — 93970 EXTREMITY STUDY: CPT

## 2023-12-04 PROCEDURE — 84100 ASSAY OF PHOSPHORUS: CPT

## 2023-12-04 PROCEDURE — 80069 RENAL FUNCTION PANEL: CPT

## 2023-12-04 PROCEDURE — 1200000002 HC GENERAL ROOM WITH TELEMETRY DAILY

## 2023-12-04 PROCEDURE — 36415 COLL VENOUS BLD VENIPUNCTURE: CPT

## 2023-12-04 PROCEDURE — 99232 SBSQ HOSP IP/OBS MODERATE 35: CPT

## 2023-12-04 PROCEDURE — 93971 EXTREMITY STUDY: CPT | Performed by: STUDENT IN AN ORGANIZED HEALTH CARE EDUCATION/TRAINING PROGRAM

## 2023-12-04 PROCEDURE — 2500000001 HC RX 250 WO HCPCS SELF ADMINISTERED DRUGS (ALT 637 FOR MEDICARE OP): Performed by: INTERNAL MEDICINE

## 2023-12-04 PROCEDURE — 2500000004 HC RX 250 GENERAL PHARMACY W/ HCPCS (ALT 636 FOR OP/ED): Performed by: INTERNAL MEDICINE

## 2023-12-04 PROCEDURE — 85520 HEPARIN ASSAY: CPT | Performed by: EMERGENCY MEDICINE

## 2023-12-04 PROCEDURE — 2500000004 HC RX 250 GENERAL PHARMACY W/ HCPCS (ALT 636 FOR OP/ED): Performed by: EMERGENCY MEDICINE

## 2023-12-04 PROCEDURE — 99223 1ST HOSP IP/OBS HIGH 75: CPT | Performed by: NURSE PRACTITIONER

## 2023-12-04 PROCEDURE — 36415 COLL VENOUS BLD VENIPUNCTURE: CPT | Performed by: EMERGENCY MEDICINE

## 2023-12-04 RX ORDER — ACETAMINOPHEN 325 MG/1
975 TABLET ORAL EVERY 8 HOURS
Status: DISCONTINUED | OUTPATIENT
Start: 2023-12-04 | End: 2023-12-14 | Stop reason: HOSPADM

## 2023-12-04 RX ADMIN — CARVEDILOL 25 MG: 12.5 TABLET, FILM COATED ORAL at 08:40

## 2023-12-04 RX ADMIN — CALCITRIOL CAPSULES 0.25 MCG 0.25 MCG: 0.25 CAPSULE ORAL at 08:40

## 2023-12-04 RX ADMIN — PANTOPRAZOLE SODIUM 20 MG: 20 TABLET, DELAYED RELEASE ORAL at 06:49

## 2023-12-04 RX ADMIN — OXYCODONE HYDROCHLORIDE 5 MG: 5 TABLET ORAL at 06:51

## 2023-12-04 RX ADMIN — ATORVASTATIN CALCIUM 80 MG: 80 TABLET, FILM COATED ORAL at 20:16

## 2023-12-04 RX ADMIN — INSULIN LISPRO 7 UNITS: 100 INJECTION, SOLUTION INTRAVENOUS; SUBCUTANEOUS at 12:22

## 2023-12-04 RX ADMIN — NEPHROCAP 1 CAPSULE: 1 CAP ORAL at 08:39

## 2023-12-04 RX ADMIN — POLYETHYLENE GLYCOL 3350 17 G: 17 POWDER, FOR SOLUTION ORAL at 08:39

## 2023-12-04 RX ADMIN — SENNOSIDES AND DOCUSATE SODIUM 1 TABLET: 8.6; 5 TABLET ORAL at 20:16

## 2023-12-04 RX ADMIN — HEPARIN SODIUM 2400 UNITS/HR: 10000 INJECTION, SOLUTION INTRAVENOUS at 12:30

## 2023-12-04 RX ADMIN — HYDROMORPHONE HYDROCHLORIDE 1 MG: 1 INJECTION, SOLUTION INTRAMUSCULAR; INTRAVENOUS; SUBCUTANEOUS at 08:39

## 2023-12-04 RX ADMIN — ACETAMINOPHEN 975 MG: 325 TABLET ORAL at 20:35

## 2023-12-04 RX ADMIN — HEPARIN SODIUM 2400 UNITS/HR: 10000 INJECTION, SOLUTION INTRAVENOUS at 00:52

## 2023-12-04 RX ADMIN — INSULIN GLARGINE 25 UNITS: 100 INJECTION, SOLUTION SUBCUTANEOUS at 20:20

## 2023-12-04 RX ADMIN — GABAPENTIN 100 MG: 100 CAPSULE ORAL at 20:16

## 2023-12-04 RX ADMIN — Medication 5 MG: at 20:16

## 2023-12-04 RX ADMIN — AMLODIPINE BESYLATE 10 MG: 10 TABLET ORAL at 08:40

## 2023-12-04 RX ADMIN — INSULIN LISPRO 7 UNITS: 100 INJECTION, SOLUTION INTRAVENOUS; SUBCUTANEOUS at 08:38

## 2023-12-04 RX ADMIN — INSULIN LISPRO 2 UNITS: 100 INJECTION, SOLUTION INTRAVENOUS; SUBCUTANEOUS at 12:24

## 2023-12-04 RX ADMIN — CARVEDILOL 25 MG: 12.5 TABLET, FILM COATED ORAL at 20:16

## 2023-12-04 RX ADMIN — OXYCODONE HYDROCHLORIDE 5 MG: 5 TABLET ORAL at 23:08

## 2023-12-04 RX ADMIN — HEPARIN SODIUM 2400 UNITS/HR: 10000 INJECTION, SOLUTION INTRAVENOUS at 22:19

## 2023-12-04 RX ADMIN — HYDROMORPHONE HYDROCHLORIDE 1 MG: 1 INJECTION, SOLUTION INTRAMUSCULAR; INTRAVENOUS; SUBCUTANEOUS at 01:52

## 2023-12-04 RX ADMIN — INSULIN LISPRO 7 UNITS: 100 INJECTION, SOLUTION INTRAVENOUS; SUBCUTANEOUS at 18:02

## 2023-12-04 RX ADMIN — INSULIN LISPRO 1 UNITS: 100 INJECTION, SOLUTION INTRAVENOUS; SUBCUTANEOUS at 08:52

## 2023-12-04 RX ADMIN — ASPIRIN 81 MG: 81 TABLET, COATED ORAL at 08:39

## 2023-12-04 RX ADMIN — TORSEMIDE 100 MG: 20 TABLET ORAL at 08:39

## 2023-12-04 ASSESSMENT — COGNITIVE AND FUNCTIONAL STATUS - GENERAL
HELP NEEDED FOR BATHING: A LITTLE
CLIMB 3 TO 5 STEPS WITH RAILING: A LOT
DAILY ACTIVITIY SCORE: 19
DRESSING REGULAR LOWER BODY CLOTHING: A LOT
MOBILITY SCORE: 19
DRESSING REGULAR UPPER BODY CLOTHING: A LITTLE
DRESSING REGULAR LOWER BODY CLOTHING: A LOT
MOVING TO AND FROM BED TO CHAIR: A LITTLE
CLIMB 3 TO 5 STEPS WITH RAILING: A LOT
STANDING UP FROM CHAIR USING ARMS: A LITTLE
STANDING UP FROM CHAIR USING ARMS: A LITTLE
WALKING IN HOSPITAL ROOM: A LITTLE
TOILETING: A LITTLE
TOILETING: A LITTLE
HELP NEEDED FOR BATHING: A LITTLE
DRESSING REGULAR UPPER BODY CLOTHING: A LITTLE
WALKING IN HOSPITAL ROOM: A LITTLE
MOVING TO AND FROM BED TO CHAIR: A LITTLE
MOBILITY SCORE: 19
DAILY ACTIVITIY SCORE: 19

## 2023-12-04 ASSESSMENT — PAIN - FUNCTIONAL ASSESSMENT
PAIN_FUNCTIONAL_ASSESSMENT: 0-10

## 2023-12-04 ASSESSMENT — PAIN SCALES - GENERAL
PAINLEVEL_OUTOF10: 5 - MODERATE PAIN
PAINLEVEL_OUTOF10: 7
PAINLEVEL_OUTOF10: 8
PAINLEVEL_OUTOF10: 10 - WORST POSSIBLE PAIN
PAINLEVEL_OUTOF10: 8
PAINLEVEL_OUTOF10: 5 - MODERATE PAIN

## 2023-12-04 ASSESSMENT — PAIN DESCRIPTION - LOCATION: LOCATION: FOOT

## 2023-12-04 ASSESSMENT — PAIN DESCRIPTION - ORIENTATION: ORIENTATION: LEFT

## 2023-12-04 NOTE — CONSULTS
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 NAD, awake/alert/oriented x3, pleasant, cooperative     Head/Neck: Neck supple, No JVD, trachea midline, no bruits           Respiratory/Thorax: Patent airways, CTAB, thorax symmetric, Rt chest wall HD cath      Cardiovascular: Regular, rate and rhythm, no murmurs, normal S 1 and S 2    Gastrointestinal: Nondistended, soft, non-tender, +BS,       Skin: Warm and dry            Extremities: CALL, +2 BLE edema, ulceration of the left great toe with necrosis and malodor, no pus noted, prior amputation of toe 2-5 on the left. Left heel very dry, sort to palpation. RLE no open ulceration, skin dry and flaky. Motor function  slightly limited on the left at the ankle level and mild loss of sensation. Monophasic Lt PT/AT doppler signal and absent DP. Post vascular procedure site Rt groin soft, nontender, no oozing/hematoma.  Neuro: non-focal   Psychological: Appropriate mood and behavior        Assessment/Plan   Kwadwo Hoyt is a 57 y.o. male with PMH of HTN, DM2 uncontrolled with neuropathy, multiple toe amputations by podiatry, OM in the left foot 2021, CKD stage 5 now on HD presented with LLE numbness and weakness causing him to fall 5  times. CTA showed complete occlusion of the P1 and P3 segments of the left popliteal artery with some reconstitution of the P2 segment and likely complete occlusion of the left anterior tibial and posterior tibial arteries. S/p diagnostic angiogram on 12/1/2023 by vascular surgery findings: mild proximal SFA stenosis, distal SFA occlusion, reconstitution of the below-the-knee popliteal artery with heavy disease of the TP trunk causing >75% stenosis. AT and PT occluded at their origins. PT does not reconstitute. The peroneal fills with multilevel disease, but flows to the ankle. Distal AT at the ankle reconstitutes via collaterals from peroneal and carries on as a diminutive DP.    Physical exam showed a monophasic left AT and PT, no dopplerable DP.     Diagnostic angiogram reviewed by Lissa Nielsen and Sylvia. Pt will need endovascular intervention to improve blood flow to the left foot.     LLE Critical Limb ischemia RC-6  Necrotic Ulcer of the great toe, Xray -ve for OM      Plan    - Peripheral angioplasty with Dr. Santiago Ward tomorrow 12/5/2023  - consult Podiatry for wound management, pt will likely need TMA post revascularization   - NPO after midnight  - type and screen in the AM transfuse if Hgb < 7 may coordinate with HD.  - Schedule Pt for HD early morning   - Move Pt to a Tele bed         Peripheral IV 11/29/23 20 G Left;Proximal;Anterior Forearm (Active)   Site Assessment Clean;Dry;Intact 12/04/23 1515   Dressing Status Clean;Dry 12/04/23 1515   Number of days: 5       Hemodialysis Cath 11/30/23 Right Subclavian (Active)   Site Assessment Clean;Dry;Intact 12/04/23 1515   Dressing Status Clean;Dry 12/04/23 1515   Number of days: 4       Code Status:  Full Code    Can and Plan was discussed with attendings Dr. Walker Nielsen and Dr. Santiago Ward.        Zan Vazquez, APRN-CNP  Endovascular/Limb Salvage Service   Day: [id number]/Haiku/Night [facility] [id number]

## 2023-12-04 NOTE — PROGRESS NOTES
"Kwadwo Hoyt is a 57 y.o. male on day 9 of admission presenting with Arterial occlusion.    Subjective   Did not sleep well. Restless with left leg pain.    Objective     Physical Exam:  Constitutional: NAD  Respiratory: unlabored breathing on RA  Cardiovascular: non-tachycardic  MSK: normal ROM  Extremities: groin soft, no evidence of hematoma. Multiphasic AT present on LLE, motor/sensory intact     Last Recorded Vitals  Blood pressure 148/85, pulse 79, temperature 37.2 °C (99 °F), resp. rate 19, height 1.854 m (6' 1\"), weight 126 kg (278 lb 7.1 oz), SpO2 94 %.  Intake/Output last 3 Shifts:  I/O last 3 completed shifts:  In: 2240 (17.7 mL/kg) [P.O.:1240; I.V.:200 (1.6 mL/kg); Other:800]  Out: 1525 (12.1 mL/kg) [Urine:1525 (0.3 mL/kg/hr)]  Weight: 126.3 kg         A/P     Kwadwo Hoyt is a 57 y.o. male with PMH of DM neuropathy, multiple toe amputations with podiatry, HTN, CKD 4 presenting with two days of LLE weakness causing him to fall. Endorses no pain, movement intact. CTA showed complete occlusion of the P1 and P3 segments of the left popliteal artery with some reconstitution of the P2 segment and likely complete occlusion of the left anterior tibial and posterior tibial arteries. Physical exam showed a monophasic left AT, no dopplerable DP.       Patient is now POD0 LLE angiogram. Patient's revascularization options remain difficult at this time, as patient had limited patency distally. Revascularization options will need to be discussed further in a multidisciplinary setting.     - cont. [id number]  - rest of care appreciated as per primary team  - will discuss with podiatry whether L foot is salvageable, may warrant amputation rather than revascularization    Brendon Hilton MD  General Surgery, pgy3  [facility] [id number]         "

## 2023-12-04 NOTE — PROGRESS NOTES
Physical Therapy                 Therapy Communication Note    Patient Name: Kwadwo Hoyt  MRN: [id number]  Today's Date: 12/4/2023     Discipline: Physical Therapy    Missed Visit Reason: Missed Visit Reason: Patient in a medical procedure (Pt off floor at vascular, will re-attempt as time permits)    Missed Time: Attempt    Erendira Sandhu PTA  Rehab Office [phone]

## 2023-12-04 NOTE — PROGRESS NOTES
Notified SW that patient will need outpatient HD chair with a schedule of TTS. Norma Parekh RN, TCC

## 2023-12-04 NOTE — CARE PLAN
The patient's goals for the shift include  less pain.    The clinical goals for the shift include pt. Will have decrease in pain for duration of shift.     Over the shift, the patient made progress towards all goals.

## 2023-12-04 NOTE — H&P (VIEW-ONLY)
Consults  History Of Present Illness:    Kwadwo Hoyt is a 57 y.o. male presenting to the hospital on 11/24/23 with LLE loss of sensation and weakness. Pt PMH include DM2 (uncontrolled) c/b neuropathy, HTN, CKD stage 5 now on HD since hospitalization via Rt CW HD cath, HFrEF (EF 50% in 11/2021), & ALEXANDER. The patient reported that he woke up Thanksgiving morning with left leg numbness and weakness that caused him to fall. The patinet has never had similar symptoms in the past and reported that he felt like his leg was giving out on him when weight bearing and couldn't move his leg. However, he did not believe it thinking he might have had too much to drinks the night before and hangover. The patient reported fail 5 time total and reported hitting his head twice but was able to get back up by himself and denied any loss of consciousness. He decided to stay home on Thanksgiving and called his son to come be with him and next day was brought to the ED for evaluation. He denies any pain and reports occasion numbness and tingling in both legs. He denied knowing any vascular testing or disease prior to this admission although he has had left toes 2-5 amputate by his podiatrist Dr. Dickinson. He last saw Dr. Dickinson on 11/14/23, he does he own wound care at home per podiatry order with betadine and cover with clean gauze. Pt denied claudication or rest pain. He reports left foot had hardware from prior accident at his job in 1991 run over by a [employer].    Off note: Pt was started on HD via Rt CW HD cath on 11/29/23. Pt denied knowing the stages of his kidney disease and stated he routinely follows up with his doctor with labs and did not know it was getting to this stage.          Last Recorded Vitals:  Vitals:    12/04/23 0744 12/04/23 1024 12/04/23 1315 12/04/23 1559   BP: 148/85 138/86 (!) 145/98 137/87   Pulse: 79  78 76   Resp: 19 17 20 17   Temp: 37.2 °C (99 °F) 36.6 °C (97.9 °F) 37.1 °C (98.8 °F) 36.4 °C (97.5 °F)    TempSrc:       SpO2: 94%  97% 98%   Weight:       Height:           Last Labs:  Lab Results   Component Value Date    WBC 7.0 12/03/2023    HGB 7.5 (L) 12/03/2023    HCT 24.0 (L) 12/03/2023    MCV 89 12/03/2023     12/03/2023     Lab Results   Component Value Date    GLUCOSE 126 (H) 12/04/2023    CALCIUM 8.9 12/04/2023     12/04/2023    K 4.7 12/04/2023    CO2 23 12/04/2023    CL 99 12/04/2023    BUN 45 (H) 12/04/2023    CREATININE 7.04 (H) 12/04/2023       Hemoglobin A1C   Date/Time Value Ref Range Status   11/24/2023 03:31 PM 12.6 (H) see below % Final   05/24/2023 02:20 PM 11.0 (H) 4.3 - 5.6 % Final     Comment:     American Diabetes Association guidelines indicate that patients with HgbA1c in the range 5.7-6.4% are at increased risk for development of diabetes, and intervention by lifestyle modification may be beneficial. HgbA1c greater or equal to 6.5% is considered diagnostic of diabetes.   09/05/2022 01:33 PM 12.4 (H) 4.0 - 5.6 % Final   09/14/2021 05:38 AM 12.5 (A) % Final     Comment:          Diagnosis of Diabetes-Adults   Non-Diabetic: < or = 5.6%   Increased risk for developing diabetes: 5.7-6.4%   Diagnostic of diabetes: > or = 6.5%  .       Monitoring of Diabetes                Age (y)     Therapeutic Goal (%)   Adults:          >18           <7.0   Pediatrics:    13-18           <7.5                   7-12           <8.0                   0- 6            7.5-8.5   American Diabetes Association. Diabetes Care 33(S1), Jan 2010.     01/14/2021 05:22 AM 12.3 (H) 4.3 - 5.6 % Final     Comment:     American Diabetes Association guidelines indicate that patients with HgbA1c in   the range 5.7-6.4% are at increased risk for development of diabetes, and   intervention by lifestyle modification may be beneficial. HgbA1c greater or   equal to 6.5% is considered diagnostic of diabetes.     LDL Calculated   Date/Time Value Ref Range Status   11/24/2023 03:31  (H) <=99 mg/dL Final     Comment:                                  Near   Borderline      AGE      Desirable  Optimal    High     High     Very High     0-19 Y     0 - 109     ---    110-129   >/= 130     ----    20-24 Y     0 - 119     ---    120-159   >/= 160     ----      >24 Y     0 -  99   100-129  130-159   160-189     >/=190       VLDL   Date/Time Value Ref Range Status   11/24/2023 03:31 PM 49 (H) 0 - 40 mg/dL Final      Last I/O:  I/O last 3 completed shifts:  In: 2240 (17.7 mL/kg) [P.O.:1240; I.V.:200 (1.6 mL/kg); Other:800]  Out: 1525 (12.1 mL/kg) [Urine:1525 (0.3 mL/kg/hr)]  Weight: 126.3 kg     Recent Cardiology and vascular Tests reviewed:    LLE diagnostic angiogram 12/1/2023 Dr. Kelli Simms  Findings: mild proximal SFA stenosis, distal SFA occlusion, diminutive peroneal runoff with distal AT reconstitution.     XR foot left 3+ views 11/28/2023    Narrative  Interpreted By:  Amparo Alaniz and Sheng Max  STUDY:  Left foot  3 views.    INDICATION:  Signs/Symptoms:Gangrene of 1st digit    COMPARISON:  Left foot radiograph dated 04/16/2018.    ACCESSION NUMBER(S):  [id number]    ORDERING CLINICIAN:  FABY MARK    FINDINGS:  Interval transmetatarsal amputations of the 3rd through 5th digits at  the level of the mid metatarsals with unremarkable corticated  appearance of the osseous stump as well as unremarkable appearance of  the soft tissue stump.    Status post amputation of the 2nd digit at the level of the mid  proximal phalanx with unremarkable corticated appearance of the  osseous stump as well as unremarkable appearance of the soft tissue  stump.      There is soft tissue loss of the tuft of the 1st digit likely from  chronic ischemia. No definite erosion of the 1st distal phalanx or  additional radiographic findings to suggest osteomyelitis.      Redemonstration of midfoot fusion involving the 1st TMT, 1/2  metatarsal bases, and inter cuneiform articulation with  redemonstration of findings of hardware failure with  loosening  similar to 2018. Advanced tarsometatarsal joint degenerative changes  which may be due to neuropathic arthropathy. Mild ankle and dorsal  foot soft tissue swelling with cellulitis not ruled out. Moderate  tibiotalar and mild subtalar joint degenerative changes with joint  space loss and osteophytes. Plantar calcaneal spur which can be  associated with plantar fasciitis.    Impression  1. Soft tissue ischemia of the tuft of the 1st toe with soft tissue  loss. No radiographic findings of osteomyelitis of the 1st digit or  the rest of the osseous structures.  2. Interval postsurgical changes with amputation of the 2nd through  5th digits as described above.  3. Soft tissue swelling of the ankle and dorsal foot with cellulitis  not ruled out.  4. Redemonstration of postsurgical changes involving multiple midfoot  articulations with similar hardware failure findings when compared  with 2018.    I personally reviewed the images/study and I agree with the findings  as stated by Dr. Juan Bhagat. This study was interpreted at [place], [place].    Signed by: Amparo Alaniz 11/29/2023 11:21 AM  Dictation workstation:   [id number]     Vascular US Lower Extremity Vein Mapping Bilateral  12/4/23  PRELIMINARY CONCLUSIONS:  Right Lower Vein Mapping: The right great saphenous vein and small saphenous vein appear widely patent with no evidence of thrombosis or fibrosis. Right lower extremity vein: Rt GV calf prox superificial branch - 3.45 mm  Rt Calf GSV dist superficial branch - 1.83 mm.  Left Lower Vein Mapping: The left great saphenous vein and small saphenous vein appear widely patent with no evidence of thrombosis or fibrosis.    Vascular US PVR Without Exercise 11/27/23     **CRITICAL RESULT**  Critical Result: Posterior tibial and dorsalis pedis are not audible on the left.  Notification called to Nino Dominique MD via secure chat on 11/27/2023 at 9:17:05 AM by  Anna Berger RVT.     CONCLUSIONS:  Right Lower PVR: Right pressures of >220 mmHg suggest no compressibility of vessels and may make absolute Segmental Limb Pressures (SLP) unreliable. Decreased digital perfusion noted. Biphasic flow is noted in the right popliteal artery, right posterior tibial artery and right dorsalis pedis artery. Triphasic flow is noted in the right common femoral artery.  Left Lower PVR: There is evidence of severe disease at the femoral popliteal level. Biphasic flow is noted in the left popliteal artery. Triphasic flow is noted in the left common femoral artery. Unable to obtain pressures.  The posterior tibial and dorasalis pedis arteries are not audible.  Disease called by waveforms.     Imaging & Doppler Findings:     RIGHT Lower PVR                Pressures Ratios  Right High Thigh               256 mmHg  1.54  Right Low Thigh                256 mmHg  1.54  Right Calf                     182 mmHg  1.10  Right Posterior Tibial (Ankle) 126 mmHg  0.76  Right Dorsalis Pedis (Ankle)   106 mmHg  0.64  Right Digit (Great Toe)        51 mmHg   0.31                        Right     Left  Brachial Pressure 151 mmHg 166 mmHg    Transthoracic Echo (TTE) Complete 11/25/2023    [facility], [address] [place] [address]  Tel [phone] and Fax [phone]    TRANSTHORACIC ECHOCARDIOGRAM REPORT      Patient Name:      JONO ALDANAGAMA        Reading Physician:    [id number] Michael Dietz MD  Study Date:        11/25/2023           Ordering Provider:    [id number] TOVA ORTEGA  MRN/PID:           [id number]             Fellow:  Accession#:        [id number]         Nurse:  Date of Birth/Age: 1965 / 57      Sonographer:          Seng perez                                      [facility]  Gender:            M                    Additional Staff:  Height:            185.42 cm            Admit Date:           11/24/2023  Weight:            129.28 kg             Admission Status:     Inpatient -  Routine  BSA:               2.50 m2              Encounter#:           [id number]  Department Location:  [facility] Non  Invasive  Blood Pressure: 146 /85 mmHg    Study Type:    TRANSTHORACIC ECHO (TTE) COMPLETE  Diagnosis/ICD: Unspecified systolic (congestive) heart failure (CHF)-I50.20  Indication:    HFrEF; Acute DVT  CPT Code:      Echo Complete w Full Doppler-53098    Patient History:  Pertinent History: IDDM, PAD; HLD, HtN, obesity; CHF.    Study Detail: The following Echo studies were performed: 2D, M-Mode, Doppler and  color flow. Technically challenging study due to body habitus.      PHYSICIAN INTERPRETATION:  Left Ventricle: The left ventricular systolic function is normal, with an estimated ejection fraction of 60-65%. There are no regional wall motion abnormalities. The left ventricular cavity size is normal. There is severe concentric left ventricular hypertrophy. Spectral Doppler shows a pseudonormal pattern of left ventricular diastolic filling. There are elevated left atrial and left ventricular end diastolic pressures.  Left Atrium: The left atrium is mildly dilated.  Right Ventricle: The right ventricle is normal in size. There is normal right ventricular global systolic function.  Right Atrium: The right atrium is normal in size.  Aortic Valve: The aortic valve appears structurally normal. There is minimal aortic valve cusp calcification. There is no evidence of aortic valve regurgitation. The peak instantaneous gradient of the aortic valve is 5.9 mmHg.  Mitral Valve: The mitral valve is normal in structure. There is no evidence of mitral valve regurgitation.  Tricuspid Valve: The tricuspid valve is structurally normal. There is trace tricuspid regurgitation. The right ventricular systolic pressure is unable to be estimated.  Pulmonic Valve: The pulmonic valve is structurally normal. There is trace pulmonic valve regurgitation.  Pericardium: There is a  small pericardial effusion.  Aorta: The aortic root is normal.  Systemic Veins: The inferior vena cava appears to be of normal size. There is IVC inspiratory collapse greater than 50%.  In comparison to the previous echocardiogram(s): Compared with study from 2021, LVEF seems to have improved from low normal to normal. Concentric LVH is known.      CONCLUSIONS:  1. Left ventricular systolic function is normal with a 60-65% estimated ejection fraction.  2. Spectral Doppler shows a pseudonormal pattern of left ventricular diastolic filling.  3. There are elevated left atrial and left ventricular end diastolic pressures.  4. There is severe concentric left ventricular hypertrophy.  5. Findings consistent with HFpEF.  6. Compared with study from 2021, LVEF seems to have improved from low normal to normal. Concentric LVH is known.    QUANTITATIVE DATA SUMMARY:  2D MEASUREMENTS:  Normal Ranges:  Ao Root d:     3.60 cm    (2.0-3.7cm)  LAs:           5.30 cm    (2.7-4.0cm)  IVSd:          1.80 cm    (0.6-1.1cm)  LVPWd:         1.70 cm    (0.6-1.1cm)  LVIDd:         5.20 cm    (3.9-5.9cm)  LVIDs:         3.10 cm  LV Mass Index: 172.5 g/m2  LV % FS        40.4 %    LA VOLUME:  Normal Ranges:  LA Volume Index: 34.1 ml/m2    RA VOLUME BY A/L METHOD:  Normal Ranges:  RA Area A4C: 16.3 cm2    AORTA MEASUREMENTS:  Normal Ranges:  Asc Ao, d: 3.63 cm (2.1-3.4cm)    LV DIASTOLIC FUNCTION:  Normal Ranges:  MV Peak E:  0.89 m/s    (0.7-1.2 m/s)  MV Peak A:  0.63 m/s    (0.42-0.7 m/s)  E/A Ratio:  1.40        (1.0-2.2)  MV e'       0.04 m/s    (>8.0)  MV A Dur:   119.00 msec  E/e' Ratio: 20.37       (<8.0)  MV DT:      217 msec    (150-240 msec)    MITRAL VALVE:  Normal Ranges:  MV DT: 217 msec (150-240msec)    AORTIC VALVE:  Normal Ranges:  AoV Vmax:      1.21 m/s (<=1.7m/s)  AoV Peak P.9 mmHg (<20mmHg)  LVOT Max Shine:  0.91 m/s (<=1.1m/s)  LVOT VTI:      16.10 cm  LVOT Diameter: 2.50 cm  (1.8-2.4cm)  AoV Area,Vmax:  3.71 cm2 (2.5-4.5cm2)      RIGHT VENTRICLE:  RV s' 0.12 m/s    PULMONIC VALVE:  Normal Ranges:  PV Max Shine: 1.0 m/s  (0.6-0.9m/s)  PV Max P.1 mmHg      [id number] Michael Dietz MD  Electronically signed on 2023 at 10:40:29 AM      CT angio aorta and bilateral iliofemoral runoff w and or wo IV contrast 2023    Narrative  Interpreted By:  Obdulio Mendoza and Benza Andrew  STUDY:  CT ANGIO AORTA AND BILATERAL ILIOFEMORAL RUNOFF W AND OR WO IV  CONTRAST;  2023 7:03 pm    INDICATION:  Signs/Symptoms:concern for left limb ischemia vs. dissection.    COMPARISON:  CT abdomen pelvis dated 10/13/2020    ACCESSION NUMBER(S):  [id number]    ORDERING CLINICIAN:  JANNA HUTCHISON    TECHNIQUE:  Thin-section axial images of the abdomen, pelvis and bilateral lower  extremities were obtained in the arterial phase after intravenous  administration of 150 mL of Omnipaque 350 contrast. Delayed images  the legs were obtained for assessment of venous patency. Coronal and  sagittal reformatted images were reconstructed from the axial data.  Multiplanar MIPs and 3D reconstructions were created on an  independent workstation and reviewed.    There was contrast extravasation from the IV site on the 1st scan  attempt and the arterial and venous phase of the CT scan was  repeated. Streak artifact from the upper extremities somewhat limits  evaluation of the abdomen.    FINDINGS:  VASCULATURE:    ABDOMINAL AORTA: No abdominal aortic aneurysm or dissection. Mild  atherosclerotic calcifications of the abdominal aorta.    ABDOMINAL ARTERIES: No hemodynamically significant stenosis.      RIGHT LOWER EXTREMITY:    - Right Common Iliac Artery: Mild-to-moderate atherosclerotic changes  with no hemodynamically significant stenosis. - Right External Iliac  Artery: No hemodynamically significant stenosis. - Right Internal  Iliac Artery:  Noncalcified atherosclerotic plaque in the proximal  right internal iliac artery with resultant  severe focal stenosis and  non opacification of some of the posterior iliac branches.    - Right Common Femoral Artery: No hemodynamically significant  stenosis. - Right Profunda Artery: No significant stenosis.  - Right Superficial Femoral Artery: Scattered atherosclerotic  calcifications without significant stenosis. - Right Popliteal  Artery: Mild atherosclerotic calcifications without significant  stenosis. - Right Anterior Tibial, Posterior Tibial, Peroneal  Arteries: There is heavy atherosclerotic plaque of the right anterior  tibial trunk and of the anterior tibial artery with areas of  multifocal stenosis or occlusion. There is suspected central  noncalcified atherosclerotic plaque within the posterior tibial  artery, for example axial image 295 of 1463 with areas of multifocal  stenosis without occlusion. There is also multifocal stenosis of the  peroneal artery..      LEFT LOWER EXTREMITY:    - Left Common Iliac Artery: Mild atherosclerotic changes with no  hemodynamically significant stenosis. - Left External Iliac Artery:  No hemodynamically significant stenosis. - Left Internal Iliac  Artery: Calcified and noncalcified atherosclerotic plaque with severe  narrowing at the origin of the left internal iliac artery.    - Left Common Femoral Artery: No hemodynamically significant stenosis.  - Left Profunda Artery: Noncalcified atherosclerotic plaque at the  origin of the left deep femoral artery with focal short segment  moderate stenosis, axial image 411 of 1463. - Left Superficial  Femoral Artery: Moderate atherosclerotic calcifications throughout  with more extensive atherosclerotic plaque distally with a proximally  7 cm focal occlusion of the distal left superficial femoral artery,  for example coronal image 130 of 236 and axial image 710 of 1463 with  reconstitution at the distal most aspect of the superficial femoral  artery. There is some collateral supply via the deep femoral  collaterals. - Left  Popliteal Artery: Moderate multifocal stenosis of  the popliteal due to calcified and noncalcified atherosclerotic  plaque/thrombus. There is complete occlusion of the distal popliteal  artery, axial image 865 of 1463. -Left Anterior Tibial, Posterior  Tibial, Peroneal Arteries: The anterior tibial trunk is occluded and  there is occlusion of the anterior tibial artery. There is multifocal  severe stenosis and occlusion of the posterior tibial artery. The  peroneal artery appears patent.        ABDOMEN/PELVIS:    LIVER: Normal size and contour. No suspicious hepatic lesions.    BILE DUCTS: No significant intrahepatic or extrahepatic dilatation.    GALLBLADDER: Fluid-filled without evidence of distention, wall  thickening, or radiopaque calculi.    SPLEEN: No significant abnormality.    PANCREAS: No significant abnormality.    ADRENALS: No significant abnormality.    KIDNEYS, URETERS, BLADDER: No significant abnormality.    REPRODUCTIVE ORGANS: The prostate is enlarged measuring 6.7 cm in  transverse dimension.    VESSELS: See above. No additional significant abnormality.    RETROPERITONEUM/LYMPH NODES: No enlarged lymph nodes.    BOWEL/PERITONEUM: No inflammatory bowel wall thickening or  dilatation. Normal appendix.    No pneumoperitoneum, significant ascites, or abscess.      ABDOMINAL WALL: No significant abnormality.    MUSCULOSKELETAL: No acute osseous abnormality. There is diffuse lower  extremity edema. Status post plate and screw fixation of the left  distal fibula with intact hardware. Osseous remodeling of the distal  tibia and chronic medial malleolus fracture fragment. There is  suspected moderate tibiotalar joint arthropathy. There are also  partially visualized cortical screw fixation of the cuboid through  4th metatarsal joint and of the cuneiform and proximal 1st through  3rd metatarsal joints. There is partial amputation of the 3rd through  5th metatarsals. Suspected chronic fractures at the base of  the 3rd  through 5th metatarsals.    LOWER CHEST: No acute abnormality involving the lung bases.    Impression  Examination is limited by suboptimal arm positioning and consequent  beam hardening artifact. With this limitation:  1. Multifocal pelvic and to a greater distal lower extremity  atherosclerotic disease. There is aproximally 7 cm in length  occlusion of the distal left superficial femoral artery with some  collaterals from the deep femoral vessels. There is reconstitution of  flow at the distal most aspect of the superficial femoral artery,  however there is moderate noncalcified plaque/thrombus throughout the  popliteal artery and complete occlusion of the distal popliteal  artery. The left peroneal artery appears patent. The anterior tibial  trunk, anterior tibial, and posterior tibial arteries are occluded on  the left.  2. There is also heavy atherosclerotic stenosis of the right distal  lower extremity vessels with multifocal stenosis or occlusion of the  right anterior tibial and peroneal arteries with some flow noted  within the right posterior tibial artery, which also however  demonstrates severe stenosis.  3. Partially visualized postsurgical changes of the surgical fixation  of chronic bimalleolar fractures without gross evidence of hardware  complication. Moderate tibiotalar joint arthropathy. Partially  visualized fixation of tarsal/metatarsal joints with partial  amputation of the 3rd through 5th metatarsals.  4. Prostatomegaly.    I personally reviewed the images/study and I agree with the findings  as stated above by resident physician, Thai Torres MD. This study  was interpreted at [facility],  [place], [place].    MACRO:  Thai Torres discussed the significance and urgency of this critical  finding by telephone with  JANNA HUTCHISON on 11/24/2023 at 7:35 pm.  (**-RCF-**) Findings:  See findings.    Signed by: Obdulio Mendoza 11/24/2023 10:03 PM  Dictation  workstation:   [id number]       Past Medical History:  He has a past medical history of Diabetes mellitus (CMS/HCC), Hyperlipidemia, and Hypertension.    Past Surgical History:  He has a past surgical history that includes Amputation foot / toe; CT angio aorta and bilateral iliofemoral runoff w and or wo IV contrast (11/24/2023); IR CVC nontunneled (11/28/2023); and IR CVC tunneled (11/30/2023).      Social History:  He reports that he has never smoked. He has never used smokeless tobacco. He reports current alcohol use. He reports current drug use. Drug: Marijuana.    Family History:  No family history on file.     Allergies:  Nsaids (non-steroidal anti-inflammatory drug)    Inpatient Medications:  Scheduled medications   Medication Dose Route Frequency    amLODIPine  10 mg oral Daily    aspirin  81 mg oral Daily    atorvastatin  80 mg oral Nightly    B complex-vitamin C-folic acid  1 capsule oral Daily    calcitriol  0.25 mcg oral Daily    carvedilol  25 mg oral BID    gabapentin  100 mg oral q PM    insulin glargine  25 Units subcutaneous Nightly    insulin lispro  0-5 Units subcutaneous TID with meals    insulin lispro  7 Units subcutaneous TID with meals    lidocaine-prilocaine   Topical Daily    melatonin  5 mg oral Nightly    pantoprazole  20 mg oral Daily before breakfast    polyethylene glycol  17 g oral BID    sennosides-docusate sodium  1 tablet oral Nightly    torsemide  100 mg oral Daily     PRN medications   Medication    dextrose 10 % in water (D10W)    dextrose    eucerin    glucagon    heparin    hydrALAZINE    HYDROmorphone    hydrOXYzine HCL    oxyCODONE     Continuous Medications   Medication Dose Last Rate    heparin  0-4,500 Units/hr 2,400 Units/hr (12/04/23 1230)     Outpatient Medications:  Current Outpatient Medications   Medication Instructions    allopurinol (ZYLOPRIM) 100 mg, oral, Daily    amLODIPine (NORVASC) 10 mg, oral, Daily    aspirin 81 mg, oral, Daily    calcitriol (ROCALTROL)  0.25 mcg, oral, Daily    carvedilol (COREG) 25 mg, oral, 2 times daily with meals    chlorthalidone (HYGROTON) 50 mg, oral, Daily    gabapentin (NEURONTIN) 300 mg, oral, 2 times daily    insulin glargine (LANTUS U-100 INSULIN) 40 Units, subcutaneous, Nightly, Take as directed per insulin instructions.    insulin lispro (HUMALOG) 10 Units, subcutaneous, 3 times daily with meals, Take as directed per insulin instructions.    lisinopril 40 mg, oral, Daily    pantoprazole (PROTONIX) 20 mg, oral, Daily before breakfast, Do not crush, chew, or split.    potassium chloride CR 20 mEq ER tablet 20 mEq, oral, Daily    sildenafil (VIAGRA) 50 mg, oral, As needed    simvastatin (ZOCOR) 20 mg, oral, Nightly    torsemide (DEMADEX) 40 mg, oral, 2 times daily       Physical Exam:  Constitutional: obese, NAD, awake/alert/oriented x3, pleasant, cooperative     Head/Neck: Neck supple, No JVD, trachea midline, no bruits           Respiratory/Thorax: Patent airways, CTAB, thorax symmetric, Rt chest wall HD cath      Cardiovascular: Regular, rate and rhythm, no murmurs, normal S 1 and S 2    Gastrointestinal: Nondistended, soft, non-tender, +BS,       Skin: Warm and dry            Extremities: CALL, +2 BLE edema, ulceration of the left great toe with necrosis and malodor, no pus noted, prior amputation of toe 2-5 on the left. Left heel very dry, sort to palpation. RLE no open ulceration, skin dry and flaky. Motor function  slightly limited on the left at the ankle level and mild loss of sensation. Monophasic Lt PT/AT doppler signal and absent DP. Post vascular procedure site Rt groin soft, nontender, no oozing/hematoma.  Neuro: non-focal   Psychological: Appropriate mood and behavior        Assessment/Plan   Kwadwo Hoyt is a 57 y.o. male with PMH of HTN, DM2 uncontrolled with neuropathy, multiple toe amputations by podiatry, OM in the left foot 2021, CKD stage 5 now on HD presented with LLE numbness and weakness causing him to fall 5  times. CTA showed complete occlusion of the P1 and P3 segments of the left popliteal artery with some reconstitution of the P2 segment and likely complete occlusion of the left anterior tibial and posterior tibial arteries. S/p diagnostic angiogram on 12/1/2023 by vascular surgery findings: mild proximal SFA stenosis, distal SFA occlusion, reconstitution of the below-the-knee popliteal artery with heavy disease of the TP trunk causing >75% stenosis. AT and PT occluded at their origins. PT does not reconstitute. The peroneal fills with multilevel disease, but flows to the ankle. Distal AT at the ankle reconstitutes via collaterals from peroneal and carries on as a diminutive DP.    Physical exam showed a monophasic left AT and PT, no dopplerable DP.     Diagnostic angiogram reviewed by Lissa Nielsen and Sylvia. Pt will need endovascular intervention to improve blood flow to the left foot.     LLE Critical Limb ischemia RC-6  Necrotic Ulcer of the great toe, Xray -ve for OM      Plan    - Peripheral angioplasty with Dr. Santiago Ward tomorrow 12/5/2023  - consult Podiatry for wound management, pt will likely need TMA post revascularization   - NPO after midnight  - type and screen in the AM transfuse if Hgb < 7 may coordinate with HD.  - Schedule Pt for HD early morning   - Move Pt to a Tele bed         Peripheral IV 11/29/23 20 G Left;Proximal;Anterior Forearm (Active)   Site Assessment Clean;Dry;Intact 12/04/23 1515   Dressing Status Clean;Dry 12/04/23 1515   Number of days: 5       Hemodialysis Cath 11/30/23 Right Subclavian (Active)   Site Assessment Clean;Dry;Intact 12/04/23 1515   Dressing Status Clean;Dry 12/04/23 1515   Number of days: 4       Code Status:  Full Code    Can and Plan was discussed with attendings Dr. Walker Nielsen and Dr. Santiago Ward.        Zan Vazquez, APRN-CNP  Endovascular/Limb Salvage Service   Day: [id number]/Haiku/Night HHVI [id number]

## 2023-12-04 NOTE — PROGRESS NOTES
57 y.o. male admitted for Arterial occlusion [I70.90]  HFrEF (heart failure with reduced ejection fraction) (CMS/HCC) [I50.20]  Acute deep vein thrombosis (DVT) of left lower extremity after procedure (CMS/HCC) [I97.89, I82.402]  Acute occlusion of popliteal artery due to thrombosis (CMS/HCC) [I74.3].     Subjective   No overnight events. Pt seen at bedside in no acute distress. Pt in a pleasant mood, states he is blessed and is just waiting to hear about any updates. States he is in no pain at the moment.        Objective     Scheduled Medications:   amLODIPine, 10 mg, oral, Daily  aspirin, 81 mg, oral, Daily  atorvastatin, 80 mg, oral, Nightly  B complex-vitamin C-folic acid, 1 capsule, oral, Daily  calcitriol, 0.25 mcg, oral, Daily  carvedilol, 25 mg, oral, BID  gabapentin, 100 mg, oral, q PM  insulin glargine, 25 Units, subcutaneous, Nightly  insulin lispro, 0-5 Units, subcutaneous, TID with meals  insulin lispro, 7 Units, subcutaneous, TID with meals  lidocaine-prilocaine, , Topical, Daily  melatonin, 5 mg, oral, Nightly  pantoprazole, 20 mg, oral, Daily before breakfast  polyethylene glycol, 17 g, oral, BID  sennosides-docusate sodium, 1 tablet, oral, Nightly  torsemide, 100 mg, oral, Daily         Continuous Medications:   heparin, 0-4,500 Units/hr, Last Rate: 2,400 Units/hr (12/04/23 1021)         PRN Medications:   PRN medications: dextrose 10 % in water (D10W), dextrose, eucerin, glucagon, heparin, hydrALAZINE, HYDROmorphone, hydrOXYzine HCL, oxyCODONE    Dietary Orders (From admission, onward)       Start     Ordered    12/02/23 0008  Adult diet Renal; Potassium Restricted 2 gm (50mEq); 2 - 3 grams Sodium  Diet effective now        Question Answer Comment   Diet type Renal    Potassium restriction: Potassium Restricted 2 gm (50mEq)    Sodium restriction: 2 - 3 grams Sodium        12/02/23 0007                    Vitals:  Most Recent:  Vitals:    12/04/23 1024   BP: 138/86   Pulse:    Resp: 17   Temp: 36.6  °C (97.9 °F)   SpO2:        24hr Min/Max:  Temp  Min: 36.6 °C (97.9 °F)  Max: 37.9 °C (100.2 °F)  Pulse  Min: 78  Max: 87  BP  Min: 134/81  Max: 154/79  Resp  Min: 17  Max: 19  SpO2  Min: 94 %  Max: 99 %    Intake/Output x24h:    Intake/Output Summary (Last 24 hours) at 12/4/2023 1049  Last data filed at 12/4/2023 1021  Gross per 24 hour   Intake 1795.8 ml   Output 1200 ml   Net 595.8 ml          Physical Exam:  Physical Exam  Constitutional:       General: He is not in acute distress.     Appearance: Normal appearance. He is obese. He is not ill-appearing.   HENT:      Nose: No congestion or rhinorrhea.   Eyes:      Extraocular Movements: Extraocular movements intact.      Conjunctiva/sclera: Conjunctivae normal.   Cardiovascular:      Rate and Rhythm: Normal rate.      Comments: No distal pulses felt on left lower extremity  Pulmonary:      Effort: Pulmonary effort is normal. No respiratory distress.   Abdominal:      General: Abdomen is flat. Bowel sounds are normal. There is no distension.      Palpations: Abdomen is soft.      Tenderness: There is no abdominal tenderness. There is no guarding or rebound.   Musculoskeletal:         General: Tenderness and deformity present.      Cervical back: Normal range of motion.      Comments: LLE w/ multiple amputaions and dry gangrene of remaining big toe.   Skin:     General: Skin is dry.   Neurological:      General: No focal deficit present.      Mental Status: He is alert and oriented to person, place, and time.         Relevant Results  Results for orders placed or performed during the hospital encounter of 11/24/23 (from the past 24 hour(s))   POCT GLUCOSE   Result Value Ref Range    POCT Glucose 232 (H) 74 - 99 mg/dL   POCT GLUCOSE   Result Value Ref Range    POCT Glucose 188 (H) 74 - 99 mg/dL   POCT GLUCOSE   Result Value Ref Range    POCT Glucose 191 (H) 74 - 99 mg/dL   POCT GLUCOSE   Result Value Ref Range    POCT Glucose 174 (H) 74 - 99 mg/dL   Renal Function  Panel   Result Value Ref Range    Glucose 126 (H) 74 - 99 mg/dL    Sodium 136 136 - 145 mmol/L    Potassium 4.7 3.5 - 5.3 mmol/L    Chloride 99 98 - 107 mmol/L    Bicarbonate 23 21 - 32 mmol/L    Anion Gap 19 10 - 20 mmol/L    Urea Nitrogen 45 (H) 6 - 23 mg/dL    Creatinine 7.04 (H) 0.50 - 1.30 mg/dL    eGFR 8 (L) >60 mL/min/1.73m*2    Calcium 8.9 8.6 - 10.6 mg/dL    Phosphorus 4.5 2.5 - 4.9 mg/dL    Albumin 3.2 (L) 3.4 - 5.0 g/dL   Heparin Assay, UFH   Result Value Ref Range    Heparin Unfractionated 0.3 See Comment Below for Therapeutic Ranges IU/mL      IR CVC tunneled    Result Date: 11/30/2023  Interpreted By:  Cody Garzon, STUDY: IR CVC TUNNELED;  11/30/2023 11:10 am   INDICATION: Signs/Symptoms:change temporary dialysis cath to tunneled catheter.   COMPARISON: None.   ACCESSION NUMBER(S): [id number]   ORDERING CLINICIAN: ISABELL LEGGETT   TECHNIQUE: INTERVENTIONALIST(S): Cody Garzon MD   CONSENT: The patient was informed of the nature of the proposed procedure. The purposes, alternatives, risks, and benefits were explained and discussed. All questions were answered and consent was obtained.   RADIATION EXPOSURE: Fluoroscopy time: Less than 0.1 min. Dose: 0.6 mGy. Dose Area Product (DAP): 217   SEDATION: Moderate conscious IV sedation services (supervision of administration, induction, and maintenance) were provided by the physician performing the procedure with intravenous fentanyl 100 mcg and versed 2 mg for 15 minutes. The physician was assisted by an independent trained observer, an interventional radiology nurse, in the continuous monitoring of patient level of consciousness and physiologic status.   MEDICATION/CONTRAST: No additional   TIME OUT: A time out was performed immediately prior to procedure start with the interventional team, correctly identifying the patient name, date of birth, MRN, procedure, anatomy (including marking of site and side), patient position, procedure consent  form, relevant laboratory and imaging test results, antibiotic administration, safety precautions, and procedure-specific equipment needs.   COMPLICATIONS: No immediate adverse events identified.   FINDINGS: In the recombinant position, the patient was positioned on the angiography table.  The patient has an existing  right internal jugular venous temporary  dialysis catheter in place.  The adjacent cutaneous tissues and existing catheter were prepared and draped in usual sterile manner.   After appropriate subcutaneous instillation of Lidocaine 1% local anesthesia, the securing sutures of the existing temporary dialysis catheter were removed.  The loaded heparin was aspirated from the catheter ports.  A 035  Amplatz guidewire was inserted through the existing  dialysis catheter.  Utilizing intermittent fluoroscopic guidance, the guidewire was advanced into the inferior vena cava to secure access.  The existing catheter was removed with the guidewire left in place.   Local anesthesia was achieved with subcutaneous Lidocaine 1%.  A tunnel tract was created from the  right infraclavicular chest to the existing venotomy site.   A  15 Bolivian x 23 cm  dual-lumen catheter was selected for placement. Venotomy site soft tissue dilation was performed to eventual accommodation of a  15-Bolivian dilator peel-away coaxial sheath system.  The catheter was then advanced over the guidewire with the tips to reside at the caval-atrial junction.  The ports were aspirated without resistance and flushed with normal saline. Heparinized saline was then loaded into the catheter ports.  The external portions of the catheter were secured in place with polypropylene suture.  The venotomy and tunnel sites were closed with discontinuous and pursestring sutures, respectively.  Sterile dressings were then applied.   The patient tolerated the procedure without complication.       1. Technically successful conversion and placement of a  15 Bolivian x  23 cm indwelling  hemodialysis catheter from a temporary hemodialysis catheter. 2. The catheter tip overlies the cavoatrial junction and is ready for immediate use.   I was present for and/or performed the critical portions of the procedure and immediately available throughout the entire procedure.   I personally reviewed the image(s) / study and resident interpretation. I agree with the findings as stated.   Dictated at [facility].   MACRO: None   Signed by: Cody Garzon 11/30/2023 11:18 AM Dictation workstation:   [id number]    XR foot left 3+ views    Result Date: 11/29/2023  Interpreted By:  Amparo Alaniz and Sheng Max STUDY: Left foot  3 views.   INDICATION: Signs/Symptoms:Gangrene of 1st digit   COMPARISON: Left foot radiograph dated 04/16/2018.   ACCESSION NUMBER(S): [id number]   ORDERING CLINICIAN: FABY MARK   FINDINGS: Interval transmetatarsal amputations of the 3rd through 5th digits at the level of the mid metatarsals with unremarkable corticated appearance of the osseous stump as well as unremarkable appearance of the soft tissue stump.   Status post amputation of the 2nd digit at the level of the mid proximal phalanx with unremarkable corticated appearance of the osseous stump as well as unremarkable appearance of the soft tissue stump.     There is soft tissue loss of the tuft of the 1st digit likely from chronic ischemia. No definite erosion of the 1st distal phalanx or additional radiographic findings to suggest osteomyelitis.     Redemonstration of midfoot fusion involving the 1st TMT, 1/2 metatarsal bases, and inter cuneiform articulation with redemonstration of findings of hardware failure with loosening similar to 2018. Advanced tarsometatarsal joint degenerative changes which may be due to neuropathic arthropathy. Mild ankle and dorsal foot soft tissue swelling with cellulitis not ruled out. Moderate tibiotalar and mild  subtalar joint degenerative changes with joint space loss and osteophytes. Plantar calcaneal spur which can be associated with plantar fasciitis.       1. Soft tissue ischemia of the tuft of the 1st toe with soft tissue loss. No radiographic findings of osteomyelitis of the 1st digit or the rest of the osseous structures. 2. Interval postsurgical changes with amputation of the 2nd through 5th digits as described above. 3. Soft tissue swelling of the ankle and dorsal foot with cellulitis not ruled out. 4. Redemonstration of postsurgical changes involving multiple midfoot articulations with similar hardware failure findings when compared with 2018.   I personally reviewed the images/study and I agree with the findings as stated by Dr. Juan Bhagat. This study was interpreted at [facility], [place], [place].   Signed by: Amparo Alaniz 11/29/2023 11:21 AM Dictation workstation:   [id number]      Assessment/Plan   Principal Problem:    Arterial occlusion  Active Problems:    ALEXANDER (obstructive sleep apnea)    ESRD on hemodialysis (CMS/[facility])    SELENA (acute kidney injury) (CMS/[facility])    Peripheral vascular disease, unspecified (CMS/[facility])    Kwadwo Hoyt is a 57 y.o. male with a PMHx of DM c/b neuropathy, HTN, CKD stage 5, HFrEF (EF 50% in 11/2021), & ALEXANDER, who presents to the ER with left leg and arm weakness. LLE distal pulses were not palpable and CTA aorta & iliofemoral runoff showed complete left popliteal occlusion. Heparin gtt was started and vascular medicine were consulted but recommended no surgical intervention, recommend PVR/ZAC first. Neuro were also consulted for concern of stroke given patients left sided weakness, rec outpatient follow up. PVR/ZAC showed severe disease at left femoral/popliteal level. 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 Nephrology consulted for worsening Cr and electrolyte status and started dialysis. Tunneled catheter placed by IR 11/30. Pt to c/w  dialysis and underwent LLE angiogram with Vascular Surgery on 12/1. Currently afebrile, HDS, awaiting further recommendations from surgical teams.      #L Popliteal Artery Occlusion  #Chronic Limb Ischemia  #c/f Stroke  - CTA aorta & B/L iliofemoral runoff significant for complete occlusion of L popliteal artery as well as anterior and posterior tibial arteries.   - ZAC/PVR showed severe disease at the femoral popliteal level. Biphasic flow is noted in the left popliteal artery. Unable to obtain pressures. The posterior tibial and dorasalis pedis arteries are not audible.  - LLE Angiogram w/ VascSurg 12/1  -- Restarted Aspirin 12/2. C/w Heparin ggt  -- Pending recs from [facility] and Podiatry  - Neurology consulted, stroke follow up in 4-6 weeks, discharge with ziopatch / loop recorder  - atorvastatin 80 mg  - Podiatry consulted for dry gangrene of Left Hallux, no interventions planned until determination from [facility]     #CKD stage 5 2/2 to T2DM  - Admission Cr 6.06, GFR 10 (Baseline Cr 5.5~), Cr 5.94 s/p dialysis, TTS moving forward  - Being evaluated at [facility] for kidney transplant w/ most recent office visit on 9/12/2023  - home Torsemide 100 mg, calcitriol 0.25 mcg QD  - Avoid nephrotoxic agents  - Nephrology consulted due to worsening Cr and Electrolyte status  -- Received HD line 11/28, tunneled line placed 11/30 by IR  - SW contacted to coordinate outpatient dialysis      #Hyponatremia  #Hyperkalemia  #Hyperphosphatemia  - Nephrology consulted  - ordered UA and Urine electrolytes, suggest Post-Renal  - Increased Sodium Bicarb and ordered Lokelma  - Resolved w/ dialysis       #Constipation  - no BM since 11/25  - ordered Miralax BID, Doc-senna  - Normal BM 11/29  - Resolved     #HTN  #HFrEF  - s/p 500 ml LR bolus in ED  - home Aspirin 81 mg  - c/w home Carvedilol 25 mg BID  - c/w home Amlodipine 10 mg every day  - Echo 11.25, 60-65% estimated ejection fraction, pseudonormal pattern of left ventricular diastolic  filling, increased LA LV diastolic pressure,severe concentric left ventricular hypertrophy.        #T2DM  #Neuropathy  - Last Hgb A1C 11.0 on 5/24/2023, ordered updated Hgb A1C  - Home Regimen: Lantus 40U at bedtime & Lispro 20U TID w/ meals  - Changed Lantus from 20 -> 25 units at bedtime due to elevated BG   - Started Lispro 7U TID w/ meals  - Mild SSI  - c/w home Gabapentin 300 mg BID  - POCT BG TID & at bedtime  - Hypoglycemia protocol     #Anxiety  - PRN Atarax 25 mg      #ALEXANDER  - CPAP at home, noncompliant        F: PRN  E: PRN  N: Renal  GI: Pantoprazole 20 mg QD  DVT: Heparin gtt       Code Status: Full Code   Emergency Contact: Extended Emergency Contact Information  Primary Emergency Contact: Milka Hoyt  Address: [id number] [place] [address]  Home Phone: [phone]  Relation: None  PCP: Thai Talavera MD    Patient seen and discussed with attending physician, Dr. Pappas.  Plan preliminary until cosigned by attending physician.    Reviewed and approved by CHIQUITA AVALOS on 12/4/23 at 10:49 AM.

## 2023-12-04 NOTE — PROGRESS NOTES
"This SW met with pt bedside to discuss pt wilberte for HD chair time. Pt states he is unfamiliar and is agreeable to referral to [facility]. Pt requests a \"nice\" facility and an evening time. This SW will place referral with Lori at [facility] for [facility] Leigh Ann followed by [facility] Dorita. TCC notified.    - Kourtney Sanz MSW, LSW      "

## 2023-12-04 NOTE — NURSING NOTE
"Pt. A&O x4, hemodynamically stable throughout shift. Pt. Is still requesting a transfer to a different unit because he states he \"Needs a private room and a change of environment\". Pt. Walker within reach, urinal at bedside. Dialysis port intact, dressing dry and intact. Pt. Heparin drip therapeutic at 24ml/hr.Pt. transported off unit for vein mapping. V/S stable. Pt. Blood glucose elevated, treated with standing and sliding scale insulin for all meals. Pt. To be NPO at midnight for LLE angiogram.   "

## 2023-12-05 ENCOUNTER — APPOINTMENT (OUTPATIENT)
Dept: DIALYSIS | Facility: HOSPITAL | Age: 58
End: 2023-12-05
Payer: MEDICARE

## 2023-12-05 LAB
ABO GROUP (TYPE) IN BLOOD: NORMAL
ALBUMIN SERPL BCP-MCNC: 3.4 G/DL (ref 3.4–5)
ANION GAP SERPL CALC-SCNC: 17 MMOL/L (ref 10–20)
ANTIBODY SCREEN: NORMAL
BASOPHILS # BLD AUTO: 0.05 X10*3/UL (ref 0–0.1)
BASOPHILS NFR BLD AUTO: 0.6 %
BUN SERPL-MCNC: 55 MG/DL (ref 6–23)
CALCIUM SERPL-MCNC: 9.5 MG/DL (ref 8.6–10.6)
CHLORIDE SERPL-SCNC: 97 MMOL/L (ref 98–107)
CO2 SERPL-SCNC: 25 MMOL/L (ref 21–32)
CREAT SERPL-MCNC: 7.61 MG/DL (ref 0.5–1.3)
EOSINOPHIL # BLD AUTO: 0.24 X10*3/UL (ref 0–0.7)
EOSINOPHIL NFR BLD AUTO: 3.1 %
ERYTHROCYTE [DISTWIDTH] IN BLOOD BY AUTOMATED COUNT: 12.9 % (ref 11.5–14.5)
GFR SERPL CREATININE-BSD FRML MDRD: 8 ML/MIN/1.73M*2
GLUCOSE BLD MANUAL STRIP-MCNC: 104 MG/DL (ref 74–99)
GLUCOSE BLD MANUAL STRIP-MCNC: 112 MG/DL (ref 74–99)
GLUCOSE BLD MANUAL STRIP-MCNC: 117 MG/DL (ref 74–99)
GLUCOSE BLD MANUAL STRIP-MCNC: 194 MG/DL (ref 74–99)
GLUCOSE SERPL-MCNC: 100 MG/DL (ref 74–99)
HCT VFR BLD AUTO: 23.8 % (ref 41–52)
HGB BLD-MCNC: 7.3 G/DL (ref 13.5–17.5)
IMM GRANULOCYTES # BLD AUTO: 0.03 X10*3/UL (ref 0–0.7)
IMM GRANULOCYTES NFR BLD AUTO: 0.4 % (ref 0–0.9)
LYMPHOCYTES # BLD AUTO: 1.94 X10*3/UL (ref 1.2–4.8)
LYMPHOCYTES NFR BLD AUTO: 24.7 %
MAGNESIUM SERPL-MCNC: 1.86 MG/DL (ref 1.6–2.4)
MCH RBC QN AUTO: 27.8 PG (ref 26–34)
MCHC RBC AUTO-ENTMCNC: 30.7 G/DL (ref 32–36)
MCV RBC AUTO: 91 FL (ref 80–100)
MONOCYTES # BLD AUTO: 1.17 X10*3/UL (ref 0.1–1)
MONOCYTES NFR BLD AUTO: 14.9 %
NEUTROPHILS # BLD AUTO: 4.42 X10*3/UL (ref 1.2–7.7)
NEUTROPHILS NFR BLD AUTO: 56.3 %
NRBC BLD-RTO: 0 /100 WBCS (ref 0–0)
PHOSPHATE SERPL-MCNC: 5.6 MG/DL (ref 2.5–4.9)
PLATELET # BLD AUTO: 268 X10*3/UL (ref 150–450)
POTASSIUM SERPL-SCNC: 4.5 MMOL/L (ref 3.5–5.3)
RBC # BLD AUTO: 2.63 X10*6/UL (ref 4.5–5.9)
RH FACTOR (ANTIGEN D): NORMAL
SODIUM SERPL-SCNC: 134 MMOL/L (ref 136–145)
UFH PPP CHRO-ACNC: 0.2 IU/ML
WBC # BLD AUTO: 7.9 X10*3/UL (ref 4.4–11.3)

## 2023-12-05 PROCEDURE — 2500000001 HC RX 250 WO HCPCS SELF ADMINISTERED DRUGS (ALT 637 FOR MEDICARE OP): Performed by: INTERNAL MEDICINE

## 2023-12-05 PROCEDURE — 85520 HEPARIN ASSAY: CPT | Performed by: EMERGENCY MEDICINE

## 2023-12-05 PROCEDURE — 2500000004 HC RX 250 GENERAL PHARMACY W/ HCPCS (ALT 636 FOR OP/ED): Performed by: EMERGENCY MEDICINE

## 2023-12-05 PROCEDURE — 85025 COMPLETE CBC W/AUTO DIFF WBC: CPT

## 2023-12-05 PROCEDURE — 84100 ASSAY OF PHOSPHORUS: CPT

## 2023-12-05 PROCEDURE — 2500000001 HC RX 250 WO HCPCS SELF ADMINISTERED DRUGS (ALT 637 FOR MEDICARE OP)

## 2023-12-05 PROCEDURE — 2500000004 HC RX 250 GENERAL PHARMACY W/ HCPCS (ALT 636 FOR OP/ED): Mod: JZ

## 2023-12-05 PROCEDURE — 99232 SBSQ HOSP IP/OBS MODERATE 35: CPT

## 2023-12-05 PROCEDURE — 8010000001 HC DIALYSIS - HEMODIALYSIS PER DAY

## 2023-12-05 PROCEDURE — 1200000002 HC GENERAL ROOM WITH TELEMETRY DAILY

## 2023-12-05 PROCEDURE — 86920 COMPATIBILITY TEST SPIN: CPT

## 2023-12-05 PROCEDURE — 36415 COLL VENOUS BLD VENIPUNCTURE: CPT | Performed by: EMERGENCY MEDICINE

## 2023-12-05 PROCEDURE — 83735 ASSAY OF MAGNESIUM: CPT

## 2023-12-05 PROCEDURE — 82947 ASSAY GLUCOSE BLOOD QUANT: CPT

## 2023-12-05 PROCEDURE — 86850 RBC ANTIBODY SCREEN: CPT | Performed by: NURSE PRACTITIONER

## 2023-12-05 PROCEDURE — 90935 HEMODIALYSIS ONE EVALUATION: CPT | Performed by: STUDENT IN AN ORGANIZED HEALTH CARE EDUCATION/TRAINING PROGRAM

## 2023-12-05 PROCEDURE — 2500000004 HC RX 250 GENERAL PHARMACY W/ HCPCS (ALT 636 FOR OP/ED): Performed by: STUDENT IN AN ORGANIZED HEALTH CARE EDUCATION/TRAINING PROGRAM

## 2023-12-05 PROCEDURE — 2500000004 HC RX 250 GENERAL PHARMACY W/ HCPCS (ALT 636 FOR OP/ED): Performed by: INTERNAL MEDICINE

## 2023-12-05 RX ADMIN — CARVEDILOL 25 MG: 12.5 TABLET, FILM COATED ORAL at 11:12

## 2023-12-05 RX ADMIN — INSULIN GLARGINE 25 UNITS: 100 INJECTION, SOLUTION SUBCUTANEOUS at 20:20

## 2023-12-05 RX ADMIN — ACETAMINOPHEN 975 MG: 325 TABLET ORAL at 12:27

## 2023-12-05 RX ADMIN — HYDROMORPHONE HYDROCHLORIDE 1 MG: 1 INJECTION, SOLUTION INTRAMUSCULAR; INTRAVENOUS; SUBCUTANEOUS at 13:25

## 2023-12-05 RX ADMIN — ATORVASTATIN CALCIUM 80 MG: 80 TABLET, FILM COATED ORAL at 20:20

## 2023-12-05 RX ADMIN — Medication 5 MG: at 20:19

## 2023-12-05 RX ADMIN — TORSEMIDE 100 MG: 20 TABLET ORAL at 11:11

## 2023-12-05 RX ADMIN — ACETAMINOPHEN 975 MG: 325 TABLET ORAL at 20:20

## 2023-12-05 RX ADMIN — CALCITRIOL CAPSULES 0.25 MCG 0.25 MCG: 0.25 CAPSULE ORAL at 11:12

## 2023-12-05 RX ADMIN — HYDROMORPHONE HYDROCHLORIDE 1 MG: 1 INJECTION, SOLUTION INTRAMUSCULAR; INTRAVENOUS; SUBCUTANEOUS at 00:16

## 2023-12-05 RX ADMIN — CARVEDILOL 25 MG: 12.5 TABLET, FILM COATED ORAL at 20:19

## 2023-12-05 RX ADMIN — AMLODIPINE BESYLATE 10 MG: 10 TABLET ORAL at 11:12

## 2023-12-05 RX ADMIN — ALTEPLASE 2 MG: 2.2 INJECTION, POWDER, LYOPHILIZED, FOR SOLUTION INTRAVENOUS at 10:03

## 2023-12-05 RX ADMIN — ACETAMINOPHEN 975 MG: 325 TABLET ORAL at 03:46

## 2023-12-05 RX ADMIN — GABAPENTIN 100 MG: 100 CAPSULE ORAL at 20:19

## 2023-12-05 RX ADMIN — HYDROMORPHONE HYDROCHLORIDE 1 MG: 1 INJECTION, SOLUTION INTRAMUSCULAR; INTRAVENOUS; SUBCUTANEOUS at 08:44

## 2023-12-05 RX ADMIN — HEPARIN SODIUM 2700 UNITS/HR: 10000 INJECTION, SOLUTION INTRAVENOUS at 23:15

## 2023-12-05 RX ADMIN — SENNOSIDES AND DOCUSATE SODIUM 1 TABLET: 8.6; 5 TABLET ORAL at 20:17

## 2023-12-05 RX ADMIN — OXYCODONE HYDROCHLORIDE 5 MG: 5 TABLET ORAL at 20:18

## 2023-12-05 RX ADMIN — IRON SUCROSE 200 MG: 20 INJECTION, SOLUTION INTRAVENOUS at 09:45

## 2023-12-05 RX ADMIN — HEPARIN SODIUM 2700 UNITS/HR: 10000 INJECTION, SOLUTION INTRAVENOUS at 09:23

## 2023-12-05 RX ADMIN — OXYCODONE HYDROCHLORIDE 5 MG: 5 TABLET ORAL at 12:31

## 2023-12-05 RX ADMIN — OXYCODONE HYDROCHLORIDE 5 MG: 5 TABLET ORAL at 06:23

## 2023-12-05 RX ADMIN — NEPHROCAP 1 CAPSULE: 1 CAP ORAL at 11:12

## 2023-12-05 ASSESSMENT — COGNITIVE AND FUNCTIONAL STATUS - GENERAL
WALKING IN HOSPITAL ROOM: A LITTLE
HELP NEEDED FOR BATHING: A LITTLE
MOBILITY SCORE: 19
CLIMB 3 TO 5 STEPS WITH RAILING: A LOT
DRESSING REGULAR LOWER BODY CLOTHING: A LOT
DRESSING REGULAR UPPER BODY CLOTHING: A LITTLE
TOILETING: A LITTLE
CLIMB 3 TO 5 STEPS WITH RAILING: A LOT
DRESSING REGULAR LOWER BODY CLOTHING: A LITTLE
WALKING IN HOSPITAL ROOM: A LITTLE
MOVING TO AND FROM BED TO CHAIR: A LITTLE
MOBILITY SCORE: 19
HELP NEEDED FOR BATHING: A LITTLE
DAILY ACTIVITIY SCORE: 20
STANDING UP FROM CHAIR USING ARMS: A LITTLE
STANDING UP FROM CHAIR USING ARMS: A LITTLE
DAILY ACTIVITIY SCORE: 20
MOVING TO AND FROM BED TO CHAIR: A LITTLE
TOILETING: A LITTLE

## 2023-12-05 ASSESSMENT — PAIN - FUNCTIONAL ASSESSMENT
PAIN_FUNCTIONAL_ASSESSMENT: 0-10
PAIN_FUNCTIONAL_ASSESSMENT: NO/DENIES PAIN
PAIN_FUNCTIONAL_ASSESSMENT: 0-10

## 2023-12-05 ASSESSMENT — PAIN SCALES - GENERAL
PAINLEVEL_OUTOF10: 0 - NO PAIN
PAINLEVEL_OUTOF10: 8
PAINLEVEL_OUTOF10: 7
PAINLEVEL_OUTOF10: 8
PAINLEVEL_OUTOF10: 0 - NO PAIN
PAINLEVEL_OUTOF10: 6
PAINLEVEL_OUTOF10: 8
PAINLEVEL_OUTOF10: 8
PAINLEVEL_OUTOF10: 10 - WORST POSSIBLE PAIN
PAINLEVEL_OUTOF10: 8
PAINLEVEL_OUTOF10: 0 - NO PAIN
PAINLEVEL_OUTOF10: 0 - NO PAIN
PAINLEVEL_OUTOF10: 8

## 2023-12-05 ASSESSMENT — PAIN DESCRIPTION - LOCATION
LOCATION: LEG
LOCATION: FOOT
LOCATION: LEG
LOCATION: LEG
LOCATION: TOE (COMMENT WHICH ONE)

## 2023-12-05 ASSESSMENT — PAIN DESCRIPTION - ORIENTATION
ORIENTATION: LEFT;RIGHT
ORIENTATION: LEFT

## 2023-12-05 ASSESSMENT — PAIN SCALES - WONG BAKER: WONGBAKER_NUMERICALRESPONSE: NO HURT

## 2023-12-05 NOTE — PROGRESS NOTES
Physical Therapy                 Therapy Communication Note    Patient Name: Kwadwo Hoyt  MRN: [id number]  Today's Date: 12/5/2023     Discipline: Physical Therapy    Missed Visit Reason: Missed Visit Reason:  (Pt off floor at HD will re-attempt as time permits)    Missed Time: Attempt    Erendira Sandhu PTA  Rehab Office [phone]

## 2023-12-05 NOTE — CARE PLAN
The clinical goals for the shift include patient will remain free from falls during shift      Problem: Fall/Injury  Goal: Not fall by end of shift  Outcome: Progressing  Goal: Be free from injury by end of the shift  Outcome: Progressing  Goal: Verbalize understanding of personal risk factors for fall in the hospital  Outcome: Progressing  Goal: Verbalize understanding of risk factor reduction measures to prevent injury from fall in the home  Outcome: Progressing  Goal: Use assistive devices by end of the shift  Outcome: Progressing  Goal: Pace activities to prevent fatigue by end of the shift  Outcome: Progressing     Problem: Skin  Goal: Participates in plan/prevention/treatment measures  Outcome: Progressing  Goal: Prevent/manage excess moisture  Outcome: Progressing  Flowsheets (Taken 12/4/2023 1953)  Prevent/manage excess moisture: Moisturize dry skin  Goal: Promote/optimize nutrition  Outcome: Progressing     Problem: Pain  Goal: Takes deep breaths with improved pain control throughout the shift  Outcome: Progressing  Goal: Turns in bed with improved pain control throughout the shift  Outcome: Progressing  Goal: Walks with improved pain control throughout the shift  Outcome: Progressing  Goal: Performs ADL's with improved pain control throughout shift  Outcome: Progressing  Goal: Participates in PT with improved pain control throughout the shift  Outcome: Progressing  Goal: Free from opioid side effects throughout the shift  Outcome: Progressing  Goal: Free from acute confusion related to pain meds throughout the shift  Outcome: Progressing

## 2023-12-05 NOTE — CARE PLAN
The patient's goals for the shift include  less pain.    The clinical goals for the shift include patient will remain free from falls during shift    Over the shift, the patient made progress towards all goals.

## 2023-12-05 NOTE — NURSING NOTE
Report to Receiving RN:    Report To: MANOJ Mar   Time Report Called: 100  Hand-Off Communication: Hd completed and tolerated fair. Cath locked with tPA. Medicated for pain with PRN x2. UF removed: 2L NET. VSS. Pt in no distress post treatment.   Complications During Treatment: No  Ultrafiltration Treatment: Yes  Medications Administered During Dialysis: Yes, for pain, see MAR   Blood Products Administered During Dialysis: No  Labs Sent During Dialysis: Yes  Heparin Drip Rate Changes: Yes, increased to 2700 units/hour per UFH 0.2

## 2023-12-05 NOTE — PROGRESS NOTES
Transitional Care Coordination Progress Note:  PLAN: OR today with vascular surgery. Based on procedure, may need further interventions.     PAYOR: [employer]    DISPO: Home with outpatient HD. SW facilitating securement of outpatient HD chair. ADOD 3-4 days.     SUPPORT/CONTACT: Milka, [phone]    Norma Parekh RN, TCC

## 2023-12-05 NOTE — PROGRESS NOTES
57 y.o. male admitted for Arterial occlusion [I70.90]  HFrEF (heart failure with reduced ejection fraction) (CMS/[facility]) [I50.20]  Acute deep vein thrombosis (DVT) of left lower extremity after procedure (CMS/HCC) [I97.89, I82.402]  Acute occlusion of popliteal artery due to thrombosis (CMS/HCC) [I74.3].     Subjective   No overnight events. Pt seen while at dialysis with wife present. Pt states he is feeling okay, slightly cold as normal with dialysis. States he knows he going to the OR with the vascular team today and feels okay.        Objective     Scheduled Medications:   acetaminophen, 975 mg, oral, q8h  alteplase, 2 mg, intra-catheter, Daily  alteplase, 2 mg, intra-catheter, Daily  amLODIPine, 10 mg, oral, Daily  aspirin, 81 mg, oral, Daily  atorvastatin, 80 mg, oral, Nightly  B complex-vitamin C-folic acid, 1 capsule, oral, Daily  calcitriol, 0.25 mcg, oral, Daily  carvedilol, 25 mg, oral, BID  gabapentin, 100 mg, oral, q PM  insulin glargine, 25 Units, subcutaneous, Nightly  insulin lispro, 0-5 Units, subcutaneous, TID with meals  insulin lispro, 7 Units, subcutaneous, TID with meals  iron sucrose, 200 mg, intravenous, Daily  lidocaine-prilocaine, , Topical, Daily  melatonin, 5 mg, oral, Nightly  pantoprazole, 20 mg, oral, Daily before breakfast  polyethylene glycol, 17 g, oral, BID  sennosides-docusate sodium, 1 tablet, oral, Nightly  torsemide, 100 mg, oral, Daily         Continuous Medications:   heparin, 0-4,500 Units/hr, Last Rate: 2,700 Units/hr (12/05/23 1002)         PRN Medications:   PRN medications: dextrose 10 % in water (D10W), dextrose, eucerin, glucagon, heparin, hydrALAZINE, HYDROmorphone, hydrOXYzine HCL, oxyCODONE    Dietary Orders (From admission, onward)       Start     Ordered    12/05/23 0001  NPO Diet Except: Sips with meds; Effective midnight  Diet effective midnight        Question:  Except:  Answer:  Sips with meds    12/04/23 6113                    Vitals:  Most Recent:  Vitals:     12/05/23 1005   BP:    Pulse: 78   Resp:    Temp:    SpO2:        24hr Min/Max:  Temp  Min: 35.8 °C (96.4 °F)  Max: 38.7 °C (101.7 °F)  Pulse  Min: 74  Max: 85  BP  Min: 130/72  Max: 155/91  Resp  Min: 17  Max: 21  SpO2  Min: 96 %  Max: 100 %    Intake/Output x24h:    Intake/Output Summary (Last 24 hours) at 12/5/2023 1032  Last data filed at 12/5/2023 1002  Gross per 24 hour   Intake 1376.2 ml   Output 3600 ml   Net -2223.8 ml          Physical Exam:  Physical Exam  Constitutional:       General: He is not in acute distress.     Appearance: Normal appearance. He is obese. He is not ill-appearing.   HENT:      Nose: No congestion or rhinorrhea.   Eyes:      Extraocular Movements: Extraocular movements intact.      Conjunctiva/sclera: Conjunctivae normal.   Cardiovascular:      Rate and Rhythm: Normal rate.      Comments: No distal pulses felt on left lower extremity  Pulmonary:      Effort: Pulmonary effort is normal. No respiratory distress.   Abdominal:      General: Abdomen is flat. Bowel sounds are normal. There is no distension.      Palpations: Abdomen is soft.      Tenderness: There is no abdominal tenderness. There is no guarding or rebound.   Musculoskeletal:         General: Tenderness and deformity present.      Cervical back: Normal range of motion.      Comments: LLE w/ multiple amputaions and dry gangrene of remaining big toe.   Skin:     General: Skin is dry.   Neurological:      General: No focal deficit present.      Mental Status: He is alert and oriented to person, place, and time.         Relevant Results  Results for orders placed or performed during the hospital encounter of 11/24/23 (from the past 24 hour(s))   POCT GLUCOSE   Result Value Ref Range    POCT Glucose 87 74 - 99 mg/dL   POCT GLUCOSE   Result Value Ref Range    POCT Glucose 100 (H) 74 - 99 mg/dL   POCT GLUCOSE   Result Value Ref Range    POCT Glucose 151 (H) 74 - 99 mg/dL   POCT GLUCOSE   Result Value Ref Range    POCT Glucose 112  (H) 74 - 99 mg/dL   CBC and Auto Differential   Result Value Ref Range    WBC 7.9 4.4 - 11.3 x10*3/uL    nRBC 0.0 0.0 - 0.0 /100 WBCs    RBC 2.63 (L) 4.50 - 5.90 x10*6/uL    Hemoglobin 7.3 (L) 13.5 - 17.5 g/dL    Hematocrit 23.8 (L) 41.0 - 52.0 %    MCV 91 80 - 100 fL    MCH 27.8 26.0 - 34.0 pg    MCHC 30.7 (L) 32.0 - 36.0 g/dL    RDW 12.9 11.5 - 14.5 %    Platelets 268 150 - 450 x10*3/uL    Neutrophils % 56.3 40.0 - 80.0 %    Immature Granulocytes %, Automated 0.4 0.0 - 0.9 %    Lymphocytes % 24.7 13.0 - 44.0 %    Monocytes % 14.9 2.0 - 10.0 %    Eosinophils % 3.1 0.0 - 6.0 %    Basophils % 0.6 0.0 - 2.0 %    Neutrophils Absolute 4.42 1.20 - 7.70 x10*3/uL    Immature Granulocytes Absolute, Automated 0.03 0.00 - 0.70 x10*3/uL    Lymphocytes Absolute 1.94 1.20 - 4.80 x10*3/uL    Monocytes Absolute 1.17 (H) 0.10 - 1.00 x10*3/uL    Eosinophils Absolute 0.24 0.00 - 0.70 x10*3/uL    Basophils Absolute 0.05 0.00 - 0.10 x10*3/uL   Renal Function Panel   Result Value Ref Range    Glucose 100 (H) 74 - 99 mg/dL    Sodium 134 (L) 136 - 145 mmol/L    Potassium 4.5 3.5 - 5.3 mmol/L    Chloride 97 (L) 98 - 107 mmol/L    Bicarbonate 25 21 - 32 mmol/L    Anion Gap 17 10 - 20 mmol/L    Urea Nitrogen 55 (H) 6 - 23 mg/dL    Creatinine 7.61 (H) 0.50 - 1.30 mg/dL    eGFR 8 (L) >60 mL/min/1.73m*2    Calcium 9.5 8.6 - 10.6 mg/dL    Phosphorus 5.6 (H) 2.5 - 4.9 mg/dL    Albumin 3.4 3.4 - 5.0 g/dL   Magnesium   Result Value Ref Range    Magnesium 1.86 1.60 - 2.40 mg/dL   Heparin Assay, UFH   Result Value Ref Range    Heparin Unfractionated 0.2 See Comment Below for Therapeutic Ranges IU/mL      IR CVC tunneled    Result Date: 11/30/2023  Interpreted By:  Cody Garzon, STUDY: IR CVC TUNNELED;  11/30/2023 11:10 am   INDICATION: Signs/Symptoms:change temporary dialysis cath to tunneled catheter.   COMPARISON: None.   ACCESSION NUMBER(S): [id number]   ORDERING CLINICIAN: ISABELL LEGGETT   TECHNIQUE: INTERVENTIONALIST(S): Cdoy  MD Duran   CONSENT: The patient was informed of the nature of the proposed procedure. The purposes, alternatives, risks, and benefits were explained and discussed. All questions were answered and consent was obtained.   RADIATION EXPOSURE: Fluoroscopy time: Less than 0.1 min. Dose: 0.6 mGy. Dose Area Product (DAP): 217   SEDATION: Moderate conscious IV sedation services (supervision of administration, induction, and maintenance) were provided by the physician performing the procedure with intravenous fentanyl 100 mcg and versed 2 mg for 15 minutes. The physician was assisted by an independent trained observer, an interventional radiology nurse, in the continuous monitoring of patient level of consciousness and physiologic status.   MEDICATION/CONTRAST: No additional   TIME OUT: A time out was performed immediately prior to procedure start with the interventional team, correctly identifying the patient name, date of birth, MRN, procedure, anatomy (including marking of site and side), patient position, procedure consent form, relevant laboratory and imaging test results, antibiotic administration, safety precautions, and procedure-specific equipment needs.   COMPLICATIONS: No immediate adverse events identified.   FINDINGS: In the recombinant position, the patient was positioned on the angiography table.  The patient has an existing  right internal jugular venous temporary  dialysis catheter in place.  The adjacent cutaneous tissues and existing catheter were prepared and draped in usual sterile manner.   After appropriate subcutaneous instillation of Lidocaine 1% local anesthesia, the securing sutures of the existing temporary dialysis catheter were removed.  The loaded heparin was aspirated from the catheter ports.  A 035  Amplatz guidewire was inserted through the existing  dialysis catheter.  Utilizing intermittent fluoroscopic guidance, the guidewire was advanced into the inferior vena cava to secure access.   The existing catheter was removed with the guidewire left in place.   Local anesthesia was achieved with subcutaneous Lidocaine 1%.  A tunnel tract was created from the  right infraclavicular chest to the existing venotomy site.   A  15 Yoruba x 23 cm  dual-lumen catheter was selected for placement. Venotomy site soft tissue dilation was performed to eventual accommodation of a  15-Yoruba dilator peel-away coaxial sheath system.  The catheter was then advanced over the guidewire with the tips to reside at the caval-atrial junction.  The ports were aspirated without resistance and flushed with normal saline. Heparinized saline was then loaded into the catheter ports.  The external portions of the catheter were secured in place with polypropylene suture.  The venotomy and tunnel sites were closed with discontinuous and pursestring sutures, respectively.  Sterile dressings were then applied.   The patient tolerated the procedure without complication.       1. Technically successful conversion and placement of a  15 Yoruba x 23 cm indwelling  hemodialysis catheter from a temporary hemodialysis catheter. 2. The catheter tip overlies the cavoatrial junction and is ready for immediate use.   I was present for and/or performed the critical portions of the procedure and immediately available throughout the entire procedure.   I personally reviewed the image(s) / study and resident interpretation. I agree with the findings as stated.   Dictated at [facility].   MACRO: None   Signed by: Cody Garzon 11/30/2023 11:18 AM Dictation workstation:   [id number]    XR foot left 3+ views    Result Date: 11/29/2023  Interpreted By:  Amparo Alaniz and Sheng Max STUDY: Left foot  3 views.   INDICATION: Signs/Symptoms:Gangrene of 1st digit   COMPARISON: Left foot radiograph dated 04/16/2018.   ACCESSION NUMBER(S): [id number]   ORDERING CLINICIAN: FABY MARK    FINDINGS: Interval transmetatarsal amputations of the 3rd through 5th digits at the level of the mid metatarsals with unremarkable corticated appearance of the osseous stump as well as unremarkable appearance of the soft tissue stump.   Status post amputation of the 2nd digit at the level of the mid proximal phalanx with unremarkable corticated appearance of the osseous stump as well as unremarkable appearance of the soft tissue stump.     There is soft tissue loss of the tuft of the 1st digit likely from chronic ischemia. No definite erosion of the 1st distal phalanx or additional radiographic findings to suggest osteomyelitis.     Redemonstration of midfoot fusion involving the 1st TMT, 1/2 metatarsal bases, and inter cuneiform articulation with redemonstration of findings of hardware failure with loosening similar to 2018. Advanced tarsometatarsal joint degenerative changes which may be due to neuropathic arthropathy. Mild ankle and dorsal foot soft tissue swelling with cellulitis not ruled out. Moderate tibiotalar and mild subtalar joint degenerative changes with joint space loss and osteophytes. Plantar calcaneal spur which can be associated with plantar fasciitis.       1. Soft tissue ischemia of the tuft of the 1st toe with soft tissue loss. No radiographic findings of osteomyelitis of the 1st digit or the rest of the osseous structures. 2. Interval postsurgical changes with amputation of the 2nd through 5th digits as described above. 3. Soft tissue swelling of the ankle and dorsal foot with cellulitis not ruled out. 4. Redemonstration of postsurgical changes involving multiple midfoot articulations with similar hardware failure findings when compared with 2018.   I personally reviewed the images/study and I agree with the findings as stated by Dr. Juan Bhagat. This study was interpreted at [facility], [place], [place].   Signed by: Amparo Alaniz 11/29/2023 11:21 AM Dictation  workstation:   [id number]      Assessment/Plan   Principal Problem:    Arterial occlusion  Active Problems:    ALEXANDER (obstructive sleep apnea)    ESRD on hemodialysis (CMS/HCC)    SELENA (acute kidney injury) (CMS/HCC)    Peripheral vascular disease, unspecified (CMS/HCC)    Acute occlusion of popliteal artery due to thrombosis (CMS/HCC)    Left leg weakness    Critical limb ischemia of left lower extremity (CMS/HCC)    Toe ulcer, left, with unspecified severity (CMS/HCC)    Kwadwo Hoyt is a 57 y.o. male with a PMHx of DM c/b neuropathy, HTN, CKD stage 5, HFrEF (EF 50% in 11/2021), & ALEXANDER, who presents to the ER with left leg and arm weakness. LLE distal pulses were not palpable and CTA aorta & iliofemoral runoff showed complete left popliteal occlusion. Heparin gtt was started and vascular medicine were consulted but recommended no surgical intervention, recommend PVR/ZAC first. Neuro were also consulted for concern of stroke given patients left sided weakness, rec outpatient follow up. PVR/ZAC showed severe disease at left femoral/popliteal level. 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 Nephrology consulted for worsening Cr and electrolyte status and started dialysis. Tunneled catheter placed by IR 11/30. Pt to c/w dialysis and underwent LLE angiogram with Vascular Surgery on 12/1, scheduled for peripheral angioplasty with Vascular Surgery 12/5. Currently afebrile, HDS, awaiting further recommendations from surgical teams.      #L Popliteal Artery Occlusion  #Chronic Limb Ischemia  #c/f Stroke  - CTA aorta & B/L iliofemoral runoff significant for complete occlusion of L popliteal artery as well as anterior and posterior tibial arteries.   - ZAC/PVR showed severe disease at the femoral popliteal level. Biphasic flow is noted in the left popliteal artery. Unable to obtain pressures. The posterior tibial and dorasalis pedis arteries are not audible.  - LLE Angiogram w/ VascSurg 12/1  -- Restarted Aspirin  12/2. C/w Heparin ggt until call for OR  -- Scheduled for Peripheral Angioplasty with Vascular Surgery 12/5  - Neurology consulted, stroke follow up in 4-6 weeks, discharge with ziopatch / loop recorder  - atorvastatin 80 mg  - Podiatry consulted for dry gangrene of Left Hallux, [facility]     #CKD stage 5 2/2 to T2DM  - Admission Cr 6.06, GFR 10 (Baseline Cr 5.5~), Cr 7.61 (scheduled for dialysis), TTS  - Being evaluated at [facility] for kidney transplant w/ most recent office visit on 9/12/2023  - home Torsemide 100 mg, calcitriol 0.25 mcg QD  - Avoid nephrotoxic agents  - Nephrology consulted due to worsening Cr and Electrolyte status  -- Received HD line 11/28, tunneled line placed 11/30 by IR  - SW contacted to coordinate outpatient dialysis      #Hyponatremia  #Hyperkalemia  #Hyperphosphatemia  - Nephrology consulted  - ordered UA and Urine electrolytes, suggest Post-Renal  - Increased Sodium Bicarb and ordered Lokelma  - Resolved w/ dialysis       #Constipation  - no BM since 11/25  - ordered Miralax BID, Doc-senna  - Normal BM 11/29  - Resolved     #HTN  #HFrEF  - s/p 500 ml LR bolus in ED  - home Aspirin 81 mg  - c/w home Carvedilol 25 mg BID  - c/w home Amlodipine 10 mg every day  - Echo 11.25, 60-65% estimated ejection fraction, pseudonormal pattern of left ventricular diastolic filling, increased LA LV diastolic pressure,severe concentric left ventricular hypertrophy.        #T2DM  #Neuropathy  - Last Hgb A1C 11.0 on 5/24/2023, ordered updated Hgb A1C  - Home Regimen: Lantus 40U at bedtime & Lispro 20U TID w/ meals  - Changed Lantus from 20 -> 25 units at bedtime due to elevated BG   - Started Lispro 7U TID w/ meals  - Mild SSI  - c/w home Gabapentin 300 mg BID  - POCT BG TID & at bedtime  - Hypoglycemia protocol     #Anxiety  - PRN Atarax 25 mg      #ALEXANDER  - CPAP at home, noncompliant        F: PRN  E: PRN  N: Renal  GI: Pantoprazole 20 mg QD  DVT: Heparin gtt        Code Status: Full Code   Emergency Contact: Extended Emergency Contact Information  Primary Emergency Contact: Milka Hoyt  Address: [address] [place] [address]  Home Phone: [phone]  Relation: None  PCP: Thai Talavera MD    Patient seen and discussed with attending physician, Dr. Pappas.  Plan preliminary until cosigned by attending physician.    Reviewed and approved by CHIQUITA AVALOS on 12/5/23 at 10:32 AM.

## 2023-12-05 NOTE — NURSING NOTE
.Report from Sending RN:    Report From: MANOJ Hernández  Recent Surgery of Procedure: No  Baseline Level of Consciousness (LOC): A&O X3  Oxygen Use: No  Type: NONE,  @8001  Diabetic: Yes  Last BP Med Given Day of Dialysis: NONE  Last Pain Med Given: Tylenol  Lab Tests to be Obtained with Dialysis: No  Blood Transfusion to be Given During Dialysis: No  Available IV Access: Yes  Medications to be Administered During Dialysis: No  Continuous IV Infusion Running: Yes, heparin 24 ml/hr  Restraints on Currently or in the Last 24 Hours: No  Hand-Off Communication: Pt had no acute event overnight, vital signs stable. Not on precaution, morning medication not needed at this time.

## 2023-12-05 NOTE — PROGRESS NOTES
NEPHROLOGY FOLLOW UP NOTE    Kwadwo Hoyt   57 y.o.    @WT@  MRN/Room: [id number]    Subjective:  Tolerated HD well, net UF 2.4L. Currently denying SOB, had no cramping with HD and making urine with torsemide     Objective:     Meds:   acetaminophen, 975 mg, q8h  alteplase, 2 mg, Daily  alteplase, 2 mg, Daily  amLODIPine, 10 mg, Daily  aspirin, 81 mg, Daily  atorvastatin, 80 mg, Nightly  B complex-vitamin C-folic acid, 1 capsule, Daily  calcitriol, 0.25 mcg, Daily  carvedilol, 25 mg, BID  gabapentin, 100 mg, q PM  insulin glargine, 25 Units, Nightly  insulin lispro, 0-5 Units, TID with meals  insulin lispro, 7 Units, TID with meals  iron sucrose, 200 mg, Daily  lidocaine-prilocaine, , Daily  melatonin, 5 mg, Nightly  pantoprazole, 20 mg, Daily before breakfast  polyethylene glycol, 17 g, BID  sennosides-docusate sodium, 1 tablet, Nightly  torsemide, 100 mg, Daily      heparin, Last Rate: 2,700 Units/hr (12/05/23 1205)      dextrose 10 % in water (D10W), 0.3 g/kg/hr, Once PRN  dextrose, 25 g, q15 min PRN  eucerin, , PRN  glucagon, 1 mg, q15 min PRN  heparin, 5,000-10,000 Units, q4h PRN  hydrALAZINE, 5 mg, q6h PRN  HYDROmorphone, 1 mg, q6h PRN  hydrOXYzine HCL, 25 mg, q6h PRN  oxyCODONE, 5 mg, q6h PRN        Vitals:    12/05/23 1518   BP: (!) 144/98   Pulse: 76   Resp: 20   Temp: 36.2 °C (97.2 °F)   SpO2: 99%          Intake/Output Summary (Last 24 hours) at 12/5/2023 1535  Last data filed at 12/5/2023 1338  Gross per 24 hour   Intake 1431.55 ml   Output 3850 ml   Net -2418.45 ml         General appearance: Awake and alert, oriented. No distress  HEENT: NC/AT, MMM  Skin: no apparent rash  Heart: heart sounds 1 & 2 present and normal   Lungs: CTAB. No wheezing/crackles  Abdomen: soft, non tender  Extremities: No edema  Neuro: No FND  ACCESS: TDC    Blood Labs:  Results for orders placed or performed during the hospital encounter of 11/24/23 (from the past 24 hour(s))   POCT GLUCOSE   Result Value Ref Range     POCT Glucose 100 (H) 74 - 99 mg/dL   POCT GLUCOSE   Result Value Ref Range    POCT Glucose 151 (H) 74 - 99 mg/dL   POCT GLUCOSE   Result Value Ref Range    POCT Glucose 112 (H) 74 - 99 mg/dL   CBC and Auto Differential   Result Value Ref Range    WBC 7.9 4.4 - 11.3 x10*3/uL    nRBC 0.0 0.0 - 0.0 /100 WBCs    RBC 2.63 (L) 4.50 - 5.90 x10*6/uL    Hemoglobin 7.3 (L) 13.5 - 17.5 g/dL    Hematocrit 23.8 (L) 41.0 - 52.0 %    MCV 91 80 - 100 fL    MCH 27.8 26.0 - 34.0 pg    MCHC 30.7 (L) 32.0 - 36.0 g/dL    RDW 12.9 11.5 - 14.5 %    Platelets 268 150 - 450 x10*3/uL    Neutrophils % 56.3 40.0 - 80.0 %    Immature Granulocytes %, Automated 0.4 0.0 - 0.9 %    Lymphocytes % 24.7 13.0 - 44.0 %    Monocytes % 14.9 2.0 - 10.0 %    Eosinophils % 3.1 0.0 - 6.0 %    Basophils % 0.6 0.0 - 2.0 %    Neutrophils Absolute 4.42 1.20 - 7.70 x10*3/uL    Immature Granulocytes Absolute, Automated 0.03 0.00 - 0.70 x10*3/uL    Lymphocytes Absolute 1.94 1.20 - 4.80 x10*3/uL    Monocytes Absolute 1.17 (H) 0.10 - 1.00 x10*3/uL    Eosinophils Absolute 0.24 0.00 - 0.70 x10*3/uL    Basophils Absolute 0.05 0.00 - 0.10 x10*3/uL   Renal Function Panel   Result Value Ref Range    Glucose 100 (H) 74 - 99 mg/dL    Sodium 134 (L) 136 - 145 mmol/L    Potassium 4.5 3.5 - 5.3 mmol/L    Chloride 97 (L) 98 - 107 mmol/L    Bicarbonate 25 21 - 32 mmol/L    Anion Gap 17 10 - 20 mmol/L    Urea Nitrogen 55 (H) 6 - 23 mg/dL    Creatinine 7.61 (H) 0.50 - 1.30 mg/dL    eGFR 8 (L) >60 mL/min/1.73m*2    Calcium 9.5 8.6 - 10.6 mg/dL    Phosphorus 5.6 (H) 2.5 - 4.9 mg/dL    Albumin 3.4 3.4 - 5.0 g/dL   Magnesium   Result Value Ref Range    Magnesium 1.86 1.60 - 2.40 mg/dL   Type and Screen   Result Value Ref Range    ABO TYPE B     Rh TYPE POS     ANTIBODY SCREEN NEG    Heparin Assay, UFH   Result Value Ref Range    Heparin Unfractionated 0.2 See Comment Below for Therapeutic Ranges IU/mL   POCT GLUCOSE   Result Value Ref Range    POCT Glucose 104 (H) 74 - 99 mg/dL    POCT GLUCOSE   Result Value Ref Range    POCT Glucose 117 (H) 74 - 99 mg/dL          ASSESSMENT:  Kwadwo Hoyt is a 57 y.o. male with a PMHx of DM c/b neuropathy, HTN, CKD stage 5, HFrEF (EF 50% in 11/2021), & ALEXANDER being managed for left popliteal occlusion. Hospital course complicated by SELENA on CKD, likely contrast induced, now progressed to ESRD     CKD V with progression to ESRD  - Baseline creatinine: ~5-6, follows with Dr. Draper nephrologist outpatient   - Etiology: secondary to T2DM  - Clinical volume status: hypervolemia  - Electrolytes (Na, K, Ca, Phos) HyopNa  - TDC in place  - Anemia: hgb 8.8, iron sat 9%, ferritin 528  - BMD: calcium and phos WNL           Recommendations:  - HD today, continue TTS schedule while admitted  -  SW for OP HD unit placement  - iron sucrose 200 mg IV for 5 doses. Aranesp 60 mcg weekly   - started renal MV  - continue calcitrol  - continue torsemide   - renal diet  - strict Is/Os  - Avoid nephrotoxins, contrast if possible;  - dose meds to IHD   - Avoid hypotension/rapid fluctuations in BPs       James Schmid MD  Nephrology Resident  24 hour Renal Pager - [phone]

## 2023-12-05 NOTE — PROGRESS NOTES
This SW left transportation resources bedside for pt as pt was off floor receiving dialysis.    UPDATE ([id number]): This SW requested update from [facility] regarding referral.    - Kourtney Sanz MSW, LSW

## 2023-12-06 LAB
ALBUMIN SERPL BCP-MCNC: 3.3 G/DL (ref 3.4–5)
ANION GAP SERPL CALC-SCNC: 16 MMOL/L (ref 10–20)
B2 GLYCOPROT1 IGA SER-ACNC: <0.6 U/ML
B2 GLYCOPROT1 IGG SER-ACNC: <1.4 U/ML
B2 GLYCOPROT1 IGM SER-ACNC: 0.7 U/ML
BASOPHILS # BLD AUTO: 0.03 X10*3/UL (ref 0–0.1)
BASOPHILS NFR BLD AUTO: 0.5 %
BUN SERPL-MCNC: 47 MG/DL (ref 6–23)
CALCIUM SERPL-MCNC: 8.6 MG/DL (ref 8.6–10.6)
CHLORIDE SERPL-SCNC: 97 MMOL/L (ref 98–107)
CO2 SERPL-SCNC: 27 MMOL/L (ref 21–32)
CREAT SERPL-MCNC: 6.28 MG/DL (ref 0.5–1.3)
EOSINOPHIL # BLD AUTO: 0.17 X10*3/UL (ref 0–0.7)
EOSINOPHIL NFR BLD AUTO: 2.7 %
ERYTHROCYTE [DISTWIDTH] IN BLOOD BY AUTOMATED COUNT: 12.7 % (ref 11.5–14.5)
GFR SERPL CREATININE-BSD FRML MDRD: 10 ML/MIN/1.73M*2
GLUCOSE BLD MANUAL STRIP-MCNC: 128 MG/DL (ref 74–99)
GLUCOSE BLD MANUAL STRIP-MCNC: 136 MG/DL (ref 74–99)
GLUCOSE BLD MANUAL STRIP-MCNC: 191 MG/DL (ref 74–99)
GLUCOSE BLD MANUAL STRIP-MCNC: 253 MG/DL (ref 74–99)
GLUCOSE SERPL-MCNC: 205 MG/DL (ref 74–99)
HCT VFR BLD AUTO: 23.3 % (ref 41–52)
HGB BLD-MCNC: 7.1 G/DL (ref 13.5–17.5)
IMM GRANULOCYTES # BLD AUTO: 0.02 X10*3/UL (ref 0–0.7)
IMM GRANULOCYTES NFR BLD AUTO: 0.3 % (ref 0–0.9)
LYMPHOCYTES # BLD AUTO: 1.32 X10*3/UL (ref 1.2–4.8)
LYMPHOCYTES NFR BLD AUTO: 21.1 %
MAGNESIUM SERPL-MCNC: 2 MG/DL (ref 1.6–2.4)
MCH RBC QN AUTO: 27.6 PG (ref 26–34)
MCHC RBC AUTO-ENTMCNC: 30.5 G/DL (ref 32–36)
MCV RBC AUTO: 91 FL (ref 80–100)
MONOCYTES # BLD AUTO: 1.15 X10*3/UL (ref 0.1–1)
MONOCYTES NFR BLD AUTO: 18.4 %
NEUTROPHILS # BLD AUTO: 3.56 X10*3/UL (ref 1.2–7.7)
NEUTROPHILS NFR BLD AUTO: 57 %
NRBC BLD-RTO: 0 /100 WBCS (ref 0–0)
PHOSPHATE SERPL-MCNC: 5.5 MG/DL (ref 2.5–4.9)
PLATELET # BLD AUTO: 249 X10*3/UL (ref 150–450)
POTASSIUM SERPL-SCNC: 4.7 MMOL/L (ref 3.5–5.3)
RBC # BLD AUTO: 2.57 X10*6/UL (ref 4.5–5.9)
SODIUM SERPL-SCNC: 135 MMOL/L (ref 136–145)
WBC # BLD AUTO: 6.3 X10*3/UL (ref 4.4–11.3)

## 2023-12-06 PROCEDURE — 96372 THER/PROPH/DIAG INJ SC/IM: CPT | Performed by: NURSE PRACTITIONER

## 2023-12-06 PROCEDURE — 2550000001 HC RX 255 CONTRASTS: Performed by: INTERNAL MEDICINE

## 2023-12-06 PROCEDURE — C1760 CLOSURE DEV, VASC: HCPCS | Performed by: INTERNAL MEDICINE

## 2023-12-06 PROCEDURE — 86146 BETA-2 GLYCOPROTEIN ANTIBODY: CPT

## 2023-12-06 PROCEDURE — 99153 MOD SED SAME PHYS/QHP EA: CPT | Performed by: INTERNAL MEDICINE

## 2023-12-06 PROCEDURE — 36246 INS CATH ABD/L-EXT ART 2ND: CPT | Mod: 59 | Performed by: INTERNAL MEDICINE

## 2023-12-06 PROCEDURE — 37229 HC REVASCULARIZE TIBIAL/PERON ARTERY,ANGIOPLASTY/ATHERECTOMY INITIAL: CPT | Performed by: INTERNAL MEDICINE

## 2023-12-06 PROCEDURE — 99223 1ST HOSP IP/OBS HIGH 75: CPT | Performed by: INTERNAL MEDICINE

## 2023-12-06 PROCEDURE — 37226 HC REVASCULARIZE FEM/POP ARTERY,ANGIOPLASTY/STENT: CPT | Performed by: INTERNAL MEDICINE

## 2023-12-06 PROCEDURE — 2500000004 HC RX 250 GENERAL PHARMACY W/ HCPCS (ALT 636 FOR OP/ED): Performed by: INTERNAL MEDICINE

## 2023-12-06 PROCEDURE — 75710 ARTERY X-RAYS ARM/LEG: CPT | Performed by: INTERNAL MEDICINE

## 2023-12-06 PROCEDURE — 37229 PR REVSC OPN/PRQ TIB/PERO W/ATHRC/ANGIOP SM VSL: CPT | Performed by: INTERNAL MEDICINE

## 2023-12-06 PROCEDURE — 36140 INTRO NDL ICATH UPR/LXTR ART: CPT | Performed by: INTERNAL MEDICINE

## 2023-12-06 PROCEDURE — 37185 PRIM ART M-THRMBC SBSQ VSL: CPT | Performed by: INTERNAL MEDICINE

## 2023-12-06 PROCEDURE — 2500000001 HC RX 250 WO HCPCS SELF ADMINISTERED DRUGS (ALT 637 FOR MEDICARE OP): Performed by: NURSE PRACTITIONER

## 2023-12-06 PROCEDURE — 2500000001 HC RX 250 WO HCPCS SELF ADMINISTERED DRUGS (ALT 637 FOR MEDICARE OP): Performed by: INTERNAL MEDICINE

## 2023-12-06 PROCEDURE — 96373 THER/PROPH/DIAG INJ IA: CPT | Performed by: INTERNAL MEDICINE

## 2023-12-06 PROCEDURE — 2500000004 HC RX 250 GENERAL PHARMACY W/ HCPCS (ALT 636 FOR OP/ED): Performed by: STUDENT IN AN ORGANIZED HEALTH CARE EDUCATION/TRAINING PROGRAM

## 2023-12-06 PROCEDURE — 85347 COAGULATION TIME ACTIVATED: CPT | Performed by: INTERNAL MEDICINE

## 2023-12-06 PROCEDURE — 83735 ASSAY OF MAGNESIUM: CPT | Performed by: NURSE PRACTITIONER

## 2023-12-06 PROCEDURE — 1200000002 HC GENERAL ROOM WITH TELEMETRY DAILY

## 2023-12-06 PROCEDURE — 2500000004 HC RX 250 GENERAL PHARMACY W/ HCPCS (ALT 636 FOR OP/ED)

## 2023-12-06 PROCEDURE — C1769 GUIDE WIRE: HCPCS | Performed by: INTERNAL MEDICINE

## 2023-12-06 PROCEDURE — 2500000005 HC RX 250 GENERAL PHARMACY W/O HCPCS: Performed by: INTERNAL MEDICINE

## 2023-12-06 PROCEDURE — 2720000007 HC OR 272 NO HCPCS: Performed by: INTERNAL MEDICINE

## 2023-12-06 PROCEDURE — C1894 INTRO/SHEATH, NON-LASER: HCPCS | Performed by: INTERNAL MEDICINE

## 2023-12-06 PROCEDURE — 85347 COAGULATION TIME ACTIVATED: CPT

## 2023-12-06 PROCEDURE — 2500000004 HC RX 250 GENERAL PHARMACY W/ HCPCS (ALT 636 FOR OP/ED): Mod: JZ | Performed by: NURSE PRACTITIONER

## 2023-12-06 PROCEDURE — C1725 CATH, TRANSLUMIN NON-LASER: HCPCS | Performed by: INTERNAL MEDICINE

## 2023-12-06 PROCEDURE — 99152 MOD SED SAME PHYS/QHP 5/>YRS: CPT | Performed by: INTERNAL MEDICINE

## 2023-12-06 PROCEDURE — C2623 CATH, TRANSLUMIN, DRUG-COAT: HCPCS | Performed by: INTERNAL MEDICINE

## 2023-12-06 PROCEDURE — 36246 INS CATH ABD/L-EXT ART 2ND: CPT | Performed by: INTERNAL MEDICINE

## 2023-12-06 PROCEDURE — 84100 ASSAY OF PHOSPHORUS: CPT | Performed by: NURSE PRACTITIONER

## 2023-12-06 PROCEDURE — 75710 ARTERY X-RAYS ARM/LEG: CPT | Mod: 59 | Performed by: INTERNAL MEDICINE

## 2023-12-06 PROCEDURE — 85025 COMPLETE CBC W/AUTO DIFF WBC: CPT | Performed by: NURSE PRACTITIONER

## 2023-12-06 PROCEDURE — 99232 SBSQ HOSP IP/OBS MODERATE 35: CPT

## 2023-12-06 PROCEDURE — 2500000001 HC RX 250 WO HCPCS SELF ADMINISTERED DRUGS (ALT 637 FOR MEDICARE OP)

## 2023-12-06 PROCEDURE — C1874 STENT, COATED/COV W/DEL SYS: HCPCS | Performed by: INTERNAL MEDICINE

## 2023-12-06 PROCEDURE — G0269 OCCLUSIVE DEVICE IN VEIN ART: HCPCS | Mod: TC,59 | Performed by: INTERNAL MEDICINE

## 2023-12-06 PROCEDURE — 36415 COLL VENOUS BLD VENIPUNCTURE: CPT | Performed by: NURSE PRACTITIONER

## 2023-12-06 PROCEDURE — 36140 INTRO NDL ICATH UPR/LXTR ART: CPT | Mod: 59 | Performed by: INTERNAL MEDICINE

## 2023-12-06 PROCEDURE — C1876 STENT, NON-COA/NON-COV W/DEL: HCPCS | Performed by: INTERNAL MEDICINE

## 2023-12-06 PROCEDURE — 2780000003 HC OR 278 NO HCPCS: Performed by: INTERNAL MEDICINE

## 2023-12-06 PROCEDURE — C1887 CATHETER, GUIDING: HCPCS | Performed by: INTERNAL MEDICINE

## 2023-12-06 PROCEDURE — 37226 PR REVSC OPN/PRQ FEM/POP W/STNT/ANGIOP SM VSL: CPT | Performed by: INTERNAL MEDICINE

## 2023-12-06 PROCEDURE — 82947 ASSAY GLUCOSE BLOOD QUANT: CPT

## 2023-12-06 PROCEDURE — 37184 PRIM ART M-THRMBC 1ST VSL: CPT | Performed by: INTERNAL MEDICINE

## 2023-12-06 PROCEDURE — C1757 CATH, THROMBECTOMY/EMBOLECT: HCPCS | Performed by: INTERNAL MEDICINE

## 2023-12-06 DEVICE — STENT EVD35-07-080-150 EF ENTRUST V01
Type: IMPLANTABLE DEVICE | Site: LEG | Status: FUNCTIONAL
Brand: EVERFLEX™

## 2023-12-06 DEVICE — EVEROLIMUS-ELUTING PLATINUM CHROMIUM CORONARY STENT SYSTEM
Type: IMPLANTABLE DEVICE | Site: LEG | Status: FUNCTIONAL
Brand: SYNERGY MEGATRON™

## 2023-12-06 RX ORDER — SODIUM CHLORIDE 9 MG/ML
INJECTION, SOLUTION INTRAVENOUS CONTINUOUS PRN
Status: COMPLETED | OUTPATIENT
Start: 2023-12-06 | End: 2023-12-06

## 2023-12-06 RX ORDER — CLOPIDOGREL BISULFATE 75 MG/1
75 TABLET ORAL DAILY
Status: DISCONTINUED | OUTPATIENT
Start: 2023-12-07 | End: 2023-12-06 | Stop reason: SDUPTHER

## 2023-12-06 RX ORDER — NITROGLYCERIN 40 MG/100ML
INJECTION INTRAVENOUS AS NEEDED
Status: DISCONTINUED | OUTPATIENT
Start: 2023-12-06 | End: 2023-12-06 | Stop reason: HOSPADM

## 2023-12-06 RX ORDER — HEPARIN SODIUM 1000 [USP'U]/ML
INJECTION, SOLUTION INTRAVENOUS; SUBCUTANEOUS AS NEEDED
Status: DISCONTINUED | OUTPATIENT
Start: 2023-12-06 | End: 2023-12-06 | Stop reason: HOSPADM

## 2023-12-06 RX ORDER — MIDAZOLAM HYDROCHLORIDE 1 MG/ML
INJECTION, SOLUTION INTRAMUSCULAR; INTRAVENOUS AS NEEDED
Status: DISCONTINUED | OUTPATIENT
Start: 2023-12-06 | End: 2023-12-06 | Stop reason: HOSPADM

## 2023-12-06 RX ORDER — IODIXANOL 320 MG/ML
INJECTION, SOLUTION INTRAVASCULAR AS NEEDED
Status: DISCONTINUED | OUTPATIENT
Start: 2023-12-06 | End: 2023-12-06 | Stop reason: HOSPADM

## 2023-12-06 RX ORDER — SODIUM CHLORIDE 9 MG/ML
1 INJECTION, SOLUTION INTRAVENOUS CONTINUOUS
Status: ACTIVE | OUTPATIENT
Start: 2023-12-06 | End: 2023-12-06

## 2023-12-06 RX ORDER — CLOPIDOGREL BISULFATE 75 MG/1
75 TABLET ORAL DAILY
Status: DISCONTINUED | OUTPATIENT
Start: 2023-12-07 | End: 2023-12-14 | Stop reason: HOSPADM

## 2023-12-06 RX ORDER — ASPIRIN 325 MG
TABLET ORAL AS NEEDED
Status: DISCONTINUED | OUTPATIENT
Start: 2023-12-06 | End: 2023-12-06 | Stop reason: HOSPADM

## 2023-12-06 RX ORDER — CLOPIDOGREL BISULFATE 300 MG/1
TABLET, FILM COATED ORAL AS NEEDED
Status: DISCONTINUED | OUTPATIENT
Start: 2023-12-06 | End: 2023-12-06 | Stop reason: HOSPADM

## 2023-12-06 RX ORDER — FENTANYL CITRATE 50 UG/ML
INJECTION, SOLUTION INTRAMUSCULAR; INTRAVENOUS AS NEEDED
Status: DISCONTINUED | OUTPATIENT
Start: 2023-12-06 | End: 2023-12-06 | Stop reason: HOSPADM

## 2023-12-06 RX ORDER — LIDOCAINE HYDROCHLORIDE 10 MG/ML
INJECTION, SOLUTION EPIDURAL; INFILTRATION; INTRACAUDAL; PERINEURAL AS NEEDED
Status: DISCONTINUED | OUTPATIENT
Start: 2023-12-06 | End: 2023-12-06 | Stop reason: HOSPADM

## 2023-12-06 RX ADMIN — ACETAMINOPHEN 975 MG: 325 TABLET ORAL at 21:01

## 2023-12-06 RX ADMIN — SODIUM CHLORIDE 1 ML/KG/HR: 9 INJECTION, SOLUTION INTRAVENOUS at 16:00

## 2023-12-06 RX ADMIN — OXYCODONE HYDROCHLORIDE 5 MG: 5 TABLET ORAL at 21:01

## 2023-12-06 RX ADMIN — TORSEMIDE 100 MG: 20 TABLET ORAL at 11:05

## 2023-12-06 RX ADMIN — Medication 5 MG: at 21:01

## 2023-12-06 RX ADMIN — CARVEDILOL 25 MG: 12.5 TABLET, FILM COATED ORAL at 11:06

## 2023-12-06 RX ADMIN — PANTOPRAZOLE SODIUM 20 MG: 20 TABLET, DELAYED RELEASE ORAL at 11:08

## 2023-12-06 RX ADMIN — ACETAMINOPHEN 975 MG: 325 TABLET ORAL at 12:30

## 2023-12-06 RX ADMIN — AMLODIPINE BESYLATE 10 MG: 10 TABLET ORAL at 11:04

## 2023-12-06 RX ADMIN — IRON SUCROSE 200 MG: 20 INJECTION, SOLUTION INTRAVENOUS at 11:09

## 2023-12-06 RX ADMIN — INSULIN GLARGINE 25 UNITS: 100 INJECTION, SOLUTION SUBCUTANEOUS at 21:19

## 2023-12-06 RX ADMIN — DARBEPOETIN ALFA 60 MCG: 60 SOLUTION INTRAVENOUS; SUBCUTANEOUS at 14:02

## 2023-12-06 RX ADMIN — INSULIN LISPRO 7 UNITS: 100 INJECTION, SOLUTION INTRAVENOUS; SUBCUTANEOUS at 17:24

## 2023-12-06 RX ADMIN — OXYCODONE HYDROCHLORIDE 5 MG: 5 TABLET ORAL at 11:04

## 2023-12-06 RX ADMIN — ACETAMINOPHEN 975 MG: 325 TABLET ORAL at 05:54

## 2023-12-06 RX ADMIN — INSULIN LISPRO 3 UNITS: 100 INJECTION, SOLUTION INTRAVENOUS; SUBCUTANEOUS at 17:22

## 2023-12-06 RX ADMIN — POLYETHYLENE GLYCOL 3350 17 G: 17 POWDER, FOR SOLUTION ORAL at 11:09

## 2023-12-06 RX ADMIN — LIDOCAINE AND PRILOCAINE: 25; 25 CREAM TOPICAL at 09:00

## 2023-12-06 RX ADMIN — CALCITRIOL CAPSULES 0.25 MCG 0.25 MCG: 0.25 CAPSULE ORAL at 11:04

## 2023-12-06 RX ADMIN — ATORVASTATIN CALCIUM 80 MG: 80 TABLET, FILM COATED ORAL at 21:01

## 2023-12-06 RX ADMIN — ASPIRIN 81 MG: 81 TABLET, COATED ORAL at 09:00

## 2023-12-06 RX ADMIN — NEPHROCAP 1 CAPSULE: 1 CAP ORAL at 11:09

## 2023-12-06 RX ADMIN — CARVEDILOL 25 MG: 12.5 TABLET, FILM COATED ORAL at 21:01

## 2023-12-06 RX ADMIN — GABAPENTIN 100 MG: 100 CAPSULE ORAL at 21:01

## 2023-12-06 ASSESSMENT — COGNITIVE AND FUNCTIONAL STATUS - GENERAL
PERSONAL GROOMING: A LITTLE
STANDING UP FROM CHAIR USING ARMS: A LOT
TURNING FROM BACK TO SIDE WHILE IN FLAT BAD: A LOT
TOILETING: A LITTLE
CLIMB 3 TO 5 STEPS WITH RAILING: A LOT
WALKING IN HOSPITAL ROOM: A LOT
MOVING TO AND FROM BED TO CHAIR: A LOT
MOBILITY SCORE: 12
DRESSING REGULAR LOWER BODY CLOTHING: A LITTLE
EATING MEALS: A LITTLE
DAILY ACTIVITIY SCORE: 18
DRESSING REGULAR UPPER BODY CLOTHING: A LITTLE
HELP NEEDED FOR BATHING: A LITTLE
MOVING FROM LYING ON BACK TO SITTING ON SIDE OF FLAT BED WITH BEDRAILS: A LOT

## 2023-12-06 ASSESSMENT — PAIN - FUNCTIONAL ASSESSMENT
PAIN_FUNCTIONAL_ASSESSMENT: 0-10

## 2023-12-06 ASSESSMENT — PAIN SCALES - GENERAL
PAINLEVEL_OUTOF10: 0 - NO PAIN
PAINLEVEL_OUTOF10: 5 - MODERATE PAIN
PAINLEVEL_OUTOF10: 7
PAINLEVEL_OUTOF10: 3
PAINLEVEL_OUTOF10: 9

## 2023-12-06 ASSESSMENT — PAIN DESCRIPTION - ORIENTATION: ORIENTATION: LEFT

## 2023-12-06 ASSESSMENT — PAIN SCALES - WONG BAKER
WONGBAKER_NUMERICALRESPONSE: NO HURT
WONGBAKER_NUMERICALRESPONSE: NO HURT

## 2023-12-06 ASSESSMENT — PAIN DESCRIPTION - LOCATION: LOCATION: FOOT

## 2023-12-06 NOTE — CARE PLAN
Problem: Fall/Injury  Goal: Not fall by end of shift  12/6/2023 1508 by Bertha Whitney RN  Outcome: Progressing  12/6/2023 1338 by Bertha Whitney RN  Outcome: Progressing  Goal: Be free from injury by end of the shift  12/6/2023 1508 by Bertha Whitney RN  Outcome: Progressing  12/6/2023 1338 by Bertha Whitney RN  Outcome: Progressing  Goal: Verbalize understanding of personal risk factors for fall in the hospital  12/6/2023 1508 by Bertha Whitney RN  Outcome: Progressing  12/6/2023 1338 by Bertha Whitney RN  Outcome: Progressing  Goal: Verbalize understanding of risk factor reduction measures to prevent injury from fall in the home  12/6/2023 1508 by Bertha Whitney RN  Outcome: Progressing  12/6/2023 1338 by Bertha Whitney RN  Outcome: Progressing  Goal: Use assistive devices by end of the shift  12/6/2023 1508 by Bertha Whitney RN  Outcome: Progressing  12/6/2023 1338 by Bertha Whitney RN  Outcome: Progressing  Goal: Pace activities to prevent fatigue by end of the shift  12/6/2023 1508 by Bertha Whitney RN  Outcome: Progressing  12/6/2023 1338 by Bertha Whitney RN  Outcome: Progressing     Problem: Skin  Goal: Participates in plan/prevention/treatment measures  12/6/2023 1508 by Bertha Whitney RN  Outcome: Progressing  12/6/2023 1338 by Bertha Whitney RN  Outcome: Progressing  Goal: Prevent/manage excess moisture  12/6/2023 1508 by Bertha Whitney RN  Outcome: Progressing  12/6/2023 1338 by Bertha Whitney RN  Outcome: Progressing  Goal: Promote/optimize nutrition  12/6/2023 1508 by Bertha Whitney RN  Outcome: Progressing  12/6/2023 1338 by Bertha Whitney RN  Outcome: Progressing     Problem: Pain  Goal: Takes deep breaths with improved pain control throughout the shift  12/6/2023 1508 by Bertha Whitney RN  Outcome: Progressing  12/6/2023 1338 by Bertha Whitney RN  Outcome: Progressing  Goal: Turns in bed with improved pain control throughout the  shift  12/6/2023 1508 by Bertha Whitney RN  Outcome: Progressing  12/6/2023 1338 by Bertha Whitney RN  Outcome: Progressing  Goal: Walks with improved pain control throughout the shift  12/6/2023 1508 by Bertha Whitney RN  Outcome: Progressing  12/6/2023 1338 by Bertha Whitney RN  Outcome: Progressing  Goal: Performs ADL's with improved pain control throughout shift  12/6/2023 1508 by Bertha Whitney RN  Outcome: Progressing  12/6/2023 1338 by Bertha Whitney RN  Outcome: Progressing  Goal: Participates in PT with improved pain control throughout the shift  12/6/2023 1508 by Bertha Whitney RN  Outcome: Progressing  12/6/2023 1338 by Bertha Whitney RN  Outcome: Progressing  Goal: Free from opioid side effects throughout the shift  12/6/2023 1508 by Bertha Whitney RN  Outcome: Progressing  12/6/2023 1338 by Bertha Whitney RN  Outcome: Progressing  Goal: Free from acute confusion related to pain meds throughout the shift  12/6/2023 1508 by Bertha Whitney RN  Outcome: Progressing  12/6/2023 1338 by Bertha Whitney RN  Outcome: Progressing   The patient's goals for the shift include      The clinical goals for the shift include Pt will have no sign of bleeding throughout the shift

## 2023-12-06 NOTE — PROGRESS NOTES
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 foraminal narrowing as described above. The soft tissues of the spinal canal and neural foramen are suboptimally evaluated on this CT study.   MACRO: None   Signed by: Charlie Betts 11/24/2023 4:30 PM Dictation workstation:   [id number]    CT head wo IV contrast    Result Date: 11/24/2023  Interpreted By:  Charlie Betts, STUDY: CT HEAD WO IV CONTRAST;  11/24/2023 4:11 pm   INDICATION: Signs/Symptoms:left lower extremity weakness.   COMPARISON: None.   ACCESSION NUMBER(S): [id number]   ORDERING CLINICIAN: CHOLO JORDAN   TECHNIQUE: Axial CT images of the head were obtained without intravenous contrast administration.   FINDINGS: There is moderate brain parenchymal volume loss.   The lateral ventricles demonstrate mild  disproportionate prominence when compared with the sulci within the cerebral convexities. No obstructing mass lesion is noted on this noncontrast CT study. This mild disproportionate ventriculomegaly may be related to more pronounced central volume loss ossific a component of communicating hydrocephalus.   There are patchy and confluent nonspecific white matter changes within the cerebral hemispheres bilaterally which while nonspecific, can be seen with small-vessel ischemic change among others.   Additional focal hypodensities are identified within the subinsular regions and basal ganglia bilaterally suggesting incidental prominent perivascular spaces and/or scattered lacunar infarctions.   No hyperdense acute intracranial hemorrhage is noted.   There is no midline shift.   There are scattered retention cysts or polyps within the bilateral maxillary sinuses. The remaining paranasal sinuses are clear.   There is opacification of the left middle ear cavity and left mastoid air cells.   There is a metallic foreign body within the right facial soft tissues surrounding the anterior margin of the right parotid gland.       There is moderate brain parenchymal volume loss.   The lateral ventricles demonstrate mild disproportionate prominence when compared with the sulci within the cerebral convexities. No obstructing mass lesion is noted on this noncontrast CT study. This mild disproportionate ventriculomegaly may be related to more pronounced central volume loss ossific a component of communicating hydrocephalus.   There are patchy and confluent nonspecific white matter changes within the cerebral hemispheres bilaterally which while nonspecific, can be seen with small-vessel ischemic change among others. Additional focal hypodensities are identified within the subinsular regions and basal ganglia bilaterally suggesting incidental prominent perivascular spaces and/or scattered lacunar infarctions.   There is opacification  of the left middle ear cavity and left mastoid air cells.   There is a metallic foreign body within the right facial soft tissues surrounding the anterior margin of the right parotid gland.   MACRO: None.   Signed by: Charlie Betts 11/24/2023 4:21 PM Dictation workstation:   [id number]    XR hip left 2 or 3 views    Result Date: 11/24/2023  Interpreted By:  Nadir Bender, STUDY: XR HIP LEFT 2 OR 3 VIEWS; XR FEMUR LEFT 2+ VIEWS; ;  11/24/2023 3:58 pm   INDICATION: Signs/Symptoms:weakness.   COMPARISON: None.   ACCESSION NUMBER(S): [id number]; [id number]   ORDERING CLINICIAN: CHOLO JORDAN   FINDINGS: Left hip, three views. Left femur, two views.   There is no fracture. There is no dislocation. Moderate degenerative changes present in the hip and in the left knee. There is no malalignment. Linear heterotopic ossification at the lateral aspect of the left hip.       No acute abnormality seen. Moderate degenerative changes     MACRO: None   Signed by: Nadir Bender 11/24/2023 4:02 PM Dictation workstation:   [id number]    XR femur left 2+ views    Result Date: 11/24/2023  Interpreted By:  Nadir Bender, STUDY: XR HIP LEFT 2 OR 3 VIEWS; XR FEMUR LEFT 2+ VIEWS; ;  11/24/2023 3:58 pm   INDICATION: Signs/Symptoms:weakness.   COMPARISON: None.   ACCESSION NUMBER(S): [id number]; [id number]   ORDERING CLINICIAN: CHOLO JORDAN   FINDINGS: Left hip, three views. Left femur, two views.   There is no fracture. There is no dislocation. Moderate degenerative changes present in the hip and in the left knee. There is no malalignment. Linear heterotopic ossification at the lateral aspect of the left hip.       No acute abnormality seen. 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  - Charts, labs, vitals and imaging all reviewed.   - Labs: WBC 7.9, Hgb 7.3, CRP 4.15, , lactate 0.6, HgbA1c 12.6, INR 1.1  - PVRs: Critical Result: Posterior tibial and dorsalis pedis are not audible on the left. Severe occlusive disease B/L.  RIGHT Lower PVR                Pressures Ratios  Right High Thigh               256 mmHg  1.54  Right Low Thigh                256 mmHg  1.54  Right Calf                     182 mmHg  1.10  Right Posterior Tibial (Ankle) 126 mmHg  0.76  Right Dorsalis Pedis (Ankle)   106 mmHg  0.64  Right Digit (Great Toe)        51 mmHg   0.31  - Blood culture: no growth    Plan:  - Abx: per primary/ID  - Pain Regimen: per primary/ID  - Dressings: betadine soaked gauze, dry gauze, aga, tape. Recommend daily dressing changes.   - Nursing staff is able to change/reinforce dressing if & as necessary until next day’s dressing change. Thank you.  -left hallux is clinically dry and stable. With critical results of complete occlusion of the DP/PT and popliteal, patient is at a very high risk for limb loss.  -Successful EVLS intervention today:   -Plan for TMA of left foot on Friday with Dr. Gill, time TBD. Consent completed. Please make NPO at midnight Thursday. OK to continue AC regimen. Please obtain active type and screen.   - Podiatry will continue to follow while in house.    DVT ppx: per primary  Weightbearing: WBAT    Case to be discussed with attending, A&P above reflects a tentative plan. Please await for the final signature from the attending physician on service.    Phylicia Espinoza DPM PGY-2  Podiatric Medicine & Surgery  Please Haiku message me with any questions or concerns.            SIGNATURE: Phylicia Espinoza DPM PATIENT NAME: Kwadwo  Evelina   DATE: December 6, 2023 MRN: [id number]   TIME: 1:19 PM CONTACT: Haiku Message

## 2023-12-06 NOTE — POST-PROCEDURE NOTE
Physician Transition of Care Summary  Invasive Cardiovascular Lab    Procedure Date: 12/6/2023  Attending:    Jean Claude Ward - Primary  Resident/Fellow/Other Assistant: Surgeon(s) and Role:    Indications:   Pre-op Diagnosis     * Arterial occlusion [I70.90]     * Acute occlusion of popliteal artery due to thrombosis (CMS/HCC) [I74.3]     * Left leg weakness [R29.898]     * ESRD on hemodialysis (CMS/HCC) [N18.6, Z99.2]     * Critical limb ischemia of left lower extremity (CMS/HCC) [I70.222]     * Toe ulcer, left, with unspecified severity (CMS/HCC) [L97.529]    Post-procedure diagnosis:   Post-op Diagnosis     * Arterial occlusion [I70.90]     * Acute occlusion of popliteal artery due to thrombosis (CMS/HCC) [I74.3]     * Left leg weakness [R29.898]     * ESRD on hemodialysis (CMS/HCC) [N18.6, Z99.2]     * Critical limb ischemia of left lower extremity (CMS/HCC) [I70.222]     * Toe ulcer, left, with unspecified severity (CMS/HCC) [L97.529]    Procedure(s):     * Lower Extremity Angiogram  CHG ANGIOGRAPHY EXTREMITY UNILATERAL RS&I [54233]    Procedure Findings:   Complete occlusion of distal SF and popliteal arteries; complete occlusion of AT and PT arteries; short segment occlusion of TP trunk    Description of the Procedure:   1) Left lower extremity angiogram   2) JETi thrombectomy SFA and popliteal artery  3) DCB PTA of SFA, popliteal artery, and TP trunk  4) 7x80mm Everflex stent placement in distal SFA/ popliteal artery; 5x32mm Megatron stent placement in distal popliteal artery/ TP trunk    Complications:   No immediate apparent complications    Stents/Implants:   Cardiovascular Implants       Stent    Stent System, Everflex, 7 X 80 X 150cm - [id number] - Implanted        Inventory item: STENT SYSTEM, EVERFLEX, 7 X 80 X 150CM Model/Cat number: [id number]    : [employer] AKA EV3 "[employer] VAS Lot number: [id number]    Device identifier: [id number]        As of 12/6/2023       Status: Implanted                       StentMireille Us Mr, 5.00 X 32mm - [id number] - Implanted        Inventory item: MIREILLE MILLER US MR, 5.00 X 32MM Model/Cat number: [id number]    : [employer] Lot number: [id number]    Device identifier: [id number]        As of 12/6/2023       Status: Implanted                            Anticoagulation/Antiplatelet Plan:   Loaded with aspirin 325 and plavix 300; continue ASA-81 and plavix 75 daily    Estimated Blood Loss:   60 mL    Anesthesia: Moderate Sedation Anesthesia Staff: No anesthesia staff entered.    Any Specimen(s) Removed:   No specimens collected during this procedure.    Disposition:   Post procedure unit, flat for 2 hours, return to [address]      Electronically signed by: Feroz Bales MD, 12/6/2023 9:49 AM

## 2023-12-06 NOTE — CARE PLAN
The patient's goals for the shift include      The clinical goals for the shift include Pt. will be free from falls and/or injury for duration of shift.

## 2023-12-06 NOTE — CONSULTS
Inpatient consult to Vascular Medicine  Consult performed by: Tomasa Samuel MD  Consult ordered by: Tatiana Pappas MD        History Of Present Illness:    Kwadwo Hoyt is a 57 y.o. male w/ PMHx of DM2 (uncontrolled) c/b neuropathy, HTN, CKD stage 5 now on HD since hospitalization via Rt CW HD cath, HFrEF (EF 50% in 11/2021), & ALEXANDER presented with LLE numbness and weakness causing multiple falls at home. During his hospital stay, the patient underwent CTA which showed complete occlusion of the P1 and P3 segments of the left popliteal artery with some reconstitution of the P2 segment and likely complete occlusion of the left anterior tibial and posterior tibial arteries. Vascular surgery was consulted and underwent diagnostic angiogram on 12/1/2023 which mild proximal SFA stenosis, distal SFA occlusion, reconstitution of the below-the-knee popliteal artery with heavy disease of the TP trunk causing >75% stenosis. AT and PT occluded at their origins. PT does not reconstitute. The peroneal fills with multilevel disease, but flows to the ankle. Distal AT at the ankle reconstitutes via collaterals from peroneal and carries on as a diminutive DP. Podiatry was consulted for dry gangrene of the left hallux however recommended revascularization before attempting surgical intervention and the patient is planned to undergo LLE angioplasty 12/6/23 with Interventional Cardiology. As for his other medical co morbidities, the patient has CKD stage 5 but was noted to have worsening creatinine during the hospital stay and nephrology were involved and proceeded with hemodialysis.      Last Recorded Vitals:  Vitals:    12/05/23 1518 12/05/23 2033 12/05/23 2316 12/06/23 0738   BP: (!) 144/98 150/81 144/73    Pulse: 76 90 92    Resp: 20 16 18    Temp: 36.2 °C (97.2 °F)      TempSrc:       SpO2: 99% 99% 94% 97%   Weight:       Height:           Last Labs:  CBC - 12/5/2023:  6:13 AM  7.9 7.3 268    23.8      CMP - 12/5/2023:  6:13  AM  9.5 _ _ --- _   5.6 3.4 _ _      PTT - 12/2/2023: 11:15 AM  1.1   12.7 71     Hemoglobin A1C   Date/Time Value Ref Range Status   11/24/2023 03:31 PM 12.6 (H) see below % Final   05/24/2023 02:20 PM 11.0 (H) 4.3 - 5.6 % Final     Comment:     American Diabetes Association guidelines indicate that patients with HgbA1c in the range 5.7-6.4% are at increased risk for development of diabetes, and intervention by lifestyle modification may be beneficial. HgbA1c greater or equal to 6.5% is considered diagnostic of diabetes.   09/05/2022 01:33 PM 12.4 (H) 4.0 - 5.6 % Final   09/14/2021 05:38 AM 12.5 (A) % Final     Comment:          Diagnosis of Diabetes-Adults   Non-Diabetic: < or = 5.6%   Increased risk for developing diabetes: 5.7-6.4%   Diagnostic of diabetes: > or = 6.5%  .       Monitoring of Diabetes                Age (y)     Therapeutic Goal (%)   Adults:          >18           <7.0   Pediatrics:    13-18           <7.5                   7-12           <8.0                   0- 6            7.5-8.5   American Diabetes Association. Diabetes Care 33(S1), Jan 2010.     01/14/2021 05:22 AM 12.3 (H) 4.3 - 5.6 % Final     Comment:     American Diabetes Association guidelines indicate that patients with HgbA1c in   the range 5.7-6.4% are at increased risk for development of diabetes, and   intervention by lifestyle modification may be beneficial. HgbA1c greater or   equal to 6.5% is considered diagnostic of diabetes.     LDL Calculated   Date/Time Value Ref Range Status   11/24/2023 03:31  (H) <=99 mg/dL Final     Comment:                                 Near   Borderline      AGE      Desirable  Optimal    High     High     Very High     0-19 Y     0 - 109     ---    110-129   >/= 130     ----    20-24 Y     0 - 119     ---    120-159   >/= 160     ----      >24 Y     0 -  99   100-129  130-159   160-189     >/=190       VLDL   Date/Time Value Ref Range Status   11/24/2023 03:31 PM 49 (H) 0 - 40 mg/dL Final     "  Last I/O:  I/O last 3 completed shifts:  In: 2093.1 (16.6 mL/kg) [P.O.:360; I.V.:1533.1 (12.1 mL/kg); Other:200]  Out: 4250 (33.7 mL/kg) [Urine:1850 (0.4 mL/kg/hr); Other:2400]  Weight: 126.3 kg     Past Cardiology Tests (Last 3 Years):  EKG:  No results found for this or any previous visit from the past 1095 days.    Echo:  Transthoracic Echo (TTE) Complete 11/25/2023    Ejection Fractions:  No results found for: \"EF\"  Cath:  No results found for this or any previous visit from the past 1095 days.    Stress Test:  No results found for this or any previous visit from the past 1095 days.    Cardiac Imaging:  No results found for this or any previous visit from the past 1095 days.      Past Medical History:  He has a past medical history of Diabetes mellitus (CMS/[facility]), Hyperlipidemia, and Hypertension.    Past Surgical History:  He has a past surgical history that includes Amputation foot / toe; CT angio aorta and bilateral iliofemoral runoff w and or wo IV contrast (11/24/2023); IR CVC nontunneled (11/28/2023); and IR CVC tunneled (11/30/2023).      Social History:  He reports that he has never smoked. He has never used smokeless tobacco. He reports current alcohol use. He reports current drug use. Drug: Marijuana.    Family History:  No family history on file.     Allergies:  Nsaids (non-steroidal anti-inflammatory drug)    Inpatient Medications:  Scheduled medications   Medication Dose Route Frequency    acetaminophen  975 mg oral q8h    alteplase  2 mg intra-catheter Daily    alteplase  2 mg intra-catheter Daily    amLODIPine  10 mg oral Daily    aspirin  81 mg oral Daily    atorvastatin  80 mg oral Nightly    B complex-vitamin C-folic acid  1 capsule oral Daily    calcitriol  0.25 mcg oral Daily    carvedilol  25 mg oral BID    darbepoetin abhishek  0.45 mcg/kg subcutaneous Weekly    gabapentin  100 mg oral q PM    insulin glargine  25 Units subcutaneous Nightly    insulin lispro  0-5 Units subcutaneous TID with " meals    insulin lispro  7 Units subcutaneous TID with meals    iron sucrose  200 mg intravenous Daily    lidocaine-prilocaine   Topical Daily    melatonin  5 mg oral Nightly    pantoprazole  20 mg oral Daily before breakfast    polyethylene glycol  17 g oral BID    sennosides-docusate sodium  1 tablet oral Nightly    torsemide  100 mg oral Daily     PRN medications   Medication    dextrose 10 % in water (D10W)    dextrose    eucerin    fentaNYL PF    glucagon    heparin    heparin    hydrALAZINE    HYDROmorphone    hydrOXYzine HCL    lidocaine PF    midazolam    oxyCODONE    sodium chloride 0.9%     Continuous Medications   Medication Dose Last Rate    heparin  0-4,500 Units/hr 2,700 Units/hr (12/05/23 9265)    sodium chloride 0.9%   50 mL/hr (12/06/23 5842)     Outpatient Medications:  Current Outpatient Medications   Medication Instructions    allopurinol (ZYLOPRIM) 100 mg, oral, Daily    amLODIPine (NORVASC) 10 mg, oral, Daily    aspirin 81 mg, oral, Daily    calcitriol (ROCALTROL) 0.25 mcg, oral, Three times a week    carvedilol (COREG) 25 mg, oral, 2 times daily with meals    gabapentin (NEURONTIN) 300 mg, oral, 2 times daily    insulin glargine (LANTUS U-100 INSULIN) 40 Units, subcutaneous, Nightly, Take as directed per insulin instructions.    insulin lispro (HUMALOG) 10 Units, subcutaneous, 3 times daily with meals, Take as directed per insulin instructions.    sildenafil (VIAGRA) 50 mg, oral, As needed    simvastatin (ZOCOR) 20 mg, oral, Nightly    torsemide (DEMADEX) 40 mg, oral, 2 times daily       Physical Exam:  General Appearance:    Alert, cooperative, no distress, appears stated age  Lungs:   Respirations unlabored  Heart:    Regular rate and rhythm, S1 and S2 normal, no murmur, rub  or gallop.  Abdomen:  Soft,  Extremities: LLE with dry gangrene over the hallux, overall limb is swollen, motor function intact.           TTE 11/25/23:  CONCLUSIONS:   1. Left ventricular systolic function is normal  with a 60-65% estimated ejection fraction.   2. Spectral Doppler shows a pseudonormal pattern of left ventricular diastolic filling.   3. There are elevated left atrial and left ventricular end diastolic pressures.   4. There is severe concentric left ventricular hypertrophy.   5. Findings consistent with HFpEF.    CT Aorta with bilateral femoral run off:  1. Multifocal pelvic and to a greater distal lower extremity  atherosclerotic disease. There is aproximally 7 cm in length  occlusion of the distal left superficial femoral artery with some  collaterals from the deep femoral vessels. There is reconstitution of  flow at the distal most aspect of the superficial femoral artery,  however there is moderate noncalcified plaque/thrombus throughout the  popliteal artery and complete occlusion of the distal popliteal  artery. The left peroneal artery appears patent. The anterior tibial  trunk, anterior tibial, and posterior tibial arteries are occluded on  the left.  2. There is also heavy atherosclerotic stenosis of the right distal  lower extremity vessels with multifocal stenosis or occlusion of the  right anterior tibial and peroneal arteries with some flow noted  within the right posterior tibial artery, which also however  demonstrates severe stenosis.  3. Partially visualized postsurgical changes of the surgical fixation  of chronic bimalleolar fractures without gross evidence of hardware  complication. Moderate tibiotalar joint arthropathy. Partially  visualized fixation of tarsal/metatarsal joints with partial  amputation of the 3rd through 5th metatarsals.    Assessment/Plan   Kwadwo Hoyt is a 57 y.o. male w/ PMHx of DM2 (uncontrolled) c/b neuropathy, HTN, CKD stage 5 now on HD since hospitalization via Rt CW HD cath, HFrEF (EF 50% in 11/2021), & ALEXANDER presented with LLE numbness and weakness causing multiple falls at home. Admitted with left popliteal artery s/p thrombectomy SFA and popliteal artery along with  stent placement in distal SFA/popliteal artery and distal popliteal/TP trunk.    Recommendations:  -Currently on ASA/plavix and atorva 80.  -Would recommend continuing heparin gtt. Given that he had an arterial thrombus and he is now on HD, he will likely need to be on Warfarin long term.  -Hypercoagulable work up (Fasting homocysteine level, lipoprotein A, lupus anticoagulant (LA) activity, cardiolipin (aCL) antibodies, and beta-2 glycoprotein 1 antibodies.  -Will need to evaluate for possible source if the thrombus was related to an embolic event, please obtain TTE-bubble study and CT angio chest to evaluate the aortic arch (was not seen in his prior aortic angiogram study).    Code Status:  Full Code    This case was discussed with Dr. Guerra and > 30 minutes was spent in the professional and overall care of this patient.    Tomasa Samuel MD  Cardiology Fellow PGY4    I saw and evaluated the patient. I personally obtained the key and critical portions of the history and physical exam or was physically present for key and critical portions performed by the resident/fellow. I reviewed the resident/fellow's documentation and discussed the patient with the resident/fellow. I agree with the resident/fellow's medical decision making as documented in the note.    LLE ALI and CLI s/p left popliteal thrombectomy, distal SFA/popliteal stent, distal pop/TP stent   Likely atherothrombosis, but given ALI presentation, will proceed with limited hypercoag workup as mentioned above and rule out possible embolic sources.  In the meanwhile, continue heparin gtt. if okay by primary team/consultants unless it is contraindicated or hold for planned procedures    Nava Guerra MD

## 2023-12-06 NOTE — CARE PLAN
Problem: Fall/Injury  Goal: Not fall by end of shift  12/6/2023 1510 by Bertha Whitney RN  Outcome: Progressing  12/6/2023 1508 by Bertha Whitney RN  Outcome: Progressing  12/6/2023 1338 by Bertha Whitney RN  Outcome: Progressing  Goal: Be free from injury by end of the shift  12/6/2023 1510 by Bertha Whitney, RN  Outcome: Progressing  12/6/2023 1508 by Bertha Whitney, RN  Outcome: Progressing  12/6/2023 1338 by Bertha Whitney, RN  Outcome: Progressing  Goal: Verbalize understanding of personal risk factors for fall in the hospital  12/6/2023 1510 by Bertah Whitney, RN  Outcome: Progressing  12/6/2023 1508 by Bertha Whitney, RN  Outcome: Progressing  12/6/2023 1338 by Bertha Whitney, RN  Outcome: Progressing  Goal: Verbalize understanding of risk factor reduction measures to prevent injury from fall in the home  12/6/2023 1510 by Bertha Whitney, RN  Outcome: Progressing  12/6/2023 1508 by Bertha Whitney, RN  Outcome: Progressing  12/6/2023 1338 by Bertha Whitney RN  Outcome: Progressing  Goal: Use assistive devices by end of the shift  12/6/2023 1510 by Bertha Whitney, RN  Outcome: Progressing  12/6/2023 1508 by Bertha Whitney, RN  Outcome: Progressing  12/6/2023 1338 by Bertha Whitney, RN  Outcome: Progressing  Goal: Pace activities to prevent fatigue by end of the shift  12/6/2023 1510 by Bertha Whitney, RN  Outcome: Progressing  12/6/2023 1508 by Bertha Whitney, RN  Outcome: Progressing  12/6/2023 1338 by Bertha Whitney, RN  Outcome: Progressing     Problem: Skin  Goal: Participates in plan/prevention/treatment measures  12/6/2023 1510 by Bertha Whitney, MANOJ  Outcome: Progressing  12/6/2023 1508 by Bertha Whitney RN  Outcome: Progressing  12/6/2023 1338 by Bertha Whitney RN  Outcome: Progressing  Goal: Prevent/manage excess moisture  12/6/2023 1510 by Bertha Whitney RN  Outcome: Progressing  12/6/2023 1508 by Bertha Whitney RN  Outcome: Progressing  12/6/2023  1338 by Bertha Whitney, RN  Outcome: Progressing  Goal: Promote/optimize nutrition  12/6/2023 1510 by Bertha Whitney, RN  Outcome: Progressing  12/6/2023 1508 by Bertha Whitney, RN  Outcome: Progressing  12/6/2023 1338 by Bertha Whitney, RN  Outcome: Progressing     Problem: Pain  Goal: Takes deep breaths with improved pain control throughout the shift  12/6/2023 1510 by Bertha Whitney, RN  Outcome: Progressing  12/6/2023 1508 by Bertha Whitney, RN  Outcome: Progressing  12/6/2023 1338 by Bertha Whitney RN  Outcome: Progressing  Goal: Turns in bed with improved pain control throughout the shift  12/6/2023 1510 by Bertha Whitney, RN  Outcome: Progressing  12/6/2023 1508 by Bertha Whitney, RN  Outcome: Progressing  12/6/2023 1338 by Bertha Whitney, RN  Outcome: Progressing  Goal: Walks with improved pain control throughout the shift  12/6/2023 1510 by Bertha Whitney, RN  Outcome: Progressing  12/6/2023 1508 by Bertha Whtiney, RN  Outcome: Progressing  12/6/2023 1338 by Bertha Whitney RN  Outcome: Progressing  Goal: Performs ADL's with improved pain control throughout shift  12/6/2023 1510 by Bertha Whitney, RN  Outcome: Progressing  12/6/2023 1508 by Bertha Whitney, RN  Outcome: Progressing  12/6/2023 1338 by Bertha Whitney, RN  Outcome: Progressing  Goal: Participates in PT with improved pain control throughout the shift  12/6/2023 1510 by Bertha Whitney, RN  Outcome: Progressing  12/6/2023 1508 by Bertha Whitney, RN  Outcome: Progressing  12/6/2023 1338 by Bertha Whitney, RN  Outcome: Progressing  Goal: Free from opioid side effects throughout the shift  12/6/2023 1510 by Bertha Whitney, RN  Outcome: Progressing  12/6/2023 1508 by Bertha Whitney RN  Outcome: Progressing  12/6/2023 1338 by Bertha Whitney RN  Outcome: Progressing  Goal: Free from acute confusion related to pain meds throughout the shift  12/6/2023 1510 by Bertha Whitney RN  Outcome:  Progressing  12/6/2023 1508 by Bertha Whitney RN  Outcome: Progressing  12/6/2023 1338 by Bertha Whitney RN  Outcome: Progressing   The patient's goals for the shift include      The clinical goals for the shift include Pt will have no sign of bleeding throughout the shift

## 2023-12-06 NOTE — PROGRESS NOTES
"Kwadwo Hoyt is a 57 y.o. male on day 11 of admission presenting with Arterial occlusion.    Subjective   Patient expressing frustration this morning that he did not get his procedure yesterday. Otherwise no new physical complaints.    Objective     Physical Exam:  Constitutional: NAD  Respiratory: unlabored breathing on RA  Cardiovascular: non-tachycardic  MSK: normal ROM  Extremities: groin soft, no evidence of hematoma. Motor/sensory intact     Last Recorded Vitals  Blood pressure 139/77, pulse 79, temperature 36.7 °C (98.1 °F), resp. rate 18, height 1.854 m (6' 1\"), weight 126 kg (278 lb 7.1 oz), SpO2 93 %.  Intake/Output last 3 Shifts:  I/O last 3 completed shifts:  In: 2093.1 (16.6 mL/kg) [P.O.:360; I.V.:1533.1 (12.1 mL/kg); Other:200]  Out: 4250 (33.7 mL/kg) [Urine:1850 (0.4 mL/kg/hr); Other:2400]  Weight: 126.3 kg         A/P     Kwadwo Hoyt is a 57 y.o. male with PMH of DM neuropathy, multiple toe amputations with podiatry, HTN, CKD 4 presenting with two days of LLE weakness causing him to fall. Endorses no pain, movement intact. CTA showed complete occlusion of the P1 and P3 segments of the left popliteal artery with some reconstitution of the P2 segment and likely complete occlusion of the left anterior tibial and posterior tibial arteries. Physical exam showed a monophasic left AT, no dopplerable DP.       Patient s/p angio w/ EVLS 12/6. LLE angio, JETi thrombectomy SFA and popliteal, DCB PTA of SFA, pop and PT trunk, distal SFA/pop stent placed, distal pop/TP stent placed.    Given successful revascularization with EVLS, no vascular surgical intervention needed at this time. Vascular surgery will sign-off, please reach out with questions.    Brendon Hilton MD  General Surgery, pgy3  Vascular [id number]         "

## 2023-12-06 NOTE — CARE PLAN
Problem: Fall/Injury  Goal: Not fall by end of shift  Outcome: Progressing  Goal: Be free from injury by end of the shift  Outcome: Progressing  Goal: Verbalize understanding of personal risk factors for fall in the hospital  Outcome: Progressing  Goal: Verbalize understanding of risk factor reduction measures to prevent injury from fall in the home  Outcome: Progressing  Goal: Use assistive devices by end of the shift  Outcome: Progressing  Goal: Pace activities to prevent fatigue by end of the shift  Outcome: Progressing     Problem: Skin  Goal: Participates in plan/prevention/treatment measures  Outcome: Progressing  Goal: Prevent/manage excess moisture  Outcome: Progressing  Goal: Promote/optimize nutrition  Outcome: Progressing     Problem: Pain  Goal: Takes deep breaths with improved pain control throughout the shift  Outcome: Progressing  Goal: Turns in bed with improved pain control throughout the shift  Outcome: Progressing  Goal: Walks with improved pain control throughout the shift  Outcome: Progressing  Goal: Performs ADL's with improved pain control throughout shift  Outcome: Progressing  Goal: Participates in PT with improved pain control throughout the shift  Outcome: Progressing  Goal: Free from opioid side effects throughout the shift  Outcome: Progressing  Goal: Free from acute confusion related to pain meds throughout the shift  Outcome: Progressing   The patient's goals for the shift include      The clinical goals for the shift include Will have adequate pain control during shift and be prep for angiogram of left leg in the morning

## 2023-12-06 NOTE — PROGRESS NOTES
57 y.o. male admitted for Arterial occlusion [I70.90]  HFrEF (heart failure with reduced ejection fraction) (CMS/HCC) [I50.20]  Acute deep vein thrombosis (DVT) of left lower extremity after procedure (CMS/HCC) [I97.89, I82.402]  Acute occlusion of popliteal artery due to thrombosis (CMS/HCC) [I74.3].     Subjective          Objective     Scheduled Medications:   acetaminophen, 975 mg, oral, q8h  alteplase, 2 mg, intra-catheter, Daily  alteplase, 2 mg, intra-catheter, Daily  amLODIPine, 10 mg, oral, Daily  aspirin, 81 mg, oral, Daily  atorvastatin, 80 mg, oral, Nightly  B complex-vitamin C-folic acid, 1 capsule, oral, Daily  calcitriol, 0.25 mcg, oral, Daily  carvedilol, 25 mg, oral, BID  darbepoetin abhishek, 0.45 mcg/kg, subcutaneous, Weekly  gabapentin, 100 mg, oral, q PM  insulin glargine, 25 Units, subcutaneous, Nightly  insulin lispro, 0-5 Units, subcutaneous, TID with meals  insulin lispro, 7 Units, subcutaneous, TID with meals  iron sucrose, 200 mg, intravenous, Daily  lidocaine-prilocaine, , Topical, Daily  melatonin, 5 mg, oral, Nightly  pantoprazole, 20 mg, oral, Daily before breakfast  polyethylene glycol, 17 g, oral, BID  sennosides-docusate sodium, 1 tablet, oral, Nightly  torsemide, 100 mg, oral, Daily         Continuous Medications:   heparin, 0-4,500 Units/hr, Last Rate: 2,700 Units/hr (12/05/23 2315)         PRN Medications:   PRN medications: dextrose 10 % in water (D10W), dextrose, eucerin, glucagon, heparin, hydrALAZINE, HYDROmorphone, hydrOXYzine HCL, oxyCODONE    Dietary Orders (From admission, onward)       Start     Ordered    12/06/23 1040  Adult diet Renal; Potassium Restricted 2 gm (50mEq); 2 - 3 grams Sodium  Diet effective now        Question Answer Comment   Diet type Renal    Potassium restriction: Potassium Restricted 2 gm (50mEq)    Sodium restriction: 2 - 3 grams Sodium        12/06/23 1039                    Vitals:  Most Recent:  Vitals:    12/06/23 1025   BP: (!) 152/100   Pulse: 83    Resp: 18   Temp: 36.7 °C (98.1 °F)   SpO2: 97%       24hr Min/Max:  Temp  Min: 36.2 °C (97.2 °F)  Max: 36.7 °C (98.1 °F)  Pulse  Min: 76  Max: 92  BP  Min: 144/73  Max: 152/100  Resp  Min: 16  Max: 21  SpO2  Min: 94 %  Max: 99 %    Intake/Output x24h:    Intake/Output Summary (Last 24 hours) at 12/6/2023 1054  Last data filed at 12/6/2023 0938  Gross per 24 hour   Intake 716.85 ml   Output 1210 ml   Net -493.15 ml          Physical Exam:  Physical Exam  Constitutional:       General: He is not in acute distress.     Appearance: Normal appearance. He is obese. He is not ill-appearing.   HENT:      Nose: No congestion or rhinorrhea.   Eyes:      Extraocular Movements: Extraocular movements intact.      Conjunctiva/sclera: Conjunctivae normal.   Cardiovascular:      Rate and Rhythm: Normal rate.      Comments: No distal pulses felt on left lower extremity  Pulmonary:      Effort: Pulmonary effort is normal. No respiratory distress.   Abdominal:      General: Abdomen is flat. Bowel sounds are normal. There is no distension.      Palpations: Abdomen is soft.      Tenderness: There is no abdominal tenderness. There is no guarding or rebound.   Musculoskeletal:         General: Tenderness and deformity present.      Cervical back: Normal range of motion.      Comments: LLE w/ multiple amputaions and dry gangrene of remaining big toe.   Skin:     General: Skin is dry.   Neurological:      General: No focal deficit present.      Mental Status: He is alert and oriented to person, place, and time.         Relevant Results  Results for orders placed or performed during the hospital encounter of 11/24/23 (from the past 24 hour(s))   POCT GLUCOSE   Result Value Ref Range    POCT Glucose 104 (H) 74 - 99 mg/dL   POCT GLUCOSE   Result Value Ref Range    POCT Glucose 117 (H) 74 - 99 mg/dL   POCT GLUCOSE   Result Value Ref Range    POCT Glucose 194 (H) 74 - 99 mg/dL   POCT GLUCOSE   Result Value Ref Range    POCT Glucose 128 (H) 74  - 99 mg/dL      IR CVC tunneled    Result Date: 11/30/2023  Interpreted By:  Cody Garzon, STUDY: IR CVC TUNNELED;  11/30/2023 11:10 am   INDICATION: Signs/Symptoms:change temporary dialysis cath to tunneled catheter.   COMPARISON: None.   ACCESSION NUMBER(S): [id number]   ORDERING CLINICIAN: ISABELL LEGGETT   TECHNIQUE: INTERVENTIONALIST(S): Cody Garzon MD   CONSENT: The patient was informed of the nature of the proposed procedure. The purposes, alternatives, risks, and benefits were explained and discussed. All questions were answered and consent was obtained.   RADIATION EXPOSURE: Fluoroscopy time: Less than 0.1 min. Dose: 0.6 mGy. Dose Area Product (DAP): 217   SEDATION: Moderate conscious IV sedation services (supervision of administration, induction, and maintenance) were provided by the physician performing the procedure with intravenous fentanyl 100 mcg and versed 2 mg for 15 minutes. The physician was assisted by an independent trained observer, an interventional radiology nurse, in the continuous monitoring of patient level of consciousness and physiologic status.   MEDICATION/CONTRAST: No additional   TIME OUT: A time out was performed immediately prior to procedure start with the interventional team, correctly identifying the patient name, date of birth, MRN, procedure, anatomy (including marking of site and side), patient position, procedure consent form, relevant laboratory and imaging test results, antibiotic administration, safety precautions, and procedure-specific equipment needs.   COMPLICATIONS: No immediate adverse events identified.   FINDINGS: In the recombinant position, the patient was positioned on the angiography table.  The patient has an existing  right internal jugular venous temporary  dialysis catheter in place.  The adjacent cutaneous tissues and existing catheter were prepared and draped in usual sterile manner.   After appropriate subcutaneous instillation of  Lidocaine 1% local anesthesia, the securing sutures of the existing temporary dialysis catheter were removed.  The loaded heparin was aspirated from the catheter ports.  A 035  Amplatz guidewire was inserted through the existing  dialysis catheter.  Utilizing intermittent fluoroscopic guidance, the guidewire was advanced into the inferior vena cava to secure access.  The existing catheter was removed with the guidewire left in place.   Local anesthesia was achieved with subcutaneous Lidocaine 1%.  A tunnel tract was created from the  right infraclavicular chest to the existing venotomy site.   A  15 Portuguese x 23 cm  dual-lumen catheter was selected for placement. Venotomy site soft tissue dilation was performed to eventual accommodation of a  15-Portuguese dilator peel-away coaxial sheath system.  The catheter was then advanced over the guidewire with the tips to reside at the caval-atrial junction.  The ports were aspirated without resistance and flushed with normal saline. Heparinized saline was then loaded into the catheter ports.  The external portions of the catheter were secured in place with polypropylene suture.  The venotomy and tunnel sites were closed with discontinuous and pursestring sutures, respectively.  Sterile dressings were then applied.   The patient tolerated the procedure without complication.       1. Technically successful conversion and placement of a  15 Portuguese x 23 cm indwelling  hemodialysis catheter from a temporary hemodialysis catheter. 2. The catheter tip overlies the cavoatrial junction and is ready for immediate use.   I was present for and/or performed the critical portions of the procedure and immediately available throughout the entire procedure.   I personally reviewed the image(s) / study and resident interpretation. I agree with the findings as stated.   Dictated at [facility].   MACRO: None   Signed by: Cody Garzon  11/30/2023 11:18 AM Dictation workstation:   [id number]    XR foot left 3+ views    Result Date: 11/29/2023  Interpreted By:  Amparo Alaniz and Sheng Max STUDY: Left foot  3 views.   INDICATION: Signs/Symptoms:Gangrene of 1st digit   COMPARISON: Left foot radiograph dated 04/16/2018.   ACCESSION NUMBER(S): [id number]   ORDERING CLINICIAN: FABY MARK   FINDINGS: Interval transmetatarsal amputations of the 3rd through 5th digits at the level of the mid metatarsals with unremarkable corticated appearance of the osseous stump as well as unremarkable appearance of the soft tissue stump.   Status post amputation of the 2nd digit at the level of the mid proximal phalanx with unremarkable corticated appearance of the osseous stump as well as unremarkable appearance of the soft tissue stump.     There is soft tissue loss of the tuft of the 1st digit likely from chronic ischemia. No definite erosion of the 1st distal phalanx or additional radiographic findings to suggest osteomyelitis.     Redemonstration of midfoot fusion involving the 1st TMT, 1/2 metatarsal bases, and inter cuneiform articulation with redemonstration of findings of hardware failure with loosening similar to 2018. Advanced tarsometatarsal joint degenerative changes which may be due to neuropathic arthropathy. Mild ankle and dorsal foot soft tissue swelling with cellulitis not ruled out. Moderate tibiotalar and mild subtalar joint degenerative changes with joint space loss and osteophytes. Plantar calcaneal spur which can be associated with plantar fasciitis.       1. Soft tissue ischemia of the tuft of the 1st toe with soft tissue loss. No radiographic findings of osteomyelitis of the 1st digit or the rest of the osseous structures. 2. Interval postsurgical changes with amputation of the 2nd through 5th digits as described above. 3. Soft tissue swelling of the ankle and dorsal foot with cellulitis not ruled out. 4. Redemonstration of  postsurgical changes involving multiple midfoot articulations with similar hardware failure findings when compared with 2018.   I personally reviewed the images/study and I agree with the findings as stated by Dr. Juan Bhagat. This study was interpreted at [facility], [place], [place].   Signed by: Amparo Alaniz 11/29/2023 11:21 AM Dictation workstation:   [id number]      Assessment/Plan   Principal Problem:    Arterial occlusion  Active Problems:    ALEXANDER (obstructive sleep apnea)    ESRD on hemodialysis (CMS/HCC)    SELENA (acute kidney injury) (CMS/HCC)    Peripheral vascular disease, unspecified (CMS/HCC)    Acute occlusion of popliteal artery due to thrombosis (CMS/HCC)    Left leg weakness    Critical limb ischemia of left lower extremity (CMS/HCC)    Toe ulcer, left, with unspecified severity (CMS/[facility])    Kwadwo Hoyt is a 57 y.o. male with a PMHx of DM c/b neuropathy, HTN, CKD stage 5, HFrEF (EF 50% in 11/2021), & ALEXANDER, who presents to the ER with left leg and arm weakness. LLE distal pulses were not palpable and CTA aorta & iliofemoral runoff showed complete left popliteal occlusion. Heparin gtt was started and vascular medicine were consulted but recommended no surgical intervention, recommend PVR/ZAC first. Neuro were also consulted for concern of stroke given patients left sided weakness, rec outpatient follow up. PVR/ZAC showed severe disease at left femoral/popliteal level. 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 Nephrology consulted for worsening Cr and electrolyte status and started dialysis. Tunneled catheter placed by IR 11/30. Pt to c/w dialysis and underwent LLE angiogram with Vascular Surgery on 12/1, completed for peripheral angioplasty with Vascular Surgery 12/6. Currently afebrile, HDS, awaiting further recommendations from surgical teams.      #L Popliteal Artery Occlusion  #Chronic Limb Ischemia  #c/f Stroke  - CTA aorta & B/L iliofemoral runoff  significant for complete occlusion of L popliteal artery as well as anterior and posterior tibial arteries.   - ZAC/PVR showed severe disease at the femoral popliteal level. Biphasic flow is noted in the left popliteal artery. Unable to obtain pressures. The posterior tibial and dorasalis pedis arteries are not audible.  [facility]  -- Underwent Peripheral Angioplasty 12/6  -- c/w ASA-81 and Plavix 75 daily, stop Heparin ggt  - Neurology consulted, stroke follow up in 4-6 weeks, discharge with ziopatch / loop recorder  - atorvastatin 80 mg  - Podiatry consulted for dry gangrene of Left Hallux, no interventions planned until determination from [facility]     #CKD stage 5 2/2 to T2DM  - Admission Cr 6.06, GFR 10 (Baseline Cr 5.5~), dialysis TTS  - Being evaluated at [facility] for [facility] w/ most recent office visit on 9/12/2023  - home Torsemide 100 mg, calcitriol 0.25 mcg QD  - Avoid nephrotoxic agents  - Nephrology consulted due to worsening Cr and Electrolyte status  -- Received HD line 11/28, tunneled line placed 11/30 by IR  - SW contacted to coordinate outpatient dialysis      #Hyponatremia  #Hyperkalemia  #Hyperphosphatemia  - Nephrology consulted  - ordered UA and Urine electrolytes, suggest Post-Renal  - Increased Sodium Bicarb and ordered Lokelma  - Resolved w/ dialysis       #Constipation  - no BM since 11/25  - ordered Miralax BID, Doc-senna  - Normal BM 11/29  - Resolved     #HTN  #HFrEF  - s/p 500 ml LR bolus in ED  - home Aspirin 81 mg  - c/w home Carvedilol 25 mg BID  - c/w home Amlodipine 10 mg every day  - Echo 11.25, 60-65% estimated ejection fraction, pseudonormal pattern of left ventricular diastolic filling, increased LA LV diastolic pressure,severe concentric left ventricular hypertrophy.        #T2DM  #Neuropathy  - Last Hgb A1C 11.0 on 5/24/2023, ordered updated Hgb A1C  - Home Regimen: Lantus 40U at bedtime & Lispro 20U TID w/ meals  - Changed Lantus from 20 -> 25  units at bedtime due to elevated BG   - Started Lispro 7U TID w/ meals  - Mild SSI  - c/w home Gabapentin 300 mg BID  - POCT BG TID & at bedtime  - Hypoglycemia protocol     #Anxiety  - PRN Atarax 25 mg      #ALEXANDER  - CPAP at home, noncompliant        F: PRN  E: PRN  N: Renal  GI: Pantoprazole 20 mg QD  DVT: ASA Plavix       Code Status: Full Code   Emergency Contact: Extended Emergency Contact Information  Primary Emergency Contact: EvelinaMilka  Address: [address] [place] [address]  Home Phone: [phone]  Relation: None  PCP: Thai Talavera MD    Patient seen and discussed with attending physician, Dr. Pappas.  Plan preliminary until cosigned by attending physician.    Reviewed and approved by CHIQUITA AVALOS on 12/6/23 at 10:54 AM.

## 2023-12-07 ENCOUNTER — ANESTHESIA EVENT (OUTPATIENT)
Dept: OPERATING ROOM | Facility: HOSPITAL | Age: 58
DRG: 270 | End: 2023-12-07
Payer: MEDICARE

## 2023-12-07 ENCOUNTER — APPOINTMENT (OUTPATIENT)
Dept: DIALYSIS | Facility: HOSPITAL | Age: 58
End: 2023-12-07
Payer: MEDICARE

## 2023-12-07 ENCOUNTER — APPOINTMENT (OUTPATIENT)
Dept: CARDIOLOGY | Facility: HOSPITAL | Age: 58
DRG: 270 | End: 2023-12-07
Payer: MEDICARE

## 2023-12-07 ENCOUNTER — APPOINTMENT (OUTPATIENT)
Dept: VASCULAR MEDICINE | Facility: HOSPITAL | Age: 58
DRG: 270 | End: 2023-12-07
Payer: MEDICARE

## 2023-12-07 LAB
ABO GROUP (TYPE) IN BLOOD: NORMAL
ACT BLD: 237 SEC (ref 89–169)
ACT BLD: 274 SEC (ref 89–169)
ACT BLD: 317 SEC (ref 89–169)
ALBUMIN SERPL BCP-MCNC: 3.4 G/DL (ref 3.4–5)
ANION GAP SERPL CALC-SCNC: 17 MMOL/L (ref 10–20)
ANTIBODY SCREEN: NORMAL
BASOPHILS # BLD AUTO: 0.03 X10*3/UL (ref 0–0.1)
BASOPHILS NFR BLD AUTO: 0.4 %
BUN SERPL-MCNC: 53 MG/DL (ref 6–23)
CALCIUM SERPL-MCNC: 9.5 MG/DL (ref 8.6–10.6)
CARDIOLIPIN IGA SERPL-ACNC: 0.6 APL U/ML
CARDIOLIPIN IGG SER IA-ACNC: <1.6 GPL U/ML
CARDIOLIPIN IGM SER IA-ACNC: 0.5 MPL U/ML
CHLORIDE SERPL-SCNC: 96 MMOL/L (ref 98–107)
CO2 SERPL-SCNC: 26 MMOL/L (ref 21–32)
CREAT SERPL-MCNC: 7.12 MG/DL (ref 0.5–1.3)
DRVVT SCREEN TO CONFIRM RATIO: 1.46 RATIO
DRVVT/DRVVT CFM NRMLZD PPP-RTO: 1.26 RATIO
DRVVT/DRVVT CFM P DOAC NEUT NORM PPP-RTO: 1.15 RATIO
EOSINOPHIL # BLD AUTO: 0.17 X10*3/UL (ref 0–0.7)
EOSINOPHIL NFR BLD AUTO: 2.1 %
ERYTHROCYTE [DISTWIDTH] IN BLOOD BY AUTOMATED COUNT: 12.8 % (ref 11.5–14.5)
ERYTHROCYTE [DISTWIDTH] IN BLOOD BY AUTOMATED COUNT: 12.9 % (ref 11.5–14.5)
GFR SERPL CREATININE-BSD FRML MDRD: 8 ML/MIN/1.73M*2
GLUCOSE BLD MANUAL STRIP-MCNC: 128 MG/DL (ref 74–99)
GLUCOSE BLD MANUAL STRIP-MCNC: 156 MG/DL (ref 74–99)
GLUCOSE BLD MANUAL STRIP-MCNC: 183 MG/DL (ref 74–99)
GLUCOSE BLD MANUAL STRIP-MCNC: 187 MG/DL (ref 74–99)
GLUCOSE BLD MANUAL STRIP-MCNC: 216 MG/DL (ref 74–99)
GLUCOSE SERPL-MCNC: 120 MG/DL (ref 74–99)
HCT VFR BLD AUTO: 22.9 % (ref 41–52)
HCT VFR BLD AUTO: 24.7 % (ref 41–52)
HGB BLD-MCNC: 6.9 G/DL (ref 13.5–17.5)
HGB BLD-MCNC: 7.7 G/DL (ref 13.5–17.5)
IMM GRANULOCYTES # BLD AUTO: 0.03 X10*3/UL (ref 0–0.7)
IMM GRANULOCYTES NFR BLD AUTO: 0.4 % (ref 0–0.9)
LA 2 SCREEN W REFLEX-IMP: NORMAL
LYMPHOCYTES # BLD AUTO: 1.44 X10*3/UL (ref 1.2–4.8)
LYMPHOCYTES NFR BLD AUTO: 18.1 %
MAGNESIUM SERPL-MCNC: 2.03 MG/DL (ref 1.6–2.4)
MCH RBC QN AUTO: 27.3 PG (ref 26–34)
MCH RBC QN AUTO: 27.9 PG (ref 26–34)
MCHC RBC AUTO-ENTMCNC: 30.1 G/DL (ref 32–36)
MCHC RBC AUTO-ENTMCNC: 31.2 G/DL (ref 32–36)
MCV RBC AUTO: 90 FL (ref 80–100)
MCV RBC AUTO: 91 FL (ref 80–100)
MONOCYTES # BLD AUTO: 1.29 X10*3/UL (ref 0.1–1)
MONOCYTES NFR BLD AUTO: 16.2 %
NEUTROPHILS # BLD AUTO: 5.01 X10*3/UL (ref 1.2–7.7)
NEUTROPHILS NFR BLD AUTO: 62.8 %
NORMALIZED SCT PPP-RTO: 0.65 RATIO
NRBC BLD-RTO: 0 /100 WBCS (ref 0–0)
NRBC BLD-RTO: 0 /100 WBCS (ref 0–0)
PHOSPHATE SERPL-MCNC: 6 MG/DL (ref 2.5–4.9)
PLATELET # BLD AUTO: 275 X10*3/UL (ref 150–450)
PLATELET # BLD AUTO: 280 X10*3/UL (ref 150–450)
POTASSIUM SERPL-SCNC: 4.7 MMOL/L (ref 3.5–5.3)
RBC # BLD AUTO: 2.53 X10*6/UL (ref 4.5–5.9)
RBC # BLD AUTO: 2.76 X10*6/UL (ref 4.5–5.9)
RH FACTOR (ANTIGEN D): NORMAL
SILICA CLOTTING TIME CONFIRMATION: 1.34 RATIO
SILICA CLOTTING TIME SCREEN: 0.87 RATIO
SODIUM SERPL-SCNC: 134 MMOL/L (ref 136–145)
UFH PPP CHRO-ACNC: 0.6 IU/ML
WBC # BLD AUTO: 8 X10*3/UL (ref 4.4–11.3)
WBC # BLD AUTO: 8.3 X10*3/UL (ref 4.4–11.3)

## 2023-12-07 PROCEDURE — 2500000001 HC RX 250 WO HCPCS SELF ADMINISTERED DRUGS (ALT 637 FOR MEDICARE OP): Performed by: NURSE PRACTITIONER

## 2023-12-07 PROCEDURE — 2500000004 HC RX 250 GENERAL PHARMACY W/ HCPCS (ALT 636 FOR OP/ED): Performed by: NURSE PRACTITIONER

## 2023-12-07 PROCEDURE — 82172 ASSAY OF APOLIPOPROTEIN: CPT

## 2023-12-07 PROCEDURE — 85730 THROMBOPLASTIN TIME PARTIAL: CPT

## 2023-12-07 PROCEDURE — 93922 UPR/L XTREMITY ART 2 LEVELS: CPT | Performed by: SURGERY

## 2023-12-07 PROCEDURE — 8010000001 HC DIALYSIS - HEMODIALYSIS PER DAY

## 2023-12-07 PROCEDURE — 85025 COMPLETE CBC W/AUTO DIFF WBC: CPT

## 2023-12-07 PROCEDURE — 2500000005 HC RX 250 GENERAL PHARMACY W/O HCPCS

## 2023-12-07 PROCEDURE — 99232 SBSQ HOSP IP/OBS MODERATE 35: CPT | Performed by: NURSE PRACTITIONER

## 2023-12-07 PROCEDURE — 86147 CARDIOLIPIN ANTIBODY EA IG: CPT

## 2023-12-07 PROCEDURE — 80069 RENAL FUNCTION PANEL: CPT

## 2023-12-07 PROCEDURE — 99232 SBSQ HOSP IP/OBS MODERATE 35: CPT

## 2023-12-07 PROCEDURE — 93308 TTE F-UP OR LMTD: CPT

## 2023-12-07 PROCEDURE — 36415 COLL VENOUS BLD VENIPUNCTURE: CPT

## 2023-12-07 PROCEDURE — 83735 ASSAY OF MAGNESIUM: CPT

## 2023-12-07 PROCEDURE — 86920 COMPATIBILITY TEST SPIN: CPT

## 2023-12-07 PROCEDURE — 82947 ASSAY GLUCOSE BLOOD QUANT: CPT

## 2023-12-07 PROCEDURE — 85613 RUSSELL VIPER VENOM DILUTED: CPT

## 2023-12-07 PROCEDURE — 85027 COMPLETE CBC AUTOMATED: CPT | Performed by: STUDENT IN AN ORGANIZED HEALTH CARE EDUCATION/TRAINING PROGRAM

## 2023-12-07 PROCEDURE — 99232 SBSQ HOSP IP/OBS MODERATE 35: CPT | Performed by: INTERNAL MEDICINE

## 2023-12-07 PROCEDURE — 2500000001 HC RX 250 WO HCPCS SELF ADMINISTERED DRUGS (ALT 637 FOR MEDICARE OP)

## 2023-12-07 PROCEDURE — 93922 UPR/L XTREMITY ART 2 LEVELS: CPT

## 2023-12-07 PROCEDURE — 2500000004 HC RX 250 GENERAL PHARMACY W/ HCPCS (ALT 636 FOR OP/ED): Performed by: STUDENT IN AN ORGANIZED HEALTH CARE EDUCATION/TRAINING PROGRAM

## 2023-12-07 PROCEDURE — 90935 HEMODIALYSIS ONE EVALUATION: CPT | Performed by: INTERNAL MEDICINE

## 2023-12-07 PROCEDURE — 85520 HEPARIN ASSAY: CPT | Performed by: STUDENT IN AN ORGANIZED HEALTH CARE EDUCATION/TRAINING PROGRAM

## 2023-12-07 PROCEDURE — 2500000004 HC RX 250 GENERAL PHARMACY W/ HCPCS (ALT 636 FOR OP/ED): Mod: JZ

## 2023-12-07 PROCEDURE — 1200000002 HC GENERAL ROOM WITH TELEMETRY DAILY

## 2023-12-07 PROCEDURE — 93308 TTE F-UP OR LMTD: CPT | Performed by: INTERNAL MEDICINE

## 2023-12-07 PROCEDURE — 86901 BLOOD TYPING SEROLOGIC RH(D): CPT

## 2023-12-07 RX ORDER — HEPARIN SODIUM 5000 [USP'U]/ML
80 INJECTION, SOLUTION INTRAVENOUS; SUBCUTANEOUS ONCE
Status: COMPLETED | OUTPATIENT
Start: 2023-12-07 | End: 2023-12-07

## 2023-12-07 RX ORDER — LIDOCAINE 560 MG/1
1 PATCH PERCUTANEOUS; TOPICAL; TRANSDERMAL DAILY
Status: DISCONTINUED | OUTPATIENT
Start: 2023-12-07 | End: 2023-12-14 | Stop reason: HOSPADM

## 2023-12-07 RX ORDER — HEPARIN SODIUM 10000 [USP'U]/100ML
0-4500 INJECTION, SOLUTION INTRAVENOUS CONTINUOUS
Status: DISCONTINUED | OUTPATIENT
Start: 2023-12-07 | End: 2023-12-08

## 2023-12-07 RX ADMIN — POLYETHYLENE GLYCOL 3350 17 G: 17 POWDER, FOR SOLUTION ORAL at 12:53

## 2023-12-07 RX ADMIN — PANTOPRAZOLE SODIUM 20 MG: 20 TABLET, DELAYED RELEASE ORAL at 13:20

## 2023-12-07 RX ADMIN — OXYCODONE HYDROCHLORIDE 5 MG: 5 TABLET ORAL at 07:22

## 2023-12-07 RX ADMIN — SENNOSIDES AND DOCUSATE SODIUM 1 TABLET: 8.6; 5 TABLET ORAL at 20:35

## 2023-12-07 RX ADMIN — CLOPIDOGREL BISULFATE 75 MG: 75 TABLET ORAL at 12:52

## 2023-12-07 RX ADMIN — LIDOCAINE AND PRILOCAINE: 25; 25 CREAM TOPICAL at 12:53

## 2023-12-07 RX ADMIN — IRON SUCROSE 200 MG: 20 INJECTION, SOLUTION INTRAVENOUS at 12:53

## 2023-12-07 RX ADMIN — ALTEPLASE 2 MG: 2.2 INJECTION, POWDER, LYOPHILIZED, FOR SOLUTION INTRAVENOUS at 10:35

## 2023-12-07 RX ADMIN — CARVEDILOL 25 MG: 12.5 TABLET, FILM COATED ORAL at 12:52

## 2023-12-07 RX ADMIN — HEPARIN SODIUM 10000 UNITS: 5000 INJECTION, SOLUTION INTRAVENOUS; SUBCUTANEOUS at 14:34

## 2023-12-07 RX ADMIN — ATORVASTATIN CALCIUM 80 MG: 80 TABLET, FILM COATED ORAL at 20:35

## 2023-12-07 RX ADMIN — CARVEDILOL 25 MG: 12.5 TABLET, FILM COATED ORAL at 20:36

## 2023-12-07 RX ADMIN — NEPHROCAP 1 CAPSULE: 1 CAP ORAL at 12:51

## 2023-12-07 RX ADMIN — LIDOCAINE 1 PATCH: 4 PATCH TOPICAL at 13:14

## 2023-12-07 RX ADMIN — Medication 5 MG: at 20:35

## 2023-12-07 RX ADMIN — AMLODIPINE BESYLATE 10 MG: 10 TABLET ORAL at 12:52

## 2023-12-07 RX ADMIN — ACETAMINOPHEN 975 MG: 325 TABLET ORAL at 20:36

## 2023-12-07 RX ADMIN — HYDROMORPHONE HYDROCHLORIDE 1 MG: 1 INJECTION, SOLUTION INTRAMUSCULAR; INTRAVENOUS; SUBCUTANEOUS at 10:11

## 2023-12-07 RX ADMIN — CALCITRIOL CAPSULES 0.25 MCG 0.25 MCG: 0.25 CAPSULE ORAL at 12:52

## 2023-12-07 RX ADMIN — GABAPENTIN 100 MG: 100 CAPSULE ORAL at 20:35

## 2023-12-07 RX ADMIN — HEPARIN SODIUM 2700 UNITS/HR: 10000 INJECTION, SOLUTION INTRAVENOUS at 14:36

## 2023-12-07 RX ADMIN — OXYCODONE HYDROCHLORIDE 5 MG: 5 TABLET ORAL at 20:35

## 2023-12-07 RX ADMIN — OXYCODONE HYDROCHLORIDE 5 MG: 5 TABLET ORAL at 15:01

## 2023-12-07 RX ADMIN — HEPARIN SODIUM 2700 UNITS/HR: 10000 INJECTION, SOLUTION INTRAVENOUS at 22:28

## 2023-12-07 RX ADMIN — INSULIN GLARGINE 25 UNITS: 100 INJECTION, SOLUTION SUBCUTANEOUS at 20:37

## 2023-12-07 RX ADMIN — HYDROMORPHONE HYDROCHLORIDE 1 MG: 1 INJECTION, SOLUTION INTRAMUSCULAR; INTRAVENOUS; SUBCUTANEOUS at 00:33

## 2023-12-07 RX ADMIN — TORSEMIDE 100 MG: 20 TABLET ORAL at 12:52

## 2023-12-07 RX ADMIN — ALTEPLASE 2 MG: 2.2 INJECTION, POWDER, LYOPHILIZED, FOR SOLUTION INTRAVENOUS at 10:36

## 2023-12-07 RX ADMIN — ACETAMINOPHEN 975 MG: 325 TABLET ORAL at 13:16

## 2023-12-07 ASSESSMENT — COGNITIVE AND FUNCTIONAL STATUS - GENERAL
STANDING UP FROM CHAIR USING ARMS: A LOT
DRESSING REGULAR UPPER BODY CLOTHING: A LOT
MOBILITY SCORE: 12
EATING MEALS: A LOT
MOVING FROM LYING ON BACK TO SITTING ON SIDE OF FLAT BED WITH BEDRAILS: A LOT
DRESSING REGULAR LOWER BODY CLOTHING: A LOT
MOVING TO AND FROM BED TO CHAIR: A LOT
EATING MEALS: A LOT
WALKING IN HOSPITAL ROOM: A LOT
PERSONAL GROOMING: A LOT
DRESSING REGULAR LOWER BODY CLOTHING: A LOT
TURNING FROM BACK TO SIDE WHILE IN FLAT BAD: A LOT
TOILETING: A LOT
CLIMB 3 TO 5 STEPS WITH RAILING: A LOT
DAILY ACTIVITIY SCORE: 12
MOBILITY SCORE: 12
STANDING UP FROM CHAIR USING ARMS: A LOT
DAILY ACTIVITIY SCORE: 12
HELP NEEDED FOR BATHING: A LOT
TOILETING: A LOT
MOVING TO AND FROM BED TO CHAIR: A LOT
TURNING FROM BACK TO SIDE WHILE IN FLAT BAD: A LOT
HELP NEEDED FOR BATHING: A LOT
WALKING IN HOSPITAL ROOM: A LOT
PERSONAL GROOMING: A LOT
MOVING FROM LYING ON BACK TO SITTING ON SIDE OF FLAT BED WITH BEDRAILS: A LOT
DRESSING REGULAR UPPER BODY CLOTHING: A LOT
CLIMB 3 TO 5 STEPS WITH RAILING: A LOT

## 2023-12-07 ASSESSMENT — PAIN - FUNCTIONAL ASSESSMENT
PAIN_FUNCTIONAL_ASSESSMENT: 0-10

## 2023-12-07 ASSESSMENT — PAIN SCALES - GENERAL
PAINLEVEL_OUTOF10: 0 - NO PAIN
PAINLEVEL_OUTOF10: 4
PAINLEVEL_OUTOF10: 8
PAINLEVEL_OUTOF10: 9
PAINLEVEL_OUTOF10: 7
PAINLEVEL_OUTOF10: 5 - MODERATE PAIN
PAINLEVEL_OUTOF10: 0 - NO PAIN
PAINLEVEL_OUTOF10: 7
PAINLEVEL_OUTOF10: 8

## 2023-12-07 ASSESSMENT — PAIN DESCRIPTION - ORIENTATION
ORIENTATION: LEFT
ORIENTATION: LEFT

## 2023-12-07 ASSESSMENT — PAIN DESCRIPTION - LOCATION
LOCATION: FOOT
LOCATION: FOOT

## 2023-12-07 ASSESSMENT — PAIN SCALES - WONG BAKER: WONGBAKER_NUMERICALRESPONSE: NO HURT

## 2023-12-07 NOTE — PROGRESS NOTES
Overnight Events:    No overnight events, we discussed the case with the endovascular team where it seemed to be subacute-chronic thrombus in the setting of chronic atherosclerosis.     Objective Data:  Last Recorded Vitals:  Vitals:    12/06/23 2311 12/07/23 0300 12/07/23 0626 12/07/23 0633   BP: 141/83 125/75     BP Location:       Patient Position:       Pulse: 81 79  87   Resp: 20 20     Temp: 36 °C (96.8 °F)  37.5 °C (99.5 °F) 37.8 °C (100 °F)   TempSrc:   Temporal Skin   SpO2: 93% 96%     Weight:       Height:           Last Labs:  CBC - 12/7/2023:  6:43 AM  8.0 6.9 275    22.9      CMP - 12/7/2023:  6:43 AM  9.5 _ _ --- _   6.0 3.4 _ _      PTT - 12/2/2023: 11:15 AM  1.1   12.7 71     HGBA1C   Date/Time Value Ref Range Status   11/24/2023 03:31 PM 12.6 see below % Final   05/24/2023 02:20 PM 11.0 4.3 - 5.6 % Final     Comment:     American Diabetes Association guidelines indicate that patients with HgbA1c in the range 5.7-6.4% are at increased risk for development of diabetes, and intervention by lifestyle modification may be beneficial. HgbA1c greater or equal to 6.5% is considered diagnostic of diabetes.   09/05/2022 01:33 PM 12.4 4.0 - 5.6 % Final   09/14/2021 05:38 AM 12.5 % Final     Comment:          Diagnosis of Diabetes-Adults   Non-Diabetic: < or = 5.6%   Increased risk for developing diabetes: 5.7-6.4%   Diagnostic of diabetes: > or = 6.5%  .       Monitoring of Diabetes                Age (y)     Therapeutic Goal (%)   Adults:          >18           <7.0   Pediatrics:    13-18           <7.5                   7-12           <8.0                   0- 6            7.5-8.5   American Diabetes Association. Diabetes Care 33(S1), Jan 2010.     01/14/2021 05:22 AM 12.3 4.3 - 5.6 % Final     Comment:     American Diabetes Association guidelines indicate that patients with HgbA1c in   the range 5.7-6.4% are at increased risk for development of diabetes, and   intervention by lifestyle modification may be  "beneficial. HgbA1c greater or   equal to 6.5% is considered diagnostic of diabetes.     LDLCALC   Date/Time Value Ref Range Status   11/24/2023 03:31  <=99 mg/dL Final     Comment:                                 Near   Borderline      AGE      Desirable  Optimal    High     High     Very High     0-19 Y     0 - 109     ---    110-129   >/= 130     ----    20-24 Y     0 - 119     ---    120-159   >/= 160     ----      >24 Y     0 -  99   100-129  130-159   160-189     >/=190       VLDL   Date/Time Value Ref Range Status   11/24/2023 03:31 PM 49 0 - 40 mg/dL Final      Last I/O:  I/O last 3 completed shifts:  In: 2058.4 (16.3 mL/kg) [P.O.:360; I.V.:1698.4 (13.4 mL/kg)]  Out: 1160 (9.2 mL/kg) [Urine:1100 (0.2 mL/kg/hr); Blood:60]  Weight: 126.3 kg     Past Cardiology Tests (Last 3 Years):  EKG:  No results found for this or any previous visit from the past 1095 days.    Echo:  Transthoracic Echo (TTE) Complete 11/25/2023    Ejection Fractions:  No results found for: \"EF\"  Cath:  No results found for this or any previous visit from the past 1095 days.    Stress Test:  No results found for this or any previous visit from the past 1095 days.    Cardiac Imaging:  No results found for this or any previous visit from the past 1095 days.      Inpatient Medications:  Scheduled medications   Medication Dose Route Frequency    acetaminophen  975 mg oral q8h    alteplase  2 mg intra-catheter Daily    alteplase  2 mg intra-catheter Daily    amLODIPine  10 mg oral Daily    [Held by provider] aspirin  81 mg oral Daily    atorvastatin  80 mg oral Nightly    B complex-vitamin C-folic acid  1 capsule oral Daily    calcitriol  0.25 mcg oral Daily    carvedilol  25 mg oral BID    clopidogrel  75 mg oral Daily    darbepoetin abhishek  0.45 mcg/kg subcutaneous Weekly    gabapentin  100 mg oral q PM    heparin  80 Units/kg intravenous Once    insulin glargine  25 Units subcutaneous Nightly    insulin lispro  0-5 Units subcutaneous TID " with meals    insulin lispro  7 Units subcutaneous TID with meals    iron sucrose  200 mg intravenous Daily    lidocaine  1 patch transdermal Daily    lidocaine-prilocaine   Topical Daily    melatonin  5 mg oral Nightly    pantoprazole  20 mg oral Daily before breakfast    polyethylene glycol  17 g oral BID    sennosides-docusate sodium  1 tablet oral Nightly    torsemide  100 mg oral Daily     PRN medications   Medication    dextrose 10 % in water (D10W)    dextrose    eucerin    glucagon    hydrALAZINE    HYDROmorphone    hydrOXYzine HCL    oxyCODONE     Continuous Medications   Medication Dose Last Rate    heparin  0-4,500 Units/hr          Assessment/Plan   Kwadwo Hoty is a 57 y.o. male w/ PMHx of DM2 (uncontrolled) c/b neuropathy, HTN, CKD stage 5 now on HD since hospitalization via Rt CW HD cath, HFrEF (EF 50% in 11/2021), & ALEXANDER presented with LLE numbness and weakness causing multiple falls at home. Admitted with left popliteal artery s/p thrombectomy SFA and popliteal artery along with stent placement in distal SFA/popliteal artery and distal popliteal/TP trunk. After discussing the case with endovascular team it seems that it was consistent with atherothrombosis.     Recommendations:  -Currently on ASA/plavix and atorva 80.  -Can hold off on AC and keep him on DAPT unless work up below reveals a thrombis.  -Will need to evaluate for possible source if the thrombus was related to an embolic event, please obtain TTE-limited bubble study and CT angio chest to evaluate the aortic arch (was not seen in his prior aortic angiogram study).  -Hypercoagulable work up (Fasting homocysteine level, lipoprotein A, lupus anticoagulant (LA) activity, cardiolipin (aCL) antibodies, and beta-2 glycoprotein 1 antibodies.    Peripheral IV 11/29/23 20 G Left;Proximal;Anterior Forearm (Active)   Site Assessment Clean;Dry 12/06/23 2301   Dressing Status Clean;Dry 12/06/23 2301   Number of days: 8       Hemodialysis Cath 11/30/23  Right Subclavian (Active)   Site Assessment Clean;Dry 12/06/23 2301   Dressing Status Clean;Dry 12/06/23 2301   Number of days: 7       Code Status:  Full Code    This case was discussed with Dr. Guerra and > 30 minutes was spent in the professional and overall care of this patient.     Tomasa Samuel MD  Cardiology Fellow PGY4

## 2023-12-07 NOTE — ANESTHESIA PREPROCEDURE EVALUATION
9-Patient: Kwadwo Hoyt    Procedure Information       Date/Time: 12/08/23 0715    Procedure: Foot Transmetatarsal Amputation (Left) - misonix, mini sagittal saw    Location: [facility] OR 07 / [facility] [place]    Surgeons: Zully Gill DPM            Relevant Problems   Cardiovascular   (+) Acute occlusion of popliteal artery due to thrombosis (CMS/HCC)   (+) Critical limb ischemia of left lower extremity (CMS/HCC)   (+) Peripheral vascular disease, unspecified (CMS/HCC)      /Renal   (+) SELENA (acute kidney injury) (CMS/HCC)   (+) ESRD on hemodialysis (CMS/HCC)      Pulmonary   (+) ALEXANDER (obstructive sleep apnea)       Clinical information reviewed:    Allergies  Meds   Med Hx  Surg Hx   Fam Hx          NPO Detail:  No data recorded     PHYSICAL EXAM    Anesthesia Plan

## 2023-12-07 NOTE — PROGRESS NOTES
Kwadwo Hoyt is a 57 y.o. male on day 12 of admission presenting with Arterial occlusion.      Subjective   Seen on HD, no complaints       Objective   Vitals 24HR  Heart Rate:  [78-92]   Temp:  [36 °C (96.8 °F)-37.8 °C (100 °F)]   Resp:  [18-20]   BP: (120-151)/(75-86)   SpO2:  [93 %-98 %]     Intake/Output last 3 Shifts:    Intake/Output Summary (Last 24 hours) at 12/7/2023 1601  Last data filed at 12/7/2023 1016  Gross per 24 hour   Intake 1625.3 ml   Output 2700 ml   Net -1074.7 ml       Physical Exam  No distress  HEENT:  moist, no pallor  No edema chago LE  Breath sounds chago equal, clear  S1 S2 regular, normal, no rub or murmur  Abdomen soft, non tender  AAO x3, non focal    Sodium   Date/Time Value Ref Range Status   12/07/2023 06:43  (L) 136 - 145 mmol/L Final   12/06/2023 05:34  (L) 136 - 145 mmol/L Final   12/05/2023 06:13  (L) 136 - 145 mmol/L Final     Potassium   Date/Time Value Ref Range Status   12/07/2023 06:43 AM 4.7 3.5 - 5.3 mmol/L Final   12/06/2023 05:34 PM 4.7 3.5 - 5.3 mmol/L Final   12/05/2023 06:13 AM 4.5 3.5 - 5.3 mmol/L Final     Chloride   Date/Time Value Ref Range Status   12/07/2023 06:43 AM 96 (L) 98 - 107 mmol/L Final   12/06/2023 05:34 PM 97 (L) 98 - 107 mmol/L Final   12/05/2023 06:13 AM 97 (L) 98 - 107 mmol/L Final     Urea Nitrogen   Date/Time Value Ref Range Status   12/07/2023 06:43 AM 53 (H) 6 - 23 mg/dL Final   12/06/2023 05:34 PM 47 (H) 6 - 23 mg/dL Final   12/05/2023 06:13 AM 55 (H) 6 - 23 mg/dL Final     Creatinine   Date/Time Value Ref Range Status   12/07/2023 06:43 AM 7.12 (H) 0.50 - 1.30 mg/dL Final   12/06/2023 05:34 PM 6.28 (H) 0.50 - 1.30 mg/dL Final   12/05/2023 06:13 AM 7.61 (H) 0.50 - 1.30 mg/dL Final     eGFR   Date/Time Value Ref Range Status   12/07/2023 06:43 AM 8 (L) >60 mL/min/1.73m*2 Final     Comment:     Calculations of estimated GFR are performed using the 2021 CKD-EPI Study Refit equation without the race variable for the IDMS-Traceable  creatinine methods.  https://jasn.asnjournals.org/content/early/2021/09/22/ASN.5454915674   12/06/2023 05:34 PM 10 (L) >60 mL/min/1.73m*2 Final     Comment:     Calculations of estimated GFR are performed using the 2021 CKD-EPI Study Refit equation without the race variable for the IDMS-Traceable creatinine methods.  https://jasn.asnjournals.org/content/early/2021/09/22/ASN.7817890044   12/05/2023 06:13 AM 8 (L) >60 mL/min/1.73m*2 Final     Comment:     Calculations of estimated GFR are performed using the 2021 CKD-EPI Study Refit equation without the race variable for the IDMS-Traceable creatinine methods.  https://jasn.asnjournals.org/content/early/2021/09/22/ASN.4548073232     Calcium   Date/Time Value Ref Range Status   12/07/2023 06:43 AM 9.5 8.6 - 10.6 mg/dL Final   12/06/2023 05:34 PM 8.6 8.6 - 10.6 mg/dL Final   12/05/2023 06:13 AM 9.5 8.6 - 10.6 mg/dL Final     Phosphorus   Date/Time Value Ref Range Status   12/07/2023 06:43 AM 6.0 (H) 2.5 - 4.9 mg/dL Final     Comment:     The performance characteristics of phosphorus testing in heparinized plasma have been validated by the individual  laboratory site where testing is performed. Testing on heparinized plasma is not approved by the FDA; however, such approval is not necessary.   12/06/2023 05:34 PM 5.5 (H) 2.5 - 4.9 mg/dL Final     Comment:     The performance characteristics of phosphorus testing in heparinized plasma have been validated by the individual  laboratory site where testing is performed. Testing on heparinized plasma is not approved by the FDA; however, such approval is not necessary.   12/05/2023 06:13 AM 5.6 (H) 2.5 - 4.9 mg/dL Final     Comment:     The performance characteristics of phosphorus testing in heparinized plasma have been validated by the individual  laboratory site where testing is performed. Testing on heparinized plasma is not approved by the FDA; however, such approval is not necessary.     Hemoglobin   Date/Time Value  Ref Range Status   12/07/2023 02:43 PM 7.7 (L) 13.5 - 17.5 g/dL Final   12/07/2023 06:43 AM 6.9 (L) 13.5 - 17.5 g/dL Final   12/06/2023 05:34 PM 7.1 (L) 13.5 - 17.5 g/dL Final       Current Facility-Administered Medications:     acetaminophen (Tylenol) tablet 975 mg, 975 mg, oral, q8h, Mahadr George, APRN-CNP, 975 mg at 12/07/23 1316    alteplase (Cathflo Activase) injection 2 mg, 2 mg, intra-catheter, Daily, Mahadr George, APRN-CNP, 2 mg at 12/07/23 1036    alteplase (Cathflo Activase) injection 2 mg, 2 mg, intra-catheter, Daily, Mahadr George, APRN-CNP, 2 mg at 12/07/23 1035    amLODIPine (Norvasc) tablet 10 mg, 10 mg, oral, Daily, Mahadr George, APRN-CNP, 10 mg at 12/07/23 1252    [Held by provider] aspirin EC tablet 81 mg, 81 mg, oral, Daily, Mahadr George, APRN-CNP, 81 mg at 12/06/23 0900    atorvastatin (Lipitor) tablet 80 mg, 80 mg, oral, Nightly, Mahadr George, APRN-CNP, 80 mg at 12/06/23 2101    B complex-vitamin C-folic acid (Nephrocaps) capsule 1 capsule, 1 capsule, oral, Daily, Dagoadr George, APRN-CNP, 1 capsule at 12/07/23 1251    calcitriol (Rocaltrol) capsule 0.25 mcg, 0.25 mcg, oral, Daily, Mahadr George, APRN-CNP, 0.25 mcg at 12/07/23 1252    carvedilol (Coreg) tablet 25 mg, 25 mg, oral, BID, Mahadr George, APRN-CNP, 25 mg at 12/07/23 1252    clopidogrel (Plavix) tablet 75 mg, 75 mg, oral, Daily, Lonnie Skaggs MD, 75 mg at 12/07/23 1252    darbepoetin abhishek in polysorbat (Aranesp) injection 60 mcg, 0.45 mcg/kg, subcutaneous, Weekly, Zan Vazquez, APRN-CNP, 60 mcg at 12/06/23 1402    dextrose 10 % in water (D10W) infusion, 0.3 g/kg/hr, intravenous, Once PRN, Zan Condonuria, APRN-CNP    dextrose 50 % injection 25 g, 25 g, intravenous, q15 min PRN, Zan George, APRN-CNP    eucerin cream, , Topical, PRN, Zan George, APRN-CNP    gabapentin (Neurontin) capsule 100 mg, 100 mg, oral, q PM, Zan Vazquez APRN-CNP, 100 mg at 12/06/23 2101    glucagon (Glucagen) injection 1 mg, 1 mg,  intramuscular, q15 min PRN, Zan George, APRN-CNP    heparin 25,000 Units in dextrose 5% 250 mL (100 Units/mL) infusion (premix), 0-4,500 Units/hr, intravenous, Continuous, Tatiana Pappas MD, Last Rate: 27 mL/hr at 12/07/23 1436, 2,700 Units/hr at 12/07/23 1436    hydrALAZINE (Apresoline) injection 5 mg, 5 mg, intravenous, q6h PRN, Mahadr George, APRN-CNP, 5 mg at 11/30/23 0420    HYDROmorphone (Dilaudid) injection 1 mg, 1 mg, intravenous, q6h PRN, Mahadr George, APRN-CNP, 1 mg at 12/07/23 1011    hydrOXYzine HCL (Atarax) tablet 25 mg, 25 mg, oral, q6h PRN, Dagoadr George, APRN-CNP, 25 mg at 12/02/23 2055    insulin glargine (Lantus) injection 25 Units, 25 Units, subcutaneous, Nightly, Mahadr George, APRN-CNP, 25 Units at 12/06/23 2119    insulin lispro (HumaLOG) injection 0-5 Units, 0-5 Units, subcutaneous, TID with meals, Dagoadr George, APRN-CNP, 3 Units at 12/06/23 1722    insulin lispro (HumaLOG) injection 7 Units, 7 Units, subcutaneous, TID with meals, Zan Condonuria, APRN-CNP, 7 Units at 12/06/23 1724    iron sucrose (Venofer) injection 200 mg, 200 mg, intravenous, Daily, Mahadr George, APRN-CNP, 200 mg at 12/07/23 1253    lidocaine 4 % patch 1 patch, 1 patch, transdermal, Daily, Felicia Shah DO, 1 patch at 12/07/23 1314    lidocaine-prilocaine (Emla) cream, , Topical, Daily, Zan George, APRN-CNP, Given at 12/07/23 1253    melatonin tablet 5 mg, 5 mg, oral, Nightly, Dagoadr George, APRN-CNP, 5 mg at 12/06/23 2101    oxyCODONE (Roxicodone) immediate release tablet 5 mg, 5 mg, oral, q6h PRN, FELI Raya, 5 mg at 12/07/23 1501    pantoprazole (ProtoNix) EC tablet 20 mg, 20 mg, oral, Daily before breakfast, FELI Raya, 20 mg at 12/07/23 1320    polyethylene glycol (Glycolax, Miralax) packet 17 g, 17 g, oral, BID, BHARGAV Raya-CNP, 17 g at 12/07/23 1253    sennosides-docusate sodium (Katerina-Colace) 8.6-50 mg per tablet 1 tablet, 1 tablet, oral, Nightly,  Dagoanalifrancis GeorgeBHARGAV-CNP, 1 tablet at 12/05/23 2017    torsemide (Demadex) tablet 100 mg, 100 mg, oral, Daily, BHARGAV Raya-CNP, 100 mg at 12/07/23 1252         Assessment/Plan    Kwadwo Hoyt is a 57 y.o. male with a PMHx of DM c/b neuropathy, HTN, CKD stage 5, HFrEF (EF 50% in 11/2021), & ALEXANDER being managed for left popliteal occlusion. Hospital course complicated by SELENA on CKD, likely contrast induced, now progressed to ESRD     #ESRD: Tolerating HD per submitted orders. SW engaged for outpatient HD chair, primary nephrologist is Dr Draper    # Anemia:   Hemoglobin   Date Value Ref Range Status   12/07/2023 7.7 (L) 13.5 - 17.5 g/dL Final   Ct Aranesp 60 mcg subcutaneous weekly    # MBD: Ca   Calcium   Date Value Ref Range Status   12/07/2023 9.5 8.6 - 10.6 mg/dL Final     Phosphorus   Date Value Ref Range Status   12/07/2023 6.0 (H) 2.5 - 4.9 mg/dL Final     Comment:     The performance characteristics of phosphorus testing in heparinized plasma have been validated by the individual  laboratory site where testing is performed. Testing on heparinized plasma is not approved by the FDA; however, such approval is not necessary.    Start Sevelamer carbonate 800 mg PO TIDWM, Continue daily renal MVI.     #Hypertension:  Bp control acceptable continue current anti-hypertensive medications    #Volume status: Clinically euvolemic continue UF as tolerated    Will continue to follow while in house            Anna Argueta MD

## 2023-12-07 NOTE — PROGRESS NOTES
57 y.o. male admitted for Arterial occlusion [I70.90]  HFrEF (heart failure with reduced ejection fraction) (CMS/[facility]) [I50.20]  Acute deep vein thrombosis (DVT) of left lower extremity after procedure (CMS/HCC) [I97.89, I82.402]  Acute occlusion of popliteal artery due to thrombosis (CMS/HCC) [I74.3].     Subjective   Pt seen during dialysis session. Pt states his lower back is bothering him and thinks he slept wrong. No acute complaints and no acute overnight events.        Objective     Scheduled Medications:   acetaminophen, 975 mg, oral, q8h  alteplase, , ,   alteplase, 2 mg, intra-catheter, Daily  alteplase, 2 mg, intra-catheter, Daily  amLODIPine, 10 mg, oral, Daily  [Held by provider] aspirin, 81 mg, oral, Daily  atorvastatin, 80 mg, oral, Nightly  B complex-vitamin C-folic acid, 1 capsule, oral, Daily  calcitriol, 0.25 mcg, oral, Daily  carvedilol, 25 mg, oral, BID  clopidogrel, 75 mg, oral, Daily  darbepoetin abhishek, 0.45 mcg/kg, subcutaneous, Weekly  gabapentin, 100 mg, oral, q PM  heparin, 80 Units/kg, intravenous, Once  insulin glargine, 25 Units, subcutaneous, Nightly  insulin lispro, 0-5 Units, subcutaneous, TID with meals  insulin lispro, 7 Units, subcutaneous, TID with meals  iron sucrose, 200 mg, intravenous, Daily  lidocaine, 1 patch, transdermal, Daily  lidocaine-prilocaine, , Topical, Daily  melatonin, 5 mg, oral, Nightly  pantoprazole, 20 mg, oral, Daily before breakfast  polyethylene glycol, 17 g, oral, BID  sennosides-docusate sodium, 1 tablet, oral, Nightly  torsemide, 100 mg, oral, Daily         Continuous Medications:   heparin, 0-4,500 Units/hr         PRN Medications:   PRN medications: alteplase, dextrose 10 % in water (D10W), dextrose, eucerin, glucagon, hydrALAZINE, HYDROmorphone, hydrOXYzine HCL, oxyCODONE    Dietary Orders (From admission, onward)       Start     Ordered    12/08/23 0001  NPO Diet; Effective midnight  Diet effective midnight         12/07/23 0720    12/06/23 1210   Adult diet Cardiac; 70 gm fat; 2 - 3 grams Sodium  Diet effective now        Question Answer Comment   Diet type Cardiac    Fat restriction: 70 gm fat    Sodium restriction: 2 - 3 grams Sodium        12/06/23 1215                    Vitals:  Most Recent:  Vitals:    12/07/23 0633   BP:    Pulse: 87   Resp:    Temp: 37.8 °C (100 °F)   SpO2:        24hr Min/Max:  Temp  Min: 36 °C (96.8 °F)  Max: 37.8 °C (100 °F)  Pulse  Min: 77  Max: 87  BP  Min: 120/75  Max: 152/100  Resp  Min: 18  Max: 20  SpO2  Min: 93 %  Max: 99 %    Intake/Output x24h:    Intake/Output Summary (Last 24 hours) at 12/7/2023 1021  Last data filed at 12/7/2023 0640  Gross per 24 hour   Intake 1396.85 ml   Output 200 ml   Net 1196.85 ml          Physical Exam:  Physical Exam  Constitutional:       General: He is not in acute distress.     Appearance: Normal appearance. He is obese. He is not ill-appearing.   HENT:      Nose: No congestion or rhinorrhea.   Eyes:      Extraocular Movements: Extraocular movements intact.      Conjunctiva/sclera: Conjunctivae normal.   Cardiovascular:      Rate and Rhythm: Normal rate.      Comments: No distal pulses felt on left lower extremity  Pulmonary:      Effort: Pulmonary effort is normal. No respiratory distress.   Abdominal:      General: Abdomen is flat. Bowel sounds are normal. There is no distension.      Palpations: Abdomen is soft.      Tenderness: There is no abdominal tenderness. There is no guarding or rebound.   Musculoskeletal:      Cervical back: Normal range of motion.      Comments: LLE w/ multiple amputaions and dry gangrene of remaining big toe.   Skin:     General: Skin is dry.   Neurological:      General: No focal deficit present.      Mental Status: He is alert and oriented to person, place, and time.         Relevant Results  Results for orders placed or performed during the hospital encounter of 11/24/23 (from the past 24 hour(s))   POCT GLUCOSE   Result Value Ref Range    POCT Glucose 128  (H) 74 - 99 mg/dL   POCT GLUCOSE   Result Value Ref Range    POCT Glucose 191 (H) 74 - 99 mg/dL   POCT GLUCOSE   Result Value Ref Range    POCT Glucose 253 (H) 74 - 99 mg/dL   CBC and Auto Differential   Result Value Ref Range    WBC 6.3 4.4 - 11.3 x10*3/uL    nRBC 0.0 0.0 - 0.0 /100 WBCs    RBC 2.57 (L) 4.50 - 5.90 x10*6/uL    Hemoglobin 7.1 (L) 13.5 - 17.5 g/dL    Hematocrit 23.3 (L) 41.0 - 52.0 %    MCV 91 80 - 100 fL    MCH 27.6 26.0 - 34.0 pg    MCHC 30.5 (L) 32.0 - 36.0 g/dL    RDW 12.7 11.5 - 14.5 %    Platelets 249 150 - 450 x10*3/uL    Neutrophils % 57.0 40.0 - 80.0 %    Immature Granulocytes %, Automated 0.3 0.0 - 0.9 %    Lymphocytes % 21.1 13.0 - 44.0 %    Monocytes % 18.4 2.0 - 10.0 %    Eosinophils % 2.7 0.0 - 6.0 %    Basophils % 0.5 0.0 - 2.0 %    Neutrophils Absolute 3.56 1.20 - 7.70 x10*3/uL    Immature Granulocytes Absolute, Automated 0.02 0.00 - 0.70 x10*3/uL    Lymphocytes Absolute 1.32 1.20 - 4.80 x10*3/uL    Monocytes Absolute 1.15 (H) 0.10 - 1.00 x10*3/uL    Eosinophils Absolute 0.17 0.00 - 0.70 x10*3/uL    Basophils Absolute 0.03 0.00 - 0.10 x10*3/uL   Renal Function Panel   Result Value Ref Range    Glucose 205 (H) 74 - 99 mg/dL    Sodium 135 (L) 136 - 145 mmol/L    Potassium 4.7 3.5 - 5.3 mmol/L    Chloride 97 (L) 98 - 107 mmol/L    Bicarbonate 27 21 - 32 mmol/L    Anion Gap 16 10 - 20 mmol/L    Urea Nitrogen 47 (H) 6 - 23 mg/dL    Creatinine 6.28 (H) 0.50 - 1.30 mg/dL    eGFR 10 (L) >60 mL/min/1.73m*2    Calcium 8.6 8.6 - 10.6 mg/dL    Phosphorus 5.5 (H) 2.5 - 4.9 mg/dL    Albumin 3.3 (L) 3.4 - 5.0 g/dL   Magnesium   Result Value Ref Range    Magnesium 2.00 1.60 - 2.40 mg/dL   Beta-2 glycoprotein antibodies   Result Value Ref Range    Beta-2 Glyco 1 IgG <1.4 <20.0 U/mL    Beta-2 Glyco 1 IgA <0.6 <20.0 U/mL    Beta 2 Glyco 1 IgM 0.7 <20.0 U/mL   POCT GLUCOSE   Result Value Ref Range    POCT Glucose 136 (H) 74 - 99 mg/dL   CBC and Auto Differential   Result Value Ref Range    WBC 8.0 4.4 -  11.3 x10*3/uL    nRBC 0.0 0.0 - 0.0 /100 WBCs    RBC 2.53 (L) 4.50 - 5.90 x10*6/uL    Hemoglobin 6.9 (L) 13.5 - 17.5 g/dL    Hematocrit 22.9 (L) 41.0 - 52.0 %    MCV 91 80 - 100 fL    MCH 27.3 26.0 - 34.0 pg    MCHC 30.1 (L) 32.0 - 36.0 g/dL    RDW 12.9 11.5 - 14.5 %    Platelets 275 150 - 450 x10*3/uL    Neutrophils % 62.8 40.0 - 80.0 %    Immature Granulocytes %, Automated 0.4 0.0 - 0.9 %    Lymphocytes % 18.1 13.0 - 44.0 %    Monocytes % 16.2 2.0 - 10.0 %    Eosinophils % 2.1 0.0 - 6.0 %    Basophils % 0.4 0.0 - 2.0 %    Neutrophils Absolute 5.01 1.20 - 7.70 x10*3/uL    Immature Granulocytes Absolute, Automated 0.03 0.00 - 0.70 x10*3/uL    Lymphocytes Absolute 1.44 1.20 - 4.80 x10*3/uL    Monocytes Absolute 1.29 (H) 0.10 - 1.00 x10*3/uL    Eosinophils Absolute 0.17 0.00 - 0.70 x10*3/uL    Basophils Absolute 0.03 0.00 - 0.10 x10*3/uL   Renal Function Panel   Result Value Ref Range    Glucose 120 (H) 74 - 99 mg/dL    Sodium 134 (L) 136 - 145 mmol/L    Potassium 4.7 3.5 - 5.3 mmol/L    Chloride 96 (L) 98 - 107 mmol/L    Bicarbonate 26 21 - 32 mmol/L    Anion Gap 17 10 - 20 mmol/L    Urea Nitrogen 53 (H) 6 - 23 mg/dL    Creatinine 7.12 (H) 0.50 - 1.30 mg/dL    eGFR 8 (L) >60 mL/min/1.73m*2    Calcium 9.5 8.6 - 10.6 mg/dL    Phosphorus 6.0 (H) 2.5 - 4.9 mg/dL    Albumin 3.4 3.4 - 5.0 g/dL   Magnesium   Result Value Ref Range    Magnesium 2.03 1.60 - 2.40 mg/dL   Lupus Anticoagulant With Interpretation [SINHA]   Result Value Ref Range    DRVVT Screen 1.46 RATIO    DRVVT Confirmation 1.26 RATIO    DRVVT Test Ratio 1.15 <=1.20 RATIO    SCT Screen 0.87 RATIO    SCT Confirmation 1.34 RATIO    SCT Test Ratio 0.65 <=1.16 RATIO    Lupus Anticoagulant Interpretation       No evidence of lupus anticoagulant in these assays (DRVVT and Silica Clotting Time (SCT)). Assay interferences may occur in the presence of factor deficiency/inhibitor and/or anticoagulants. For patients on anti-Vitamin K therapy, repeating DRVVT testing might  be indicated when the patient is off anti-vitamin K therapy. The DRVVT assay contains a heparin neutralizer up to 1.0 U/mL. Higher concentrations of heparin may cause interferences. SCT results are not affected by UF heparin up to 0.5 U/mL and LMW Heparin up to 1.0 U/mL. Higher concentrations of heparin may cause interferences. Correlation with clinical findings and clinical history is necessary to assess significance of results in an individual patient.      IR CVC tunneled    Result Date: 11/30/2023  Interpreted By:  Cody Garzon, STUDY: IR CVC TUNNELED;  11/30/2023 11:10 am   INDICATION: Signs/Symptoms:change temporary dialysis cath to tunneled catheter.   COMPARISON: None.   ACCESSION NUMBER(S): [id number]   ORDERING CLINICIAN: ISABELL LEGGETT   TECHNIQUE: INTERVENTIONALIST(S): Cody Garzon MD   CONSENT: The patient was informed of the nature of the proposed procedure. The purposes, alternatives, risks, and benefits were explained and discussed. All questions were answered and consent was obtained.   RADIATION EXPOSURE: Fluoroscopy time: Less than 0.1 min. Dose: 0.6 mGy. Dose Area Product (DAP): 217   SEDATION: Moderate conscious IV sedation services (supervision of administration, induction, and maintenance) were provided by the physician performing the procedure with intravenous fentanyl 100 mcg and versed 2 mg for 15 minutes. The physician was assisted by an independent trained observer, an interventional radiology nurse, in the continuous monitoring of patient level of consciousness and physiologic status.   MEDICATION/CONTRAST: No additional   TIME OUT: A time out was performed immediately prior to procedure start with the interventional team, correctly identifying the patient name, date of birth, MRN, procedure, anatomy (including marking of site and side), patient position, procedure consent form, relevant laboratory and imaging test results, antibiotic administration, safety precautions,  and procedure-specific equipment needs.   COMPLICATIONS: No immediate adverse events identified.   FINDINGS: In the recombinant position, the patient was positioned on the angiography table.  The patient has an existing  right internal jugular venous temporary  dialysis catheter in place.  The adjacent cutaneous tissues and existing catheter were prepared and draped in usual sterile manner.   After appropriate subcutaneous instillation of Lidocaine 1% local anesthesia, the securing sutures of the existing temporary dialysis catheter were removed.  The loaded heparin was aspirated from the catheter ports.  A 035  Amplatz guidewire was inserted through the existing  dialysis catheter.  Utilizing intermittent fluoroscopic guidance, the guidewire was advanced into the inferior vena cava to secure access.  The existing catheter was removed with the guidewire left in place.   Local anesthesia was achieved with subcutaneous Lidocaine 1%.  A tunnel tract was created from the  right infraclavicular chest to the existing venotomy site.   A  15 Maldivian x 23 cm  dual-lumen catheter was selected for placement. Venotomy site soft tissue dilation was performed to eventual accommodation of a  15-Maldivian dilator peel-away coaxial sheath system.  The catheter was then advanced over the guidewire with the tips to reside at the caval-atrial junction.  The ports were aspirated without resistance and flushed with normal saline. Heparinized saline was then loaded into the catheter ports.  The external portions of the catheter were secured in place with polypropylene suture.  The venotomy and tunnel sites were closed with discontinuous and pursestring sutures, respectively.  Sterile dressings were then applied.   The patient tolerated the procedure without complication.       1. Technically successful conversion and placement of a  15 Maldivian x 23 cm indwelling  hemodialysis catheter from a temporary hemodialysis catheter. 2. The catheter tip  overlies the cavoatrial junction and is ready for immediate use.   I was present for and/or performed the critical portions of the procedure and immediately available throughout the entire procedure.   I personally reviewed the image(s) / study and resident interpretation. I agree with the findings as stated.   Dictated at [facility].   MACRO: None   Signed by: Cody Garzon 11/30/2023 11:18 AM Dictation workstation:   [id number]    XR foot left 3+ views    Result Date: 11/29/2023  Interpreted By:  Amparo Alaniz and Sheng Max STUDY: Left foot  3 views.   INDICATION: Signs/Symptoms:Gangrene of 1st digit   COMPARISON: Left foot radiograph dated 04/16/2018.   ACCESSION NUMBER(S): [id number]   ORDERING CLINICIAN: FABY MARK   FINDINGS: Interval transmetatarsal amputations of the 3rd through 5th digits at the level of the mid metatarsals with unremarkable corticated appearance of the osseous stump as well as unremarkable appearance of the soft tissue stump.   Status post amputation of the 2nd digit at the level of the mid proximal phalanx with unremarkable corticated appearance of the osseous stump as well as unremarkable appearance of the soft tissue stump.     There is soft tissue loss of the tuft of the 1st digit likely from chronic ischemia. No definite erosion of the 1st distal phalanx or additional radiographic findings to suggest osteomyelitis.     Redemonstration of midfoot fusion involving the 1st TMT, 1/2 metatarsal bases, and inter cuneiform articulation with redemonstration of findings of hardware failure with loosening similar to 2018. Advanced tarsometatarsal joint degenerative changes which may be due to neuropathic arthropathy. Mild ankle and dorsal foot soft tissue swelling with cellulitis not ruled out. Moderate tibiotalar and mild subtalar joint degenerative changes with joint space loss and osteophytes. Plantar calcaneal spur which  can be associated with plantar fasciitis.       1. Soft tissue ischemia of the tuft of the 1st toe with soft tissue loss. No radiographic findings of osteomyelitis of the 1st digit or the rest of the osseous structures. 2. Interval postsurgical changes with amputation of the 2nd through 5th digits as described above. 3. Soft tissue swelling of the ankle and dorsal foot with cellulitis not ruled out. 4. Redemonstration of postsurgical changes involving multiple midfoot articulations with similar hardware failure findings when compared with 2018.   I personally reviewed the images/study and I agree with the findings as stated by Dr. Juan Bhagat. This study was interpreted at [place], [place].   Signed by: Amparo Alaniz 11/29/2023 11:21 AM Dictation workstation:   [id number]      Assessment/Plan   Principal Problem:    Arterial occlusion  Active Problems:    ALEXANDER (obstructive sleep apnea)    ESRD on hemodialysis (CMS/HCC)    SELENA (acute kidney injury) (CMS/HCC)    Peripheral vascular disease, unspecified (CMS/HCC)    Acute occlusion of popliteal artery due to thrombosis (CMS/HCC)    Left leg weakness    Critical limb ischemia of left lower extremity (CMS/HCC)    Toe ulcer, left, with unspecified severity (CMS/HCC)    Kwadwo Hoyt is a 57 y.o. male with a PMHx of DM c/b neuropathy, HTN, CKD stage 5, HFrEF (EF 50% in 11/2021), & ALEXANDER, who presents to the ER with left leg and arm weakness. LLE distal pulses were not palpable and CTA aorta & iliofemoral runoff showed complete left popliteal occlusion. Heparin gtt was started and vascular medicine were consulted but recommended no surgical intervention, recommend PVR/ZAC first. Neuro were also consulted for concern of stroke given patients left sided weakness, rec outpatient follow up. PVR/ZAC showed severe disease at left femoral/popliteal level. 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 Nephrology consulted for  worsening Cr and electrolyte status and started dialysis. Tunneled catheter placed by IR 11/30. Pt to c/w dialysis and underwent LLE angiogram with Vascular Surgery on 12/1, completed for peripheral angioplasty with Vascular Surgery 12/6. Currently afebrile, HDS, with plans for intervention with Podiatry 12/8.     #L Popliteal Artery Occlusion  #Chronic Limb Ischemia  #c/f Stroke  - Neurology consulted, stroke follow up in 4-6 weeks, discharge with ziopatch / loop recorder  - CTA aorta & B/L iliofemoral runoff significant for complete occlusion of L popliteal artery as well as anterior and posterior tibial arteries.   - ZAC/PVR showed severe disease at the femoral popliteal level. Biphasic flow is noted in the left popliteal artery. Unable to obtain pressures. The posterior tibial and dorasalis pedis arteries are not audible.  - LLE Angiogram w/ VascSurg 12/.  Underwent Peripheral Angioplasty 12/6, Vasc Surg signed off  - Podiatry consulted for dry gangrene of Left Hallux, Plan for possible TMA 12/8  - Vasc Med consulted, rec c/w ASA-81 and Plavix 75 daily, Heparin ggt. Obtain CTA Chest, Limited TTE w/ Bubble and hypercoagulable workup to evaluate for potential embolism source  -- ASA 81 temporarily held prior to procedure 12/8, NPO at midnight, type and screnn pending  - atorvastatin 80 mg     #CKD stage 5 2/2 to T2DM  - Admission Cr 6.06, GFR 10 (Baseline Cr 5.5~), dialysis TTS  - Post Angioplasty 12/6 Cr 7.12  - Being evaluated at [facility] for kidney transplant w/ most recent office visit on 9/12/2023  - home Torsemide 100 mg, calcitriol 0.25 mcg QD  - Avoid nephrotoxic agents  - Nephrology consulted due to worsening Cr and Electrolyte status  -- Received HD line 11/28, tunneled line placed 11/30 by IR  - SW contacted to coordinate outpatient dialysis     #Anemia  - Hgb dropped 7.1 -> 6.9 s/p 12/6 Angioplasty  - Repeat after dialysis increased to 7.7  - continue to trend      #Hyponatremia  #Hyperkalemia  #Hyperphosphatemia  - Nephrology consulted  - ordered UA and Urine electrolytes, suggest Post-Renal  - Increased Sodium Bicarb and ordered Lokelma  - Resolved w/ dialysis       #Constipation  - no BM since 11/25  - ordered Miralax BID, Doc-senna  - Normal BM 11/29  - Resolved     #HTN  #HFrEF  - s/p 500 ml LR bolus in ED  - home Aspirin 81 mg  - c/w home Carvedilol 25 mg BID  - c/w home Amlodipine 10 mg every day  - Echo 11.25, 60-65% estimated ejection fraction, pseudonormal pattern of left ventricular diastolic filling, increased LA LV diastolic pressure,severe concentric left ventricular hypertrophy.        #T2DM  #Neuropathy  - Last Hgb A1C 11.0 on 5/24/2023, ordered updated Hgb A1C  - Home Regimen: Lantus 40U at bedtime & Lispro 20U TID w/ meals  - Changed Lantus from 20 -> 25 units at bedtime due to elevated BG   - Started Lispro 7U TID w/ meals  - Mild SSI  - c/w home Gabapentin 300 mg BID  - POCT BG TID & at bedtime  - Hypoglycemia protocol     #Anxiety  - PRN Atarax 25 mg      #ALEXANDER  - CPAP at home, noncompliant        F: PRN  E: PRN  N: Renal  GI: Pantoprazole 20 mg QD  DVT: ASA Plavix       Code Status: Full Code   Emergency Contact: Extended Emergency Contact Information  Primary Emergency Contact: Milka Hoyt  Address: [id number] [place], [place] [id number]  Home Phone: [phone]  Relation: None  PCP: Thai Talavera MD    Patient seen and discussed with attending physician, Dr. Pappas.  Plan preliminary until cosigned by attending physician.    Reviewed and approved by CHIQUITA AVALOS on 12/7/23 at 10:21 AM.

## 2023-12-07 NOTE — CARE PLAN
Problem: Fall/Injury  Goal: Not fall by end of shift  Outcome: Progressing  Goal: Be free from injury by end of the shift  Outcome: Progressing  Goal: Verbalize understanding of personal risk factors for fall in the hospital  Outcome: Progressing  Goal: Verbalize understanding of risk factor reduction measures to prevent injury from fall in the home  Outcome: Progressing  Goal: Use assistive devices by end of the shift  Outcome: Progressing  Goal: Pace activities to prevent fatigue by end of the shift  Outcome: Progressing     Problem: Skin  Goal: Participates in plan/prevention/treatment measures  Outcome: Progressing  Goal: Prevent/manage excess moisture  Outcome: Progressing  Goal: Promote/optimize nutrition  Outcome: Progressing     Problem: Pain  Goal: Takes deep breaths with improved pain control throughout the shift  Outcome: Progressing  Goal: Turns in bed with improved pain control throughout the shift  Outcome: Progressing  Goal: Walks with improved pain control throughout the shift  Outcome: Progressing  Goal: Performs ADL's with improved pain control throughout shift  Outcome: Progressing  Goal: Participates in PT with improved pain control throughout the shift  Outcome: Progressing  Goal: Free from opioid side effects throughout the shift  Outcome: Progressing  Goal: Free from acute confusion related to pain meds throughout the shift  Outcome: Progressing   The patient's goals for the shift include      The clinical goals for the shift include Pt's H&H will remain above 7 throughout the shift

## 2023-12-07 NOTE — PROGRESS NOTES
Transitional Care Coordination Progress Note:  Patient was discussed during interdisciplinary rounds.  Team members present: medical team, and TCC.  Plan per medical team: Pt with left popliteal vein, plan to continue Heparin gtt.  Pt went to the OR today with vascular surgery.  Await podiatry recommendations.  Pt will have dialysis on 12/7.  Payer: [employer]  Status: Inpatient  Discharge disposition: PT/OT are recommending low intensity.  Pt is agreeable to home care.  Pt's preference is [facility].  YONATAN Oconnell working on outpatient HD chair.  Pt also requested information on how to find new housing, referral sent to the [facility].    Potential barriers: None  ADOD: 12/9  Care coordinator will continue to follow for discharge planning needs.     Jodi Rivera MSN, RN-BC  Transitional Care Coordinator (TCC)  [phone]

## 2023-12-07 NOTE — NURSING NOTE
Report from Sending RN:    Report From: Betty Almaraz RN)  Recent Surgery of Procedure: No  Baseline Level of Consciousness (LOC): A/O x 4  Oxygen Use: No  Type: none  Diabetic: Yes, 136 12/06  Last BP Med Given Day of Dialysis: none  Last Pain Med Given: dilaudid 1 mg 0033 am; tylenol 975 mg p.o.  Lab Tests to be Obtained with Dialysis: Yes, CBC. Magnesium, RFP  Blood Transfusion to be Given During Dialysis: No  Available IV Access: Yes  Medications to be Administered During Dialysis: No  Continuous IV Infusion Running: No  Restraints on Currently or in the Last 24 Hours: No  Hand-Off Communication: No acute overnight or morning events; Pt did take morning medications; Pt will need labs; Pt  may go off unit without telemetry; Pt is a full code; Kiara Domingo RN

## 2023-12-07 NOTE — CARE PLAN
Problem: Fall/Injury  Goal: Not fall by end of shift  12/7/2023 1649 by Bertha Whitney RN  Outcome: Progressing  12/7/2023 1630 by Bertha Whitney RN  Outcome: Progressing  Goal: Be free from injury by end of the shift  12/7/2023 1649 by Bertha Whitney RN  Outcome: Progressing  12/7/2023 1630 by Bertha Whitney RN  Outcome: Progressing  Goal: Verbalize understanding of personal risk factors for fall in the hospital  12/7/2023 1649 by Bertha Whitney RN  Outcome: Progressing  12/7/2023 1630 by Bertha Whitney RN  Outcome: Progressing  Goal: Verbalize understanding of risk factor reduction measures to prevent injury from fall in the home  12/7/2023 1649 by Bertha Whitney RN  Outcome: Progressing  12/7/2023 1630 by Bertha Whitney RN  Outcome: Progressing  Goal: Use assistive devices by end of the shift  12/7/2023 1649 by Bertha Whitney RN  Outcome: Progressing  12/7/2023 1630 by Bertha Whitney RN  Outcome: Progressing  Goal: Pace activities to prevent fatigue by end of the shift  12/7/2023 1649 by Bertha Whitney RN  Outcome: Progressing  12/7/2023 1630 by Bertha Whitney RN  Outcome: Progressing     Problem: Skin  Goal: Participates in plan/prevention/treatment measures  12/7/2023 1649 by Bertha Whitney RN  Outcome: Progressing  12/7/2023 1630 by Bretha Whitney RN  Outcome: Progressing  Goal: Prevent/manage excess moisture  12/7/2023 1649 by Bertha Whitney RN  Outcome: Progressing  12/7/2023 1630 by Bertha Whitney RN  Outcome: Progressing  Goal: Promote/optimize nutrition  12/7/2023 1649 by Bertha Whitney RN  Outcome: Progressing  12/7/2023 1630 by Bertha Whitney RN  Outcome: Progressing     Problem: Pain  Goal: Takes deep breaths with improved pain control throughout the shift  12/7/2023 1649 by Bertha Whitney RN  Outcome: Progressing  12/7/2023 1630 by Bertha Whitney RN  Outcome: Progressing  Goal: Turns in bed with improved pain control throughout the  shift  12/7/2023 1649 by Bertha Whitney RN  Outcome: Progressing  12/7/2023 1630 by Bertha Whitney RN  Outcome: Progressing  Goal: Walks with improved pain control throughout the shift  12/7/2023 1649 by Bertha Whitney RN  Outcome: Progressing  12/7/2023 1630 by Bertha Whitney RN  Outcome: Progressing  Goal: Performs ADL's with improved pain control throughout shift  12/7/2023 1649 by Bertha Whitney RN  Outcome: Progressing  12/7/2023 1630 by Bertha Whitney RN  Outcome: Progressing  Goal: Participates in PT with improved pain control throughout the shift  12/7/2023 1649 by Bertha Whitney RN  Outcome: Progressing  12/7/2023 1630 by Bertha Whitney RN  Outcome: Progressing  Goal: Free from opioid side effects throughout the shift  12/7/2023 1649 by Bertha Whitney RN  Outcome: Progressing  12/7/2023 1630 by Bertha Whitney RN  Outcome: Progressing  Goal: Free from acute confusion related to pain meds throughout the shift  12/7/2023 1649 by Bertha Whitney RN  Outcome: Progressing  12/7/2023 1630 by Bertha Whitney RN  Outcome: Progressing   The patient's goals for the shift include      The clinical goals for the shift include Pt's H&H will remain above 7 throughout the shift

## 2023-12-07 NOTE — PROGRESS NOTES
Subjective Data:  Pt seen and assessed this AM at [facility] 1800 while waiting for ECHO to be done. Pt appeared a lethargic and slightly disoriented but knew he has procedure done yesterday. Pt was brought to the lab post HD that might have affected him. Pt denied pain on the leg or foot.     Overnight Events:    No overnight issue reported to our service      Objective Data:  Last Recorded Vitals:  Vitals:    12/07/23 0626 12/07/23 0633 12/07/23 1016 12/07/23 1157   BP:    151/85   BP Location:       Patient Position:       Pulse:  87 78 92   Resp:    18   Temp: 37.5 °C (99.5 °F) 37.8 °C (100 °F) 36.7 °C (98.1 °F) 37 °C (98.6 °F)   TempSrc: Temporal Skin Skin    SpO2:    94%   Weight:       Height:           Lab Results   Component Value Date    WBC 8.0 12/07/2023    HGB 6.9 (L) 12/07/2023    HCT 22.9 (L) 12/07/2023    MCV 91 12/07/2023     12/07/2023     Lab Results   Component Value Date    GLUCOSE 120 (H) 12/07/2023    CALCIUM 9.5 12/07/2023     (L) 12/07/2023    K 4.7 12/07/2023    CO2 26 12/07/2023    CL 96 (L) 12/07/2023    BUN 53 (H) 12/07/2023    CREATININE 7.12 (H) 12/07/2023       PTT - 12/2/2023: 11:15 AM  1.1   12.7 71     HGBA1C   Date/Time Value Ref Range Status   11/24/2023 03:31 PM 12.6 see below % Final   05/24/2023 02:20 PM 11.0 4.3 - 5.6 % Final     Comment:     American Diabetes Association guidelines indicate that patients with HgbA1c in the range 5.7-6.4% are at increased risk for development of diabetes, and intervention by lifestyle modification may be beneficial. HgbA1c greater or equal to 6.5% is considered diagnostic of diabetes.   09/05/2022 01:33 PM 12.4 4.0 - 5.6 % Final   09/14/2021 05:38 AM 12.5 % Final     Comment:          Diagnosis of Diabetes-Adults   Non-Diabetic: < or = 5.6%   Increased risk for developing diabetes: 5.7-6.4%   Diagnostic of diabetes: > or = 6.5%  .       Monitoring of Diabetes                Age (y)     Therapeutic Goal (%)   Adults:          >18            <7.0   Pediatrics:    13-18           <7.5                   7-12           <8.0                   0- 6            7.5-8.5   American Diabetes Association. Diabetes Care 33(S1), Jan 2010.     01/14/2021 05:22 AM 12.3 4.3 - 5.6 % Final     Comment:     American Diabetes Association guidelines indicate that patients with HgbA1c in   the range 5.7-6.4% are at increased risk for development of diabetes, and   intervention by lifestyle modification may be beneficial. HgbA1c greater or   equal to 6.5% is considered diagnostic of diabetes.     LDLCALC   Date/Time Value Ref Range Status   11/24/2023 03:31  <=99 mg/dL Final     Comment:                                 Near   Borderline      AGE      Desirable  Optimal    High     High     Very High     0-19 Y     0 - 109     ---    110-129   >/= 130     ----    20-24 Y     0 - 119     ---    120-159   >/= 160     ----      >24 Y     0 -  99   100-129  130-159   160-189     >/=190       VLDL   Date/Time Value Ref Range Status   11/24/2023 03:31 PM 49 0 - 40 mg/dL Final      Last I/O:  I/O last 3 completed shifts:  In: 2058.4 (16.3 mL/kg) [P.O.:360; I.V.:1698.4 (13.4 mL/kg)]  Out: 1160 (9.2 mL/kg) [Urine:1100 (0.2 mL/kg/hr); Blood:60]  Weight: 126.3 kg     VAS US ANKLE BRACHIAL INDEX (ZAC) WITHOUT EXERCISE   Patient Name: JONO Arshad Physician:    Study Date: 12/7/2023 Ordering Physician: [id number] DIAZ   MRN/PID: [id number] Technologist: Riley Banks T   Accession#: [id number] Technologist 2: Clare Hardin RVT   Date of Birth/Age: 1965 / 57 years Encounter#: [id number]   Gender: M     Admission Status: Inpatient  Location Performed: [facility]     Diagnosis/ICD: Peripheral vascular disease, unspecified-I73.9CPT Codes:22797 Peripheral artery ZAC Only  CONCLUSIONS:  Bilateral Lower PVR:   Right Lower PVR: Evidence of moderate arterial occlusive disease in the right lower extremity at rest. Biphasic flow is noted in the right  posterior tibial artery and right dorsalis pedis artery. Triphasic flow is noted in the right common femoral artery. Unable to obtain the TBI due to partial amputation of right big toe. The PPG waveform from the remaining part of the toe appears to be flat.   Left Lower PVR: Evidence of mild arterial occlusive disease in the left lower extremity at rest. Biphasic flow is noted in the left posterior tibial artery and left dorsalis pedis artery. Triphasic flow is noted in the left common femoral artery. Unable to obtain TBI due to a fresh bandage on pt's toe.   Right Upper PVR:   Left Upper PVR:   Exercise:     Imaging & Doppler Findings:    RIGHT Lower PVR Pressures Ratios   Right Posterior Tibial (Ankle) 93 mmHg 0.58   Right Dorsalis Pedis (Ankle) 90 mmHg 0.56          LEFT Lower PVR Pressures Ratios   Left Posterior Tibial (Ankle) 150 mmHg 0.93   Left Dorsalis Pedis (Ankle) 110 mmHg 0.68           Right Left   Brachial Pressure 161 mmHg 159 mmHg     [id number] Coni Ziegler MD, RPVI  Electronically signed by on at     Vascular US PVR without exercise 11/27/2023    [place] [id number]  Tel [phone] and Fax [phone]      Vascular Lab Report  VASC US PVR WITHOUT EXERCISE      Patient Name:      JONO ALDANAGAMA         Reading Physician:  [id number] Quincy Saab MD  Study Date:        11/27/2023            Ordering Physician: [id number] JACKSON LINDSAY  MRN/PID:           [id number]              Technologist:       Anna Berger  RVT  Accession#:        [id number]          Technologist 2:  Date of Birth/Age: 1965 / 57 years Encounter#:         [id number]  Gender:            M  Admission Status:  Inpatient             Location Performed: [facility]      Diagnosis/ICD: Peripheral vascular disease, unspecified-I73.9  Indication:    Peripheral vascular disease  CPT Codes:     17789 Peripheral artery PVR (multi segmental pressure      **CRITICAL  RESULT**  Critical Result: Posterior tibial and dorsalis pedis are not audible on the left.  Notification called to Nino Dominique MD via secure chat on 11/27/2023 at 9:17:05 AM by Anna Berger YAN.    CONCLUSIONS:  Right Lower PVR: Right pressures of >220 mmHg suggest no compressibility of vessels and may make absolute Segmental Limb Pressures (SLP) unreliable. Decreased digital perfusion noted. Biphasic flow is noted in the right popliteal artery, right posterior tibial artery and right dorsalis pedis artery. Triphasic flow is noted in the right common femoral artery.  Left Lower PVR: There is evidence of severe disease at the femoral popliteal level. Biphasic flow is noted in the left popliteal artery. Triphasic flow is noted in the left common femoral artery. Unable to obtain pressures.  The posterior tibial and dorasalis pedis arteries are not audible.  Disease called by waveforms.    Imaging & Doppler Findings:    RIGHT Lower PVR                Pressures Ratios  Right High Thigh               256 mmHg  1.54  Right Low Thigh                256 mmHg  1.54  Right Calf                     182 mmHg  1.10  Right Posterior Tibial (Ankle) 126 mmHg  0.76  Right Dorsalis Pedis (Ankle)   106 mmHg  0.64  Right Digit (Great Toe)        51 mmHg   0.31    Right     Left  Brachial Pressure 151 mmHg 166 mmHg      [id number] Quincy Saab MD  Electronically signed by [id number] Quincy Saab MD on 11/27/2023 at 3:46:58 PM      Inpatient Medications:  Scheduled medications   Medication Dose Route Frequency    acetaminophen  975 mg oral q8h    alteplase  2 mg intra-catheter Daily    alteplase  2 mg intra-catheter Daily    amLODIPine  10 mg oral Daily    [Held by provider] aspirin  81 mg oral Daily    atorvastatin  80 mg oral Nightly    B complex-vitamin C-folic acid  1 capsule oral Daily    calcitriol  0.25 mcg oral Daily    carvedilol  25 mg oral BID    clopidogrel  75 mg oral Daily    darbepoetin abhishek  0.45 mcg/kg  subcutaneous Weekly    gabapentin  100 mg oral q PM    heparin  80 Units/kg intravenous Once    insulin glargine  25 Units subcutaneous Nightly    insulin lispro  0-5 Units subcutaneous TID with meals    insulin lispro  7 Units subcutaneous TID with meals    iron sucrose  200 mg intravenous Daily    lidocaine  1 patch transdermal Daily    lidocaine-prilocaine   Topical Daily    melatonin  5 mg oral Nightly    pantoprazole  20 mg oral Daily before breakfast    polyethylene glycol  17 g oral BID    sennosides-docusate sodium  1 tablet oral Nightly    torsemide  100 mg oral Daily     PRN medications   Medication    dextrose 10 % in water (D10W)    dextrose    eucerin    glucagon    hydrALAZINE    HYDROmorphone    hydrOXYzine HCL    oxyCODONE     Continuous Medications   Medication Dose Last Rate    heparin  0-4,500 Units/hr         Physical Exam:  Constitutional: obese, NAD, lethargic, pleasant, cooperative     Head/Neck: Neck supple, No JVD, trachea midline, no bruits           Respiratory/Thorax: Patent airways, CTAB, thorax symmetric, Rt chest wall HD cath      Cardiovascular: Regular, rate and rhythm, no murmurs, normal S 1 and S 2    Gastrointestinal: Nondistended, soft, non-tender, +BS,       Skin: Warm and dry            Extremities: CALL, +2 BLE edema, ulceration of the left great toe with dressing, prior amputation of toe 2-5 on the left. Left heel very dry, sort to palpation. RLE no open ulceration, skin dry and flaky. Motor function  slightly limited on the left at the ankle level and mild loss of sensation. multiphasic Lt PT/DP doppler signal Post vascular procedure site Rt groin soft, nontender, no oozing/hematoma.  Neuro: non-focal, Aox2-3  Psychological: Appropriate mood and behavior      Assessment/Plan   Kwadwo Hoyt is a 57 y.o. male with PMH of HTN, DM2 uncontrolled with neuropathy, multiple toe amputations by podiatry, OM in the left foot 2021, CKD stage 5 now on HD presented with LLE numbness and  "weakness causing him to fall 5 times. CTA showed complete occlusion of the P1 and P3 segments of the left popliteal artery with some reconstitution of the P2 segment and likely complete occlusion of the left anterior tibial and posterior tibial arteries. S/p diagnostic angiogram on 12/1/2023 by vascular surgery findings: mild proximal SFA stenosis, distal SFA occlusion, reconstitution of the below-the-knee popliteal artery with heavy disease of the TP trunk causing >75% stenosis. AT and PT occluded at their origins. PT does not reconstitute. The peroneal fills with multilevel disease, but flows to the ankle. Distal AT at the ankle reconstitutes via collaterals from peroneal and carries on as a diminutive DP.  EV consulted for endovascular option for revascularization.     12/6/23 - Pt underwent peripheral angioplasty of LLE by Dr. Santiago Ward  1) Left lower extremity angiogram   2) JETi thrombectomy SFA and popliteal artery  3) DCB PTA of SFA, popliteal artery, and TP trunk  4) 7x80mm Everflex stent placement in distal SFA/ popliteal artery; 5x32mm Megatron stent placement in distal popliteal artery/ TP trunk    12/7/23 - On assessment Pt foot warm to touch with Doppler signal PT/DP. Access site stable without bleeding or hematoma. Pt denied pain. Edema noted B/L  ZAC/TBI reviewed - significant improvement in the blood flow to the left foot ankle pressure Lt went from \"0\" to 0.93. toe pressure was not recorded. Good potential for wound healing. (Result also reviewed by attending Dr. Santiago Ward)    Plan    - Continue with ASA and Plavix (Please send Pt home with 1 year Rx)  - continue with Atorva 80   - Podiatry can proceed with TMA.    - Pain management and Abx as indicated per primary team  - 1 month postprocedure follow up at EV clinic (our team will arrange)  - Endovascular tem will sign off, please feel free to call us with any questions.         Peripheral IV 11/29/23 20 G Left;Proximal;Anterior Forearm (Active) "   Site Assessment Clean;Dry;Intact 12/07/23 0800   Dressing Status Clean;Dry 12/07/23 0800   Number of days: 8       Hemodialysis Cath 11/30/23 Right Subclavian (Active)   Cap Change Cap changed 12/07/23 1016   Number of days: 7     Code Status:  Full Code            Mahadr George, APRN-CNP  Endovascular/Limb [facility] Service   Day: 34151/Haiku/Night [facility] [id number]

## 2023-12-07 NOTE — NURSING NOTE
VSS. Patient reports severe pain, tylenol, oxycodone, and dilaudid administered throughout the shift. Patient slept throughout the night with no other complaints. Patient currently off unit for HD, report given to MANOJ Craig. Plan is for possible TMA of left foot on 12/8 per documentation. Betty Betts RN

## 2023-12-07 NOTE — CARE PLAN
The patient's goals for the shift include  pain control    The clinical goals for the shift include pt will have no signs of bleeding during shift.      Problem: Fall/Injury  Goal: Not fall by end of shift  Outcome: Progressing  Goal: Be free from injury by end of the shift  Outcome: Progressing  Goal: Verbalize understanding of personal risk factors for fall in the hospital  Outcome: Progressing  Goal: Verbalize understanding of risk factor reduction measures to prevent injury from fall in the home  Outcome: Progressing  Goal: Use assistive devices by end of the shift  Outcome: Progressing  Goal: Pace activities to prevent fatigue by end of the shift  Outcome: Progressing     Problem: Skin  Goal: Participates in plan/prevention/treatment measures  Outcome: Progressing  Goal: Prevent/manage excess moisture  Outcome: Progressing  Flowsheets (Taken 12/6/2023 2201)  Prevent/manage excess moisture: Moisturize dry skin  Goal: Promote/optimize nutrition  Outcome: Progressing     Problem: Pain  Goal: Takes deep breaths with improved pain control throughout the shift  Outcome: Progressing  Goal: Turns in bed with improved pain control throughout the shift  Outcome: Progressing  Goal: Walks with improved pain control throughout the shift  Outcome: Progressing  Goal: Performs ADL's with improved pain control throughout shift  Outcome: Progressing  Goal: Participates in PT with improved pain control throughout the shift  Outcome: Progressing  Goal: Free from opioid side effects throughout the shift  Outcome: Progressing  Goal: Free from acute confusion related to pain meds throughout the shift  Outcome: Progressing

## 2023-12-07 NOTE — NURSING NOTE
Report to Receiving RN:    Report To: Benton ( RN)  Time Report Called: 10:25 am  Hand-Off Communication: vs - 138/85, HR 78  Complications During Treatment: c/o generalized pain  Ultrafiltration Treatment: No  Medications Administered During Dialysis: Yes, oxycodone 5 mg; dilaudid 1 mg IVP 09: 32 am  Blood Products Administered During Dialysis: No  Labs Sent During Dialysis: Yes  Heparin Drip Rate Changes: No    Electronic Signatures:   (Signed Ladonna Cheng RN)   Authored:    (Signed )   Authored:     Last Updated: 10:31 AM by LADONNA CHENG

## 2023-12-07 NOTE — PROGRESS NOTES
This SW continuing to work with Lori from [facility] to find HD chair time that meets pt request if possible.    UPDATE ([id number]): Lori gave this SW multiple chair times. This SW spoke with pt and he chose [facility] at 5:15 AM, TTS. Team and Lori updated. Pt and SW spoke about resources that this SW previously provided to pt.    - Kourtney Sanz MSW, LSW

## 2023-12-08 ENCOUNTER — APPOINTMENT (OUTPATIENT)
Dept: RADIOLOGY | Facility: HOSPITAL | Age: 58
DRG: 270 | End: 2023-12-08
Payer: MEDICARE

## 2023-12-08 ENCOUNTER — ANESTHESIA (OUTPATIENT)
Dept: OPERATING ROOM | Facility: HOSPITAL | Age: 58
DRG: 270 | End: 2023-12-08
Payer: MEDICARE

## 2023-12-08 LAB
ALBUMIN SERPL BCP-MCNC: 3.3 G/DL (ref 3.4–5)
ANION GAP SERPL CALC-SCNC: 17 MMOL/L (ref 10–20)
BASOPHILS # BLD AUTO: 0.04 X10*3/UL (ref 0–0.1)
BASOPHILS NFR BLD AUTO: 0.5 %
BUN SERPL-MCNC: 45 MG/DL (ref 6–23)
CALCIUM SERPL-MCNC: 8.8 MG/DL (ref 8.6–10.6)
CHLORIDE SERPL-SCNC: 98 MMOL/L (ref 98–107)
CO2 SERPL-SCNC: 27 MMOL/L (ref 21–32)
CREAT SERPL-MCNC: 6.29 MG/DL (ref 0.5–1.3)
EOSINOPHIL # BLD AUTO: 0.25 X10*3/UL (ref 0–0.7)
EOSINOPHIL NFR BLD AUTO: 3.3 %
ERYTHROCYTE [DISTWIDTH] IN BLOOD BY AUTOMATED COUNT: 12.8 % (ref 11.5–14.5)
ERYTHROCYTE [DISTWIDTH] IN BLOOD BY AUTOMATED COUNT: 13 % (ref 11.5–14.5)
GFR SERPL CREATININE-BSD FRML MDRD: 10 ML/MIN/1.73M*2
GLUCOSE BLD MANUAL STRIP-MCNC: 142 MG/DL (ref 74–99)
GLUCOSE BLD MANUAL STRIP-MCNC: 142 MG/DL (ref 74–99)
GLUCOSE BLD MANUAL STRIP-MCNC: 183 MG/DL (ref 74–99)
GLUCOSE BLD MANUAL STRIP-MCNC: 212 MG/DL (ref 74–99)
GLUCOSE SERPL-MCNC: 107 MG/DL (ref 74–99)
HCT VFR BLD AUTO: 23 % (ref 41–52)
HCT VFR BLD AUTO: 27.5 % (ref 41–52)
HCYS SERPL-SCNC: 19.09 UMOL/L (ref 5–13.9)
HGB BLD-MCNC: 6.9 G/DL (ref 13.5–17.5)
HGB BLD-MCNC: 7 G/DL (ref 13.5–17.5)
HGB BLD-MCNC: 8.6 G/DL (ref 13.5–17.5)
IMM GRANULOCYTES # BLD AUTO: 0.01 X10*3/UL (ref 0–0.7)
IMM GRANULOCYTES NFR BLD AUTO: 0.1 % (ref 0–0.9)
LYMPHOCYTES # BLD AUTO: 1.93 X10*3/UL (ref 1.2–4.8)
LYMPHOCYTES NFR BLD AUTO: 25.8 %
MAGNESIUM SERPL-MCNC: 2.06 MG/DL (ref 1.6–2.4)
MCH RBC QN AUTO: 27 PG (ref 26–34)
MCH RBC QN AUTO: 28.5 PG (ref 26–34)
MCHC RBC AUTO-ENTMCNC: 30 G/DL (ref 32–36)
MCHC RBC AUTO-ENTMCNC: 31.3 G/DL (ref 32–36)
MCV RBC AUTO: 90 FL (ref 80–100)
MCV RBC AUTO: 91 FL (ref 80–100)
MONOCYTES # BLD AUTO: 1.44 X10*3/UL (ref 0.1–1)
MONOCYTES NFR BLD AUTO: 19.2 %
NEUTROPHILS # BLD AUTO: 3.82 X10*3/UL (ref 1.2–7.7)
NEUTROPHILS NFR BLD AUTO: 51.1 %
NRBC BLD-RTO: 0 /100 WBCS (ref 0–0)
NRBC BLD-RTO: 0 /100 WBCS (ref 0–0)
PHOSPHATE SERPL-MCNC: 5.1 MG/DL (ref 2.5–4.9)
PLATELET # BLD AUTO: 274 X10*3/UL (ref 150–450)
PLATELET # BLD AUTO: 282 X10*3/UL (ref 150–450)
POTASSIUM SERPL-SCNC: 4.5 MMOL/L (ref 3.5–5.3)
RBC # BLD AUTO: 2.56 X10*6/UL (ref 4.5–5.9)
RBC # BLD AUTO: 3.02 X10*6/UL (ref 4.5–5.9)
SODIUM SERPL-SCNC: 137 MMOL/L (ref 136–145)
UFH PPP CHRO-ACNC: 0.4 IU/ML
UFH PPP CHRO-ACNC: 0.5 IU/ML
WBC # BLD AUTO: 7.5 X10*3/UL (ref 4.4–11.3)
WBC # BLD AUTO: 8.1 X10*3/UL (ref 4.4–11.3)

## 2023-12-08 PROCEDURE — P9016 RBC LEUKOCYTES REDUCED: HCPCS

## 2023-12-08 PROCEDURE — 36430 TRANSFUSION BLD/BLD COMPNT: CPT

## 2023-12-08 PROCEDURE — 85027 COMPLETE CBC AUTOMATED: CPT

## 2023-12-08 PROCEDURE — 85018 HEMOGLOBIN: CPT | Performed by: STUDENT IN AN ORGANIZED HEALTH CARE EDUCATION/TRAINING PROGRAM

## 2023-12-08 PROCEDURE — 85025 COMPLETE CBC W/AUTO DIFF WBC: CPT

## 2023-12-08 PROCEDURE — 2500000001 HC RX 250 WO HCPCS SELF ADMINISTERED DRUGS (ALT 637 FOR MEDICARE OP): Performed by: NURSE PRACTITIONER

## 2023-12-08 PROCEDURE — 71275 CT ANGIOGRAPHY CHEST: CPT

## 2023-12-08 PROCEDURE — 36415 COLL VENOUS BLD VENIPUNCTURE: CPT | Performed by: STUDENT IN AN ORGANIZED HEALTH CARE EDUCATION/TRAINING PROGRAM

## 2023-12-08 PROCEDURE — 2550000001 HC RX 255 CONTRASTS: Performed by: STUDENT IN AN ORGANIZED HEALTH CARE EDUCATION/TRAINING PROGRAM

## 2023-12-08 PROCEDURE — 80069 RENAL FUNCTION PANEL: CPT

## 2023-12-08 PROCEDURE — 83735 ASSAY OF MAGNESIUM: CPT

## 2023-12-08 PROCEDURE — 85520 HEPARIN ASSAY: CPT | Performed by: STUDENT IN AN ORGANIZED HEALTH CARE EDUCATION/TRAINING PROGRAM

## 2023-12-08 PROCEDURE — 82947 ASSAY GLUCOSE BLOOD QUANT: CPT

## 2023-12-08 PROCEDURE — 71275 CT ANGIOGRAPHY CHEST: CPT | Performed by: RADIOLOGY

## 2023-12-08 PROCEDURE — 83090 ASSAY OF HOMOCYSTEINE: CPT

## 2023-12-08 PROCEDURE — 99232 SBSQ HOSP IP/OBS MODERATE 35: CPT

## 2023-12-08 PROCEDURE — 2500000004 HC RX 250 GENERAL PHARMACY W/ HCPCS (ALT 636 FOR OP/ED): Performed by: NURSE PRACTITIONER

## 2023-12-08 PROCEDURE — 84100 ASSAY OF PHOSPHORUS: CPT

## 2023-12-08 PROCEDURE — 2500000001 HC RX 250 WO HCPCS SELF ADMINISTERED DRUGS (ALT 637 FOR MEDICARE OP)

## 2023-12-08 PROCEDURE — 2500000004 HC RX 250 GENERAL PHARMACY W/ HCPCS (ALT 636 FOR OP/ED): Performed by: STUDENT IN AN ORGANIZED HEALTH CARE EDUCATION/TRAINING PROGRAM

## 2023-12-08 PROCEDURE — 1200000002 HC GENERAL ROOM WITH TELEMETRY DAILY

## 2023-12-08 PROCEDURE — 36415 COLL VENOUS BLD VENIPUNCTURE: CPT

## 2023-12-08 PROCEDURE — 2500000005 HC RX 250 GENERAL PHARMACY W/O HCPCS

## 2023-12-08 PROCEDURE — 2500000004 HC RX 250 GENERAL PHARMACY W/ HCPCS (ALT 636 FOR OP/ED)

## 2023-12-08 RX ORDER — SEVELAMER CARBONATE 800 MG/1
800 TABLET, FILM COATED ORAL
Status: DISCONTINUED | OUTPATIENT
Start: 2023-12-08 | End: 2023-12-14 | Stop reason: HOSPADM

## 2023-12-08 RX ADMIN — ACETAMINOPHEN 975 MG: 325 TABLET ORAL at 21:12

## 2023-12-08 RX ADMIN — CALCITRIOL CAPSULES 0.25 MCG 0.25 MCG: 0.25 CAPSULE ORAL at 08:51

## 2023-12-08 RX ADMIN — OXYCODONE HYDROCHLORIDE 5 MG: 5 TABLET ORAL at 06:03

## 2023-12-08 RX ADMIN — SEVELAMER CARBONATE 800 MG: 800 TABLET, FILM COATED ORAL at 08:50

## 2023-12-08 RX ADMIN — CLOPIDOGREL BISULFATE 75 MG: 75 TABLET ORAL at 08:50

## 2023-12-08 RX ADMIN — INSULIN GLARGINE 25 UNITS: 100 INJECTION, SOLUTION SUBCUTANEOUS at 21:12

## 2023-12-08 RX ADMIN — SEVELAMER CARBONATE 800 MG: 800 TABLET, FILM COATED ORAL at 15:39

## 2023-12-08 RX ADMIN — CARVEDILOL 25 MG: 12.5 TABLET, FILM COATED ORAL at 08:51

## 2023-12-08 RX ADMIN — SENNOSIDES AND DOCUSATE SODIUM 1 TABLET: 8.6; 5 TABLET ORAL at 21:11

## 2023-12-08 RX ADMIN — ACETAMINOPHEN 975 MG: 325 TABLET ORAL at 04:20

## 2023-12-08 RX ADMIN — LIDOCAINE AND PRILOCAINE: 25; 25 CREAM TOPICAL at 09:00

## 2023-12-08 RX ADMIN — NEPHROCAP 1 CAPSULE: 1 CAP ORAL at 08:51

## 2023-12-08 RX ADMIN — INSULIN LISPRO 7 UNITS: 100 INJECTION, SOLUTION INTRAVENOUS; SUBCUTANEOUS at 15:40

## 2023-12-08 RX ADMIN — AMLODIPINE BESYLATE 10 MG: 10 TABLET ORAL at 08:51

## 2023-12-08 RX ADMIN — ACETAMINOPHEN 975 MG: 325 TABLET ORAL at 12:50

## 2023-12-08 RX ADMIN — IOHEXOL 100 ML: 350 INJECTION, SOLUTION INTRAVENOUS at 23:10

## 2023-12-08 RX ADMIN — PANTOPRAZOLE SODIUM 20 MG: 20 TABLET, DELAYED RELEASE ORAL at 08:51

## 2023-12-08 RX ADMIN — CARVEDILOL 25 MG: 12.5 TABLET, FILM COATED ORAL at 21:11

## 2023-12-08 RX ADMIN — OXYCODONE HYDROCHLORIDE 5 MG: 5 TABLET ORAL at 12:50

## 2023-12-08 RX ADMIN — LIDOCAINE 1 PATCH: 4 PATCH TOPICAL at 08:51

## 2023-12-08 RX ADMIN — IRON SUCROSE 200 MG: 20 INJECTION, SOLUTION INTRAVENOUS at 08:51

## 2023-12-08 RX ADMIN — Medication 5 MG: at 21:12

## 2023-12-08 RX ADMIN — TORSEMIDE 100 MG: 20 TABLET ORAL at 08:50

## 2023-12-08 RX ADMIN — GABAPENTIN 100 MG: 100 CAPSULE ORAL at 21:12

## 2023-12-08 RX ADMIN — HYDROMORPHONE HYDROCHLORIDE 1 MG: 1 INJECTION, SOLUTION INTRAMUSCULAR; INTRAVENOUS; SUBCUTANEOUS at 00:20

## 2023-12-08 RX ADMIN — ATORVASTATIN CALCIUM 80 MG: 80 TABLET, FILM COATED ORAL at 21:11

## 2023-12-08 ASSESSMENT — PAIN - FUNCTIONAL ASSESSMENT
PAIN_FUNCTIONAL_ASSESSMENT: 0-10

## 2023-12-08 ASSESSMENT — PAIN SCALES - GENERAL
PAINLEVEL_OUTOF10: 7
PAINLEVEL_OUTOF10: 0 - NO PAIN
PAINLEVEL_OUTOF10: 0 - NO PAIN
PAINLEVEL_OUTOF10: 7
PAINLEVEL_OUTOF10: 8
PAINLEVEL_OUTOF10: 7

## 2023-12-08 NOTE — CARE PLAN
The patient's goals for the shift include  pain control    The clinical goals for the shift include patient will remain free from falls during shift      Problem: Fall/Injury  Goal: Not fall by end of shift  Outcome: Progressing  Goal: Be free from injury by end of the shift  Outcome: Progressing  Goal: Verbalize understanding of personal risk factors for fall in the hospital  Outcome: Progressing  Goal: Verbalize understanding of risk factor reduction measures to prevent injury from fall in the home  Outcome: Progressing  Goal: Use assistive devices by end of the shift  Outcome: Progressing  Goal: Pace activities to prevent fatigue by end of the shift  Outcome: Progressing     Problem: Skin  Goal: Participates in plan/prevention/treatment measures  Outcome: Progressing  Goal: Prevent/manage excess moisture  Outcome: Progressing  Flowsheets (Taken 12/7/2023 1940)  Prevent/manage excess moisture: Moisturize dry skin  Goal: Promote/optimize nutrition  Outcome: Progressing     Problem: Pain  Goal: Takes deep breaths with improved pain control throughout the shift  Outcome: Progressing  Goal: Turns in bed with improved pain control throughout the shift  Outcome: Progressing  Goal: Walks with improved pain control throughout the shift  Outcome: Progressing  Goal: Performs ADL's with improved pain control throughout shift  Outcome: Progressing  Goal: Participates in PT with improved pain control throughout the shift  Outcome: Progressing  Goal: Free from opioid side effects throughout the shift  Outcome: Progressing  Goal: Free from acute confusion related to pain meds throughout the shift  Outcome: Progressing

## 2023-12-08 NOTE — PROGRESS NOTES
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 osteophytic spurring, posterior disc bulge, along with degenerative facet changes and ligamentum flavum hypertrophy which in combination with prominence of the epidural fat posteriorly within the spinal canal contributes to suspected at least moderate narrowing of the thecal sac within the spinal canal. There is moderate encroachment upon the neural foramen bilaterally.   At the L3/4 level, is minimal posterior osteophytic spurring and posterior disc bulge along with degenerative facet changes and ligamentum flavum hypertrophy. There is prominence of the epidural fat posteriorly within the spinal canal. There is suspected moderate narrowing of the thecal sac within the spinal canal. There is moderate encroachment upon the neural foramen  bilaterally.   At the L2/3 level, there is a suspected mild posterior disc bulge along with mild degenerative facet changes and ligamentum flavum hypertrophy. There is prominence of the epidural fat posteriorly within the spinal canal. There is mild overall narrowing of the thecal sac within the spinal canal. There is mild encroachment upon the neural foramen bilaterally. There is right far lateral osteophytic spurring.   At the L1/2 level, there are mild degenerative facet changes without significant bony encroachment upon the spinal canal or neural foramen. There is right far lateral osteophytic spurring.   At the T12/L1 level, there are mild degenerative facet changes without significant bony encroachment upon the spinal canal or neural foramen.   At the T11/12 level, there are degenerative facet changes and minimal posterior osteophytic spurring contributing to suspected mild spinal canal narrowing. There is mild-to-moderate bony encroachment upon the neural foramen bilaterally.   There is a component of bony ankylosis along the sacroiliac joints bilaterally.   Atherosclerotic calcifications are noted along the descending aorta and iliac arteries.         No acute fracture of the lumbar spine is noted.   The lumbar vertebral bodies are in anatomic alignment.   There is multilevel spondylosis with degenerative changes noted along endplates within lumbar and visualized lower thoracic region. There are varying degrees of spinal canal and neural foraminal narrowing as described above. The soft tissues of the spinal canal and neural foramen are suboptimally evaluated on this CT study.   MACRO: None   Signed by: Charlie Betts 11/24/2023 4:30 PM Dictation workstation:   [id number]    CT head wo IV contrast    Result Date: 11/24/2023  Interpreted By:  Charlie Betts, STUDY: CT HEAD WO IV CONTRAST;  11/24/2023 4:11 pm   INDICATION: Signs/Symptoms:left lower extremity weakness.   COMPARISON: None.   ACCESSION NUMBER(S):  [id number]   ORDERING CLINICIAN: CHOLO JORDAN   TECHNIQUE: Axial CT images of the head were obtained without intravenous contrast administration.   FINDINGS: There is moderate brain parenchymal volume loss.   The lateral ventricles demonstrate mild disproportionate prominence when compared with the sulci within the cerebral convexities. No obstructing mass lesion is noted on this noncontrast CT study. This mild disproportionate ventriculomegaly may be related to more pronounced central volume loss ossific a component of communicating hydrocephalus.   There are patchy and confluent nonspecific white matter changes within the cerebral hemispheres bilaterally which while nonspecific, can be seen with small-vessel ischemic change among others.   Additional focal hypodensities are identified within the subinsular regions and basal ganglia bilaterally suggesting incidental prominent perivascular spaces and/or scattered lacunar infarctions.   No hyperdense acute intracranial hemorrhage is noted.   There is no midline shift.   There are scattered retention cysts or polyps within the bilateral maxillary sinuses. The remaining paranasal sinuses are clear.   There is opacification of the left middle ear cavity and left mastoid air cells.   There is a metallic foreign body within the right facial soft tissues surrounding the anterior margin of the right parotid gland.       There is moderate brain parenchymal volume loss.   The lateral ventricles demonstrate mild disproportionate prominence when compared with the sulci within the cerebral convexities. No obstructing mass lesion is noted on this noncontrast CT study. This mild disproportionate ventriculomegaly may be related to more pronounced central volume loss ossific a component of communicating hydrocephalus.   There are patchy and confluent nonspecific white matter changes within the cerebral hemispheres bilaterally which while nonspecific, can be seen with  small-vessel ischemic change among others. Additional focal hypodensities are identified within the subinsular regions and basal ganglia bilaterally suggesting incidental prominent perivascular spaces and/or scattered lacunar infarctions.   There is opacification of the left middle ear cavity and left mastoid air cells.   There is a metallic foreign body within the right facial soft tissues surrounding the anterior margin of the right parotid gland.   MACRO: None.   Signed by: Charlie Betts 11/24/2023 4:21 PM Dictation workstation:   [id number]    XR hip left 2 or 3 views    Result Date: 11/24/2023  Interpreted By:  Nadir Bender, STUDY: XR HIP LEFT 2 OR 3 VIEWS; XR FEMUR LEFT 2+ VIEWS; ;  11/24/2023 3:58 pm   INDICATION: Signs/Symptoms:weakness.   COMPARISON: None.   ACCESSION NUMBER(S): [id number]; [id number]   ORDERING CLINICIAN: CHOLO JORDAN   FINDINGS: Left hip, three views. Left femur, two views.   There is no fracture. There is no dislocation. Moderate degenerative changes present in the hip and in the left knee. There is no malalignment. Linear heterotopic ossification at the lateral aspect of the left hip.       No acute abnormality seen. Moderate degenerative changes     MACRO: None   Signed by: Nadir Bender 11/24/2023 4:02 PM Dictation workstation:   [id number]    XR femur left 2+ views    Result Date: 11/24/2023  Interpreted By:  Nadir Bender, STUDY: XR HIP LEFT 2 OR 3 VIEWS; XR FEMUR LEFT 2+ VIEWS; ;  11/24/2023 3:58 pm   INDICATION: Signs/Symptoms:weakness.   COMPARISON: None.   ACCESSION NUMBER(S): [id number]; [id number]   ORDERING CLINICIAN: CHOLO JORDAN   FINDINGS: Left hip, three views. Left femur, two views.   There is no fracture. There is no dislocation. Moderate degenerative changes present in the hip and in the left knee. There is no malalignment. Linear heterotopic ossification at the lateral aspect of the left hip.       No acute abnormality seen. Moderate  degenerative changes     MACRO: None   Signed by: Nadir Bender 11/24/2023 4:02 PM Dictation workstation:   [id number]            Assessment/Plan   ASSESSMENT & PLAN:    #CLTI, LLE  #atherosclerosis of native arteries with dry gangrene, left   #DM2 with peripheral neuropathy  #ESRD  #acquired absence of 3 digit, right foot  #acquired absence of multiple digits, left foot    - Patient was seen and evaluated; all findings were discussed and all questions were answered to patient's satisfaction.  - Charts, labs, vitals and imaging all reviewed.   - Labs: WBC 7.5, Hgb 6.9, CRP 4.15, , lactate 0.6, HgbA1c 12.6, INR 1.1  - PVRs: Critical Result: Posterior tibial and dorsalis pedis are not audible on the left. Severe occlusive disease B/L.  RIGHT Lower PVR                Pressures Ratios  Right High Thigh               256 mmHg  1.54  Right Low Thigh                256 mmHg  1.54  Right Calf                     182 mmHg  1.10  Right Posterior Tibial (Ankle) 126 mmHg  0.76  Right Dorsalis Pedis (Ankle)   106 mmHg  0.64  Right Digit (Great Toe)        51 mmHg   0.31  - Blood culture: no growth    Plan:  - Abx: per primary/ID  - Pain Regimen: per primary/ID  - Dressings: betadine soaked gauze, dry gauze, aga, tape. Recommend daily dressing changes.   - Nursing staff is able to change/reinforce dressing if & as necessary until next day’s dressing change. Thank you.  -left hallux is clinically dry and stable. With critical results of complete occlusion of the DP/PT and popliteal, patient is at a very high risk for limb loss.  -Plan for TMA of left foot on Monday (12/11/23) with Dr. Gill, time TBD. Consent completed. Please make NPO at midnight Sunday. OK to continue AC regimen. Please obtain active type and screen. Please medically optimize with Hgb >7.5mmHg  - Podiatry will continue to follow while in house.    DVT ppx: per primary  Weightbearing: WBAT    Case to be discussed with attending, A&P above reflects a  tentative plan. Please await for the final signature from the attending physician on service.    Phylicia Espinoza DPM PGY-2  Podiatric Medicine & Surgery  Please [place] message me with any questions or concerns.            SIGNATURE: Phylicia Espinoza DPM PATIENT NAME: Kwadwo Hoyt   DATE: December 8, 2023 MRN: [id number]   TIME: 8:45 AM CONTACT: [facility] Message

## 2023-12-08 NOTE — CARE PLAN
The patient's goals for the shift include      The clinical goals for the shift include patient will remain free from falls during shift. Goal met. Pt was free from falls and injury during the shift. Pt was checked and monitored during the shift. Pt received RBC during the shift and tolerated it well. Most goals met.     Problem: Skin  Goal: Prevent/manage excess moisture  Outcome: Progressing  Goal: Promote/optimize nutrition  Outcome: Progressing     Problem: Pain  Goal: Takes deep breaths with improved pain control throughout the shift  Outcome: Progressing  Goal: Turns in bed with improved pain control throughout the shift  Outcome: Progressing  Goal: Walks with improved pain control throughout the shift  Outcome: Progressing  Goal: Performs ADL's with improved pain control throughout shift  Outcome: Progressing  Goal: Participates in PT with improved pain control throughout the shift  Outcome: Progressing

## 2023-12-08 NOTE — CARE PLAN
Patient wanted information on getting a house. I provided the patient with multiple resources like [email or web address] and other resources.  The patient thought he was just the information he needed.

## 2023-12-08 NOTE — PROGRESS NOTES
57 y.o. male admitted for Arterial occlusion [I70.90]  HFrEF (heart failure with reduced ejection fraction) (CMS/[facility]) [I50.20]  Acute deep vein thrombosis (DVT) of left lower extremity after procedure (CMS/HCC) [I97.89, I82.402]  Acute occlusion of popliteal artery due to thrombosis (CMS/HCC) [I74.3].     Subjective   Pt with OVN Hgb of 6.9, will have transfusion. Pt seen at bedside, very frustrated he is not having his procedure today. States he is tired of waiting and hoping.        Objective     Scheduled Medications:   acetaminophen, 975 mg, oral, q8h  alteplase, 2 mg, intra-catheter, Daily  alteplase, 2 mg, intra-catheter, Daily  amLODIPine, 10 mg, oral, Daily  [Held by provider] aspirin, 81 mg, oral, Daily  atorvastatin, 80 mg, oral, Nightly  B complex-vitamin C-folic acid, 1 capsule, oral, Daily  calcitriol, 0.25 mcg, oral, Daily  carvedilol, 25 mg, oral, BID  clopidogrel, 75 mg, oral, Daily  darbepoetin abhishek, 0.45 mcg/kg, subcutaneous, Weekly  gabapentin, 100 mg, oral, q PM  insulin glargine, 25 Units, subcutaneous, Nightly  insulin lispro, 0-5 Units, subcutaneous, TID with meals  insulin lispro, 7 Units, subcutaneous, TID with meals  [START ON 12/9/2023] iron sucrose, 200 mg, intravenous, Daily  lidocaine, 1 patch, transdermal, Daily  lidocaine-prilocaine, , Topical, Daily  melatonin, 5 mg, oral, Nightly  pantoprazole, 20 mg, oral, Daily before breakfast  polyethylene glycol, 17 g, oral, BID  sennosides-docusate sodium, 1 tablet, oral, Nightly  sevelamer carbonate, 800 mg, oral, TID with meals  torsemide, 100 mg, oral, Daily         Continuous Medications:           PRN Medications:   PRN medications: dextrose 10 % in water (D10W), dextrose, eucerin, glucagon, hydrALAZINE, HYDROmorphone, hydrOXYzine HCL, oxyCODONE    Dietary Orders (From admission, onward)       Start     Ordered    12/08/23 0825  Adult diet Renal; Potassium Restricted 2 gm (50mEq); 2 - 3 grams Sodium  Diet effective now        Question  Answer Comment   Diet type Renal    Potassium restriction: Potassium Restricted 2 gm (50mEq)    Sodium restriction: 2 - 3 grams Sodium        12/08/23 0824                    Vitals:  Most Recent:  Vitals:    12/08/23 0812   BP: 127/68   Pulse: 72   Resp: 16   Temp: 36.9 °C (98.4 °F)   SpO2: 97%       24hr Min/Max:  Temp  Min: 36.3 °C (97.3 °F)  Max: 37.1 °C (98.8 °F)  Pulse  Min: 72  Max: 92  BP  Min: 126/60  Max: 159/88  Resp  Min: 16  Max: 18  SpO2  Min: 94 %  Max: 98 %    Intake/Output x24h:  No intake or output data in the 24 hours ending 12/08/23 1048       Physical Exam:  Physical Exam  Constitutional:       General: He is not in acute distress.     Appearance: Normal appearance. He is obese. He is not ill-appearing.   HENT:      Nose: No congestion or rhinorrhea.      Mouth/Throat:      Mouth: Mucous membranes are moist.      Pharynx: Oropharynx is clear.   Eyes:      Extraocular Movements: Extraocular movements intact.      Conjunctiva/sclera: Conjunctivae normal.   Cardiovascular:      Rate and Rhythm: Normal rate.   Pulmonary:      Effort: Pulmonary effort is normal. No respiratory distress.      Breath sounds: No wheezing.   Abdominal:      General: Abdomen is flat. Bowel sounds are normal. There is no distension.      Palpations: Abdomen is soft.      Tenderness: There is no abdominal tenderness. There is no guarding or rebound.   Musculoskeletal:      Cervical back: Normal range of motion.      Comments: LLE w/ multiple amputaions and dry gangrene of remaining big toe.   Skin:     General: Skin is dry.   Neurological:      General: No focal deficit present.      Mental Status: He is alert and oriented to person, place, and time.   Psychiatric:         Mood and Affect: Mood normal.         Behavior: Behavior normal.         Relevant Results  Results for orders placed or performed during the hospital encounter of 11/24/23 (from the past 24 hour(s))   POCT GLUCOSE   Result Value Ref Range    POCT Glucose  128 (H) 74 - 99 mg/dL   CBC   Result Value Ref Range    WBC 8.3 4.4 - 11.3 x10*3/uL    nRBC 0.0 0.0 - 0.0 /100 WBCs    RBC 2.76 (L) 4.50 - 5.90 x10*6/uL    Hemoglobin 7.7 (L) 13.5 - 17.5 g/dL    Hematocrit 24.7 (L) 41.0 - 52.0 %    MCV 90 80 - 100 fL    MCH 27.9 26.0 - 34.0 pg    MCHC 31.2 (L) 32.0 - 36.0 g/dL    RDW 12.8 11.5 - 14.5 %    Platelets 280 150 - 450 x10*3/uL   POCT GLUCOSE   Result Value Ref Range    POCT Glucose 187 (H) 74 - 99 mg/dL   Heparin Assay, UFH   Result Value Ref Range    Heparin Unfractionated 0.6 See Comment Below for Therapeutic Ranges IU/mL   POCT GLUCOSE   Result Value Ref Range    POCT Glucose 183 (H) 74 - 99 mg/dL   Heparin Assay, UFH   Result Value Ref Range    Heparin Unfractionated 0.5 See Comment Below for Therapeutic Ranges IU/mL   Homocysteine   Result Value Ref Range    Homocysteine 19.09 (H) 5.00 - 13.90 umol/L   CBC and Auto Differential   Result Value Ref Range    WBC 7.5 4.4 - 11.3 x10*3/uL    nRBC 0.0 0.0 - 0.0 /100 WBCs    RBC 2.56 (L) 4.50 - 5.90 x10*6/uL    Hemoglobin 6.9 (L) 13.5 - 17.5 g/dL    Hematocrit 23.0 (L) 41.0 - 52.0 %    MCV 90 80 - 100 fL    MCH 27.0 26.0 - 34.0 pg    MCHC 30.0 (L) 32.0 - 36.0 g/dL    RDW 12.8 11.5 - 14.5 %    Platelets 274 150 - 450 x10*3/uL    Neutrophils % 51.1 40.0 - 80.0 %    Immature Granulocytes %, Automated 0.1 0.0 - 0.9 %    Lymphocytes % 25.8 13.0 - 44.0 %    Monocytes % 19.2 2.0 - 10.0 %    Eosinophils % 3.3 0.0 - 6.0 %    Basophils % 0.5 0.0 - 2.0 %    Neutrophils Absolute 3.82 1.20 - 7.70 x10*3/uL    Immature Granulocytes Absolute, Automated 0.01 0.00 - 0.70 x10*3/uL    Lymphocytes Absolute 1.93 1.20 - 4.80 x10*3/uL    Monocytes Absolute 1.44 (H) 0.10 - 1.00 x10*3/uL    Eosinophils Absolute 0.25 0.00 - 0.70 x10*3/uL    Basophils Absolute 0.04 0.00 - 0.10 x10*3/uL   Renal Function Panel   Result Value Ref Range    Glucose 107 (H) 74 - 99 mg/dL    Sodium 137 136 - 145 mmol/L    Potassium 4.5 3.5 - 5.3 mmol/L    Chloride 98 98 - 107  mmol/L    Bicarbonate 27 21 - 32 mmol/L    Anion Gap 17 10 - 20 mmol/L    Urea Nitrogen 45 (H) 6 - 23 mg/dL    Creatinine 6.29 (H) 0.50 - 1.30 mg/dL    eGFR 10 (L) >60 mL/min/1.73m*2    Calcium 8.8 8.6 - 10.6 mg/dL    Phosphorus 5.1 (H) 2.5 - 4.9 mg/dL    Albumin 3.3 (L) 3.4 - 5.0 g/dL   Magnesium   Result Value Ref Range    Magnesium 2.06 1.60 - 2.40 mg/dL   Heparin Assay, UFH   Result Value Ref Range    Heparin Unfractionated 0.4 See Comment Below for Therapeutic Ranges IU/mL   POCT GLUCOSE   Result Value Ref Range    POCT Glucose 142 (H) 74 - 99 mg/dL   Hemoglobin   Result Value Ref Range    Hemoglobin 7.0 (L) 13.5 - 17.5 g/dL      IR CVC tunneled    Result Date: 11/30/2023  Interpreted By:  Cody Garzon, STUDY: IR CVC TUNNELED;  11/30/2023 11:10 am   INDICATION: Signs/Symptoms:change temporary dialysis cath to tunneled catheter.   COMPARISON: None.   ACCESSION NUMBER(S): [id number]   ORDERING CLINICIAN: ISABELL LEGGETT   TECHNIQUE: INTERVENTIONALIST(S): Cody Garzon MD   CONSENT: The patient was informed of the nature of the proposed procedure. The purposes, alternatives, risks, and benefits were explained and discussed. All questions were answered and consent was obtained.   RADIATION EXPOSURE: Fluoroscopy time: Less than 0.1 min. Dose: 0.6 mGy. Dose Area Product (DAP): 217   SEDATION: Moderate conscious IV sedation services (supervision of administration, induction, and maintenance) were provided by the physician performing the procedure with intravenous fentanyl 100 mcg and versed 2 mg for 15 minutes. The physician was assisted by an independent trained observer, an interventional radiology nurse, in the continuous monitoring of patient level of consciousness and physiologic status.   MEDICATION/CONTRAST: No additional   TIME OUT: A time out was performed immediately prior to procedure start with the interventional team, correctly identifying the patient name, date of birth, MRN, procedure,  anatomy (including marking of site and side), patient position, procedure consent form, relevant laboratory and imaging test results, antibiotic administration, safety precautions, and procedure-specific equipment needs.   COMPLICATIONS: No immediate adverse events identified.   FINDINGS: In the recombinant position, the patient was positioned on the angiography table.  The patient has an existing  right internal jugular venous temporary  dialysis catheter in place.  The adjacent cutaneous tissues and existing catheter were prepared and draped in usual sterile manner.   After appropriate subcutaneous instillation of Lidocaine 1% local anesthesia, the securing sutures of the existing temporary dialysis catheter were removed.  The loaded heparin was aspirated from the catheter ports.  A 035  Amplatz guidewire was inserted through the existing  dialysis catheter.  Utilizing intermittent fluoroscopic guidance, the guidewire was advanced into the inferior vena cava to secure access.  The existing catheter was removed with the guidewire left in place.   Local anesthesia was achieved with subcutaneous Lidocaine 1%.  A tunnel tract was created from the  right infraclavicular chest to the existing venotomy site.   A  15 Salvadorean x 23 cm  dual-lumen catheter was selected for placement. Venotomy site soft tissue dilation was performed to eventual accommodation of a  15-Salvadorean dilator peel-away coaxial sheath system.  The catheter was then advanced over the guidewire with the tips to reside at the caval-atrial junction.  The ports were aspirated without resistance and flushed with normal saline. Heparinized saline was then loaded into the catheter ports.  The external portions of the catheter were secured in place with polypropylene suture.  The venotomy and tunnel sites were closed with discontinuous and pursestring sutures, respectively.  Sterile dressings were then applied.   The patient tolerated the procedure without  complication.       1. Technically successful conversion and placement of a  15 Telugu x 23 cm indwelling  hemodialysis catheter from a temporary hemodialysis catheter. 2. The catheter tip overlies the cavoatrial junction and is ready for immediate use.   I was present for and/or performed the critical portions of the procedure and immediately available throughout the entire procedure.   I personally reviewed the image(s) / study and resident interpretation. I agree with the findings as stated.   Dictated at [facility].   MACRO: None   Signed by: Cody Garzon 11/30/2023 11:18 AM Dictation workstation:   [id number]    XR foot left 3+ views    Result Date: 11/29/2023  Interpreted By:  Amparo Alaniz and Sheng Max STUDY: Left foot  3 views.   INDICATION: Signs/Symptoms:Gangrene of 1st digit   COMPARISON: Left foot radiograph dated 04/16/2018.   ACCESSION NUMBER(S): [id number]   ORDERING CLINICIAN: FABY MARK   FINDINGS: Interval transmetatarsal amputations of the 3rd through 5th digits at the level of the mid metatarsals with unremarkable corticated appearance of the osseous stump as well as unremarkable appearance of the soft tissue stump.   Status post amputation of the 2nd digit at the level of the mid proximal phalanx with unremarkable corticated appearance of the osseous stump as well as unremarkable appearance of the soft tissue stump.     There is soft tissue loss of the tuft of the 1st digit likely from chronic ischemia. No definite erosion of the 1st distal phalanx or additional radiographic findings to suggest osteomyelitis.     Redemonstration of midfoot fusion involving the 1st TMT, 1/2 metatarsal bases, and inter cuneiform articulation with redemonstration of findings of hardware failure with loosening similar to 2018. Advanced tarsometatarsal joint degenerative changes which may be due to neuropathic arthropathy. Mild ankle and dorsal  foot soft tissue swelling with cellulitis not ruled out. Moderate tibiotalar and mild subtalar joint degenerative changes with joint space loss and osteophytes. Plantar calcaneal spur which can be associated with plantar fasciitis.       1. Soft tissue ischemia of the tuft of the 1st toe with soft tissue loss. No radiographic findings of osteomyelitis of the 1st digit or the rest of the osseous structures. 2. Interval postsurgical changes with amputation of the 2nd through 5th digits as described above. 3. Soft tissue swelling of the ankle and dorsal foot with cellulitis not ruled out. 4. Redemonstration of postsurgical changes involving multiple midfoot articulations with similar hardware failure findings when compared with 2018.   I personally reviewed the images/study and I agree with the findings as stated by Dr. Juan Bhagat. This study was interpreted at [place], [place].   Signed by: Amparo Alaniz 11/29/2023 11:21 AM Dictation workstation:   [id number]      Assessment/Plan   Principal Problem:    Arterial occlusion  Active Problems:    ALEXANDER (obstructive sleep apnea)    ESRD on hemodialysis (CMS/HCC)    SELENA (acute kidney injury) (CMS/HCC)    Peripheral vascular disease, unspecified (CMS/HCC)    Acute occlusion of popliteal artery due to thrombosis (CMS/HCC)    Left leg weakness    Critical limb ischemia of left lower extremity (CMS/HCC)    Toe ulcer, left, with unspecified severity (CMS/HCC)    Kwadwo Hoyt is a 57 y.o. male with a PMHx of DM c/b neuropathy, HTN, CKD stage 5, HFrEF (EF 50% in 11/2021), & ALEXANDER, who presents to the ER with left leg and arm weakness. LLE distal pulses were not palpable and CTA aorta & iliofemoral runoff showed complete left popliteal occlusion. Heparin gtt was started and vascular medicine were consulted but recommended no surgical intervention, recommend PVR/ZAC first. Neuro were also consulted for concern of stroke given patients  left sided weakness, rec outpatient follow up. PVR/ZAC showed severe disease at left femoral/popliteal level. 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 Nephrology consulted for worsening Cr and electrolyte status and started dialysis. Tunneled catheter placed by IR 11/30. Pt to c/w dialysis and underwent LLE angiogram with Vascular Surgery on 12/1, completed for peripheral angioplasty with Vascular Surgery 12/6. Due to Hgb of 6.9, will require transfusion and TMA with Podiatry postponed until 12/ 11.     #L Popliteal Artery Occlusion  #Chronic Limb Ischemia  #c/f Stroke  - Neurology consulted, stroke follow up in 4-6 weeks, discharge with ziopatch / loop recorder  - CTA aorta & B/L iliofemoral runoff significant for complete occlusion of L popliteal artery as well as anterior and posterior tibial arteries.   - ZAC/PVR showed severe disease at the femoral popliteal level. Biphasic flow is noted in the left popliteal artery. Unable to obtain pressures. The posterior tibial and dorasalis pedis arteries are not audible.  - LLE Angiogram w/ VascSurg 12/.  Underwent Peripheral Angioplasty 12/6, Vasc Surg signed off  - Podiatry consulted for dry gangrene of Left Hallux, Plan for TMA 12/11. Maintain Hgb above 7.5, repeat type and screen  - Vasc Med consulted, rec c/w ASA-81 and Plavix 75 daily. Obtain CTA Chest, Limited TTE w/ Bubble and hypercoagulable workup wnl  -- ASA 81 restarted as procedure delayed.   - atorvastatin 80 mg     #CKD stage 5 2/2 to T2DM  - Admission Cr 6.06, GFR 10 (Baseline Cr 5.5~), dialysis TTS  - Being evaluated at [facility] for kidney transplant w/ most recent office visit on 9/12/2023  - home Torsemide 100 mg, calcitriol 0.25 mcg QD  - Avoid nephrotoxic agents  - Nephrology consulted due to worsening Cr and Electrolyte status  -- Received HD line 11/28, tunneled line placed 11/30 by IR  - SW contacted to coordinate outpatient dialysis   - Started Sevelamer carbonate 800 TID w/  meals    #Anemia  - Hgb dropped 7.1 -> 6.9 s/p 12/6 Angioplasty  - Hgb 6.9 12/8  - Tranfused with 2 units pRBC 12/8  - Maintain above 7.5 per Podiatry     #Hyponatremia  #Hyperkalemia  #Hyperphosphatemia  - Nephrology consulted  - ordered UA and Urine electrolytes, suggest Post-Renal  - Increased Sodium Bicarb and ordered Lokelma  - Resolved w/ dialysis       #Constipation  - no BM since 11/25  - ordered Miralax BID, Doc-senna  - Normal BM 11/29  - Resolved     #HTN  #HFrEF  - s/p 500 ml LR bolus in ED  - home Aspirin 81 mg  - c/w home Carvedilol 25 mg BID  - c/w home Amlodipine 10 mg every day  - Echo 11.25, 60-65% estimated ejection fraction, pseudonormal pattern of left ventricular diastolic filling, increased LA LV diastolic pressure,severe concentric left ventricular hypertrophy.        #T2DM  #Neuropathy  - Last Hgb A1C 11.0 on 5/24/2023, ordered updated Hgb A1C  - Home Regimen: Lantus 40U at bedtime & Lispro 20U TID w/ meals  - Changed Lantus from 20 -> 25 units at bedtime due to elevated BG   - Started Lispro 7U TID w/ meals  - Mild SSI  - c/w home Gabapentin 300 mg BID  - POCT BG TID & at bedtime  - Hypoglycemia protocol     #Anxiety  - PRN Atarax 25 mg      #ALEXANDER  - CPAP at home, noncompliant        F: PRN  E: PRN  N: Renal  GI: Pantoprazole 20 mg QD  DVT: ASA Plavix       Code Status: Full Code   Emergency Contact: Extended Emergency Contact Information  Primary Emergency Contact: Milka Hoyt  Address: [id number] [place] [address]  Home Phone: [phone]  Relation: None  PCP: Thai Talavera MD    Patient seen and discussed with attending physician, Dr. Pappas.  Plan preliminary until cosigned by attending physician.    Reviewed and approved by CHIQUITA AVALOS on 12/8/23 at 10:48 AM.

## 2023-12-08 NOTE — NURSING NOTE
VSS. Patient reported severe pain, tylenol, oxycodone, and dilaudid administered throughout the shift as ordered. Patient therapeutic on heparin gtt at 27 mL/hr. This RN was concerned d/t plan for surgery this morning when patient was on hep gtt. This RN communicated this concern with DO Daniella. Per DO Daniella (after reading vascular's most recent note on 12/6) we can keep the heparin gtt on until day team evaluates the patient. Morning labs drawn which resulted in hbg of 6.9, DO Daniella notified. Heparin gtt held. Transport came to get patient for surgery. RN called pre-op and notified nurse of hbg less than 7.0. Pre-op called this RN to notify that per podiatry the surgery will be cancelled and rescheduled for Monday. Patient remains safe in bed with call light in reach and side rails up x2. Betty Betts RN

## 2023-12-09 LAB
ALBUMIN SERPL BCP-MCNC: 3.3 G/DL (ref 3.4–5)
ANION GAP SERPL CALC-SCNC: 15 MMOL/L (ref 10–20)
BASOPHILS # BLD AUTO: 0.06 X10*3/UL (ref 0–0.1)
BASOPHILS NFR BLD AUTO: 0.7 %
BLOOD EXPIRATION DATE: NORMAL
BUN SERPL-MCNC: 33 MG/DL (ref 6–23)
CALCIUM SERPL-MCNC: 8.9 MG/DL (ref 8.6–10.6)
CHLORIDE SERPL-SCNC: 98 MMOL/L (ref 98–107)
CO2 SERPL-SCNC: 28 MMOL/L (ref 21–32)
CREAT SERPL-MCNC: 5.53 MG/DL (ref 0.5–1.3)
DISPENSE STATUS: NORMAL
EOSINOPHIL # BLD AUTO: 0.29 X10*3/UL (ref 0–0.7)
EOSINOPHIL NFR BLD AUTO: 3.6 %
ERYTHROCYTE [DISTWIDTH] IN BLOOD BY AUTOMATED COUNT: 13 % (ref 11.5–14.5)
GFR SERPL CREATININE-BSD FRML MDRD: 11 ML/MIN/1.73M*2
GLUCOSE BLD MANUAL STRIP-MCNC: 106 MG/DL (ref 74–99)
GLUCOSE BLD MANUAL STRIP-MCNC: 124 MG/DL (ref 74–99)
GLUCOSE BLD MANUAL STRIP-MCNC: 98 MG/DL (ref 74–99)
GLUCOSE SERPL-MCNC: 95 MG/DL (ref 74–99)
HCT VFR BLD AUTO: 23.9 % (ref 41–52)
HGB BLD-MCNC: 7.5 G/DL (ref 13.5–17.5)
IMM GRANULOCYTES # BLD AUTO: 0.03 X10*3/UL (ref 0–0.7)
IMM GRANULOCYTES NFR BLD AUTO: 0.4 % (ref 0–0.9)
LPA SERPL-MCNC: 158 MG/DL
LYMPHOCYTES # BLD AUTO: 1.6 X10*3/UL (ref 1.2–4.8)
LYMPHOCYTES NFR BLD AUTO: 20 %
MAGNESIUM SERPL-MCNC: 2.14 MG/DL (ref 1.6–2.4)
MCH RBC QN AUTO: 27.9 PG (ref 26–34)
MCHC RBC AUTO-ENTMCNC: 31.4 G/DL (ref 32–36)
MCV RBC AUTO: 89 FL (ref 80–100)
MONOCYTES # BLD AUTO: 1.44 X10*3/UL (ref 0.1–1)
MONOCYTES NFR BLD AUTO: 18 %
NEUTROPHILS # BLD AUTO: 4.6 X10*3/UL (ref 1.2–7.7)
NEUTROPHILS NFR BLD AUTO: 57.3 %
NRBC BLD-RTO: 0 /100 WBCS (ref 0–0)
PHOSPHATE SERPL-MCNC: 4 MG/DL (ref 2.5–4.9)
PLATELET # BLD AUTO: 340 X10*3/UL (ref 150–450)
POTASSIUM SERPL-SCNC: 4.4 MMOL/L (ref 3.5–5.3)
PRODUCT BLOOD TYPE: 7300
PRODUCT CODE: NORMAL
RBC # BLD AUTO: 2.69 X10*6/UL (ref 4.5–5.9)
SODIUM SERPL-SCNC: 137 MMOL/L (ref 136–145)
UNIT ABO: NORMAL
UNIT NUMBER: NORMAL
UNIT RH: NORMAL
UNIT VOLUME: 350
WBC # BLD AUTO: 8 X10*3/UL (ref 4.4–11.3)
XM INTEP: NORMAL

## 2023-12-09 PROCEDURE — 2500000001 HC RX 250 WO HCPCS SELF ADMINISTERED DRUGS (ALT 637 FOR MEDICARE OP): Performed by: NURSE PRACTITIONER

## 2023-12-09 PROCEDURE — 2500000004 HC RX 250 GENERAL PHARMACY W/ HCPCS (ALT 636 FOR OP/ED): Performed by: NURSE PRACTITIONER

## 2023-12-09 PROCEDURE — 83735 ASSAY OF MAGNESIUM: CPT | Performed by: STUDENT IN AN ORGANIZED HEALTH CARE EDUCATION/TRAINING PROGRAM

## 2023-12-09 PROCEDURE — 2500000004 HC RX 250 GENERAL PHARMACY W/ HCPCS (ALT 636 FOR OP/ED): Mod: JZ

## 2023-12-09 PROCEDURE — 8010000001 HC DIALYSIS - HEMODIALYSIS PER DAY

## 2023-12-09 PROCEDURE — 82947 ASSAY GLUCOSE BLOOD QUANT: CPT

## 2023-12-09 PROCEDURE — 2500000004 HC RX 250 GENERAL PHARMACY W/ HCPCS (ALT 636 FOR OP/ED)

## 2023-12-09 PROCEDURE — 90935 HEMODIALYSIS ONE EVALUATION: CPT | Performed by: INTERNAL MEDICINE

## 2023-12-09 PROCEDURE — 2500000005 HC RX 250 GENERAL PHARMACY W/O HCPCS

## 2023-12-09 PROCEDURE — 85025 COMPLETE CBC W/AUTO DIFF WBC: CPT | Performed by: STUDENT IN AN ORGANIZED HEALTH CARE EDUCATION/TRAINING PROGRAM

## 2023-12-09 PROCEDURE — 80069 RENAL FUNCTION PANEL: CPT | Performed by: STUDENT IN AN ORGANIZED HEALTH CARE EDUCATION/TRAINING PROGRAM

## 2023-12-09 PROCEDURE — 2500000004 HC RX 250 GENERAL PHARMACY W/ HCPCS (ALT 636 FOR OP/ED): Mod: JZ | Performed by: NURSE PRACTITIONER

## 2023-12-09 PROCEDURE — 2500000001 HC RX 250 WO HCPCS SELF ADMINISTERED DRUGS (ALT 637 FOR MEDICARE OP)

## 2023-12-09 PROCEDURE — 36415 COLL VENOUS BLD VENIPUNCTURE: CPT | Performed by: STUDENT IN AN ORGANIZED HEALTH CARE EDUCATION/TRAINING PROGRAM

## 2023-12-09 PROCEDURE — 1200000002 HC GENERAL ROOM WITH TELEMETRY DAILY

## 2023-12-09 RX ADMIN — INSULIN GLARGINE 25 UNITS: 100 INJECTION, SOLUTION SUBCUTANEOUS at 20:08

## 2023-12-09 RX ADMIN — HYDROXYZINE HYDROCHLORIDE 25 MG: 25 TABLET, FILM COATED ORAL at 08:27

## 2023-12-09 RX ADMIN — CARVEDILOL 25 MG: 12.5 TABLET, FILM COATED ORAL at 20:07

## 2023-12-09 RX ADMIN — INSULIN LISPRO 7 UNITS: 100 INJECTION, SOLUTION INTRAVENOUS; SUBCUTANEOUS at 17:33

## 2023-12-09 RX ADMIN — ACETAMINOPHEN 975 MG: 325 TABLET ORAL at 12:02

## 2023-12-09 RX ADMIN — TORSEMIDE 100 MG: 20 TABLET ORAL at 11:09

## 2023-12-09 RX ADMIN — CARVEDILOL 25 MG: 12.5 TABLET, FILM COATED ORAL at 11:10

## 2023-12-09 RX ADMIN — GABAPENTIN 100 MG: 100 CAPSULE ORAL at 20:07

## 2023-12-09 RX ADMIN — INSULIN LISPRO 7 UNITS: 100 INJECTION, SOLUTION INTRAVENOUS; SUBCUTANEOUS at 13:00

## 2023-12-09 RX ADMIN — SENNOSIDES AND DOCUSATE SODIUM 1 TABLET: 8.6; 5 TABLET ORAL at 20:07

## 2023-12-09 RX ADMIN — ALTEPLASE 2 MG: 2.2 INJECTION, POWDER, LYOPHILIZED, FOR SOLUTION INTRAVENOUS at 10:40

## 2023-12-09 RX ADMIN — PANTOPRAZOLE SODIUM 20 MG: 20 TABLET, DELAYED RELEASE ORAL at 11:09

## 2023-12-09 RX ADMIN — HYDROMORPHONE HYDROCHLORIDE 1 MG: 1 INJECTION, SOLUTION INTRAMUSCULAR; INTRAVENOUS; SUBCUTANEOUS at 00:34

## 2023-12-09 RX ADMIN — LIDOCAINE AND PRILOCAINE: 25; 25 CREAM TOPICAL at 11:09

## 2023-12-09 RX ADMIN — AMLODIPINE BESYLATE 10 MG: 10 TABLET ORAL at 11:09

## 2023-12-09 RX ADMIN — ALTEPLASE 2 MG: 2.2 INJECTION, POWDER, LYOPHILIZED, FOR SOLUTION INTRAVENOUS at 09:00

## 2023-12-09 RX ADMIN — CALCITRIOL CAPSULES 0.25 MCG 0.25 MCG: 0.25 CAPSULE ORAL at 11:09

## 2023-12-09 RX ADMIN — LIDOCAINE 1 PATCH: 4 PATCH TOPICAL at 11:08

## 2023-12-09 RX ADMIN — HYDROMORPHONE HYDROCHLORIDE 1 MG: 1 INJECTION, SOLUTION INTRAMUSCULAR; INTRAVENOUS; SUBCUTANEOUS at 15:04

## 2023-12-09 RX ADMIN — SEVELAMER CARBONATE 800 MG: 800 TABLET, FILM COATED ORAL at 11:09

## 2023-12-09 RX ADMIN — CLOPIDOGREL BISULFATE 75 MG: 75 TABLET ORAL at 11:10

## 2023-12-09 RX ADMIN — IRON SUCROSE 200 MG: 20 INJECTION, SOLUTION INTRAVENOUS at 11:52

## 2023-12-09 RX ADMIN — NEPHROCAP 1 CAPSULE: 1 CAP ORAL at 11:10

## 2023-12-09 RX ADMIN — Medication 5 MG: at 20:07

## 2023-12-09 RX ADMIN — ASPIRIN 81 MG: 81 TABLET, COATED ORAL at 11:09

## 2023-12-09 RX ADMIN — OXYCODONE HYDROCHLORIDE 5 MG: 5 TABLET ORAL at 11:10

## 2023-12-09 RX ADMIN — SEVELAMER CARBONATE 800 MG: 800 TABLET, FILM COATED ORAL at 17:32

## 2023-12-09 RX ADMIN — ATORVASTATIN CALCIUM 80 MG: 80 TABLET, FILM COATED ORAL at 20:07

## 2023-12-09 RX ADMIN — HYDROMORPHONE HYDROCHLORIDE 1 MG: 1 INJECTION, SOLUTION INTRAMUSCULAR; INTRAVENOUS; SUBCUTANEOUS at 08:27

## 2023-12-09 RX ADMIN — ACETAMINOPHEN 975 MG: 325 TABLET ORAL at 20:07

## 2023-12-09 ASSESSMENT — PAIN SCALES - GENERAL
PAINLEVEL_OUTOF10: 7
PAINLEVEL_OUTOF10: 8
PAINLEVEL_OUTOF10: 0 - NO PAIN

## 2023-12-09 ASSESSMENT — PAIN - FUNCTIONAL ASSESSMENT
PAIN_FUNCTIONAL_ASSESSMENT: NO/DENIES PAIN
PAIN_FUNCTIONAL_ASSESSMENT: 0-10

## 2023-12-09 ASSESSMENT — PAIN SCALES - PAIN ASSESSMENT IN ADVANCED DEMENTIA (PAINAD)
NEGVOCALIZATION: OCCASIONAL MOAN/GROAN, LOW SPEECH, NEGATIVE/DISAPPROVING QUALITY
FACIALEXPRESSION: FACIAL GRIMACING
BODYLANGUAGE: RIGID, FISTS CLENCHED, KNEES UP, PUSHING/PULLING AWAY, STRIKES OUT

## 2023-12-09 ASSESSMENT — COGNITIVE AND FUNCTIONAL STATUS - GENERAL
MOVING FROM LYING ON BACK TO SITTING ON SIDE OF FLAT BED WITH BEDRAILS: A LOT
HELP NEEDED FOR BATHING: A LOT
MOBILITY SCORE: 12
STANDING UP FROM CHAIR USING ARMS: A LOT
CLIMB 3 TO 5 STEPS WITH RAILING: A LOT
TURNING FROM BACK TO SIDE WHILE IN FLAT BAD: A LOT
MOVING TO AND FROM BED TO CHAIR: A LOT
MOVING FROM LYING ON BACK TO SITTING ON SIDE OF FLAT BED WITH BEDRAILS: A LOT
DRESSING REGULAR LOWER BODY CLOTHING: A LOT
TURNING FROM BACK TO SIDE WHILE IN FLAT BAD: A LOT
PERSONAL GROOMING: A LOT
WALKING IN HOSPITAL ROOM: A LOT
DRESSING REGULAR UPPER BODY CLOTHING: A LOT
EATING MEALS: A LOT
CLIMB 3 TO 5 STEPS WITH RAILING: A LOT
TOILETING: A LOT
HELP NEEDED FOR BATHING: A LOT
STANDING UP FROM CHAIR USING ARMS: A LOT
MOBILITY SCORE: 12
WALKING IN HOSPITAL ROOM: A LOT
DRESSING REGULAR LOWER BODY CLOTHING: A LOT
MOVING TO AND FROM BED TO CHAIR: A LOT
DAILY ACTIVITIY SCORE: 12
DRESSING REGULAR UPPER BODY CLOTHING: A LOT
DAILY ACTIVITIY SCORE: 18

## 2023-12-09 ASSESSMENT — PAIN DESCRIPTION - LOCATION
LOCATION: LEG
LOCATION: KNEE
LOCATION: LEG
LOCATION: LEG

## 2023-12-09 ASSESSMENT — PAIN DESCRIPTION - ORIENTATION
ORIENTATION: RIGHT;LEFT
ORIENTATION: RIGHT;LEFT
ORIENTATION: RIGHT
ORIENTATION: RIGHT

## 2023-12-09 NOTE — PROGRESS NOTES
Nephrology Follow-up Note   Patient ID: Kwadwo Hoyt is a 57 y.o. male.   Admitted for :   Chief Complaint   Patient presents with    Leg Pain      Seen and examined during hemodialysis. Labs and events reviewed.      Subjective:   Feels OK, No c/o related to HD   C/o rt leg pain     Patient Active Problem List   Diagnosis    Arterial occlusion    Peripheral vascular disease, unspecified (CMS/HCC)    ALEXANDER (obstructive sleep apnea)    ESRD on hemodialysis (CMS/HCC)    SELENA (acute kidney injury) (CMS/[facility])    Acute occlusion of popliteal artery due to thrombosis (CMS/[facility])    Left leg weakness    Critical limb ischemia of left lower extremity (CMS/[facility])    Toe ulcer, left, with unspecified severity (CMS/[facility])       Scheduled medications:  acetaminophen, 975 mg, oral, q8h  alteplase, 2 mg, intra-catheter, Daily  alteplase, 2 mg, intra-catheter, Daily  amLODIPine, 10 mg, oral, Daily  aspirin, 81 mg, oral, Daily  atorvastatin, 80 mg, oral, Nightly  B complex-vitamin C-folic acid, 1 capsule, oral, Daily  calcitriol, 0.25 mcg, oral, Daily  carvedilol, 25 mg, oral, BID  clopidogrel, 75 mg, oral, Daily  darbepoetin abhishek, 0.45 mcg/kg, subcutaneous, Weekly  gabapentin, 100 mg, oral, q PM  insulin glargine, 25 Units, subcutaneous, Nightly  insulin lispro, 0-5 Units, subcutaneous, TID with meals  insulin lispro, 7 Units, subcutaneous, TID with meals  iron sucrose, 200 mg, intravenous, Daily  lidocaine, 1 patch, transdermal, Daily  lidocaine-prilocaine, , Topical, Daily  melatonin, 5 mg, oral, Nightly  pantoprazole, 20 mg, oral, Daily before breakfast  polyethylene glycol, 17 g, oral, BID  sennosides-docusate sodium, 1 tablet, oral, Nightly  sevelamer carbonate, 800 mg, oral, TID with meals  torsemide, 100 mg, oral, Daily         PRN medications: dextrose 10 % in water (D10W), dextrose, eucerin, glucagon, hydrALAZINE, HYDROmorphone, hydrOXYzine HCL, oxyCODONE     Heart Rate:  [75-87]   Temp:  [36.4 °C (97.5 °F)-37.4 °C (99.3 °F)]    Resp:  [16-18]   BP: (123-165)/(74-95)   SpO2:  [97 %-100 %]    Weight: 129 kg (285 lb)   Gen: alert, NAD  HEENT: NC/AT  Neck: supple, no JVD   Pulm: clear ant b/l   CVS: RRR, no rub  Abd: S/NT/ND  LE: no edema , no cyanosis   Neuro: no asterixis   Dialysis acces:  RtIJ TC      Lab Results   Component Value Date    WBC 8.1 12/08/2023    HGB 8.6 (L) 12/08/2023    HCT 27.5 (L) 12/08/2023    MCV 91 12/08/2023     12/08/2023     Lab Results   Component Value Date    GLUCOSE 107 (H) 12/08/2023    CALCIUM 8.8 12/08/2023     12/08/2023    K 4.5 12/08/2023    CO2 27 12/08/2023    CL 98 12/08/2023    BUN 45 (H) 12/08/2023    CREATININE 6.29 (H) 12/08/2023     Results from last 72 hours   Lab Units 12/08/23  2325 12/08/23  0907 12/08/23  0440   ALBUMIN g/dL  --   --  3.3*   GLUCOSE mg/dL  --   --  107*   HEMOGLOBIN g/dL 8.6*   < > 6.9*   WBC AUTO x10*3/uL 8.1  --  7.5    < > = values in this interval not displayed.      Results from last 72 hours   Lab Units 12/08/23  0440   SODIUM mmol/L 137   POTASSIUM mmol/L 4.5   CO2 mmol/L 27   BUN mg/dL 45*   CREATININE mg/dL 6.29*   PHOSPHORUS mg/dL 5.1*   CALCIUM mg/dL 8.8        Assessment CKD5 - now HD dependent - pt of Dr Bonny Hoyt is a 57 y.o. male with a PMHx of DM c/b neuropathy, HTN, CKD stage 5, HFrEF (EF 50% in 11/2021), & ALEXANDER being managed for left popliteal occlusion. Hospital course complicated by SELENA on CKD, likely contrast induced, now progressed to ESRD      Plan   Plan HD per submitted orders, UF  2-2.5L as tolerated  MBD - Phosphorus binder: continue with Sevelamer q AC  Anemia of CKD: EPO to be given x 3 per week   Access: no issues   BP: acceptable during treatment   Renal Diet   Daily renal MVI   Continue 3 x per week hemodialysis;   SW to ensure availability of o/p HD chair at discharge - needs 4 hrs chair time at [facility] under Dr Draper as primary nephrologist     Petra Stuart MD MPH

## 2023-12-09 NOTE — PROGRESS NOTES
"Kwadwo Hoyt is a 57 y.o. male on day 14 of admission presenting with Arterial occlusion.    Subjective    Doing well this AM. Pt at HD.       Objective     Physical Exam  Constitutional:       General: He is not in acute distress.  HENT:      Head: Normocephalic and atraumatic.   Eyes:      Conjunctiva/sclera: Conjunctivae normal.   Cardiovascular:      Heart sounds: Normal heart sounds. No murmur heard.  Pulmonary:      Effort: Pulmonary effort is normal.      Breath sounds: Normal breath sounds.   Abdominal:      Palpations: Abdomen is soft.   Musculoskeletal:         General: Normal range of motion.      Comments: Left foot wrapped in gauze and covered in a sock.   Neurological:      Mental Status: He is alert and oriented to person, place, and time.      Comments: CN 2-12 grossly intact   Psychiatric:         Mood and Affect: Mood normal.       Last Recorded Vitals  Blood pressure 165/89, pulse 79, temperature 37 °C (98.6 °F), temperature source Skin, resp. rate 18, height 1.854 m (6' 1\"), weight 126 kg (278 lb 7.1 oz), SpO2 98 %.  Intake/Output last 3 Shifts:  I/O last 3 completed shifts:  In: 1155 (9.1 mL/kg) [I.V.:200 (1.6 mL/kg); Blood:955]  Out: - (0 mL/kg)   Weight: 126.3 kg     Relevant Results  Results for orders placed or performed during the hospital encounter of 11/24/23 (from the past 24 hour(s))   POCT GLUCOSE   Result Value Ref Range    POCT Glucose 142 (H) 74 - 99 mg/dL   POCT GLUCOSE   Result Value Ref Range    POCT Glucose 212 (H) 74 - 99 mg/dL   POCT GLUCOSE   Result Value Ref Range    POCT Glucose 183 (H) 74 - 99 mg/dL   CBC   Result Value Ref Range    WBC 8.1 4.4 - 11.3 x10*3/uL    nRBC 0.0 0.0 - 0.0 /100 WBCs    RBC 3.02 (L) 4.50 - 5.90 x10*6/uL    Hemoglobin 8.6 (L) 13.5 - 17.5 g/dL    Hematocrit 27.5 (L) 41.0 - 52.0 %    MCV 91 80 - 100 fL    MCH 28.5 26.0 - 34.0 pg    MCHC 31.3 (L) 32.0 - 36.0 g/dL    RDW 13.0 11.5 - 14.5 %    Platelets 282 150 - 450 x10*3/uL   SST TOP   Result Value " Ref Range    Extra Tube Hold for add-ons.    POCT GLUCOSE   Result Value Ref Range    POCT Glucose 106 (H) 74 - 99 mg/dL           Assessment/Plan   Principal Problem:    Arterial occlusion  Active Problems:    ALEXANDER (obstructive sleep apnea)    ESRD on hemodialysis (CMS/[facility])    SELENA (acute kidney injury) (CMS/HCC)    Peripheral vascular disease, unspecified (CMS/[facility])    Acute occlusion of popliteal artery due to thrombosis (CMS/HCC)    Left leg weakness    Critical limb ischemia of left lower extremity (CMS/[facility])    Toe ulcer, left, with unspecified severity (CMS/[facility])    Kwadwo Hoyt is a 57 y.o. male with a PMHx of DM c/b neuropathy, HTN, CKD stage 5, HFrEF (EF 50% in 11/2021), & ALEXANDER, who presents to the ER with left leg and arm weakness. LLE distal pulses were not palpable and CTA aorta & iliofemoral runoff showed complete left popliteal occlusion. Heparin gtt was started and vascular medicine were consulted but recommended no surgical intervention, recommend PVR/ZAC first. Neuro were also consulted for concern of stroke given patients left sided weakness, rec outpatient follow up. PVR/ZAC showed severe disease at left femoral/popliteal level. 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 Nephrology consulted for worsening Cr and electrolyte status and started dialysis. Tunneled catheter placed by IR 11/30. Pt to c/w dialysis and underwent LLE angiogram with Vascular Surgery on 12/1, completed for peripheral angioplasty with Vascular Surgery 12/6. Due to Hgb of 6.9, will require transfusion and TMA with Podiatry postponed until 12/ 11.     #L Popliteal Artery Thrombosis  #Chronic Limb Ischemia  #Left Hallux Dry Gangrene  #c/f Stroke  - Neurology consulted, stroke follow up in 4-6 weeks, discharge with ziopatch / loop recorder  - CTA aorta & B/L iliofemoral runoff significant for complete occlusion of L popliteal artery as well as anterior and posterior tibial arteries.   - ZAC/PVR showed severe disease at the  femoral popliteal level. Biphasic flow is noted in the left popliteal artery. Unable to obtain pressures. The posterior tibial and dorasalis pedis arteries are not audible.  - LLE Angiogram w/ VascSurg 12/1.  Underwent Peripheral Angioplasty (thrombectomy and stent placement) on 12/6, Vasc Surg signed off  - Podiatry consulted for dry gangrene of Left Hallux, Plan for TMA 12/11. Maintain Hgb above 7.5, repeat type and screen  - Vasc Med consulted, rec c/w ASA-81 and Plavix 75 daily.   - Vasc Med to evaluate source of thrombosis: TTE 12/7 had negative bubble study; hypercoagulable workup WNL, CTA Chest for evaluation of aortic arch- results pending  - ASA 81 restarted as procedure delayed.   - atorvastatin 80 mg  - no anticoagulation unless workup reveals a thrombosis per Vasc Med     #ESRD on  T/Th/Sat HD 2/2 to T2DM  - Admission Cr 6.06, GFR 10 (Baseline Cr 5.5~), dialysis TTS  - Being evaluated at [facility] for kidney transplant w/ most recent office visit on 9/12/2023  - home Torsemide 100 mg, calcitriol 0.25 mcg QD  - Avoid nephrotoxic agents  - Nephrology consulted due to worsening Cr and Electrolyte status  -- Received HD line 11/28, tunneled line placed 11/30 by IR  - SW contacted to coordinate outpatient dialysis (needs 4hr chair time at [facility] under Dr. Draper)  - c/w Sevelamer carbonate 800 TID w/ meals, daily renal MVI, EPO w/ HD     #Anemia  - likely anemia of chronic disease 2/2 ESRD  - Hgb dropped 7.1 -> 6.9 s/p 12/6 Angioplasty  - Hgb 6.9 12/8  - Tranfused with 2 units pRBC 12/8  - Maintain above 7.5 per Podiatry  - pending Hbg today, ordered PM draw     #HTN  #HFpEF  - s/p 500 ml LR bolus in ED  - c/w home Aspirin 81 mg  - c/w home Carvedilol 25 mg BID  - c/w home Amlodipine 10 mg QD  - Echo 11/25, EF 60-65% w/ pseudonormal pattern of LV diastolic filling, increased LA LV diastolic pressure, severe concentric LVH  - Repeat TTE on 12/7 w/ EF on 60-65% w/ severe concentric LVH, negative bubble study       #T2DM  #Neuropathy  - Last Hgb A1C 11.0 on 5/24/2023, ordered updated Hgb A1C  - Home Regimen: Lantus 40U at bedtime & Lispro 20U TID w/ meals  - Current Regimen: Lantus 25 units at bedtime and Lispro 7u TID w/ meals  - Mild SSI  - c/w home Gabapentin 300 mg BID  - POCT BG TID & at bedtime  - Hypoglycemia protocol     #Anxiety  - PRN Atarax 25 mg      #ALEXANDER  - CPAP at home, noncompliant     F: PRN  E: PRN  N: Renal  GI: Pantoprazole 20 mg QD  DVT: none (on DAPT per Vasc Med)      Code Status: Full Code   Emergency Contact: Extended Emergency Contact Information  Primary Emergency Contact: Milka Hoyt  Address: [id number] [place] [address]  Home Phone: [phone]  Relation: None  PCP: Thai Talavera MD     Plan preliminary until cosigned by attending physician.    Attending Supervision: seen and discussed with attending physician, Dr. Mian Fowler MD  Family Medicine, PGY-3

## 2023-12-09 NOTE — NURSING NOTE
Report from Sending RN:    Report From: Perla ( RN)  Recent Surgery of Procedure: No  Baseline Level of Consciousness (LOC): A/O x 4  Oxygen Use: No  Type: none  Diabetic: No  Last BP Med Given Day of Dialysis: none  Last Pain Med Given: none  Lab Tests to be Obtained with Dialysis: No  Blood Transfusion to be Given During Dialysis: No  Available IV Access: Yes  Medications to be Administered During Dialysis: No  Continuous IV Infusion Running: No  Restraints on Currently or in the Last 24 Hours: No  Hand-Off Communication: No acute overnight or morning events; vss; Pt did take morning medications; No labs needed; Pt is a full code; Kiara Domingo RN.

## 2023-12-09 NOTE — NURSING NOTE
End of shift Note    Patient remained safe and free from falls during the shift.  Patient complained of lower left extremity pain throughout the shift.  Tylenol, Dilaudid, and Oxy was administered during the shift.  The patient went down for HD 2.5L was removed.  The patient also received Hydralazine during HD pt BP was 157/100.  Call light within reach.  Will continue to monitor pt.     Luis Miguel Bender LPN

## 2023-12-09 NOTE — NURSING NOTE
Report to Receiving RN:    Report To: mila  Time Report Called: 1150  Hand-Off Communication: pt removed 2.5l of fluid, post tx bp 157/100/81, pt alert stable, received pain meds see MAR  Complications During Treatment: No  Ultrafiltration Treatment: No  Medications Administered During Dialysis: Yes  Blood Products Administered During Dialysis: No  Labs Sent During Dialysis: No  Heparin Drip Rate Changes: No    Electronic Signatures:    (Signed  )   Authored:      (Signed  )   Authored:      Last Updated: 11:51 AM by DILSHAD HOLLAND

## 2023-12-10 LAB
ALBUMIN SERPL BCP-MCNC: 3.5 G/DL (ref 3.4–5)
ANION GAP SERPL CALC-SCNC: 19 MMOL/L (ref 10–20)
BLOOD EXPIRATION DATE: NORMAL
BLOOD EXPIRATION DATE: NORMAL
BUN SERPL-MCNC: 44 MG/DL (ref 6–23)
CALCIUM SERPL-MCNC: 9.1 MG/DL (ref 8.6–10.6)
CHLORIDE SERPL-SCNC: 96 MMOL/L (ref 98–107)
CO2 SERPL-SCNC: 26 MMOL/L (ref 21–32)
CREAT SERPL-MCNC: 7.43 MG/DL (ref 0.5–1.3)
DISPENSE STATUS: NORMAL
DISPENSE STATUS: NORMAL
ERYTHROCYTE [DISTWIDTH] IN BLOOD BY AUTOMATED COUNT: 12.7 % (ref 11.5–14.5)
GFR SERPL CREATININE-BSD FRML MDRD: 8 ML/MIN/1.73M*2
GLUCOSE BLD MANUAL STRIP-MCNC: 109 MG/DL (ref 74–99)
GLUCOSE BLD MANUAL STRIP-MCNC: 111 MG/DL (ref 74–99)
GLUCOSE BLD MANUAL STRIP-MCNC: 156 MG/DL (ref 74–99)
GLUCOSE BLD MANUAL STRIP-MCNC: 88 MG/DL (ref 74–99)
GLUCOSE SERPL-MCNC: 99 MG/DL (ref 74–99)
HCT VFR BLD AUTO: 29.4 % (ref 41–52)
HGB BLD-MCNC: 9 G/DL (ref 13.5–17.5)
MAGNESIUM SERPL-MCNC: 2.2 MG/DL (ref 1.6–2.4)
MCH RBC QN AUTO: 28 PG (ref 26–34)
MCHC RBC AUTO-ENTMCNC: 30.6 G/DL (ref 32–36)
MCV RBC AUTO: 91 FL (ref 80–100)
NRBC BLD-RTO: 0 /100 WBCS (ref 0–0)
PHOSPHATE SERPL-MCNC: 4.6 MG/DL (ref 2.5–4.9)
PLATELET # BLD AUTO: 342 X10*3/UL (ref 150–450)
POTASSIUM SERPL-SCNC: 4.5 MMOL/L (ref 3.5–5.3)
PRODUCT BLOOD TYPE: 7300
PRODUCT BLOOD TYPE: 7300
PRODUCT CODE: NORMAL
PRODUCT CODE: NORMAL
RBC # BLD AUTO: 3.22 X10*6/UL (ref 4.5–5.9)
SODIUM SERPL-SCNC: 136 MMOL/L (ref 136–145)
UNIT ABO: NORMAL
UNIT ABO: NORMAL
UNIT NUMBER: NORMAL
UNIT NUMBER: NORMAL
UNIT RH: NORMAL
UNIT RH: NORMAL
UNIT VOLUME: 350
UNIT VOLUME: 350
WBC # BLD AUTO: 8.2 X10*3/UL (ref 4.4–11.3)
XM INTEP: NORMAL
XM INTEP: NORMAL

## 2023-12-10 PROCEDURE — 82947 ASSAY GLUCOSE BLOOD QUANT: CPT

## 2023-12-10 PROCEDURE — 2500000001 HC RX 250 WO HCPCS SELF ADMINISTERED DRUGS (ALT 637 FOR MEDICARE OP): Performed by: NURSE PRACTITIONER

## 2023-12-10 PROCEDURE — 36415 COLL VENOUS BLD VENIPUNCTURE: CPT | Performed by: STUDENT IN AN ORGANIZED HEALTH CARE EDUCATION/TRAINING PROGRAM

## 2023-12-10 PROCEDURE — 80069 RENAL FUNCTION PANEL: CPT | Performed by: STUDENT IN AN ORGANIZED HEALTH CARE EDUCATION/TRAINING PROGRAM

## 2023-12-10 PROCEDURE — 2500000004 HC RX 250 GENERAL PHARMACY W/ HCPCS (ALT 636 FOR OP/ED): Performed by: NURSE PRACTITIONER

## 2023-12-10 PROCEDURE — 99232 SBSQ HOSP IP/OBS MODERATE 35: CPT | Performed by: PODIATRIST

## 2023-12-10 PROCEDURE — 85027 COMPLETE CBC AUTOMATED: CPT | Performed by: STUDENT IN AN ORGANIZED HEALTH CARE EDUCATION/TRAINING PROGRAM

## 2023-12-10 PROCEDURE — 1200000002 HC GENERAL ROOM WITH TELEMETRY DAILY

## 2023-12-10 PROCEDURE — 2500000004 HC RX 250 GENERAL PHARMACY W/ HCPCS (ALT 636 FOR OP/ED)

## 2023-12-10 PROCEDURE — 83735 ASSAY OF MAGNESIUM: CPT | Performed by: STUDENT IN AN ORGANIZED HEALTH CARE EDUCATION/TRAINING PROGRAM

## 2023-12-10 PROCEDURE — 2500000001 HC RX 250 WO HCPCS SELF ADMINISTERED DRUGS (ALT 637 FOR MEDICARE OP)

## 2023-12-10 RX ADMIN — Medication 5 MG: at 20:23

## 2023-12-10 RX ADMIN — INSULIN LISPRO 7 UNITS: 100 INJECTION, SOLUTION INTRAVENOUS; SUBCUTANEOUS at 14:22

## 2023-12-10 RX ADMIN — ACETAMINOPHEN 975 MG: 325 TABLET ORAL at 20:23

## 2023-12-10 RX ADMIN — INSULIN LISPRO 7 UNITS: 100 INJECTION, SOLUTION INTRAVENOUS; SUBCUTANEOUS at 10:18

## 2023-12-10 RX ADMIN — AMLODIPINE BESYLATE 10 MG: 10 TABLET ORAL at 10:05

## 2023-12-10 RX ADMIN — ATORVASTATIN CALCIUM 80 MG: 80 TABLET, FILM COATED ORAL at 20:23

## 2023-12-10 RX ADMIN — OXYCODONE HYDROCHLORIDE 5 MG: 5 TABLET ORAL at 20:28

## 2023-12-10 RX ADMIN — INSULIN LISPRO 7 UNITS: 100 INJECTION, SOLUTION INTRAVENOUS; SUBCUTANEOUS at 18:23

## 2023-12-10 RX ADMIN — SEVELAMER CARBONATE 800 MG: 800 TABLET, FILM COATED ORAL at 17:26

## 2023-12-10 RX ADMIN — ASPIRIN 81 MG: 81 TABLET, COATED ORAL at 10:05

## 2023-12-10 RX ADMIN — CLOPIDOGREL BISULFATE 75 MG: 75 TABLET ORAL at 10:05

## 2023-12-10 RX ADMIN — SEVELAMER CARBONATE 800 MG: 800 TABLET, FILM COATED ORAL at 13:08

## 2023-12-10 RX ADMIN — INSULIN LISPRO 1 UNITS: 100 INJECTION, SOLUTION INTRAVENOUS; SUBCUTANEOUS at 14:15

## 2023-12-10 RX ADMIN — PANTOPRAZOLE SODIUM 20 MG: 20 TABLET, DELAYED RELEASE ORAL at 10:04

## 2023-12-10 RX ADMIN — TORSEMIDE 100 MG: 20 TABLET ORAL at 10:04

## 2023-12-10 RX ADMIN — CARVEDILOL 25 MG: 12.5 TABLET, FILM COATED ORAL at 20:23

## 2023-12-10 RX ADMIN — INSULIN GLARGINE 25 UNITS: 100 INJECTION, SOLUTION SUBCUTANEOUS at 20:23

## 2023-12-10 RX ADMIN — SEVELAMER CARBONATE 800 MG: 800 TABLET, FILM COATED ORAL at 10:14

## 2023-12-10 RX ADMIN — NEPHROCAP 1 CAPSULE: 1 CAP ORAL at 09:00

## 2023-12-10 RX ADMIN — GABAPENTIN 100 MG: 100 CAPSULE ORAL at 20:23

## 2023-12-10 RX ADMIN — HYDROXYZINE HYDROCHLORIDE 25 MG: 25 TABLET, FILM COATED ORAL at 20:28

## 2023-12-10 RX ADMIN — CARVEDILOL 25 MG: 12.5 TABLET, FILM COATED ORAL at 10:05

## 2023-12-10 RX ADMIN — CALCITRIOL CAPSULES 0.25 MCG 0.25 MCG: 0.25 CAPSULE ORAL at 10:05

## 2023-12-10 ASSESSMENT — COGNITIVE AND FUNCTIONAL STATUS - GENERAL
MOVING TO AND FROM BED TO CHAIR: A LOT
CLIMB 3 TO 5 STEPS WITH RAILING: A LOT
DRESSING REGULAR UPPER BODY CLOTHING: A LITTLE
MOBILITY SCORE: 12
WALKING IN HOSPITAL ROOM: A LOT
HELP NEEDED FOR BATHING: A LOT
MOVING FROM LYING ON BACK TO SITTING ON SIDE OF FLAT BED WITH BEDRAILS: A LOT
STANDING UP FROM CHAIR USING ARMS: A LOT
DRESSING REGULAR LOWER BODY CLOTHING: A LOT
DAILY ACTIVITIY SCORE: 19
TURNING FROM BACK TO SIDE WHILE IN FLAT BAD: A LOT

## 2023-12-10 ASSESSMENT — PAIN SCALES - GENERAL
PAINLEVEL_OUTOF10: 0 - NO PAIN
PAINLEVEL_OUTOF10: 0 - NO PAIN
PAINLEVEL_OUTOF10: 5 - MODERATE PAIN
PAINLEVEL_OUTOF10: 5 - MODERATE PAIN

## 2023-12-10 ASSESSMENT — PAIN - FUNCTIONAL ASSESSMENT
PAIN_FUNCTIONAL_ASSESSMENT: 0-10

## 2023-12-10 NOTE — PROGRESS NOTES
"Kwadwo Hoyt is a 57 y.o. male on day 15 of admission presenting with Arterial occlusion.    Subjective   Patient states he is \"doing as well as can be expected.\" Endorses some bilateral foot pain. Denies fever, chills, nausea and vomiting.            Objective     Physical Exam  Constitutional:       Appearance: Normal appearance.   HENT:      Head: Normocephalic.   Cardiovascular:      Rate and Rhythm: Normal rate and regular rhythm.   Pulmonary:      Effort: Pulmonary effort is normal.      Breath sounds: Normal breath sounds.   Abdominal:      General: Bowel sounds are normal. There is no distension.      Palpations: Abdomen is soft.      Tenderness: There is no guarding.   Skin:     General: Skin is warm and dry.   Neurological:      General: No focal deficit present.      Mental Status: He is alert.   Psychiatric:         Mood and Affect: Mood normal.         Last Recorded Vitals  Blood pressure 150/87, pulse 77, temperature 36.6 °C (97.9 °F), resp. rate 16, height 1.854 m (6' 1\"), weight 126 kg (278 lb 7.1 oz), SpO2 95 %.  Intake/Output last 3 Shifts:  I/O last 3 completed shifts:  In: 1725 (13.7 mL/kg) [P.O.:120; I.V.:600 (4.8 mL/kg); Blood:605; Other:400]  Out: 2500 (19.8 mL/kg) [Other:2500]  Weight: 126.3 kg     Relevant Results  Scheduled medications  acetaminophen, 975 mg, oral, q8h  alteplase, 2 mg, intra-catheter, Daily  alteplase, 2 mg, intra-catheter, Daily  amLODIPine, 10 mg, oral, Daily  aspirin, 81 mg, oral, Daily  atorvastatin, 80 mg, oral, Nightly  B complex-vitamin C-folic acid, 1 capsule, oral, Daily  calcitriol, 0.25 mcg, oral, Daily  carvedilol, 25 mg, oral, BID  clopidogrel, 75 mg, oral, Daily  darbepoetin abhishek, 0.45 mcg/kg, subcutaneous, Weekly  gabapentin, 100 mg, oral, q PM  insulin glargine, 25 Units, subcutaneous, Nightly  insulin lispro, 0-5 Units, subcutaneous, TID with meals  insulin lispro, 7 Units, subcutaneous, TID with meals  lidocaine, 1 patch, transdermal, " Daily  lidocaine-prilocaine, , Topical, Daily  melatonin, 5 mg, oral, Nightly  pantoprazole, 20 mg, oral, Daily before breakfast  polyethylene glycol, 17 g, oral, BID  sennosides-docusate sodium, 1 tablet, oral, Nightly  sevelamer carbonate, 800 mg, oral, TID with meals  torsemide, 100 mg, oral, Daily      Continuous medications     PRN medications  PRN medications: dextrose 10 % in water (D10W), dextrose, eucerin, glucagon, hydrALAZINE, HYDROmorphone, hydrOXYzine HCL, oxyCODONE   Results for orders placed or performed during the hospital encounter of 11/24/23 (from the past 24 hour(s))   POCT GLUCOSE   Result Value Ref Range    POCT Glucose 106 (H) 74 - 99 mg/dL   POCT GLUCOSE   Result Value Ref Range    POCT Glucose 98 74 - 99 mg/dL   CBC and Auto Differential   Result Value Ref Range    WBC 8.0 4.4 - 11.3 x10*3/uL    nRBC 0.0 0.0 - 0.0 /100 WBCs    RBC 2.69 (L) 4.50 - 5.90 x10*6/uL    Hemoglobin 7.5 (L) 13.5 - 17.5 g/dL    Hematocrit 23.9 (L) 41.0 - 52.0 %    MCV 89 80 - 100 fL    MCH 27.9 26.0 - 34.0 pg    MCHC 31.4 (L) 32.0 - 36.0 g/dL    RDW 13.0 11.5 - 14.5 %    Platelets 340 150 - 450 x10*3/uL    Neutrophils % 57.3 40.0 - 80.0 %    Immature Granulocytes %, Automated 0.4 0.0 - 0.9 %    Lymphocytes % 20.0 13.0 - 44.0 %    Monocytes % 18.0 2.0 - 10.0 %    Eosinophils % 3.6 0.0 - 6.0 %    Basophils % 0.7 0.0 - 2.0 %    Neutrophils Absolute 4.60 1.20 - 7.70 x10*3/uL    Immature Granulocytes Absolute, Automated 0.03 0.00 - 0.70 x10*3/uL    Lymphocytes Absolute 1.60 1.20 - 4.80 x10*3/uL    Monocytes Absolute 1.44 (H) 0.10 - 1.00 x10*3/uL    Eosinophils Absolute 0.29 0.00 - 0.70 x10*3/uL    Basophils Absolute 0.06 0.00 - 0.10 x10*3/uL   Renal function panel   Result Value Ref Range    Glucose 95 74 - 99 mg/dL    Sodium 137 136 - 145 mmol/L    Potassium 4.4 3.5 - 5.3 mmol/L    Chloride 98 98 - 107 mmol/L    Bicarbonate 28 21 - 32 mmol/L    Anion Gap 15 10 - 20 mmol/L    Urea Nitrogen 33 (H) 6 - 23 mg/dL    Creatinine  5.53 (H) 0.50 - 1.30 mg/dL    eGFR 11 (L) >60 mL/min/1.73m*2    Calcium 8.9 8.6 - 10.6 mg/dL    Phosphorus 4.0 2.5 - 4.9 mg/dL    Albumin 3.3 (L) 3.4 - 5.0 g/dL   Magnesium   Result Value Ref Range    Magnesium 2.14 1.60 - 2.40 mg/dL   POCT GLUCOSE   Result Value Ref Range    POCT Glucose 124 (H) 74 - 99 mg/dL   POCT GLUCOSE   Result Value Ref Range    POCT Glucose 111 (H) 74 - 99 mg/dL             Assessment/Plan   Principal Problem:    Arterial occlusion  Active Problems:    ALEXANDER (obstructive sleep apnea)    ESRD on hemodialysis (CMS/HCC)    SELENA (acute kidney injury) (CMS/HCC)    Peripheral vascular disease, unspecified (CMS/HCC)    Acute occlusion of popliteal artery due to thrombosis (CMS/HCC)    Left leg weakness    Critical limb ischemia of left lower extremity (CMS/[facility])    Toe ulcer, left, with unspecified severity (CMS/[facility])      Kwadwo Hoyt is a 58 y/o male with a PMHx of DM c/b neuropathy, HTN, CKD stage 5, HFrEF (EF 50% in 11/2021), & ALEXANDER, who presented to the ER with left leg and arm weakness. LLE distal pulses were not palpable and CTA aorta & iliofemoral runoff showed complete left popliteal occlusion. Heparin gtt was started and vascular medicine were consulted but recommended no surgical intervention, recommend PVR/ZAC first. Neuro were also consulted for concern of stroke given patients left sided weakness, rec outpatient follow up. PVR/ZAC showed severe disease at left femoral/popliteal level. 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 Nephrology consulted for worsening Cr and electrolyte status and started dialysis. Tunneled catheter placed by IR 11/30. Pt to c/w dialysis and underwent LLE angiogram with Vascular Surgery on 12/1, completed for peripheral angioplasty with Vascular Surgery 12/6. TMA with Podiatry 12/ 11.     L Popliteal Artery Thrombosis  Chronic Limb Ischemia  Left Hallux Dry Gangrene  c/f Stroke  - Neurology consulted, stroke follow up in 4-6 weeks, discharge with  ziopatch / loop recorder  - CTA aorta & B/L iliofemoral runoff significant for complete occlusion of L popliteal artery as well as anterior and posterior tibial arteries.   - ZAC/PVR showed severe disease at the femoral popliteal level. Biphasic flow is noted in the left popliteal artery. Unable to obtain pressures. The posterior tibial and dorasalis pedis arteries are not audible.  - LLE Angiogram w/ VascSurg 12/1.  Underwent Peripheral Angioplasty (thrombectomy and stent placement) on 12/6, Vasc Surg signed off  - Podiatry consulted for dry gangrene of Left Hallux, Plan for TMA 12/11. Maintain Hgb above 7.5, repeat type and screen. Plan for NPO at midnight for surgery in am   - Vasc Med consulted, rec c/w ASA-81 and Plavix 75 daily  - Vas Med to evaluate source of thrombosis: TTE 12/7 had negative bubble study; hypercoagulable workup WNL, CTA Chest 12/8 for evaluation of aortic arch- mild atherosclerotic disease; no evidence intracavitary thrombi      --per Vas Med no anticoagulation unless workup reveals a thrombosis  - Continue with ASA 81 as procedure delayed   - Continue with atorvastatin 80 mg    ESRD 2/2 T2DM on HD T/Th/Sat HD   - Admission Cr 6.06, GFR 10 (Baseline Cr 5.5~)  - Being evaluated at [facility] for kidney transplant w/ most recent office visit on 9/12/2023  - home Torsemide 100 mg, calcitriol 0.25 mcg QD  - Avoid nephrotoxic agents  - Nephrology consulted due to worsening Cr and Electrolyte status  -- Received HD line 11/28, tunneled line placed 11/30 by IR  - SW contacted to coordinate outpatient dialysis (needs 4hr chair time at [facility] under Dr. Draper)  - Continue with Sevelamer carbonate 800 TID w/ meals, daily renal MVI, EPO w/ HD     Anemia  - Likely anemia of chronic disease 2/2 ESRD  - Hgb dropped 7.1 -> 6.9 s/p 12/6 Angioplasty  - Hgb 6.9 12/8  - Tranfused with 2 units pRBC 12/8  - Maintain above 7.5 per Podiatry  - Today Hb 9.0      HTN  HFpEF  - Continue on home Aspirin 81 mg  - Continue on  home Carvedilol 25 mg BID  - Continue on home Amlodipine 10 mg QD  - Echo 11/25, EF 60-65% w/ pseudonormal pattern of LV diastolic filling, increased LA LV diastolic pressure, severe concentric LVH  - Repeat TTE on 12/7 w/ EF on 60-65% w/ severe concentric LVH, negative bubble study      T2DM  Neuropathy  - Last Hgb A1C 11.0 on 5/24/2023, ordered updated Hgb A1C  - Home Regimen: Lantus 40U at bedtime & Lispro 20U TID w/ meals  - Current Regimen: Lantus 25 units at bedtime and Lispro 7u TID w/ meals  - Mild SSI  - c/w home Gabapentin 300 mg BID  - POCT BG TID & at bedtime  - Hypoglycemia protocol     Anxiety  - PRN Atarax 25 mg      ALEXANDER  - CPAP at home, noncompliant     F: PRN  E: PRN  N: Renal  GI: Pantoprazole 20 mg QD  DVT: none (on DAPT per Vasc Med)      Code Status: Full Code   Emergency Contact: Extended Emergency Contact Information  Primary Emergency Contact: EvelinaMilka  Address: [address] [place] [address]  Home Phone: [phone]  Relation: None  PCP: Thai Talavera MD     Patient seen and discussed with attending physician, Dr. Pappas.    Beverly Soto MD   PGY1, Family Medicine   [facility]3]  Available by Onzo

## 2023-12-11 LAB
ALBUMIN SERPL BCP-MCNC: 3.3 G/DL (ref 3.4–5)
ANION GAP SERPL CALC-SCNC: 18 MMOL/L (ref 10–20)
BUN SERPL-MCNC: 66 MG/DL (ref 6–23)
CALCIUM SERPL-MCNC: 9.5 MG/DL (ref 8.6–10.6)
CHLORIDE SERPL-SCNC: 98 MMOL/L (ref 98–107)
CO2 SERPL-SCNC: 24 MMOL/L (ref 21–32)
CREAT SERPL-MCNC: 9.2 MG/DL (ref 0.5–1.3)
ERYTHROCYTE [DISTWIDTH] IN BLOOD BY AUTOMATED COUNT: 13 % (ref 11.5–14.5)
GFR SERPL CREATININE-BSD FRML MDRD: 6 ML/MIN/1.73M*2
GLUCOSE BLD MANUAL STRIP-MCNC: 114 MG/DL (ref 74–99)
GLUCOSE BLD MANUAL STRIP-MCNC: 137 MG/DL (ref 74–99)
GLUCOSE BLD MANUAL STRIP-MCNC: 81 MG/DL (ref 74–99)
GLUCOSE SERPL-MCNC: 77 MG/DL (ref 74–99)
HBV SURFACE AB SER-ACNC: <3.1 MIU/ML
HCT VFR BLD AUTO: 28.1 % (ref 41–52)
HGB BLD-MCNC: 8.9 G/DL (ref 13.5–17.5)
MAGNESIUM SERPL-MCNC: 2.14 MG/DL (ref 1.6–2.4)
MCH RBC QN AUTO: 28.8 PG (ref 26–34)
MCHC RBC AUTO-ENTMCNC: 31.7 G/DL (ref 32–36)
MCV RBC AUTO: 91 FL (ref 80–100)
NRBC BLD-RTO: 0 /100 WBCS (ref 0–0)
PHOSPHATE SERPL-MCNC: 5.5 MG/DL (ref 2.5–4.9)
PLATELET # BLD AUTO: 341 X10*3/UL (ref 150–450)
POTASSIUM SERPL-SCNC: 4.2 MMOL/L (ref 3.5–5.3)
RBC # BLD AUTO: 3.09 X10*6/UL (ref 4.5–5.9)
SODIUM SERPL-SCNC: 136 MMOL/L (ref 136–145)
WBC # BLD AUTO: 7.9 X10*3/UL (ref 4.4–11.3)

## 2023-12-11 PROCEDURE — 83735 ASSAY OF MAGNESIUM: CPT | Performed by: STUDENT IN AN ORGANIZED HEALTH CARE EDUCATION/TRAINING PROGRAM

## 2023-12-11 PROCEDURE — 2500000001 HC RX 250 WO HCPCS SELF ADMINISTERED DRUGS (ALT 637 FOR MEDICARE OP): Performed by: NURSE PRACTITIONER

## 2023-12-11 PROCEDURE — 80069 RENAL FUNCTION PANEL: CPT | Performed by: STUDENT IN AN ORGANIZED HEALTH CARE EDUCATION/TRAINING PROGRAM

## 2023-12-11 PROCEDURE — 2500000004 HC RX 250 GENERAL PHARMACY W/ HCPCS (ALT 636 FOR OP/ED): Performed by: NURSE PRACTITIONER

## 2023-12-11 PROCEDURE — 99232 SBSQ HOSP IP/OBS MODERATE 35: CPT | Performed by: PODIATRIST

## 2023-12-11 PROCEDURE — 86706 HEP B SURFACE ANTIBODY: CPT | Performed by: STUDENT IN AN ORGANIZED HEALTH CARE EDUCATION/TRAINING PROGRAM

## 2023-12-11 PROCEDURE — 85027 COMPLETE CBC AUTOMATED: CPT | Performed by: STUDENT IN AN ORGANIZED HEALTH CARE EDUCATION/TRAINING PROGRAM

## 2023-12-11 PROCEDURE — 36415 COLL VENOUS BLD VENIPUNCTURE: CPT | Performed by: STUDENT IN AN ORGANIZED HEALTH CARE EDUCATION/TRAINING PROGRAM

## 2023-12-11 PROCEDURE — 1200000002 HC GENERAL ROOM WITH TELEMETRY DAILY

## 2023-12-11 PROCEDURE — 2500000004 HC RX 250 GENERAL PHARMACY W/ HCPCS (ALT 636 FOR OP/ED)

## 2023-12-11 PROCEDURE — 82947 ASSAY GLUCOSE BLOOD QUANT: CPT

## 2023-12-11 PROCEDURE — 2500000001 HC RX 250 WO HCPCS SELF ADMINISTERED DRUGS (ALT 637 FOR MEDICARE OP)

## 2023-12-11 PROCEDURE — 2500000005 HC RX 250 GENERAL PHARMACY W/O HCPCS

## 2023-12-11 PROCEDURE — 99233 SBSQ HOSP IP/OBS HIGH 50: CPT | Performed by: STUDENT IN AN ORGANIZED HEALTH CARE EDUCATION/TRAINING PROGRAM

## 2023-12-11 RX ORDER — FLUTICASONE PROPIONATE 50 MCG
2 SPRAY, SUSPENSION (ML) NASAL DAILY
Status: DISCONTINUED | OUTPATIENT
Start: 2023-12-11 | End: 2023-12-14 | Stop reason: HOSPADM

## 2023-12-11 RX ADMIN — CARVEDILOL 25 MG: 12.5 TABLET, FILM COATED ORAL at 20:24

## 2023-12-11 RX ADMIN — Medication 5 MG: at 20:24

## 2023-12-11 RX ADMIN — ACETAMINOPHEN 975 MG: 325 TABLET ORAL at 12:51

## 2023-12-11 RX ADMIN — GABAPENTIN 100 MG: 100 CAPSULE ORAL at 20:24

## 2023-12-11 RX ADMIN — AMLODIPINE BESYLATE 10 MG: 10 TABLET ORAL at 08:44

## 2023-12-11 RX ADMIN — LIDOCAINE 1 PATCH: 4 PATCH TOPICAL at 08:44

## 2023-12-11 RX ADMIN — ATORVASTATIN CALCIUM 80 MG: 80 TABLET, FILM COATED ORAL at 20:24

## 2023-12-11 RX ADMIN — INSULIN LISPRO 7 UNITS: 100 INJECTION, SOLUTION INTRAVENOUS; SUBCUTANEOUS at 13:03

## 2023-12-11 RX ADMIN — CARVEDILOL 25 MG: 12.5 TABLET, FILM COATED ORAL at 08:44

## 2023-12-11 RX ADMIN — NEPHROCAP 1 CAPSULE: 1 CAP ORAL at 08:43

## 2023-12-11 RX ADMIN — ASPIRIN 81 MG: 81 TABLET, COATED ORAL at 08:43

## 2023-12-11 RX ADMIN — OXYCODONE HYDROCHLORIDE 5 MG: 5 TABLET ORAL at 20:24

## 2023-12-11 RX ADMIN — SEVELAMER CARBONATE 800 MG: 800 TABLET, FILM COATED ORAL at 12:51

## 2023-12-11 RX ADMIN — ACETAMINOPHEN 975 MG: 325 TABLET ORAL at 06:21

## 2023-12-11 RX ADMIN — SEVELAMER CARBONATE 800 MG: 800 TABLET, FILM COATED ORAL at 17:40

## 2023-12-11 RX ADMIN — INSULIN GLARGINE 25 UNITS: 100 INJECTION, SOLUTION SUBCUTANEOUS at 20:25

## 2023-12-11 RX ADMIN — OXYCODONE HYDROCHLORIDE 5 MG: 5 TABLET ORAL at 06:18

## 2023-12-11 RX ADMIN — CLOPIDOGREL BISULFATE 75 MG: 75 TABLET ORAL at 08:43

## 2023-12-11 RX ADMIN — PANTOPRAZOLE SODIUM 20 MG: 20 TABLET, DELAYED RELEASE ORAL at 06:19

## 2023-12-11 RX ADMIN — TORSEMIDE 100 MG: 20 TABLET ORAL at 08:44

## 2023-12-11 RX ADMIN — ACETAMINOPHEN 975 MG: 325 TABLET ORAL at 20:24

## 2023-12-11 ASSESSMENT — PAIN SCALES - GENERAL
PAINLEVEL_OUTOF10: 8
PAINLEVEL_OUTOF10: 0 - NO PAIN
PAINLEVEL_OUTOF10: 9

## 2023-12-11 ASSESSMENT — COGNITIVE AND FUNCTIONAL STATUS - GENERAL
WALKING IN HOSPITAL ROOM: A LITTLE
TOILETING: A LITTLE
PERSONAL GROOMING: A LITTLE
MOVING TO AND FROM BED TO CHAIR: A LITTLE
DRESSING REGULAR UPPER BODY CLOTHING: A LITTLE
MOBILITY SCORE: 18
DAILY ACTIVITIY SCORE: 19
DRESSING REGULAR LOWER BODY CLOTHING: A LITTLE
HELP NEEDED FOR BATHING: A LITTLE
CLIMB 3 TO 5 STEPS WITH RAILING: TOTAL
STANDING UP FROM CHAIR USING ARMS: A LITTLE

## 2023-12-11 ASSESSMENT — PAIN DESCRIPTION - LOCATION: LOCATION: LEG

## 2023-12-11 ASSESSMENT — PAIN - FUNCTIONAL ASSESSMENT
PAIN_FUNCTIONAL_ASSESSMENT: 0-10
PAIN_FUNCTIONAL_ASSESSMENT: 0-10

## 2023-12-11 ASSESSMENT — PAIN DESCRIPTION - ORIENTATION: ORIENTATION: LEFT

## 2023-12-11 NOTE — PROGRESS NOTES
Physical Therapy                 Therapy Communication Note    Patient Name: Kwadwo Hoyt  MRN: [id number]  Today's Date: 12/11/2023     Discipline: Physical Therapy    Missed Visit Reason: Missed Visit Reason: Other (Comment) (Pt off floor at time of visit, will re-attempt as time permits.)    Missed Time: Attempt    Erendira Sandhu PTA  Rehab Office [phone]

## 2023-12-11 NOTE — CARE PLAN
The patient's goals for the shift include      The clinical goals for the shift include Pt HGB will be within normal limits during the shift. Goal met. Pt HGB was within normal limits. Most goals met. Pt was checked and monitored during the shift. Pt had no complaints of pain. Pt had no injury during the shift.       Problem: Skin  Goal: Prevent/manage excess moisture  12/11/2023 1818 by Keira Mares RN  Outcome: Not met  12/11/2023 1818 by Keira Mares RN  Outcome: Progressing  Goal: Promote/optimize nutrition  12/11/2023 1818 by Keira Mares RN  Outcome: Not met  12/11/2023 1818 by Keira Mares RN  Outcome: Progressing     Problem: Pain  Goal: Takes deep breaths with improved pain control throughout the shift  12/11/2023 1818 by Keira Mares RN  Outcome: Not met  12/11/2023 1818 by Keira Mares RN  Outcome: Progressing  Goal: Turns in bed with improved pain control throughout the shift  12/11/2023 1818 by Keira Mares RN  Outcome: Not met  12/11/2023 1818 by Keira Mares RN  Outcome: Progressing  Goal: Walks with improved pain control throughout the shift  12/11/2023 1818 by Keira Mares RN  Outcome: Not met  12/11/2023 1818 by Keira Mares RN  Outcome: Progressing  Goal: Performs ADL's with improved pain control throughout shift  12/11/2023 1818 by Keira Mares RN  Outcome: Not met  12/11/2023 1818 by Keira Mares RN  Outcome: Progressing  Goal: Participates in PT with improved pain control throughout the shift  12/11/2023 1818 by Keira Mares RN  Outcome: Not met  12/11/2023 1818 by Keira Mares RN  Outcome: Progressing

## 2023-12-11 NOTE — PROGRESS NOTES
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 foramen are suboptimally evaluated on this CT study.   MACRO: None   Signed by: Charlie Betts 11/24/2023 4:30 PM Dictation workstation:   [id number]    CT head wo IV contrast    Result Date: 11/24/2023  Interpreted By:  Charlie Betts, STUDY: CT HEAD WO IV CONTRAST;  11/24/2023 4:11 pm   INDICATION: Signs/Symptoms:left lower extremity weakness.   COMPARISON: None.   ACCESSION NUMBER(S): [id number]   ORDERING CLINICIAN: CHOLO JORDAN   TECHNIQUE: Axial CT images of the head were obtained without intravenous contrast administration.   FINDINGS: There is moderate brain parenchymal volume loss.   The lateral ventricles demonstrate mild disproportionate prominence when compared with the sulci  within the cerebral convexities. No obstructing mass lesion is noted on this noncontrast CT study. This mild disproportionate ventriculomegaly may be related to more pronounced central volume loss ossific a component of communicating hydrocephalus.   There are patchy and confluent nonspecific white matter changes within the cerebral hemispheres bilaterally which while nonspecific, can be seen with small-vessel ischemic change among others.   Additional focal hypodensities are identified within the subinsular regions and basal ganglia bilaterally suggesting incidental prominent perivascular spaces and/or scattered lacunar infarctions.   No hyperdense acute intracranial hemorrhage is noted.   There is no midline shift.   There are scattered retention cysts or polyps within the bilateral maxillary sinuses. The remaining paranasal sinuses are clear.   There is opacification of the left middle ear cavity and left mastoid air cells.   There is a metallic foreign body within the right facial soft tissues surrounding the anterior margin of the right parotid gland.       There is moderate brain parenchymal volume loss.   The lateral ventricles demonstrate mild disproportionate prominence when compared with the sulci within the cerebral convexities. No obstructing mass lesion is noted on this noncontrast CT study. This mild disproportionate ventriculomegaly may be related to more pronounced central volume loss ossific a component of communicating hydrocephalus.   There are patchy and confluent nonspecific white matter changes within the cerebral hemispheres bilaterally which while nonspecific, can be seen with small-vessel ischemic change among others. Additional focal hypodensities are identified within the subinsular regions and basal ganglia bilaterally suggesting incidental prominent perivascular spaces and/or scattered lacunar infarctions.   There is opacification of the left middle ear cavity and left mastoid air cells.    There is a metallic foreign body within the right facial soft tissues surrounding the anterior margin of the right parotid gland.   MACRO: None.   Signed by: Charlie Betts 11/24/2023 4:21 PM Dictation workstation:   [id number]    XR hip left 2 or 3 views    Result Date: 11/24/2023  Interpreted By:  Nadir Bender, STUDY: XR HIP LEFT 2 OR 3 VIEWS; XR FEMUR LEFT 2+ VIEWS; ;  11/24/2023 3:58 pm   INDICATION: Signs/Symptoms:weakness.   COMPARISON: None.   ACCESSION NUMBER(S): [id number]; [id number]   ORDERING CLINICIAN: CHOLO JORDAN   FINDINGS: Left hip, three views. Left femur, two views.   There is no fracture. There is no dislocation. Moderate degenerative changes present in the hip and in the left knee. There is no malalignment. Linear heterotopic ossification at the lateral aspect of the left hip.       No acute abnormality seen. Moderate degenerative changes     MACRO: None   Signed by: Nadir Bender 11/24/2023 4:02 PM Dictation workstation:   [id number]    XR femur left 2+ views    Result Date: 11/24/2023  Interpreted By:  Nadir Bender, STUDY: XR HIP LEFT 2 OR 3 VIEWS; XR FEMUR LEFT 2+ VIEWS; ;  11/24/2023 3:58 pm   INDICATION: Signs/Symptoms:weakness.   COMPARISON: None.   ACCESSION NUMBER(S): [id number]; [id number]   ORDERING CLINICIAN: CHOLO JORDAN   FINDINGS: Left hip, three views. Left femur, two views.   There is no fracture. There is no dislocation. Moderate degenerative changes present in the hip and in the left knee. There is no malalignment. Linear heterotopic ossification at the lateral aspect of the left hip.       No acute abnormality seen. Moderate degenerative changes     MACRO: None   Signed by: Nadir Bender 11/24/2023 4:02 PM Dictation workstation:   [id number]            Assessment/Plan   ASSESSMENT & PLAN:    #CLTI, LLE  #atherosclerosis of native arteries with dry gangrene, left   #DM2 with peripheral neuropathy  #ESRD  #acquired absence of 3 digit, right  foot  #acquired absence of multiple digits, left foot    - Patient was seen and evaluated; all findings were discussed and all questions were answered to patient's satisfaction.  - Charts, labs, vitals and imaging all reviewed.   - Labs: WBC 7.9, Hgb 8.9, CRP 4.15, , lactate 0.6, HgbA1c 12.6, INR 1.1  - PVRs: Critical Result: Posterior tibial and dorsalis pedis are not audible on the left. Severe occlusive disease B/L.  RIGHT Lower PVR                Pressures Ratios  Right High Thigh               256 mmHg  1.54  Right Low Thigh                256 mmHg  1.54  Right Calf                     182 mmHg  1.10  Right Posterior Tibial (Ankle) 126 mmHg  0.76  Right Dorsalis Pedis (Ankle)   106 mmHg  0.64  Right Digit (Great Toe)        51 mmHg   0.31  - Blood culture: no growth    Plan:  - Abx: per primary/ID  - Pain Regimen: per primary/ID  - Dressings: betadine soaked gauze, dry gauze, aga, tape. Recommend daily dressing changes.   - Nursing staff is able to change/reinforce dressing if & as necessary until next day’s dressing change. Thank you.  -left hallux is clinically dry and stable. With critical results of complete occlusion of the DP/PT and popliteal, patient is at a very high risk for limb loss.  -Plan for TMA of left foot on Wednesday (12/13/23) with Dr. Gill at 8:15am. Consent completed. Please make NPO at midnight Tuesday. OK to continue AC regimen. Please obtain active type and screen. Please medically optimize with Hgb >7.5mmHg  - Podiatry will continue to follow while in house.    DVT ppx: per primary  Weightbearing: WBAT    Case to be discussed with attending, A&P above reflects a tentative plan. Please await for the final signature from the attending physician on service.    Phylicia Espinoza DPM PGY-2  Podiatric Medicine & Surgery  Please Haiku message me with any questions or concerns.            SIGNATURE: Phylicia Espinoza DPM PATIENT NAME: Kwadwo Hoyt   DATE: December 11, 2023 MRN:  [id number]   TIME: 2:08 PM CONTACT: Haiku Message

## 2023-12-11 NOTE — PROGRESS NOTES
"Kwadwo Hoyt is a 57 y.o. male on day 16 of admission presenting with Arterial occlusion.    Subjective   Patient says he is doing well. He had a bowel movement yesterday. He has been walking around the floor. Is ready for his L foot surgery today. Denies fever, chills, nausea and vomiting.        Objective     Physical Exam  Constitutional:       Appearance: Normal appearance.   HENT:      Head: Normocephalic.   Cardiovascular:      Rate and Rhythm: Normal rate and regular rhythm.      Pulses: Normal pulses.   Pulmonary:      Effort: Pulmonary effort is normal. No respiratory distress.      Breath sounds: Normal breath sounds. No wheezing.   Abdominal:      General: Bowel sounds are normal. There is no distension.      Palpations: Abdomen is soft.   Skin:     General: Skin is warm and dry.   Neurological:      General: No focal deficit present.      Mental Status: He is alert.   Psychiatric:         Mood and Affect: Mood normal.         Behavior: Behavior normal.         Last Recorded Vitals  Blood pressure 145/85, pulse 79, temperature 36.9 °C (98.4 °F), resp. rate 19, height 1.854 m (6' 1\"), weight 126 kg (278 lb 7.1 oz), SpO2 96 %.  Intake/Output last 3 Shifts:  I/O last 3 completed shifts:  In: - (0 mL/kg)   Out: 660 (5.2 mL/kg) [Urine:660 (0.1 mL/kg/hr)]  Weight: 126.3 kg     Relevant Results    Scheduled medications  acetaminophen, 975 mg, oral, q8h  alteplase, 2 mg, intra-catheter, Daily  alteplase, 2 mg, intra-catheter, Daily  amLODIPine, 10 mg, oral, Daily  aspirin, 81 mg, oral, Daily  atorvastatin, 80 mg, oral, Nightly  B complex-vitamin C-folic acid, 1 capsule, oral, Daily  calcitriol, 0.25 mcg, oral, Daily  carvedilol, 25 mg, oral, BID  clopidogrel, 75 mg, oral, Daily  darbepoetin abhishek, 0.45 mcg/kg, subcutaneous, Weekly  gabapentin, 100 mg, oral, q PM  insulin glargine, 25 Units, subcutaneous, Nightly  insulin lispro, 0-5 Units, subcutaneous, TID with meals  insulin lispro, 7 Units, subcutaneous, TID " with meals  lidocaine, 1 patch, transdermal, Daily  lidocaine-prilocaine, , Topical, Daily  melatonin, 5 mg, oral, Nightly  pantoprazole, 20 mg, oral, Daily before breakfast  polyethylene glycol, 17 g, oral, BID  sennosides-docusate sodium, 1 tablet, oral, Nightly  sevelamer carbonate, 800 mg, oral, TID with meals  torsemide, 100 mg, oral, Daily      Continuous medications     PRN medications  PRN medications: dextrose 10 % in water (D10W), dextrose, eucerin, glucagon, hydrALAZINE, HYDROmorphone, hydrOXYzine HCL, oxyCODONE              Assessment/Plan   Principal Problem:    Arterial occlusion  Active Problems:    ALEXANDER (obstructive sleep apnea)    ESRD on hemodialysis (CMS/HCC)    SELENA (acute kidney injury) (CMS/[facility])    Peripheral vascular disease, unspecified (CMS/[facility])    Acute occlusion of popliteal artery due to thrombosis (CMS/HCC)    Left leg weakness    Critical limb ischemia of left lower extremity (CMS/[facility])    Toe ulcer, left, with unspecified severity (CMS/[facility])    Kwadwo Hoyt is a 58 y/o male with a PMHx of DM c/b neuropathy, HTN, CKD stage 5, HFrEF (EF 50% in 11/2021), & ALEXANDER, who presented to the ER with left leg and arm weakness. LLE distal pulses were not palpable and CTA aorta & iliofemoral runoff showed complete left popliteal occlusion. Heparin gtt was started and vascular medicine were consulted but recommended no surgical intervention, recommend PVR/ZAC first. Neuro were also consulted for concern of stroke given patients left sided weakness, rec outpatient follow up. PVR/ZAC showed severe disease at left femoral/popliteal level. 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 Nephrology consulted for worsening Cr and electrolyte status and started dialysis. Tunneled catheter placed by IR 11/30. Pt to c/w dialysis and underwent LLE angiogram with Vascular Surgery on 12/1, completed for peripheral angioplasty with Vascular Surgery 12/6. TMA with Podiatry today 12/11.     L Popliteal Artery  Thrombosis  Chronic Limb Ischemia  Left Hallux Dry Gangrene  c/f Stroke  - Neurology consulted, stroke follow up in 4-6 weeks, discharge with ziopatch / loop recorder  - CTA aorta & B/L iliofemoral runoff significant for complete occlusion of L popliteal artery as well as anterior and posterior tibial arteries.   - ZAC/PVR showed severe disease at the femoral popliteal level. Biphasic flow is noted in the left popliteal artery. Unable to obtain pressures. The posterior tibial and dorasalis pedis arteries are not audible.  - LLE Angiogram w/ VascSurg 12/1.  Underwent Peripheral Angioplasty (thrombectomy and stent placement) on 12/6, Vasc Surg signed off  - Podiatry consulted for dry gangrene of Left Hallux, Plan for TMA. Maintain Hgb above 7.5, repeat type and screen. Plan for L TMA 12/13 at 8:15 am.   - Vasc Med consulted, rec c/w ASA-81 and Plavix 75 daily  - Vasc Med to evaluate source of thrombosis: TTE 12/7 had negative bubble study; hypercoagulable workup WNL, CTA Chest 12/8 for evaluation of aortic arch- mild atherosclerotic disease; no evidence intracavitary thrombi      --per Vasc Med no anticoagulation unless workup reveals a thrombosis  - Continue with ASA 81   - Continue with atorvastatin 80 mg     ESRD 2/2 T2DM on HD T/Th/Sat HD   - Admission Cr 6.06, GFR 10 (Baseline Cr 5.5~)  - Being evaluated at [facility] for kidney transplant w/ most recent office visit on 9/12/2023  - home Torsemide 100 mg, calcitriol 0.25 mcg QD  - Avoid nephrotoxic agents  - Nephrology consulted due to worsening Cr and Electrolyte status  -- Received HD line 11/28, tunneled line placed 11/30 by GENNY  - YONATAN contacted to coordinate outpatient dialysis (needs 4hr chair time at [facility] under Dr. Draper)  - Continue with Sevelamer carbonate 800 TID w/ meals, daily renal MVI, EPO w/ HD     Anemia  - Likely anemia of chronic disease 2/2 ESRD  - Received 2 units pRBC 12/8 for Hb 6.9   - Hb today pending.      HTN  HFpEF  - Continue on home Aspirin 81  mg  - Continue on home Carvedilol 25 mg BID  - Continue on home Amlodipine 10 mg QD  - Echo 11/25, EF 60-65% w/ pseudonormal pattern of LV diastolic filling, increased LA LV diastolic pressure, severe concentric LVH  - Repeat TTE on 12/7 w/ EF on 60-65% w/ severe concentric LVH, negative bubble study      T2DM  Neuropathy  - Last Hgb A1C 11.0 on 5/24/2023, ordered updated Hgb A1C  - Home Regimen: Lantus 40U at bedtime & Lispro 20U TID w/ meals  - Current Regimen: Lantus 25 units at bedtime and Lispro 7u TID w/ meals  - Mild SSI  - c/w home Gabapentin 300 mg BID  - POCT BG TID & at bedtime  - Hypoglycemia protocol     Anxiety  - PRN Atarax 25 mg      ALEXANDER  - CPAP at home, noncompliant    Dispo:  - Pending PT/OT post-op      F: PRN  E: PRN  N: Renal diet   GI: Pantoprazole 20 mg QD  DVT: none (on DAPT per Vasc Med)      Code Status: Full Code   Emergency Contact: Extended Emergency Contact Information  Primary Emergency Contact: EvelinaMilka  Address: [address] [place] [address]  Home Phone: [phone]  Relation: None  PCP: Thai Talavera MD     Patient discussed with Dr. Breen.      Beverly Soto MD   PGY1, Family Medicine   [facility]  Available by FOBO

## 2023-12-11 NOTE — NURSING NOTE
End of shift Note     Patient remained safe and free from falls during the shift.  Patient did not complain of pain during the shift.  The plan is for the patient to go to surgery in the morning.   The patient will be NPO @ midnight.  Call light within reach.  Will continue to monitor pt.      Luis Miguel Bender LP

## 2023-12-11 NOTE — PROGRESS NOTES
NEPHROLOGY FOLLOW UP NOTE    Kwadwo Hoyt   57 y.o.       MRN/Room: [id number]    Subjective:  No acute events overnight, planne for TMA Wednesday     Objective:     Meds:   acetaminophen, 975 mg, q8h  alteplase, 2 mg, Daily  alteplase, 2 mg, Daily  amLODIPine, 10 mg, Daily  aspirin, 81 mg, Daily  atorvastatin, 80 mg, Nightly  B complex-vitamin C-folic acid, 1 capsule, Daily  calcitriol, 0.25 mcg, Daily  carvedilol, 25 mg, BID  clopidogrel, 75 mg, Daily  darbepoetin abhishek, 0.45 mcg/kg, Weekly  gabapentin, 100 mg, q PM  insulin glargine, 25 Units, Nightly  insulin lispro, 0-5 Units, TID with meals  insulin lispro, 7 Units, TID with meals  lidocaine, 1 patch, Daily  lidocaine-prilocaine, , Daily  melatonin, 5 mg, Nightly  pantoprazole, 20 mg, Daily before breakfast  polyethylene glycol, 17 g, BID  sennosides-docusate sodium, 1 tablet, Nightly  sevelamer carbonate, 800 mg, TID with meals  torsemide, 100 mg, Daily           dextrose 10 % in water (D10W), 0.3 g/kg/hr, Once PRN  dextrose, 25 g, q15 min PRN  eucerin, , PRN  glucagon, 1 mg, q15 min PRN  hydrALAZINE, 5 mg, q6h PRN  HYDROmorphone, 1 mg, q6h PRN  hydrOXYzine HCL, 25 mg, q6h PRN  oxyCODONE, 5 mg, q6h PRN        Vitals:    12/11/23 1711   BP: (!) 175/98   Pulse: 78   Resp: 17   Temp: 36.9 °C (98.4 °F)   SpO2: 98%          Intake/Output Summary (Last 24 hours) at 12/11/2023 1759  Last data filed at 12/11/2023 1100  Gross per 24 hour   Intake --   Output 800 ml   Net -800 ml         General appearance: Awake and alert, oriented. No distress  HEENT: NC/AT, MMM  Skin: no apparent rash  Heart: heart sounds 1 & 2 present and normal   Lungs: CTAB. No wheezing/crackles  Abdomen: soft, non tender  Extremities: No edema  Neuro: No FND  ACCESS: TDC    Blood Labs:  Results for orders placed or performed during the hospital encounter of 11/24/23 (from the past 24 hour(s))   POCT GLUCOSE   Result Value Ref Range    POCT Glucose 109 (H) 74 - 99 mg/dL   POCT GLUCOSE    Result Value Ref Range    POCT Glucose 114 (H) 74 - 99 mg/dL   Renal function panel   Result Value Ref Range    Glucose 77 74 - 99 mg/dL    Sodium 136 136 - 145 mmol/L    Potassium 4.2 3.5 - 5.3 mmol/L    Chloride 98 98 - 107 mmol/L    Bicarbonate 24 21 - 32 mmol/L    Anion Gap 18 10 - 20 mmol/L    Urea Nitrogen 66 (H) 6 - 23 mg/dL    Creatinine 9.20 (H) 0.50 - 1.30 mg/dL    eGFR 6 (L) >60 mL/min/1.73m*2    Calcium 9.5 8.6 - 10.6 mg/dL    Phosphorus 5.5 (H) 2.5 - 4.9 mg/dL    Albumin 3.3 (L) 3.4 - 5.0 g/dL   Magnesium   Result Value Ref Range    Magnesium 2.14 1.60 - 2.40 mg/dL   CBC   Result Value Ref Range    WBC 7.9 4.4 - 11.3 x10*3/uL    nRBC 0.0 0.0 - 0.0 /100 WBCs    RBC 3.09 (L) 4.50 - 5.90 x10*6/uL    Hemoglobin 8.9 (L) 13.5 - 17.5 g/dL    Hematocrit 28.1 (L) 41.0 - 52.0 %    MCV 91 80 - 100 fL    MCH 28.8 26.0 - 34.0 pg    MCHC 31.7 (L) 32.0 - 36.0 g/dL    RDW 13.0 11.5 - 14.5 %    Platelets 341 150 - 450 x10*3/uL   POCT GLUCOSE   Result Value Ref Range    POCT Glucose 81 74 - 99 mg/dL   POCT GLUCOSE   Result Value Ref Range    POCT Glucose 137 (H) 74 - 99 mg/dL          ASSESSMENT:  Kwadwo Hoyt is a 57 y.o. male with a PMHx of DM c/b neuropathy, HTN, CKD stage 5, HFrEF (EF 50% in 11/2021), & ALEXANDER being managed for left popliteal occlusion. Hospital course complicated by SELENA on CKD, likely contrast induced, now progressed to ESRD     CKD V with progression to ESRD  - Baseline creatinine: ~5-6, follows with Dr. Draper nephrologist outpatient   - Etiology: secondary to T2DM  - Clinical volume status: euvolemic   - Electrolytes: WNL  - TDC in place  - Anemia: hgb 8.8, iron sat 9%, ferritin 528 - s/p iron sucrose 200 mg  x 5 days   - BMD: calcium WNL and phos elevated      OP HD [facility] at 5:15 AM, TTS      Recommendations:  - HD tomorrow, continue TTS schedule while admitted  - Aranesp 60 mcg weekly   - renal MV  - continue renvela 800 mg TID, renal diet  - continue calcitrol  - continue torsemide   -  strict Is/Os  - Avoid nephrotoxins, contrast if possible;  - dose meds to IHD   - Avoid hypotension/rapid fluctuations in BPs       James Schmid MD  Nephrology Resident  24 hour Renal Pager - [phone]

## 2023-12-12 ENCOUNTER — APPOINTMENT (OUTPATIENT)
Dept: DIALYSIS | Facility: HOSPITAL | Age: 58
End: 2023-12-12
Payer: MEDICARE

## 2023-12-12 LAB
ABO GROUP (TYPE) IN BLOOD: NORMAL
ALBUMIN SERPL BCP-MCNC: 3.3 G/DL (ref 3.4–5)
ANION GAP SERPL CALC-SCNC: 17 MMOL/L (ref 10–20)
ANTIBODY SCREEN: NORMAL
BASOPHILS # BLD AUTO: 0.07 X10*3/UL (ref 0–0.1)
BASOPHILS NFR BLD AUTO: 0.8 %
BUN SERPL-MCNC: 70 MG/DL (ref 6–23)
CALCIUM SERPL-MCNC: 9.1 MG/DL (ref 8.6–10.6)
CHLORIDE SERPL-SCNC: 100 MMOL/L (ref 98–107)
CO2 SERPL-SCNC: 25 MMOL/L (ref 21–32)
CREAT SERPL-MCNC: 8.8 MG/DL (ref 0.5–1.3)
EOSINOPHIL # BLD AUTO: 0.35 X10*3/UL (ref 0–0.7)
EOSINOPHIL NFR BLD AUTO: 4.2 %
ERYTHROCYTE [DISTWIDTH] IN BLOOD BY AUTOMATED COUNT: 13.1 % (ref 11.5–14.5)
GFR SERPL CREATININE-BSD FRML MDRD: 6 ML/MIN/1.73M*2
GLUCOSE BLD MANUAL STRIP-MCNC: 105 MG/DL (ref 74–99)
GLUCOSE BLD MANUAL STRIP-MCNC: 173 MG/DL (ref 74–99)
GLUCOSE BLD MANUAL STRIP-MCNC: 97 MG/DL (ref 74–99)
GLUCOSE SERPL-MCNC: 113 MG/DL (ref 74–99)
HCT VFR BLD AUTO: 27.5 % (ref 41–52)
HGB BLD-MCNC: 8.6 G/DL (ref 13.5–17.5)
IMM GRANULOCYTES # BLD AUTO: 0.04 X10*3/UL (ref 0–0.7)
IMM GRANULOCYTES NFR BLD AUTO: 0.5 % (ref 0–0.9)
LYMPHOCYTES # BLD AUTO: 1.82 X10*3/UL (ref 1.2–4.8)
LYMPHOCYTES NFR BLD AUTO: 22.1 %
MAGNESIUM SERPL-MCNC: 2.1 MG/DL (ref 1.6–2.4)
MCH RBC QN AUTO: 28.5 PG (ref 26–34)
MCHC RBC AUTO-ENTMCNC: 31.3 G/DL (ref 32–36)
MCV RBC AUTO: 91 FL (ref 80–100)
MONOCYTES # BLD AUTO: 1.1 X10*3/UL (ref 0.1–1)
MONOCYTES NFR BLD AUTO: 13.3 %
NEUTROPHILS # BLD AUTO: 4.87 X10*3/UL (ref 1.2–7.7)
NEUTROPHILS NFR BLD AUTO: 59.1 %
NRBC BLD-RTO: 0 /100 WBCS (ref 0–0)
PHOSPHATE SERPL-MCNC: 5.5 MG/DL (ref 2.5–4.9)
PLATELET # BLD AUTO: 345 X10*3/UL (ref 150–450)
POTASSIUM SERPL-SCNC: 4.8 MMOL/L (ref 3.5–5.3)
RBC # BLD AUTO: 3.02 X10*6/UL (ref 4.5–5.9)
RH FACTOR (ANTIGEN D): NORMAL
SODIUM SERPL-SCNC: 137 MMOL/L (ref 136–145)
WBC # BLD AUTO: 8.3 X10*3/UL (ref 4.4–11.3)

## 2023-12-12 PROCEDURE — 2500000001 HC RX 250 WO HCPCS SELF ADMINISTERED DRUGS (ALT 637 FOR MEDICARE OP): Performed by: NURSE PRACTITIONER

## 2023-12-12 PROCEDURE — 2500000004 HC RX 250 GENERAL PHARMACY W/ HCPCS (ALT 636 FOR OP/ED): Performed by: NURSE PRACTITIONER

## 2023-12-12 PROCEDURE — 36415 COLL VENOUS BLD VENIPUNCTURE: CPT

## 2023-12-12 PROCEDURE — 83735 ASSAY OF MAGNESIUM: CPT | Performed by: STUDENT IN AN ORGANIZED HEALTH CARE EDUCATION/TRAINING PROGRAM

## 2023-12-12 PROCEDURE — 80069 RENAL FUNCTION PANEL: CPT | Performed by: STUDENT IN AN ORGANIZED HEALTH CARE EDUCATION/TRAINING PROGRAM

## 2023-12-12 PROCEDURE — 2500000001 HC RX 250 WO HCPCS SELF ADMINISTERED DRUGS (ALT 637 FOR MEDICARE OP)

## 2023-12-12 PROCEDURE — 86901 BLOOD TYPING SEROLOGIC RH(D): CPT

## 2023-12-12 PROCEDURE — 1200000002 HC GENERAL ROOM WITH TELEMETRY DAILY

## 2023-12-12 PROCEDURE — 2500000004 HC RX 250 GENERAL PHARMACY W/ HCPCS (ALT 636 FOR OP/ED)

## 2023-12-12 PROCEDURE — 2500000005 HC RX 250 GENERAL PHARMACY W/O HCPCS

## 2023-12-12 PROCEDURE — 85025 COMPLETE CBC W/AUTO DIFF WBC: CPT | Performed by: PODIATRIST

## 2023-12-12 PROCEDURE — 99233 SBSQ HOSP IP/OBS HIGH 50: CPT | Performed by: INTERNAL MEDICINE

## 2023-12-12 PROCEDURE — 2500000004 HC RX 250 GENERAL PHARMACY W/ HCPCS (ALT 636 FOR OP/ED): Mod: JZ

## 2023-12-12 PROCEDURE — 36415 COLL VENOUS BLD VENIPUNCTURE: CPT | Performed by: PODIATRIST

## 2023-12-12 PROCEDURE — 99232 SBSQ HOSP IP/OBS MODERATE 35: CPT | Performed by: PODIATRIST

## 2023-12-12 PROCEDURE — 82947 ASSAY GLUCOSE BLOOD QUANT: CPT

## 2023-12-12 PROCEDURE — 2500000004 HC RX 250 GENERAL PHARMACY W/ HCPCS (ALT 636 FOR OP/ED): Mod: JZ | Performed by: NURSE PRACTITIONER

## 2023-12-12 PROCEDURE — 8010000001 HC DIALYSIS - HEMODIALYSIS PER DAY

## 2023-12-12 RX ADMIN — OXYCODONE HYDROCHLORIDE 5 MG: 5 TABLET ORAL at 06:43

## 2023-12-12 RX ADMIN — CLOPIDOGREL BISULFATE 75 MG: 75 TABLET ORAL at 13:16

## 2023-12-12 RX ADMIN — LIDOCAINE 1 PATCH: 4 PATCH TOPICAL at 13:17

## 2023-12-12 RX ADMIN — ACETAMINOPHEN 975 MG: 325 TABLET ORAL at 20:47

## 2023-12-12 RX ADMIN — CARVEDILOL 25 MG: 12.5 TABLET, FILM COATED ORAL at 13:15

## 2023-12-12 RX ADMIN — ATORVASTATIN CALCIUM 80 MG: 80 TABLET, FILM COATED ORAL at 20:47

## 2023-12-12 RX ADMIN — SEVELAMER CARBONATE 800 MG: 800 TABLET, FILM COATED ORAL at 13:27

## 2023-12-12 RX ADMIN — ASPIRIN 81 MG: 81 TABLET, COATED ORAL at 13:16

## 2023-12-12 RX ADMIN — SEVELAMER CARBONATE 800 MG: 800 TABLET, FILM COATED ORAL at 17:58

## 2023-12-12 RX ADMIN — ALTEPLASE 2 MG: 2.2 INJECTION, POWDER, LYOPHILIZED, FOR SOLUTION INTRAVENOUS at 11:17

## 2023-12-12 RX ADMIN — ALTEPLASE 2 MG: 2.2 INJECTION, POWDER, LYOPHILIZED, FOR SOLUTION INTRAVENOUS at 11:18

## 2023-12-12 RX ADMIN — Medication 5 MG: at 20:47

## 2023-12-12 RX ADMIN — TORSEMIDE 100 MG: 20 TABLET ORAL at 13:15

## 2023-12-12 RX ADMIN — ACETAMINOPHEN 975 MG: 325 TABLET ORAL at 04:30

## 2023-12-12 RX ADMIN — CARVEDILOL 25 MG: 12.5 TABLET, FILM COATED ORAL at 20:47

## 2023-12-12 RX ADMIN — GABAPENTIN 100 MG: 100 CAPSULE ORAL at 20:47

## 2023-12-12 RX ADMIN — ACETAMINOPHEN 975 MG: 325 TABLET ORAL at 13:27

## 2023-12-12 RX ADMIN — LIDOCAINE AND PRILOCAINE: 25; 25 CREAM TOPICAL at 13:20

## 2023-12-12 RX ADMIN — INSULIN GLARGINE 25 UNITS: 100 INJECTION, SOLUTION SUBCUTANEOUS at 20:48

## 2023-12-12 RX ADMIN — NEPHROCAP 1 CAPSULE: 1 CAP ORAL at 13:16

## 2023-12-12 RX ADMIN — AMLODIPINE BESYLATE 10 MG: 10 TABLET ORAL at 13:17

## 2023-12-12 RX ADMIN — PANTOPRAZOLE SODIUM 20 MG: 20 TABLET, DELAYED RELEASE ORAL at 13:16

## 2023-12-12 RX ADMIN — SENNOSIDES AND DOCUSATE SODIUM 1 TABLET: 8.6; 5 TABLET ORAL at 20:47

## 2023-12-12 RX ADMIN — CALCITRIOL CAPSULES 0.25 MCG 0.25 MCG: 0.25 CAPSULE ORAL at 13:16

## 2023-12-12 RX ADMIN — HYDROMORPHONE HYDROCHLORIDE 1 MG: 1 INJECTION, SOLUTION INTRAMUSCULAR; INTRAVENOUS; SUBCUTANEOUS at 09:45

## 2023-12-12 ASSESSMENT — COGNITIVE AND FUNCTIONAL STATUS - GENERAL
DRESSING REGULAR UPPER BODY CLOTHING: A LITTLE
DAILY ACTIVITIY SCORE: 19
PERSONAL GROOMING: A LITTLE
HELP NEEDED FOR BATHING: A LITTLE
MOBILITY SCORE: 19
WALKING IN HOSPITAL ROOM: A LITTLE
CLIMB 3 TO 5 STEPS WITH RAILING: A LOT
MOVING TO AND FROM BED TO CHAIR: A LITTLE
TOILETING: A LITTLE
DRESSING REGULAR LOWER BODY CLOTHING: A LITTLE
STANDING UP FROM CHAIR USING ARMS: A LITTLE

## 2023-12-12 ASSESSMENT — PAIN - FUNCTIONAL ASSESSMENT
PAIN_FUNCTIONAL_ASSESSMENT: 0-10
PAIN_FUNCTIONAL_ASSESSMENT: 0-10

## 2023-12-12 ASSESSMENT — PAIN DESCRIPTION - ORIENTATION: ORIENTATION: LEFT

## 2023-12-12 ASSESSMENT — PAIN SCALES - GENERAL
PAINLEVEL_OUTOF10: 4
PAINLEVEL_OUTOF10: 9
PAINLEVEL_OUTOF10: 4

## 2023-12-12 ASSESSMENT — PAIN DESCRIPTION - LOCATION: LOCATION: FOOT

## 2023-12-12 NOTE — NURSING NOTE
Report from Sending RN:    Report From: Puma  Recent Surgery of Procedure: No  Baseline Level of Consciousness (LOC): A and O   Oxygen Use: No  Type:   Diabetic: Yes, BG   Last BP Med Given Day of Dialysis: yesterday  Last Pain Med Given: 0430  Lab Tests to be Obtained with Dialysis: Yes  Blood Transfusion to be Given During Dialysis: No  Available IV Access: Yes  Medications to be Administered During Dialysis: No  Continuous IV Infusion Running: No  Restraints on Currently or in the Last 24 Hours: N/A  Hand-Off Communication: Pt is stable and able to dialysis.

## 2023-12-12 NOTE — PROGRESS NOTES
Physical Therapy                 Therapy Communication Note    Patient Name: Kwadwo Hoyt  MRN: [id number]  Today's Date: 12/12/2023     Discipline: Physical Therapy    Missed Visit Reason: Missed Visit Reason:  (Pt off floor at HD, will re-attempt this afternoon as time permits)    Missed Time: Attempt    Erendira Sandhu PTA  Rehab Office [phone]

## 2023-12-12 NOTE — CARE PLAN
Problem: Fall/Injury  Goal: Not fall by end of shift  Outcome: Progressing  Goal: Be free from injury by end of the shift  Outcome: Progressing  Goal: Verbalize understanding of personal risk factors for fall in the hospital  Outcome: Progressing  Goal: Verbalize understanding of risk factor reduction measures to prevent injury from fall in the home  Outcome: Progressing  Goal: Use assistive devices by end of the shift  Outcome: Progressing  Goal: Pace activities to prevent fatigue by end of the shift  Outcome: Progressing   The patient's goals for the shift include      The clinical goals for the shift include Pt will have no complaint of pain throughout the shift

## 2023-12-12 NOTE — CARE PLAN
The patient's goals for the shift include      The clinical goals for the shift include Pt will have no complaint of pain throughout the shift

## 2023-12-12 NOTE — PROGRESS NOTES
Nephrology Follow-up Note   Patient ID: Kwadwo Hoyt is a 57 y.o. male.   Admitted for :   Chief Complaint   Patient presents with    Leg Pain      Seen and examined during hemodialysis. Labs and events reviewed.      Subjective:   C/o pain to lt foot ; awaiting TMA surgery tomorrow.   No c/o related to HD ; tolerating well - plan for UF 2L     Patient Active Problem List   Diagnosis    Arterial occlusion    Peripheral vascular disease, unspecified (CMS/HCC)    ALEXANDER (obstructive sleep apnea)    ESRD on hemodialysis (CMS/HCC)    SELENA (acute kidney injury) (CMS/[facility])    Acute occlusion of popliteal artery due to thrombosis (CMS/HCC)    Left leg weakness    Critical limb ischemia of left lower extremity (CMS/HCC)    Toe ulcer, left, with unspecified severity (CMS/[facility])       Scheduled medications:  acetaminophen, 975 mg, oral, q8h  alteplase, 2 mg, intra-catheter, Daily  alteplase, 2 mg, intra-catheter, Daily  amLODIPine, 10 mg, oral, Daily  aspirin, 81 mg, oral, Daily  atorvastatin, 80 mg, oral, Nightly  B complex-vitamin C-folic acid, 1 capsule, oral, Daily  calcitriol, 0.25 mcg, oral, Daily  carvedilol, 25 mg, oral, BID  clopidogrel, 75 mg, oral, Daily  darbepoetin abhishek, 0.45 mcg/kg, subcutaneous, Weekly  fluticasone, 2 spray, Each Nostril, Daily  gabapentin, 100 mg, oral, q PM  insulin glargine, 25 Units, subcutaneous, Nightly  insulin lispro, 0-5 Units, subcutaneous, TID with meals  insulin lispro, 7 Units, subcutaneous, TID with meals  lidocaine, 1 patch, transdermal, Daily  lidocaine-prilocaine, , Topical, Daily  melatonin, 5 mg, oral, Nightly  pantoprazole, 20 mg, oral, Daily before breakfast  polyethylene glycol, 17 g, oral, BID  sennosides-docusate sodium, 1 tablet, oral, Nightly  sevelamer carbonate, 800 mg, oral, TID with meals  torsemide, 100 mg, oral, Daily         PRN medications: dextrose 10 % in water (D10W), dextrose, eucerin, glucagon, hydrALAZINE, HYDROmorphone, hydrOXYzine HCL, oxyCODONE     Heart  Rate:  [71-79]   Temp:  [36.1 °C (97 °F)-36.9 °C (98.4 °F)]   Resp:  [17-20]   BP: (147-175)/(72-98)   SpO2:  [95 %-100 %]    Weight: 129 kg (285 lb)   Gen: alert, NAD  HEENT: NC/AT  Neck: supple, no JVD   Pulm: clear ant b/l   CVS: RRR, no rub  Abd: S/NT/ND  LE: no edema , no cyanosis   Neuro: no asterixis   Dialysis acces:       Lab Results   Component Value Date    WBC 8.3 12/12/2023    HGB 8.6 (L) 12/12/2023    HCT 27.5 (L) 12/12/2023    MCV 91 12/12/2023     12/12/2023     Lab Results   Component Value Date    GLUCOSE 113 (H) 12/12/2023    CALCIUM 9.1 12/12/2023     12/12/2023    K 4.8 12/12/2023    CO2 25 12/12/2023     12/12/2023    BUN 70 (H) 12/12/2023    CREATININE 8.80 (H) 12/12/2023     Results from last 72 hours   Lab Units 12/12/23  0715   ALBUMIN g/dL 3.3*   GLUCOSE mg/dL 113*   HEMOGLOBIN g/dL 8.6*   WBC AUTO x10*3/uL 8.3      Results from last 72 hours   Lab Units 12/12/23  0715   SODIUM mmol/L 137   POTASSIUM mmol/L 4.8   CO2 mmol/L 25   BUN mg/dL 70*   CREATININE mg/dL 8.80*   PHOSPHORUS mg/dL 5.5*   CALCIUM mg/dL 9.1        Assessment   CKD 5 presumed related to DN (DM2 since age 41) now progressed to ESRD who was followed by Dr Draper admitted with gangreen of left foot and scheduled for TMA tomorrow     Arrangements for o/p dialysis have been made at [facility] at 5AM    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 800 mg TID AC  Anemia of CKD: on Aranesp weekly   Access:  poor  ml/min today --> tpa dwell post HD  BP: acceptable during treatment   Renal Diet   Daily renal MVI   Continue 3 x per week hemodialysis  Pt to be followed inpatient by the dialysis service starting tomorrow 12/13.     Petra Stuart MD MPH

## 2023-12-12 NOTE — PROGRESS NOTES
"Kwadwo Hoyt is a 57 y.o. male on day 17 of admission presenting with Arterial occlusion.    Subjective   Patient doing well. Denies any concerns. States he is having a good day. Anticipating L TMA surgery tomorrow by podiatry. Denies fever, chills, nausea and vomiting.        Objective     Physical Exam  Constitutional:       Appearance: Normal appearance.      Comments: Resting in bed in dialysis center.    HENT:      Head: Normocephalic.   Cardiovascular:      Rate and Rhythm: Normal rate and regular rhythm.      Heart sounds: Normal heart sounds.   Pulmonary:      Effort: Pulmonary effort is normal.      Breath sounds: Normal breath sounds.   Abdominal:      General: Bowel sounds are normal.      Palpations: Abdomen is soft.   Skin:     General: Skin is warm.   Neurological:      General: No focal deficit present.      Mental Status: He is alert.   Psychiatric:         Mood and Affect: Mood normal.         Last Recorded Vitals  Blood pressure (!) 186/118, pulse 82, temperature 36.5 °C (97.7 °F), temperature source Skin, resp. rate 20, height 1.854 m (6' 1\"), weight 126 kg (278 lb 7.1 oz), SpO2 99 %.  Intake/Output last 3 Shifts:  I/O last 3 completed shifts:  In: - (0 mL/kg)   Out: 1000 (7.9 mL/kg) [Urine:1000 (0.2 mL/kg/hr)]  Weight: 126.3 kg     Relevant Results  Scheduled medications  acetaminophen, 975 mg, oral, q8h  alteplase, 2 mg, intra-catheter, Daily  alteplase, 2 mg, intra-catheter, Daily  amLODIPine, 10 mg, oral, Daily  aspirin, 81 mg, oral, Daily  atorvastatin, 80 mg, oral, Nightly  B complex-vitamin C-folic acid, 1 capsule, oral, Daily  calcitriol, 0.25 mcg, oral, Daily  carvedilol, 25 mg, oral, BID  clopidogrel, 75 mg, oral, Daily  darbepoetin abhishek, 0.45 mcg/kg, subcutaneous, Weekly  fluticasone, 2 spray, Each Nostril, Daily  gabapentin, 100 mg, oral, q PM  insulin glargine, 25 Units, subcutaneous, Nightly  insulin lispro, 0-5 Units, subcutaneous, TID with meals  insulin lispro, 7 Units, " subcutaneous, TID with meals  lidocaine, 1 patch, transdermal, Daily  lidocaine-prilocaine, , Topical, Daily  melatonin, 5 mg, oral, Nightly  pantoprazole, 20 mg, oral, Daily before breakfast  polyethylene glycol, 17 g, oral, BID  sennosides-docusate sodium, 1 tablet, oral, Nightly  sevelamer carbonate, 800 mg, oral, TID with meals  torsemide, 100 mg, oral, Daily      Continuous medications     PRN medications  PRN medications: dextrose 10 % in water (D10W), dextrose, eucerin, glucagon, hydrALAZINE, HYDROmorphone, hydrOXYzine HCL, oxyCODONE '      Results for orders placed or performed during the hospital encounter of 11/24/23 (from the past 24 hour(s))   POCT GLUCOSE   Result Value Ref Range    POCT Glucose 137 (H) 74 - 99 mg/dL   Renal function panel   Result Value Ref Range    Glucose 113 (H) 74 - 99 mg/dL    Sodium 137 136 - 145 mmol/L    Potassium 4.8 3.5 - 5.3 mmol/L    Chloride 100 98 - 107 mmol/L    Bicarbonate 25 21 - 32 mmol/L    Anion Gap 17 10 - 20 mmol/L    Urea Nitrogen 70 (H) 6 - 23 mg/dL    Creatinine 8.80 (H) 0.50 - 1.30 mg/dL    eGFR 6 (L) >60 mL/min/1.73m*2    Calcium 9.1 8.6 - 10.6 mg/dL    Phosphorus 5.5 (H) 2.5 - 4.9 mg/dL    Albumin 3.3 (L) 3.4 - 5.0 g/dL   Magnesium   Result Value Ref Range    Magnesium 2.10 1.60 - 2.40 mg/dL   CBC and Auto Differential   Result Value Ref Range    WBC 8.3 4.4 - 11.3 x10*3/uL    nRBC 0.0 0.0 - 0.0 /100 WBCs    RBC 3.02 (L) 4.50 - 5.90 x10*6/uL    Hemoglobin 8.6 (L) 13.5 - 17.5 g/dL    Hematocrit 27.5 (L) 41.0 - 52.0 %    MCV 91 80 - 100 fL    MCH 28.5 26.0 - 34.0 pg    MCHC 31.3 (L) 32.0 - 36.0 g/dL    RDW 13.1 11.5 - 14.5 %    Platelets 345 150 - 450 x10*3/uL    Neutrophils % 59.1 40.0 - 80.0 %    Immature Granulocytes %, Automated 0.5 0.0 - 0.9 %    Lymphocytes % 22.1 13.0 - 44.0 %    Monocytes % 13.3 2.0 - 10.0 %    Eosinophils % 4.2 0.0 - 6.0 %    Basophils % 0.8 0.0 - 2.0 %    Neutrophils Absolute 4.87 1.20 - 7.70 x10*3/uL    Immature Granulocytes  Absolute, Automated 0.04 0.00 - 0.70 x10*3/uL    Lymphocytes Absolute 1.82 1.20 - 4.80 x10*3/uL    Monocytes Absolute 1.10 (H) 0.10 - 1.00 x10*3/uL    Eosinophils Absolute 0.35 0.00 - 0.70 x10*3/uL    Basophils Absolute 0.07 0.00 - 0.10 x10*3/uL   POCT GLUCOSE   Result Value Ref Range    POCT Glucose 105 (H) 74 - 99 mg/dL   POCT GLUCOSE   Result Value Ref Range    POCT Glucose 97 74 - 99 mg/dL                   Assessment/Plan   Principal Problem:    Arterial occlusion  Active Problems:    ALEXANDER (obstructive sleep apnea)    ESRD on hemodialysis (CMS/[facility])    SELENA (acute kidney injury) (CMS/[facility])    Peripheral vascular disease, unspecified (CMS/[facility])    Acute occlusion of popliteal artery due to thrombosis (CMS/[facility])    Left leg weakness    Critical limb ischemia of left lower extremity (CMS/[facility])    Toe ulcer, left, with unspecified severity (CMS/[facility])      Kwadwo Hoyt is a 56 y/o male with a PMHx of DM c/b neuropathy, HTN, CKD stage 5, HFrEF (EF 50% in 11/2021), & ALEXANDER, who presented to the ER with left leg and arm weakness. LLE distal pulses were not palpable and CTA aorta & iliofemoral runoff showed complete left popliteal occlusion. Heparin gtt was started and vascular medicine were consulted but recommended no surgical intervention, recommend PVR/ZAC first. Neuro were also consulted for concern of stroke given patients left sided weakness, rec outpatient follow up. PVR/ZAC showed severe disease at left femoral/popliteal level. 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 Nephrology consulted for worsening Cr and electrolyte status and started dialysis. Tunneled catheter placed by IR 11/30. Pt to c/w dialysis and underwent LLE angiogram with Vascular Surgery on 12/1, completed for peripheral angioplasty with Vascular Surgery 12/6. L TMA with Podiatry tomorrow 12/13.     L Popliteal Artery Thrombosis  Chronic Limb Ischemia  Left Hallux Dry Gangrene  c/f Stroke  - Neurology consulted, stroke follow up in 4-6 weeks,  discharge with ziopatch / loop recorder  - CTA aorta & B/L iliofemoral runoff significant for complete occlusion of L popliteal artery as well as anterior and posterior tibial arteries.   - LLE Angiogram w/ VascSurg 12/1.  Underwent Peripheral Angioplasty (thrombectomy and stent placement) on 12/6, Vasc Surg signed off  - Podiatry consulted for dry gangrene of Left Hallux, Plan for TMA. Maintain Hgb above 7.5, repeat type and screen. Plan for L TMA 12/13 at 8:15 am. NPO at midnight.   - Vasc Med consulted, rec c/w ASA-81 and Plavix 75 daily  - Vasc Med to evaluate source of thrombosis: TTE 12/7 had negative bubble study; hypercoagulable workup WNL, CTA Chest 12/8 for evaluation of aortic arch- mild atherosclerotic disease; no evidence intracavitary thrombi      --per Vasc Med no anticoagulation unless workup reveals a thrombosis  - Continue with ASA 81   - Continue with atorvastatin 80 mg     ESRD 2/2 T2DM on HD T/Th/Sat HD   - Admission Cr 6.06, GFR 10 (Baseline Cr 5.5~)  - Being evaluated at [facility] for kidney transplant w/ most recent office visit on 9/12/2023  - home Torsemide 100 mg, calcitriol 0.25 mcg QD  - Avoid nephrotoxic agents  - Nephrology consulted due to worsening Cr and Electrolyte status  -- Received HD line 11/28, tunneled line placed 11/30 by IR  - SW contacted to coordinate outpatient dialysis (needs 4hr chair time at [facility] under Dr. Draper)  - Continue with Sevelamer carbonate 800 TID w/ meals, daily renal MVI, EPO w/ HD     Anemia  - Likely anemia of chronic disease 2/2 ESRD  - Received 2 units pRBC 12/8 for Hb 6.9   - Hb today 8.6     HTN  HFpEF  - Continue on home Aspirin 81 mg  - Continue on home Carvedilol 25 mg BID  - Continue on home Amlodipine 10 mg QD  - Echo 11/25, EF 60-65% w/ pseudonormal pattern of LV diastolic filling, increased LA LV diastolic pressure, severe concentric LVH  - Repeat TTE on 12/7 w/ EF on 60-65% w/ severe concentric LVH, negative bubble study      T2DM  Neuropathy  -  Last Hgb A1C 11.0 on 5/24/2023, ordered updated Hgb A1C  - Home Regimen: Lantus 40U at bedtime & Lispro 20U TID w/ meals  - Current Regimen: Lantus 25 units at bedtime and Lispro 7u TID w/ meals  - Mild SSI  - c/w home Gabapentin 300 mg BID  - POCT BG TID & at bedtime  - Hypoglycemia protocol     Anxiety  - PRN Atarax 25 mg      ALEXANDER  - CPAP at home, noncompliant     Dispo  - Pending PT/OT post-op       F: PRN  E: PRN  N: Renal diet   GI: Pantoprazole 20 mg QD  DVT: none (on DAPT per Vasc Med)  Code Status: Full Code   Emergency Contact: Milka Hoyt  Home Phone: [phone]      Patient discussed with Dr. Breen.      Beverly Soto MD   PGY1, Family Medicine   [facility]  Available by CUPS

## 2023-12-12 NOTE — NURSING NOTE
"Nurse found five unidentified tablets on Pt tray table in a medication cup. Nurse asked Pt what they were. Pt said they were \"[employer].\" Nurse explained the policy that Pt cannot take home meds without MD and pharmacy verification.  Nurse took meds and noticed that they had THC printed on tablet. Nurse notified unit manager and supervisor. Nurse manager verified with [facility] and campus police came and did a room search. No more tablets were found. [place] police took drug pharaphernalia with them.   "

## 2023-12-12 NOTE — NURSING NOTE
Pt remained safe and stable. VS remained stable. Pt had no significant event during the shift. Pt had HD today. Pt will be NPO @ midnight for surgery tomorrow. Will continue to monitor the patient.  Birdie Whitney RN

## 2023-12-13 ENCOUNTER — ANESTHESIA (OUTPATIENT)
Dept: OPERATING ROOM | Facility: HOSPITAL | Age: 58
DRG: 270 | End: 2023-12-13
Payer: MEDICARE

## 2023-12-13 ENCOUNTER — APPOINTMENT (OUTPATIENT)
Dept: RADIOLOGY | Facility: HOSPITAL | Age: 58
DRG: 270 | End: 2023-12-13
Payer: MEDICARE

## 2023-12-13 ENCOUNTER — ANESTHESIA EVENT (OUTPATIENT)
Dept: OPERATING ROOM | Facility: HOSPITAL | Age: 58
DRG: 270 | End: 2023-12-13
Payer: MEDICARE

## 2023-12-13 LAB
ANION GAP SERPL CALC-SCNC: 16 MMOL/L (ref 10–20)
BASOPHILS # BLD AUTO: 0.06 X10*3/UL (ref 0–0.1)
BASOPHILS NFR BLD AUTO: 0.8 %
BUN SERPL-MCNC: 53 MG/DL (ref 6–23)
CALCIUM SERPL-MCNC: 9.2 MG/DL (ref 8.6–10.6)
CHLORIDE SERPL-SCNC: 98 MMOL/L (ref 98–107)
CO2 SERPL-SCNC: 28 MMOL/L (ref 21–32)
CREAT SERPL-MCNC: 6.95 MG/DL (ref 0.5–1.3)
EOSINOPHIL # BLD AUTO: 0.32 X10*3/UL (ref 0–0.7)
EOSINOPHIL NFR BLD AUTO: 4.2 %
ERYTHROCYTE [DISTWIDTH] IN BLOOD BY AUTOMATED COUNT: 13.1 % (ref 11.5–14.5)
GFR SERPL CREATININE-BSD FRML MDRD: 9 ML/MIN/1.73M*2
GLUCOSE BLD MANUAL STRIP-MCNC: 103 MG/DL (ref 74–99)
GLUCOSE BLD MANUAL STRIP-MCNC: 104 MG/DL (ref 74–99)
GLUCOSE BLD MANUAL STRIP-MCNC: 118 MG/DL (ref 74–99)
GLUCOSE BLD MANUAL STRIP-MCNC: 122 MG/DL (ref 74–99)
GLUCOSE BLD MANUAL STRIP-MCNC: 148 MG/DL (ref 74–99)
GLUCOSE SERPL-MCNC: 89 MG/DL (ref 74–99)
HCT VFR BLD AUTO: 30 % (ref 41–52)
HGB BLD-MCNC: 9.3 G/DL (ref 13.5–17.5)
HOLD SPECIMEN: NORMAL
HOLD SPECIMEN: NORMAL
IMM GRANULOCYTES # BLD AUTO: 0.03 X10*3/UL (ref 0–0.7)
IMM GRANULOCYTES NFR BLD AUTO: 0.4 % (ref 0–0.9)
LYMPHOCYTES # BLD AUTO: 1.83 X10*3/UL (ref 1.2–4.8)
LYMPHOCYTES NFR BLD AUTO: 24 %
MAGNESIUM SERPL-MCNC: 2.1 MG/DL (ref 1.6–2.4)
MCH RBC QN AUTO: 28.4 PG (ref 26–34)
MCHC RBC AUTO-ENTMCNC: 31 G/DL (ref 32–36)
MCV RBC AUTO: 92 FL (ref 80–100)
MONOCYTES # BLD AUTO: 0.98 X10*3/UL (ref 0.1–1)
MONOCYTES NFR BLD AUTO: 12.8 %
NEUTROPHILS # BLD AUTO: 4.41 X10*3/UL (ref 1.2–7.7)
NEUTROPHILS NFR BLD AUTO: 57.8 %
NRBC BLD-RTO: 0 /100 WBCS (ref 0–0)
PLATELET # BLD AUTO: 381 X10*3/UL (ref 150–450)
POTASSIUM SERPL-SCNC: 5.3 MMOL/L (ref 3.5–5.3)
RBC # BLD AUTO: 3.27 X10*6/UL (ref 4.5–5.9)
SODIUM SERPL-SCNC: 137 MMOL/L (ref 136–145)
WBC # BLD AUTO: 7.6 X10*3/UL (ref 4.4–11.3)

## 2023-12-13 PROCEDURE — 2500000001 HC RX 250 WO HCPCS SELF ADMINISTERED DRUGS (ALT 637 FOR MEDICARE OP)

## 2023-12-13 PROCEDURE — 3600000003 HC OR TIME - INITIAL BASE CHARGE - PROCEDURE LEVEL THREE: Performed by: PODIATRIST

## 2023-12-13 PROCEDURE — A28805 PR AMPUTATION FOOT,TRANSMETATARSAL

## 2023-12-13 PROCEDURE — A28805 PR AMPUTATION FOOT,TRANSMETATARSAL: Performed by: ANESTHESIOLOGY

## 2023-12-13 PROCEDURE — 7100000002 HC RECOVERY ROOM TIME - EACH INCREMENTAL 1 MINUTE: Performed by: PODIATRIST

## 2023-12-13 PROCEDURE — 2500000004 HC RX 250 GENERAL PHARMACY W/ HCPCS (ALT 636 FOR OP/ED): Performed by: NURSE PRACTITIONER

## 2023-12-13 PROCEDURE — 36415 COLL VENOUS BLD VENIPUNCTURE: CPT | Performed by: PODIATRIST

## 2023-12-13 PROCEDURE — 2500000001 HC RX 250 WO HCPCS SELF ADMINISTERED DRUGS (ALT 637 FOR MEDICARE OP): Performed by: NURSE PRACTITIONER

## 2023-12-13 PROCEDURE — 83735 ASSAY OF MAGNESIUM: CPT | Performed by: PODIATRIST

## 2023-12-13 PROCEDURE — 80048 BASIC METABOLIC PNL TOTAL CA: CPT | Performed by: PODIATRIST

## 2023-12-13 PROCEDURE — 88311 DECALCIFY TISSUE: CPT | Performed by: PATHOLOGY

## 2023-12-13 PROCEDURE — 3700000002 HC GENERAL ANESTHESIA TIME - EACH INCREMENTAL 1 MINUTE: Performed by: PODIATRIST

## 2023-12-13 PROCEDURE — 99233 SBSQ HOSP IP/OBS HIGH 50: CPT | Performed by: PODIATRIST

## 2023-12-13 PROCEDURE — 1200000002 HC GENERAL ROOM WITH TELEMETRY DAILY

## 2023-12-13 PROCEDURE — 82947 ASSAY GLUCOSE BLOOD QUANT: CPT

## 2023-12-13 PROCEDURE — 2500000005 HC RX 250 GENERAL PHARMACY W/O HCPCS

## 2023-12-13 PROCEDURE — 2500000004 HC RX 250 GENERAL PHARMACY W/ HCPCS (ALT 636 FOR OP/ED)

## 2023-12-13 PROCEDURE — 73630 X-RAY EXAM OF FOOT: CPT | Mod: LT

## 2023-12-13 PROCEDURE — 2500000004 HC RX 250 GENERAL PHARMACY W/ HCPCS (ALT 636 FOR OP/ED): Performed by: ANESTHESIOLOGY

## 2023-12-13 PROCEDURE — 94760 N-INVAS EAR/PLS OXIMETRY 1: CPT

## 2023-12-13 PROCEDURE — 96372 THER/PROPH/DIAG INJ SC/IM: CPT | Performed by: NURSE PRACTITIONER

## 2023-12-13 PROCEDURE — 2500000004 HC RX 250 GENERAL PHARMACY W/ HCPCS (ALT 636 FOR OP/ED): Performed by: PODIATRIST

## 2023-12-13 PROCEDURE — 3600000008 HC OR TIME - EACH INCREMENTAL 1 MINUTE - PROCEDURE LEVEL THREE: Performed by: PODIATRIST

## 2023-12-13 PROCEDURE — 2500000005 HC RX 250 GENERAL PHARMACY W/O HCPCS: Performed by: PODIATRIST

## 2023-12-13 PROCEDURE — 87070 CULTURE OTHR SPECIMN AEROBIC: CPT | Performed by: PODIATRIST

## 2023-12-13 PROCEDURE — 3700000001 HC GENERAL ANESTHESIA TIME - INITIAL BASE CHARGE: Performed by: PODIATRIST

## 2023-12-13 PROCEDURE — 88304 TISSUE EXAM BY PATHOLOGIST: CPT | Performed by: PATHOLOGY

## 2023-12-13 PROCEDURE — 88304 TISSUE EXAM BY PATHOLOGIST: CPT | Mod: TC,SUR | Performed by: PODIATRIST

## 2023-12-13 PROCEDURE — A4217 STERILE WATER/SALINE, 500 ML: HCPCS | Performed by: PODIATRIST

## 2023-12-13 PROCEDURE — 2500000004 HC RX 250 GENERAL PHARMACY W/ HCPCS (ALT 636 FOR OP/ED): Mod: JZ | Performed by: NURSE PRACTITIONER

## 2023-12-13 PROCEDURE — 87075 CULTR BACTERIA EXCEPT BLOOD: CPT | Performed by: PODIATRIST

## 2023-12-13 PROCEDURE — 7100000001 HC RECOVERY ROOM TIME - INITIAL BASE CHARGE: Performed by: PODIATRIST

## 2023-12-13 PROCEDURE — 73630 X-RAY EXAM OF FOOT: CPT | Mod: LEFT SIDE | Performed by: RADIOLOGY

## 2023-12-13 PROCEDURE — 2720000007 HC OR 272 NO HCPCS: Performed by: PODIATRIST

## 2023-12-13 PROCEDURE — 87102 FUNGUS ISOLATION CULTURE: CPT | Performed by: PODIATRIST

## 2023-12-13 PROCEDURE — 85025 COMPLETE CBC W/AUTO DIFF WBC: CPT | Performed by: PODIATRIST

## 2023-12-13 RX ORDER — SODIUM CHLORIDE 0.9 G/100ML
IRRIGANT IRRIGATION AS NEEDED
Status: DISCONTINUED | OUTPATIENT
Start: 2023-12-13 | End: 2023-12-13 | Stop reason: HOSPADM

## 2023-12-13 RX ORDER — PHENYLEPHRINE HCL IN 0.9% NACL 0.4MG/10ML
SYRINGE (ML) INTRAVENOUS AS NEEDED
Status: DISCONTINUED | OUTPATIENT
Start: 2023-12-13 | End: 2023-12-13

## 2023-12-13 RX ORDER — HYDROMORPHONE HYDROCHLORIDE 1 MG/ML
0.2 INJECTION, SOLUTION INTRAMUSCULAR; INTRAVENOUS; SUBCUTANEOUS EVERY 5 MIN PRN
Status: DISCONTINUED | OUTPATIENT
Start: 2023-12-13 | End: 2023-12-13 | Stop reason: HOSPADM

## 2023-12-13 RX ORDER — HYDROMORPHONE HYDROCHLORIDE 1 MG/ML
0.5 INJECTION, SOLUTION INTRAMUSCULAR; INTRAVENOUS; SUBCUTANEOUS EVERY 5 MIN PRN
Status: DISCONTINUED | OUTPATIENT
Start: 2023-12-13 | End: 2023-12-13 | Stop reason: HOSPADM

## 2023-12-13 RX ORDER — LIDOCAINE HYDROCHLORIDE 10 MG/ML
0.1 INJECTION INFILTRATION; PERINEURAL ONCE
Status: DISCONTINUED | OUTPATIENT
Start: 2023-12-13 | End: 2023-12-13 | Stop reason: HOSPADM

## 2023-12-13 RX ORDER — LIDOCAINE HCL/PF 100 MG/5ML
SYRINGE (ML) INTRAVENOUS AS NEEDED
Status: DISCONTINUED | OUTPATIENT
Start: 2023-12-13 | End: 2023-12-13

## 2023-12-13 RX ORDER — PROPOFOL 10 MG/ML
INJECTION, EMULSION INTRAVENOUS AS NEEDED
Status: DISCONTINUED | OUTPATIENT
Start: 2023-12-13 | End: 2023-12-13

## 2023-12-13 RX ORDER — ONDANSETRON HYDROCHLORIDE 2 MG/ML
INJECTION, SOLUTION INTRAVENOUS AS NEEDED
Status: DISCONTINUED | OUTPATIENT
Start: 2023-12-13 | End: 2023-12-13

## 2023-12-13 RX ORDER — ACETAMINOPHEN 325 MG/1
650 TABLET ORAL EVERY 4 HOURS PRN
Status: DISCONTINUED | OUTPATIENT
Start: 2023-12-13 | End: 2023-12-13 | Stop reason: HOSPADM

## 2023-12-13 RX ORDER — ONDANSETRON HYDROCHLORIDE 2 MG/ML
4 INJECTION, SOLUTION INTRAVENOUS ONCE AS NEEDED
Status: DISCONTINUED | OUTPATIENT
Start: 2023-12-13 | End: 2023-12-13 | Stop reason: HOSPADM

## 2023-12-13 RX ORDER — CEFAZOLIN 1 G/1
INJECTION, POWDER, FOR SOLUTION INTRAVENOUS AS NEEDED
Status: DISCONTINUED | OUTPATIENT
Start: 2023-12-13 | End: 2023-12-13

## 2023-12-13 RX ORDER — FENTANYL CITRATE 50 UG/ML
INJECTION, SOLUTION INTRAMUSCULAR; INTRAVENOUS AS NEEDED
Status: DISCONTINUED | OUTPATIENT
Start: 2023-12-13 | End: 2023-12-13

## 2023-12-13 RX ORDER — OXYCODONE HYDROCHLORIDE 5 MG/1
10 TABLET ORAL EVERY 4 HOURS PRN
Status: DISCONTINUED | OUTPATIENT
Start: 2023-12-13 | End: 2023-12-13 | Stop reason: HOSPADM

## 2023-12-13 RX ADMIN — OXYCODONE HYDROCHLORIDE 5 MG: 5 TABLET ORAL at 23:54

## 2023-12-13 RX ADMIN — NEPHROCAP 1 CAPSULE: 1 CAP ORAL at 14:21

## 2023-12-13 RX ADMIN — DARBEPOETIN ALFA 60 MCG: 60 SOLUTION INTRAVENOUS; SUBCUTANEOUS at 15:41

## 2023-12-13 RX ADMIN — ONDANSETRON 4 MG: 2 INJECTION INTRAMUSCULAR; INTRAVENOUS at 09:50

## 2023-12-13 RX ADMIN — CLOPIDOGREL BISULFATE 75 MG: 75 TABLET ORAL at 14:24

## 2023-12-13 RX ADMIN — Medication 80 MCG: at 09:08

## 2023-12-13 RX ADMIN — HYDROMORPHONE HYDROCHLORIDE 0.2 MG: 1 INJECTION, SOLUTION INTRAMUSCULAR; INTRAVENOUS; SUBCUTANEOUS at 11:00

## 2023-12-13 RX ADMIN — SEVELAMER CARBONATE 800 MG: 800 TABLET, FILM COATED ORAL at 17:55

## 2023-12-13 RX ADMIN — Medication 80 MCG: at 09:25

## 2023-12-13 RX ADMIN — Medication 80 MCG: at 09:18

## 2023-12-13 RX ADMIN — LIDOCAINE HYDROCHLORIDE 100 MG: 20 INJECTION INTRAVENOUS at 08:22

## 2023-12-13 RX ADMIN — Medication 80 MCG: at 09:50

## 2023-12-13 RX ADMIN — ATORVASTATIN CALCIUM 80 MG: 80 TABLET, FILM COATED ORAL at 20:40

## 2023-12-13 RX ADMIN — PROPOFOL 200 MG: 10 INJECTION, EMULSION INTRAVENOUS at 08:22

## 2023-12-13 RX ADMIN — FENTANYL CITRATE 50 MCG: 50 INJECTION, SOLUTION INTRAMUSCULAR; INTRAVENOUS at 08:22

## 2023-12-13 RX ADMIN — FENTANYL CITRATE 25 MCG: 50 INJECTION, SOLUTION INTRAMUSCULAR; INTRAVENOUS at 08:52

## 2023-12-13 RX ADMIN — Medication 80 MCG: at 10:08

## 2023-12-13 RX ADMIN — CARVEDILOL 25 MG: 12.5 TABLET, FILM COATED ORAL at 14:22

## 2023-12-13 RX ADMIN — CARVEDILOL 25 MG: 12.5 TABLET, FILM COATED ORAL at 20:40

## 2023-12-13 RX ADMIN — ACETAMINOPHEN 975 MG: 325 TABLET ORAL at 14:25

## 2023-12-13 RX ADMIN — SODIUM CHLORIDE: 9 INJECTION, SOLUTION INTRAVENOUS at 08:15

## 2023-12-13 RX ADMIN — AMLODIPINE BESYLATE 10 MG: 10 TABLET ORAL at 14:23

## 2023-12-13 RX ADMIN — GABAPENTIN 100 MG: 100 CAPSULE ORAL at 20:41

## 2023-12-13 RX ADMIN — Medication 80 MCG: at 09:56

## 2023-12-13 RX ADMIN — Medication 80 MCG: at 09:43

## 2023-12-13 RX ADMIN — SENNOSIDES AND DOCUSATE SODIUM 1 TABLET: 8.6; 5 TABLET ORAL at 20:41

## 2023-12-13 RX ADMIN — CALCITRIOL CAPSULES 0.25 MCG 0.25 MCG: 0.25 CAPSULE ORAL at 14:25

## 2023-12-13 RX ADMIN — CEFAZOLIN 3 G: 330 INJECTION, POWDER, FOR SOLUTION INTRAMUSCULAR; INTRAVENOUS at 08:30

## 2023-12-13 RX ADMIN — Medication 80 MCG: at 09:38

## 2023-12-13 RX ADMIN — HYDROMORPHONE HYDROCHLORIDE 1 MG: 1 INJECTION, SOLUTION INTRAMUSCULAR; INTRAVENOUS; SUBCUTANEOUS at 20:41

## 2023-12-13 RX ADMIN — OXYCODONE HYDROCHLORIDE 5 MG: 5 TABLET ORAL at 17:56

## 2023-12-13 RX ADMIN — ASPIRIN 81 MG: 81 TABLET, COATED ORAL at 14:22

## 2023-12-13 RX ADMIN — HYDROXYZINE HYDROCHLORIDE 25 MG: 25 TABLET, FILM COATED ORAL at 20:41

## 2023-12-13 RX ADMIN — ACETAMINOPHEN 975 MG: 325 TABLET ORAL at 04:30

## 2023-12-13 RX ADMIN — ACETAMINOPHEN 975 MG: 325 TABLET ORAL at 20:40

## 2023-12-13 RX ADMIN — INSULIN GLARGINE 25 UNITS: 100 INJECTION, SOLUTION SUBCUTANEOUS at 20:43

## 2023-12-13 RX ADMIN — HYDROMORPHONE HYDROCHLORIDE 0.2 MG: 1 INJECTION, SOLUTION INTRAMUSCULAR; INTRAVENOUS; SUBCUTANEOUS at 09:31

## 2023-12-13 RX ADMIN — POLYETHYLENE GLYCOL 3350 17 G: 17 POWDER, FOR SOLUTION ORAL at 20:40

## 2023-12-13 RX ADMIN — Medication 80 MCG: at 09:31

## 2023-12-13 RX ADMIN — LIDOCAINE AND PRILOCAINE: 25; 25 CREAM TOPICAL at 14:26

## 2023-12-13 RX ADMIN — HYDROMORPHONE HYDROCHLORIDE 0.5 MG: 1 INJECTION, SOLUTION INTRAMUSCULAR; INTRAVENOUS; SUBCUTANEOUS at 10:45

## 2023-12-13 RX ADMIN — FENTANYL CITRATE 25 MCG: 50 INJECTION, SOLUTION INTRAMUSCULAR; INTRAVENOUS at 08:35

## 2023-12-13 RX ADMIN — Medication 5 MG: at 20:41

## 2023-12-13 RX ADMIN — TORSEMIDE 100 MG: 20 TABLET ORAL at 14:23

## 2023-12-13 RX ADMIN — SEVELAMER CARBONATE 800 MG: 800 TABLET, FILM COATED ORAL at 14:23

## 2023-12-13 RX ADMIN — PANTOPRAZOLE SODIUM 20 MG: 20 TABLET, DELAYED RELEASE ORAL at 14:24

## 2023-12-13 RX ADMIN — HYDROMORPHONE HYDROCHLORIDE 0.2 MG: 1 INJECTION, SOLUTION INTRAMUSCULAR; INTRAVENOUS; SUBCUTANEOUS at 09:14

## 2023-12-13 ASSESSMENT — PAIN SCALES - GENERAL
PAINLEVEL_OUTOF10: 3
PAINLEVEL_OUTOF10: 3
PAINLEVEL_OUTOF10: 0 - NO PAIN
PAINLEVEL_OUTOF10: 9
PAINLEVEL_OUTOF10: 7
PAINLEVEL_OUTOF10: 0 - NO PAIN
PAINLEVEL_OUTOF10: 3
PAINLEVEL_OUTOF10: 4
PAINLEVEL_OUTOF10: 7
PAINLEVEL_OUTOF10: 0 - NO PAIN
PAINLEVEL_OUTOF10: 5 - MODERATE PAIN
PAINLEVEL_OUTOF10: 5 - MODERATE PAIN
PAINLEVEL_OUTOF10: 7

## 2023-12-13 ASSESSMENT — PAIN DESCRIPTION - LOCATION
LOCATION: FOOT
LOCATION: FOOT

## 2023-12-13 ASSESSMENT — PAIN - FUNCTIONAL ASSESSMENT
PAIN_FUNCTIONAL_ASSESSMENT: 0-10

## 2023-12-13 ASSESSMENT — COGNITIVE AND FUNCTIONAL STATUS - GENERAL
MOVING FROM LYING ON BACK TO SITTING ON SIDE OF FLAT BED WITH BEDRAILS: A LITTLE
EATING MEALS: A LITTLE
PERSONAL GROOMING: A LITTLE
CLIMB 3 TO 5 STEPS WITH RAILING: A LOT
DRESSING REGULAR LOWER BODY CLOTHING: A LITTLE
MOBILITY SCORE: 16
TOILETING: A LITTLE
DRESSING REGULAR UPPER BODY CLOTHING: A LITTLE
MOVING TO AND FROM BED TO CHAIR: A LITTLE
DAILY ACTIVITIY SCORE: 18
WALKING IN HOSPITAL ROOM: A LOT
STANDING UP FROM CHAIR USING ARMS: A LITTLE
HELP NEEDED FOR BATHING: A LITTLE
TURNING FROM BACK TO SIDE WHILE IN FLAT BAD: A LITTLE

## 2023-12-13 ASSESSMENT — COLUMBIA-SUICIDE SEVERITY RATING SCALE - C-SSRS
2. HAVE YOU ACTUALLY HAD ANY THOUGHTS OF KILLING YOURSELF?: NO
1. IN THE PAST MONTH, HAVE YOU WISHED YOU WERE DEAD OR WISHED YOU COULD GO TO SLEEP AND NOT WAKE UP?: NO
6. HAVE YOU EVER DONE ANYTHING, STARTED TO DO ANYTHING, OR PREPARED TO DO ANYTHING TO END YOUR LIFE?: NO

## 2023-12-13 ASSESSMENT — PAIN SCALES - WONG BAKER: WONGBAKER_NUMERICALRESPONSE: NO HURT

## 2023-12-13 ASSESSMENT — PAIN DESCRIPTION - DESCRIPTORS: DESCRIPTORS: STABBING;SHARP;OTHER (COMMENT)

## 2023-12-13 ASSESSMENT — PAIN DESCRIPTION - ORIENTATION
ORIENTATION: LEFT
ORIENTATION: LEFT

## 2023-12-13 NOTE — CARE PLAN
The patient's goals for the shift include      The clinical goals for the shift include Pt will remain safe and stable free of falls throughout the shift.

## 2023-12-13 NOTE — ANESTHESIA PREPROCEDURE EVALUATION
Patient: Kwadwo Hoyt    Procedure Information       Anesthesia Start Date/Time: 12/13/23 0815    Procedure: Foot Transmetatarsal Amputation (Left) - mini sagittal saw, misonix    Location: [facility] OR 11 / Virtual [facility] OR    Surgeons: Zully Gill DPM            Relevant Problems   Cardiovascular   (+) Acute occlusion of popliteal artery due to thrombosis (CMS/HCC)   (+) Critical limb ischemia of left lower extremity (CMS/HCC)   (+) Peripheral vascular disease, unspecified (CMS/HCC)      /Renal   (+) SELENA (acute kidney injury) (CMS/HCC)   (+) ESRD on hemodialysis (CMS/HCC)      Pulmonary   (+) ALEXANDER (obstructive sleep apnea)       Clinical information reviewed:   Tobacco  Allergies  Meds   Med Hx  Surg Hx   Fam Hx  Soc Hx        NPO Detail:  NPO/Void Status  Date of Last Solid: 12/12/23  Time of Last Solid: 2300         Physical Exam    Airway  Mallampati: IV  TM distance: >3 FB  Neck ROM: full     Cardiovascular - normal exam     Dental    Pulmonary   Breath sounds clear to auscultation     Abdominal   (+) obese  Abdomen: soft             Anesthesia Plan    ASA 3     general     Anesthetic plan and risks discussed with patient.

## 2023-12-13 NOTE — PROGRESS NOTES
"Kwadwo Hoyt is a 57 y.o. male on day 18 of admission presenting with Arterial occlusion.    Subjective   Patient not in room as he is currently in the OR for L foot TMA.        Objective     Physical Exam    Last Recorded Vitals  Blood pressure 156/85, pulse 82, temperature 36.6 °C (97.9 °F), resp. rate 18, height 1.854 m (6' 1\"), weight 126 kg (278 lb 7.1 oz), SpO2 97 %.  Intake/Output last 3 Shifts:  I/O last 3 completed shifts:  In: 1400 (11.1 mL/kg) [I.V.:800 (6.3 mL/kg); Other:600]  Out: 3500 (27.7 mL/kg) [Urine:1500 (0.3 mL/kg/hr); Other:2000]  Weight: 126.3 kg     Relevant Results  Scheduled medications  acetaminophen, 975 mg, oral, q8h  alteplase, 2 mg, intra-catheter, Daily  alteplase, 2 mg, intra-catheter, Daily  amLODIPine, 10 mg, oral, Daily  aspirin, 81 mg, oral, Daily  atorvastatin, 80 mg, oral, Nightly  B complex-vitamin C-folic acid, 1 capsule, oral, Daily  calcitriol, 0.25 mcg, oral, Daily  carvedilol, 25 mg, oral, BID  clopidogrel, 75 mg, oral, Daily  darbepoetin abhishek, 0.45 mcg/kg, subcutaneous, Weekly  fluticasone, 2 spray, Each Nostril, Daily  gabapentin, 100 mg, oral, q PM  insulin glargine, 25 Units, subcutaneous, Nightly  insulin lispro, 0-5 Units, subcutaneous, TID with meals  insulin lispro, 7 Units, subcutaneous, TID with meals  lidocaine, 1 patch, transdermal, Daily  lidocaine-prilocaine, , Topical, Daily  melatonin, 5 mg, oral, Nightly  pantoprazole, 20 mg, oral, Daily before breakfast  polyethylene glycol, 17 g, oral, BID  sennosides-docusate sodium, 1 tablet, oral, Nightly  sevelamer carbonate, 800 mg, oral, TID with meals  torsemide, 100 mg, oral, Daily      Continuous medications     PRN medications  PRN medications: dextrose 10 % in water (D10W), dextrose, eucerin, glucagon, hydrALAZINE, HYDROmorphone, hydrOXYzine HCL, oxyCODONE      Assessment/Plan   Principal Problem:    Arterial occlusion  Active Problems:    ALEXANDER (obstructive sleep apnea)    ESRD on hemodialysis (CMS/[facility])    SELENA " (acute kidney injury) (CMS/HCC)    Peripheral vascular disease, unspecified (CMS/HCC)    Acute occlusion of popliteal artery due to thrombosis (CMS/HCC)    Left leg weakness    Critical limb ischemia of left lower extremity (CMS/HCC)    Toe ulcer, left, with unspecified severity (CMS/HCC)    Kwadwo Hoyt is a 56 y/o male with a PMHx of DM c/b neuropathy, HTN, CKD stage 5, HFrEF (EF 50% in 11/2021), & ALEXANDER, who presented to the ER with left leg and arm weakness. LLE distal pulses were not palpable and CTA aorta & iliofemoral runoff showed complete left popliteal occlusion. Heparin gtt was started and vascular medicine were consulted but recommended no surgical intervention, recommend PVR/ZAC first. Neuro were also consulted for concern of stroke given patients left sided weakness, rec outpatient follow up. PVR/ZAC showed severe disease at left femoral/popliteal level. 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 Nephrology consulted for worsening Cr and electrolyte status and started dialysis. Tunneled catheter placed by IR 11/30. Pt to c/w dialysis and underwent LLE angiogram with Vascular Surgery on 12/1, completed for peripheral angioplasty with Vascular Surgery 12/6. L TMA with Podiatry today 12/13. Dispo pending PT/OT eval      L Popliteal Artery Thrombosis  Chronic Limb Ischemia  Left Hallux Dry Gangrene  c/f Stroke  - Neurology consulted, stroke follow up in 4-6 weeks, discharge with ziopatch / loop recorder  - CTA aorta & B/L iliofemoral runoff significant for complete occlusion of L popliteal artery as well as anterior and posterior tibial arteries.   - LLE Angiogram w/ VascSurg 12/1.  Underwent Peripheral Angioplasty (thrombectomy and stent placement) on 12/6, Vasc Surg signed off  - Podiatry consulted for dry gangrene of Left Hallux, Plan for TMA. Maintain Hgb above 7.5, repeat type and screen. In OR for L TMA 12/13.  - Await final podiatry recs for post-op care  - Vasc Med consulted, rec c/w ASA-81  and Plavix 75 daily  - Vasc Med to evaluate source of thrombosis: TTE 12/7 had negative bubble study; hypercoagulable workup WNL, CTA Chest 12/8 for evaluation of aortic arch- mild atherosclerotic disease; no evidence intracavitary thrombi      --per Vasc Med no anticoagulation unless workup reveals a thrombosis  - Continue with ASA 81   - Continue with atorvastatin 80 mg     ESRD 2/2 T2DM on HD T/Th/Sat HD   - Admission Cr 6.06, GFR 10 (Baseline Cr 5.5~) Cr today pending   - Being evaluated at [facility] for kidney transplant w/ most recent office visit on 9/12/2023  - Continue home Torsemide 100 mg, calcitriol 0.25 mcg QD  - Avoid nephrotoxic agents  - Nephrology consulted due to worsening Cr and Electrolyte status  -- Received HD line 11/28, tunneled line placed 11/30 by IR  - SW contacted to coordinate outpatient dialysis (needs 4hr chair time at [facility] under Dr. Draper)  - Continue with Sevelamer carbonate 800 TID w/ meals, daily renal MVI, EPO w/ HD     Anemia  - Likely anemia of chronic disease 2/2 ESRD  - Received 2 units pRBC 12/8 for Hb 6.9   - Hb today pending      HTN  HFpEF  - Continue on home Aspirin 81 mg  - Continue on home Carvedilol 25 mg BID  - Continue on home Amlodipine 10 mg QD  - Echo 11/25, EF 60-65% w/ pseudonormal pattern of LV diastolic filling, increased LA LV diastolic pressure, severe concentric LVH  - Repeat TTE on 12/7 w/ EF on 60-65% w/ severe concentric LVH, negative bubble study      T2DM  Neuropathy  - Last Hgb A1C 12.6 (11/24/23)  - Home Regimen: Lantus 40U at bedtime & Lispro 20U TID w/ meals  - Current Regimen: Lantus 25 units at bedtime and Lispro 7u TID w/ meals  - Mild SSI  - Continue home Gabapentin 300 mg BID  - POCT BG TID & at bedtime  - Hypoglycemia protocol     Anxiety  - PRN Atarax 25 mg      ALEXANDER  - CPAP at home, noncompliant     Dispo  - Pending PT/OT post-op evaluation and recs      F: PRN  E: PRN  N: NPO currently, but previously Renal diet   GI: Pantoprazole 20 mg QD  DVT:  none (on DAPT per Vasc Med)  Code Status: FULL  Emergency Contact: NubiadaraKeishaMilka  Home Phone: [phone]        Patient discussed with Dr. Breen.      Beverly Soto MD   PGY1, Family Medicine   [facility]  Available by [employer]

## 2023-12-13 NOTE — PROGRESS NOTES
Transitional Care Coordination Progress Note:  Patient was discussed during interdisciplinary rounds.  Team members present: medical team, and TCC.  Plan per medical team: Pt is POD#0 s/p left TMA.  Pt is currently NWB.  Payer: [employer]  Status: Inpatient  Discharge disposition: Await PT/OT re-evaluations for therapy and DME.  Potential barriers: None  ADOD: 12/15  Care coordinator will continue to follow for discharge planning needs.    Jodi Rivera MSN, RN-BC  Transitional Care Coordinator (TCC)  [phone]

## 2023-12-13 NOTE — ANESTHESIA PROCEDURE NOTES
Airway  Date/Time: 12/13/2023 8:24 AM  Urgency: elective    Airway not difficult    Staffing  Performed: CRNA   Authorized by: Benedicto Man MD    Performed by: BHARGAV Browne-DAVIE  Patient location during procedure: OR    Indications and Patient Condition  Indications for airway management: anesthesia  Spontaneous Ventilation: absent  Sedation level: deep  Preoxygenated: yes  Patient position: sniffing  Mask difficulty assessment: 2 - vent by mask + OA or adjuvant +/- NMBA  Planned trial extubation    Final Airway Details  Final airway type: supraglottic airway      Successful airway: Supreme  Size 5     Number of attempts at approach: 1

## 2023-12-13 NOTE — OP NOTE
Foot Transmetatarsal Amputation (L) Operative Note     Date: 2023  OR Location: [facility] OR    Name: Kwadwo Hoyt, : 1965, Age: 57 y.o., MRN: [id number], Sex: male    Diagnosis  Pre-op Diagnosis     * Critical limb ischemia of left lower extremity (CMS/HCC) [I70.222]     * Toe ulcer, left, with unspecified severity (CMS/HCC) [L97.529] Post-op Diagnosis     * Critical limb ischemia of left lower extremity (CMS/HCC) [I70.222]     * Toe ulcer, left, with unspecified severity (CMS/HCC) [L97.529]     Procedures  28810 x 2 (left 1st and 2nd metatarsals)  28005 x 1    Surgeons      * Zully Gill - Primary    Resident/Fellow/Other Assistant:  Surgeon(s) and Role: 1. Phylicia Espinoza, PGY-2  2. Amelia Cruz, PGY-2    Procedure Summary  Anesthesia: Consult LMA+local 30cc of 1:1 mix of 2% lidocaine with 0.5% marcaine    ASA: III  Anesthesia Staff: Anesthesiologist: Benedicto Man MD  CRNA: BHARGAV Browne-CRNA; REHAN Rodriguez  Estimated Blood Loss: 100mL  Intra-op Medications:   Medication Name Total Dose   sodium chloride 0.9 % irrigation solution 1,000 mL   BUPivacaine HCl (Marcaine) 0.5 % (5 mg/mL) 10 mL, lidocaine (Xylocaine) 20 mg/mL (2 %) 10 mL syringe 20 mL   HYDROmorphone (Dilaudid) injection 1 mg 0.4 mg              Anesthesia Record               Intraprocedure I/O Totals          Intake    NaCl 0.9 % 250.00 mL    Total Intake 250 mL       Output    Est. Blood Loss 100 mL    Total Output 100 mL       Net    Net Volume 150 mL          Specimen:   ID Type Source Tests Collected by Time   1 : left toes Tissue FOOT AMPUTATION LEFT SURGICAL PATHOLOGY EXAM Zully Gill DPM 2023 0903   A : post lavage Swab FOOT AMPUTATION LEFT FUNGAL CULTURE/SMEAR, TISSUE/WOUND CULTURE/SMEAR Zully Gill DPM 2023 0921   B : CLEAN BONE MARGIN Tissue FOOT AMPUTATION LEFT FUNGAL CULTURE/SMEAR, TISSUE/WOUND CULTURE/SMEAR Zully Gill DPM 2023 9139        Staff:   Circulator:  Anaid Blackwell RN; Shobha Sanz RN  Scrub Person: Wendy Wallace; Edgar Martinez         Drains and/or Catheters: * None in log *    Tourniquet Times:         Implants:     Findings: Intra-op findings consistent with clinical and radiographic findings    Indications: Kwadwo Hoyt is an 57 y.o. male who is having surgery for Critical limb ischemia of left lower extremity (CMS/[facility]) [I70.222]  Toe ulcer, left, with unspecified severity (CMS/[facility]) [L97.529].     The patient was seen in the preoperative area. The risks, benefits, complications, treatment options, non-operative alternatives, expected recovery and outcomes were discussed with the patient. Patient was advised that he is high risk for limb loss. Patient verbalized his understanding that he is high risk for additional procedure up and including proximal amputation. Patient advised this is a limb salvage attempt.The possibilities of reaction to medication, pulmonary aspiration, injury to surrounding structures, bleeding, recurrent infection, the need for additional procedures, failure to diagnose a condition, and creating a complication requiring transfusion or operation were discussed with the patient. The patient concurred with the proposed plan, giving informed consent.  The site of surgery was properly noted/marked if necessary per policy. The patient has been actively warmed in preoperative area. Preoperative antibiotics have been ordered and given within 1 hours of incision. Venous thrombosis prophylaxis have been ordered including unilateral sequential compression device    Procedure Details: The patient was brought into the operating room where he remained on the cart and was secured in a supine position. Care was taken to pad and protect any prominences. A time out was performed verifying the patient, procedure and laterality. LMA general anesthesia was administered per the anesthesia team. Local anesthetic of 20mL of 1:1 mix of 2% lidocaine with  0.5% marcaine was administered in an ankle block fashion. The left foot was then scrubbed and prepped using betadine solution. The left foot was then draped in typical sterile fashion.    Attention was directed to the left foot. Patient has had prior surgical intervention necessitating the resection of metatarsals 3-5 and partial digital amputation of digit 2. A modified fishmouth incision was made using a #15 blade around the 1st and 2nd metatarsals. Sharp dissection was used to deepen the incision and disarticulate digits 1 and 2 at the level of the MTPJ. The removed digits were passed to the back table to be sent for path and culture. Thrombosed vessels and ischemic fat necrosis was appreciated.     A Key elevator was used to reflect soft tissues overlying the metatarsal heads of the 1st and 2nd metatarsal heads and shaft. A sagittal saw was then used to resect metatarsals 1 and 2 at the level proximal to the metatarsal neck. Care was taken to resect metatarsal 1 and 2 in a fashion to match existing metatarsal amputations of 3-5. Misonix US irrigation was carried out to further remove non-viable tissues. Sharp instrumentation and rongeur was used to remove nonviable tissue, plantar plate, and tendons. The sesamoids were sharply excised using #15 blade. Misonix was used to further irrigate the site. The sagittal saw was used again to further resect portions of the 1st and 2nd metatarsals to be in line with the prior metatarsal length. Clean bone margin was taken from the 1st and 2nd metatarsal shafts with clean bone rongeur in addition to post lavage culture swab.    Hemostasis was achieved using Bovie and 2-0 silk. Sharp instrumentation was used to remodel the skin edges. Deep and subcutaneous closure was achieve using 3-0  Monocryl. An additional 10mL of 1:1 mix of 2% lidocaine with 0.5% marcaine was administered. A Prevana incisional wound vac was applied followed by kerlix and ace wrap. Patient tolerated the  procedure well. Moderate bleeding encountered. Patient was returned to PACU with vital signs stable and neurovascular status of the LLE unchanged.     Complications:  None; patient tolerated the procedure well.    Disposition: PACU - hemodynamically stable.  Condition: stable         Additional Details: Visible ischemic necrosis appreciated including fat necrosis and thrombosed vessels. Moderate bleeding was encountered. Patient at high risk of future procedures up to and including proximal amputation. Procedure today was limb salvage attempt.     Attending Attestation:     Zully Gill  Phone Number: [phone]

## 2023-12-13 NOTE — SIGNIFICANT EVENT
S/P left foot TMA    Please restart all medications including antibiotics, anticoagulants, and/or pain medication as per Medicine/ID.  Restart prior appropriate diet.  NWB to surgical extremity.  May elevate surgical extremity.  Please keep dressing clean, dry and intact with post-op shoegear.  Pravena wound vac in place.   May reinforce dressings with 4x4s, ABDs, Kerlix, and light ACE wrap for strikethrough bleeding.  Podiatry to change dressing daily.    Phylicia Espinoza DPM PGY-2  Podiatric Medicine & Surgery  Pager [phone]

## 2023-12-13 NOTE — BRIEF OP NOTE
Date: 2023  OR Location: [facility] OR    Name: Kwadwo Hoyt, : 1965, Age: 57 y.o., MRN: [id number], Sex: male    Diagnosis  Pre-op Diagnosis     * Critical limb ischemia of left lower extremity (CMS/HCC) [I70.222]     * Toe ulcer, left, with unspecified severity (CMS/HCC) [L97.529] Post-op Diagnosis     * Critical limb ischemia of left lower extremity (CMS/HCC) [I70.222]     * Toe ulcer, left, with unspecified severity (CMS/HCC) [L97.529]     Procedures  Foot Transmetatarsal Amputation  92730 - KS AMPUTATION FOOT TRANSMETARSAL      Surgeons      * Zully Gill - Primary    Resident/Fellow/Other Assistant:  Surgeon(s) and Role: 1. Phylicia Espinoza  2. Young-in Cruz    Procedure Summary  Anesthesia: Consult LMA+local 30cc of 1:1 mix of 2% lidocaine with 0.5% marcaine  ASA: III  Anesthesia Staff: Anesthesiologist: Benedicto Man MD  CRNA: Ger Degroot, BHARGAV-CRNA; BHARGAV Rodriguez-CRNA  Estimated Blood Loss: 100mL  Intra-op Medications:   Medication Name Total Dose   sodium chloride 0.9 % irrigation solution 1,000 mL   BUPivacaine HCl (Marcaine) 0.5 % (5 mg/mL) 10 mL, lidocaine (Xylocaine) 20 mg/mL (2 %) 10 mL syringe 20 mL   HYDROmorphone (Dilaudid) injection 1 mg 0.4 mg              Anesthesia Record               Intraprocedure I/O Totals       None           Specimen:   ID Type Source Tests Collected by Time   1 : left toes Tissue FOOT AMPUTATION LEFT SURGICAL PATHOLOGY EXAM BINH Nye 2023 0903   A : post lavage Swab FOOT AMPUTATION LEFT FUNGAL CULTURE/SMEAR, TISSUE/WOUND CULTURE/SMEAR BINH Nye 2023 0921   B : CLEAN BONE MARGIN Tissue FOOT AMPUTATION LEFT FUNGAL CULTURE/SMEAR, TISSUE/WOUND CULTURE/SMEAR BINH Nye 2023 0940        Staff:   Circulator: Anaid Blackwell RN; Shobha Sanz RN  Scrub Person: Wendy Wallace; Edgar Martinez          Findings: intra op findings consistent with clinical and radiographic findings    Complications:   None; patient tolerated the procedure well.     Disposition: PACU - hemodynamically stable.  Condition: stable  Specimens Collected:   ID Type Source Tests Collected by Time   1 : left toes Tissue FOOT AMPUTATION LEFT SURGICAL PATHOLOGY EXAM Zully Gill DPM 12/13/2023 0903   A : post lavage Swab FOOT AMPUTATION LEFT FUNGAL CULTURE/SMEAR, TISSUE/WOUND CULTURE/SMEAR Zully Gill DPM 12/13/2023 0921   B : CLEAN BONE MARGIN Tissue FOOT AMPUTATION LEFT FUNGAL CULTURE/SMEAR, TISSUE/WOUND CULTURE/SMEAR Zully Gill DPM 12/13/2023 0940     Attending Attestation:     Zully Gill  Phone Number: [phone]

## 2023-12-13 NOTE — NURSING NOTE
Pt remained safe and stale. VS remained stable. Pt had LTMA done to today. Pt was medicated for pain. Pt had xray had of the foot done after the surgery. Labs sent. Pt had no significant event during the shift. Will continue to monitor the patient.

## 2023-12-13 NOTE — ANESTHESIA POSTPROCEDURE EVALUATION
Patient: Kwadwo Hoyt    Procedure Summary       Date: 12/13/23 Room / Location: [facility] OR 11 / [facility] [place]    Anesthesia Start: 0815 Anesthesia Stop: 1030    Procedure: Foot Transmetatarsal Amputation (Left) Diagnosis:       Critical limb ischemia of left lower extremity (CMS/HCC)      Toe ulcer, left, with unspecified severity (CMS/HCC)      (Critical limb ischemia of left lower extremity (CMS/HCC) [I70.222])      (Toe ulcer, left, with unspecified severity (CMS/HCC) [L97.529])    Surgeons: Zully Gill DPM Responsible Provider: Benedicto Man MD    Anesthesia Type: general ASA Status: 3            Anesthesia Type: general    Vitals Value Taken Time   /89 12/13/23 1024   Temp 36 12/13/23 1035   Pulse 70 12/13/23 1032   Resp 16 12/13/23 1032   SpO2 95 % 12/13/23 1032   Vitals shown include unvalidated device data.    Anesthesia Post Evaluation    Patient location during evaluation: bedside  Patient participation: complete - patient participated  Level of consciousness: awake  Pain management: adequate  Multimodal analgesia pain management approach  Airway patency: patent  Cardiovascular status: acceptable  Respiratory status: acceptable  Hydration status: acceptable  Postoperative Nausea and Vomiting: none        There were no known notable events for this encounter.

## 2023-12-14 ENCOUNTER — APPOINTMENT (OUTPATIENT)
Dept: DIALYSIS | Facility: HOSPITAL | Age: 58
End: 2023-12-14
Payer: MEDICARE

## 2023-12-14 VITALS
OXYGEN SATURATION: 98 % | RESPIRATION RATE: 20 BRPM | BODY MASS INDEX: 36.9 KG/M2 | HEART RATE: 89 BPM | SYSTOLIC BLOOD PRESSURE: 188 MMHG | HEIGHT: 73 IN | DIASTOLIC BLOOD PRESSURE: 105 MMHG | WEIGHT: 278.44 LBS | TEMPERATURE: 98.4 F

## 2023-12-14 LAB
ANION GAP SERPL CALC-SCNC: 17 MMOL/L (ref 10–20)
BASOPHILS # BLD AUTO: 0.05 X10*3/UL (ref 0–0.1)
BASOPHILS NFR BLD AUTO: 0.7 %
BUN SERPL-MCNC: 61 MG/DL (ref 6–23)
CALCIUM SERPL-MCNC: 8.9 MG/DL (ref 8.6–10.6)
CHLORIDE SERPL-SCNC: 99 MMOL/L (ref 98–107)
CO2 SERPL-SCNC: 25 MMOL/L (ref 21–32)
CREAT SERPL-MCNC: 7.47 MG/DL (ref 0.5–1.3)
EOSINOPHIL # BLD AUTO: 0.29 X10*3/UL (ref 0–0.7)
EOSINOPHIL NFR BLD AUTO: 4 %
ERYTHROCYTE [DISTWIDTH] IN BLOOD BY AUTOMATED COUNT: 13 % (ref 11.5–14.5)
GFR SERPL CREATININE-BSD FRML MDRD: 8 ML/MIN/1.73M*2
GLUCOSE BLD MANUAL STRIP-MCNC: 115 MG/DL (ref 74–99)
GLUCOSE SERPL-MCNC: 121 MG/DL (ref 74–99)
HCT VFR BLD AUTO: 26 % (ref 41–52)
HGB BLD-MCNC: 8.1 G/DL (ref 13.5–17.5)
IMM GRANULOCYTES # BLD AUTO: 0.03 X10*3/UL (ref 0–0.7)
IMM GRANULOCYTES NFR BLD AUTO: 0.4 % (ref 0–0.9)
LYMPHOCYTES # BLD AUTO: 1.65 X10*3/UL (ref 1.2–4.8)
LYMPHOCYTES NFR BLD AUTO: 22.8 %
MAGNESIUM SERPL-MCNC: 1.95 MG/DL (ref 1.6–2.4)
MCH RBC QN AUTO: 28.7 PG (ref 26–34)
MCHC RBC AUTO-ENTMCNC: 31.2 G/DL (ref 32–36)
MCV RBC AUTO: 92 FL (ref 80–100)
MONOCYTES # BLD AUTO: 1.07 X10*3/UL (ref 0.1–1)
MONOCYTES NFR BLD AUTO: 14.8 %
NEUTROPHILS # BLD AUTO: 4.15 X10*3/UL (ref 1.2–7.7)
NEUTROPHILS NFR BLD AUTO: 57.3 %
NRBC BLD-RTO: 0 /100 WBCS (ref 0–0)
PLATELET # BLD AUTO: 348 X10*3/UL (ref 150–450)
POTASSIUM SERPL-SCNC: 4.7 MMOL/L (ref 3.5–5.3)
RBC # BLD AUTO: 2.82 X10*6/UL (ref 4.5–5.9)
SODIUM SERPL-SCNC: 136 MMOL/L (ref 136–145)
WBC # BLD AUTO: 7.2 X10*3/UL (ref 4.4–11.3)

## 2023-12-14 PROCEDURE — 2500000001 HC RX 250 WO HCPCS SELF ADMINISTERED DRUGS (ALT 637 FOR MEDICARE OP): Performed by: NURSE PRACTITIONER

## 2023-12-14 PROCEDURE — 2500000004 HC RX 250 GENERAL PHARMACY W/ HCPCS (ALT 636 FOR OP/ED): Mod: JZ

## 2023-12-14 PROCEDURE — 85025 COMPLETE CBC W/AUTO DIFF WBC: CPT | Performed by: PODIATRIST

## 2023-12-14 PROCEDURE — 90935 HEMODIALYSIS ONE EVALUATION: CPT | Performed by: INTERNAL MEDICINE

## 2023-12-14 PROCEDURE — 99233 SBSQ HOSP IP/OBS HIGH 50: CPT | Performed by: PODIATRIST

## 2023-12-14 PROCEDURE — 2500000001 HC RX 250 WO HCPCS SELF ADMINISTERED DRUGS (ALT 637 FOR MEDICARE OP)

## 2023-12-14 PROCEDURE — 2500000004 HC RX 250 GENERAL PHARMACY W/ HCPCS (ALT 636 FOR OP/ED): Mod: JZ | Performed by: NURSE PRACTITIONER

## 2023-12-14 PROCEDURE — 36415 COLL VENOUS BLD VENIPUNCTURE: CPT | Performed by: PODIATRIST

## 2023-12-14 PROCEDURE — 80048 BASIC METABOLIC PNL TOTAL CA: CPT | Performed by: PODIATRIST

## 2023-12-14 PROCEDURE — 2500000004 HC RX 250 GENERAL PHARMACY W/ HCPCS (ALT 636 FOR OP/ED): Performed by: STUDENT IN AN ORGANIZED HEALTH CARE EDUCATION/TRAINING PROGRAM

## 2023-12-14 PROCEDURE — 83735 ASSAY OF MAGNESIUM: CPT | Performed by: PODIATRIST

## 2023-12-14 PROCEDURE — 2500000004 HC RX 250 GENERAL PHARMACY W/ HCPCS (ALT 636 FOR OP/ED): Performed by: NURSE PRACTITIONER

## 2023-12-14 PROCEDURE — 82947 ASSAY GLUCOSE BLOOD QUANT: CPT

## 2023-12-14 PROCEDURE — 8010000001 HC DIALYSIS - HEMODIALYSIS PER DAY

## 2023-12-14 RX ORDER — OXYCODONE HYDROCHLORIDE 5 MG/1
10 TABLET ORAL EVERY 4 HOURS PRN
Status: DISCONTINUED | OUTPATIENT
Start: 2023-12-14 | End: 2023-12-14 | Stop reason: HOSPADM

## 2023-12-14 RX ORDER — HYDROMORPHONE HYDROCHLORIDE 1 MG/ML
1 INJECTION, SOLUTION INTRAMUSCULAR; INTRAVENOUS; SUBCUTANEOUS ONCE
Status: COMPLETED | OUTPATIENT
Start: 2023-12-14 | End: 2023-12-14

## 2023-12-14 RX ADMIN — ALTEPLASE 2 MG: 2.2 INJECTION, POWDER, LYOPHILIZED, FOR SOLUTION INTRAVENOUS at 10:40

## 2023-12-14 RX ADMIN — CARVEDILOL 25 MG: 12.5 TABLET, FILM COATED ORAL at 11:59

## 2023-12-14 RX ADMIN — ASPIRIN 81 MG: 81 TABLET, COATED ORAL at 12:07

## 2023-12-14 RX ADMIN — PANTOPRAZOLE SODIUM 20 MG: 20 TABLET, DELAYED RELEASE ORAL at 12:00

## 2023-12-14 RX ADMIN — HYDROMORPHONE HYDROCHLORIDE 1 MG: 1 INJECTION, SOLUTION INTRAMUSCULAR; INTRAVENOUS; SUBCUTANEOUS at 01:58

## 2023-12-14 RX ADMIN — NEPHROCAP 1 CAPSULE: 1 CAP ORAL at 12:00

## 2023-12-14 RX ADMIN — ACETAMINOPHEN 975 MG: 325 TABLET ORAL at 12:01

## 2023-12-14 RX ADMIN — CALCITRIOL CAPSULES 0.25 MCG 0.25 MCG: 0.25 CAPSULE ORAL at 11:59

## 2023-12-14 RX ADMIN — HYDROMORPHONE HYDROCHLORIDE 1 MG: 1 INJECTION, SOLUTION INTRAMUSCULAR; INTRAVENOUS; SUBCUTANEOUS at 10:11

## 2023-12-14 RX ADMIN — AMLODIPINE BESYLATE 10 MG: 10 TABLET ORAL at 11:58

## 2023-12-14 RX ADMIN — ACETAMINOPHEN 975 MG: 325 TABLET ORAL at 06:30

## 2023-12-14 RX ADMIN — LIDOCAINE AND PRILOCAINE: 25; 25 CREAM TOPICAL at 12:00

## 2023-12-14 RX ADMIN — CLOPIDOGREL BISULFATE 75 MG: 75 TABLET ORAL at 11:59

## 2023-12-14 RX ADMIN — ALTEPLASE 2 MG: 2.2 INJECTION, POWDER, LYOPHILIZED, FOR SOLUTION INTRAVENOUS at 10:42

## 2023-12-14 RX ADMIN — TORSEMIDE 100 MG: 20 TABLET ORAL at 11:59

## 2023-12-14 RX ADMIN — OXYCODONE HYDROCHLORIDE 5 MG: 5 TABLET ORAL at 06:31

## 2023-12-14 ASSESSMENT — PAIN SCALES - GENERAL
PAINLEVEL_OUTOF10: 8
PAINLEVEL_OUTOF10: 8
PAINLEVEL_OUTOF10: 10 - WORST POSSIBLE PAIN
PAINLEVEL_OUTOF10: 0 - NO PAIN
PAINLEVEL_OUTOF10: 8
PAINLEVEL_OUTOF10: 0 - NO PAIN
PAINLEVEL_OUTOF10: 0 - NO PAIN
PAINLEVEL_OUTOF10: 10 - WORST POSSIBLE PAIN
PAINLEVEL_OUTOF10: 4

## 2023-12-14 ASSESSMENT — PAIN - FUNCTIONAL ASSESSMENT
PAIN_FUNCTIONAL_ASSESSMENT: 0-10

## 2023-12-14 ASSESSMENT — COGNITIVE AND FUNCTIONAL STATUS - GENERAL
MOBILITY SCORE: 16
TOILETING: A LITTLE
MOVING FROM LYING ON BACK TO SITTING ON SIDE OF FLAT BED WITH BEDRAILS: A LITTLE
WALKING IN HOSPITAL ROOM: A LOT
EATING MEALS: A LITTLE
PERSONAL GROOMING: A LITTLE
STANDING UP FROM CHAIR USING ARMS: A LITTLE
MOVING TO AND FROM BED TO CHAIR: A LITTLE
TURNING FROM BACK TO SIDE WHILE IN FLAT BAD: A LITTLE
DRESSING REGULAR LOWER BODY CLOTHING: A LITTLE
DAILY ACTIVITIY SCORE: 18
HELP NEEDED FOR BATHING: A LITTLE
CLIMB 3 TO 5 STEPS WITH RAILING: A LOT
DRESSING REGULAR UPPER BODY CLOTHING: A LITTLE

## 2023-12-14 ASSESSMENT — PAIN DESCRIPTION - ORIENTATION: ORIENTATION: LEFT

## 2023-12-14 NOTE — CARE PLAN
Problem: Fall/Injury  Goal: Not fall by end of shift  Outcome: Progressing  Goal: Be free from injury by end of the shift  Outcome: Progressing  Goal: Verbalize understanding of personal risk factors for fall in the hospital  Outcome: Progressing  Goal: Verbalize understanding of risk factor reduction measures to prevent injury from fall in the home  Outcome: Progressing  Goal: Use assistive devices by end of the shift  Outcome: Progressing  Goal: Pace activities to prevent fatigue by end of the shift  Outcome: Progressing     Problem: Skin  Goal: Prevent/minimize sheer/friction injuries  Outcome: Progressing   The patient's goals for the shift include      The clinical goals for the shift include Pt will rate pain less than 10 throughout the shift

## 2023-12-14 NOTE — PROGRESS NOTES
"Kwadwo Hoyt is a 57 y.o. male on day 19 of admission presenting with Arterial occlusion.    Subjective   Patient complains of pain to L foot and describes the pain as shooting. Otherwise denies any concerns. Denies fever, chills, nausea and vomiting.        Objective     Physical Exam  Constitutional:       Appearance: Normal appearance. He is not ill-appearing.      Comments: Resting in dialysis bed.    HENT:      Head: Normocephalic.   Cardiovascular:      Rate and Rhythm: Normal rate and regular rhythm.      Heart sounds: Normal heart sounds. No murmur heard.  Pulmonary:      Effort: No respiratory distress.      Breath sounds: Normal breath sounds. No wheezing.   Abdominal:      General: Bowel sounds are normal. There is no distension.      Palpations: Abdomen is soft.      Tenderness: There is no abdominal tenderness.   Skin:     General: Skin is warm and dry.      Comments: Surgical dressing intact to L foot with wound vac. No strike-through.    Neurological:      General: No focal deficit present.      Mental Status: He is alert. Mental status is at baseline.   Psychiatric:         Mood and Affect: Mood normal.         Last Recorded Vitals  Blood pressure 141/80, pulse 79, temperature 36.8 °C (98.2 °F), temperature source Skin, resp. rate 20, height 1.854 m (6' 1\"), weight 126 kg (278 lb 7.1 oz), SpO2 98 %.  Intake/Output last 3 Shifts:  I/O last 3 completed shifts:  In: 650 (5.1 mL/kg) [I.V.:400 (3.2 mL/kg); IV Piggyback:250]  Out: 1675 (13.3 mL/kg) [Urine:1575 (0.3 mL/kg/hr); Blood:100]  Weight: 126.3 kg     Relevant Results  Scheduled medications  acetaminophen, 975 mg, oral, q8h  alteplase, 2 mg, intra-catheter, Daily  alteplase, 2 mg, intra-catheter, Daily  amLODIPine, 10 mg, oral, Daily  aspirin, 81 mg, oral, Daily  atorvastatin, 80 mg, oral, Nightly  B complex-vitamin C-folic acid, 1 capsule, oral, Daily  calcitriol, 0.25 mcg, oral, Daily  carvedilol, 25 mg, oral, BID  clopidogrel, 75 mg, oral, " Daily  darbepoetin abhishek, 0.45 mcg/kg, subcutaneous, Weekly  fluticasone, 2 spray, Each Nostril, Daily  gabapentin, 100 mg, oral, q PM  insulin glargine, 25 Units, subcutaneous, Nightly  insulin lispro, 0-5 Units, subcutaneous, TID with meals  insulin lispro, 7 Units, subcutaneous, TID with meals  lidocaine, 1 patch, transdermal, Daily  lidocaine-prilocaine, , Topical, Daily  melatonin, 5 mg, oral, Nightly  pantoprazole, 20 mg, oral, Daily before breakfast  polyethylene glycol, 17 g, oral, BID  sennosides-docusate sodium, 1 tablet, oral, Nightly  sevelamer carbonate, 800 mg, oral, TID with meals  torsemide, 100 mg, oral, Daily      Continuous medications     PRN medications  PRN medications: dextrose 10 % in water (D10W), dextrose, eucerin, glucagon, hydrALAZINE, HYDROmorphone, hydrOXYzine HCL, oxyCODONE     Assessment/Plan   Principal Problem:    Arterial occlusion  Active Problems:    ALEXANDER (obstructive sleep apnea)    ESRD on hemodialysis (CMS/[facility])    SELENA (acute kidney injury) (CMS/[facility])    Peripheral vascular disease, unspecified (CMS/[facility])    Acute occlusion of popliteal artery due to thrombosis (CMS/[facility])    Left leg weakness    Critical limb ischemia of left lower extremity (CMS/[facility])    Toe ulcer, left, with unspecified severity (CMS/[facility])      Kwadwo Hoyt is a 56 y/o male with a PMHx of DM c/b neuropathy, HTN, CKD stage 5, HFrEF (EF 50% in 11/2021), & ALEXANDER, who presented to the ER with left leg and arm weakness. LLE distal pulses were not palpable and CTA aorta & iliofemoral runoff showed complete left popliteal occlusion. Heparin gtt was started and vascular medicine were consulted but recommended no surgical intervention, recommend PVR/ZAC first. Neuro were also consulted for concern of stroke given patients left sided weakness, rec outpatient follow up. PVR/ZAC showed severe disease at left femoral/popliteal level. 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 Nephrology consulted for worsening Cr and  electrolyte status and started dialysis. Tunneled catheter placed by IR 11/30. Pt to c/w dialysis and underwent LLE angiogram with Vascular Surgery on 12/1, completed for peripheral angioplasty with Vascular Surgery 12/6. S/p L TMA with Podiatry on 12/13. Dispo pending PT/OT eval.     L Popliteal Artery Thrombosis  Chronic Limb Ischemia  Left Hallux Dry Gangrene  c/f Stroke  - Neurology consulted, stroke follow up in 4-6 weeks, discharge with ziopatch / loop recorder  - CTA aorta & B/L iliofemoral runoff significant for complete occlusion of L popliteal artery as well as anterior and posterior tibial arteries.   - LLE Angiogram w/ VascSurg 12/1.  Underwent Peripheral Angioplasty (thrombectomy and stent placement) on 12/6, Vasc Surg signed off  - Podiatry consulted for dry gangrene of Left Hallux, Plan for TMA. Maintain Hgb above 7.5, repeat type and screen. S/p L TMA on 12/13.  - Await final podiatry recs for post-op care  - NWB L foot per podiatry   - Vasc Med consulted, rec c/w ASA-81 and Plavix 75 daily  - Vasc Med to evaluate source of thrombosis: TTE 12/7 had negative bubble study; hypercoagulable workup WNL, CTA Chest 12/8 for evaluation of aortic arch- mild atherosclerotic disease; no evidence intracavitary thrombi      --per Vasc Med no anticoagulation unless workup reveals a thrombosis  - Continue with ASA 81   - Continue with atorvastatin 80 mg  - Pain regimen: Increased oxycodone 5 to 10 mg q4h Severe pain     ESRD 2/2 T2DM on HD T/Th/Sat HD   - Admission Cr 6.06, GFR 10 (Baseline Cr 5.5~) Cr 7.47  - Being evaluated at [facility] for kidney transplant w/ most recent office visit on 9/12/2023  - Continue home Torsemide 100 mg, calcitriol 0.25 mcg QD  - Avoid nephrotoxic agents  - Nephrology consulted due to worsening Cr and Electrolyte status  -- Received HD line 11/28, tunneled line placed 11/30 by IR  - SW contacted to coordinate outpatient dialysis (needs 4hr chair time at [facility] under Dr. Draper)  - Continue  with Sevelamer carbonate 800 TID w/ meals, daily renal MVI, EPO w/ HD  - Check RFP on HD days    Anemia  - Likely anemia of chronic disease 2/2 ESRD  - Received 2 units pRBC 12/8 for Hb 6.9   - Hb today 8.1  - Monitor with CBCs     HTN  HFpEF  - Continue on home Aspirin 81 mg  - Continue on home Carvedilol 25 mg BID  - Continue on home Amlodipine 10 mg QD  - Echo 11/25, EF 60-65% w/ pseudonormal pattern of LV diastolic filling, increased LA LV diastolic pressure, severe concentric LVH  - Repeat TTE on 12/7 w/ EF on 60-65% w/ severe concentric LVH, negative bubble study      T2DM  Neuropathy  - Last Hgb A1C 12.6 (11/24/23)  - Home Regimen: Lantus 40U at bedtime & Lispro 20U TID w/ meals  - Current Regimen: Lantus 25 units at bedtime and Lispro 7u TID w/ meals  - Mild SSI  - Continue home Gabapentin 300 mg BID  - POCT BG TID & at bedtime  - Hypoglycemia protocol     Anxiety  - PRN Atarax 25 mg      ALEXANDER  - CPAP at home, noncompliant     Dispo  - Pending PT/OT post-surgical evaluation and recs      F: PRN  E: PRN  N: Renal diet   GI: Pantoprazole 20 mg QD  DVT: none (on DAPT per Vasc Med)  Code Status: FULL  Emergency Contact: Milka Hoyt  Home Phone: [phone]     Patient discussed with Dr. Breen.      Beverly Soto MD   PGY1, Family Medicine   [facility]  Available by [place]

## 2023-12-14 NOTE — SIGNIFICANT EVENT
Patient was unavailable to be evaluated today due to not being in the room.  Patient off the floor having dialysis  Labs, other documents, and any imaging were reviewed.  Patient to be evaluated tomorrow for updated evaluation. Updated plan to follow.    Phylicia Espinoza DPM PGY-2  Podiatric Medicine & Surgery  P: [phone]

## 2023-12-14 NOTE — NURSING NOTE
.Report from Sending RN:    Report From: MANOJ Hernández  Recent Surgery of Procedure: TMA of left toe  Baseline Level of Consciousness (LOC): A&O X3  Oxygen Use: No  Type: room air  Diabetic: Yes  Last BP Med Given Day of Dialysis: none  Last Pain Med Given: dilaudid  Lab Tests to be Obtained with Dialysis: Yes  Blood Transfusion to be Given During Dialysis: No  Available IV Access: Yes  Medications to be Administered During Dialysis: No  Continuous IV Infusion Running: No  Restraints on Currently or in the Last 24 Hours: No  Hand-Off Communication: Pt had no acute event overnight, vital signs stable. Not on precaution and no morning medication given.

## 2023-12-14 NOTE — NURSING NOTE
Report to Receiving RN:    Report To: MANOJ Moran  Time Report Called: 11:01am  Hand-Off Communication: No acute change from RN to RN report.  Patient tolerated treatment well with 2L fluid remmoved.  Last BP was 153/71.  Patient complaint of pain throughout the treatment with pain medication.    Complications During Treatment: No  Ultrafiltration Treatment: Yes  Medications Administered During Dialysis: Yes, 650mg Tylenol, 5mg Oxycodone, 1mg Dilaudid.    Blood Products Administered During Dialysis: No  Labs Sent During Dialysis: No  Heparin Drip Rate Changes: No    Electronic Signatures:   (Signed 12/14/23)   Authored: Fawad Hammond RN   (Signed )   Authored:     Last Updated: 11:20 AM by FAWAD HAMMOND

## 2023-12-14 NOTE — PROGRESS NOTES
Physical Therapy                 Therapy Communication Note/ Re-Eval Attempt post TMA    Patient Name: Kwadwo Hoyt  MRN: [id number]  Today's Date: 12/14/2023     Discipline: Physical Therapy    Missed Visit Reason: Missed Visit Reason:  (@ 806 pt off the floor at dialysis)    Missed Time: Attempt      12/14/23 at 8:06 AM - Erendira Smith, PT

## 2023-12-14 NOTE — PROGRESS NOTES
Subjective     Interval History: Kwadwo Hoyt    Patient seen on dialysis, no complaints          Current Facility-Administered Medications:     acetaminophen (Tylenol) tablet 975 mg, 975 mg, oral, q8h, Mahadr George, APRN-CNP, 975 mg at 12/14/23 0630    alteplase (Cathflo Activase) injection 2 mg, 2 mg, intra-catheter, Daily, Mahadr George, APRN-CNP, 2 mg at 12/12/23 1118    alteplase (Cathflo Activase) injection 2 mg, 2 mg, intra-catheter, Daily, Mahadr George, APRN-CNP, 2 mg at 12/12/23 1117    amLODIPine (Norvasc) tablet 10 mg, 10 mg, oral, Daily, Mahadr George, APRN-CNP, 10 mg at 12/13/23 1423    aspirin EC tablet 81 mg, 81 mg, oral, Daily, Mahadr George, APRN-CNP, 81 mg at 12/13/23 1422    atorvastatin (Lipitor) tablet 80 mg, 80 mg, oral, Nightly, Mahadr George, APRN-CNP, 80 mg at 12/13/23 2040    B complex-vitamin C-folic acid (Nephrocaps) capsule 1 capsule, 1 capsule, oral, Daily, Mahadr George, APRN-CNP, 1 capsule at 12/13/23 1421    calcitriol (Rocaltrol) capsule 0.25 mcg, 0.25 mcg, oral, Daily, Mahadr George, APRN-CNP, 0.25 mcg at 12/13/23 1425    carvedilol (Coreg) tablet 25 mg, 25 mg, oral, BID, Mahadr George, APRN-CNP, 25 mg at 12/13/23 2040    clopidogrel (Plavix) tablet 75 mg, 75 mg, oral, Daily, Lonnie Skaggs MD, 75 mg at 12/13/23 1424    darbepoetin abhishek in polysorbat (Aranesp) injection 60 mcg, 0.45 mcg/kg, subcutaneous, Weekly, Mahadr George, APRN-CNP, 60 mcg at 12/13/23 1541    dextrose 10 % in water (D10W) infusion, 0.3 g/kg/hr, intravenous, Once PRN, Mahadr George, APRN-CNP    dextrose 50 % injection 25 g, 25 g, intravenous, q15 min PRN, BHARGAV Ryaa-CNP    eucerin cream, , Topical, PRN, Zan Vazquez APRN-CNP    fluticasone (Flonase) nasal spray 2 spray, 2 spray, Each Nostril, Daily, Christiano Almanza MD    gabapentin (Neurontin) capsule 100 mg, 100 mg, oral, q PM, BHARGAV Raya-CNP, 100 mg at 12/13/23 2041    glucagon (Glucagen) injection 1 mg, 1 mg,  intramuscular, q15 min PRN, Zan Condonuria, APRN-CNP    hydrALAZINE (Apresoline) injection 5 mg, 5 mg, intravenous, q6h PRN, Zan George, APRN-CNP, 5 mg at 11/30/23 0420    HYDROmorphone (Dilaudid) injection 1 mg, 1 mg, intravenous, q6h PRN, Zan George, APRN-CNP, 1 mg at 12/13/23 2041    hydrOXYzine HCL (Atarax) tablet 25 mg, 25 mg, oral, q6h PRN, Zan George, APRN-CNP, 25 mg at 12/13/23 2041    insulin glargine (Lantus) injection 25 Units, 25 Units, subcutaneous, Nightly, Zan George, APRN-CNP, 25 Units at 12/13/23 2043    insulin lispro (HumaLOG) injection 0-5 Units, 0-5 Units, subcutaneous, TID with meals, Zan Condonuria, APRN-CNP, 1 Units at 12/10/23 1415    insulin lispro (HumaLOG) injection 7 Units, 7 Units, subcutaneous, TID with meals, Zan Condonuria, APRN-CNP, 7 Units at 12/11/23 1303    lidocaine 4 % patch 1 patch, 1 patch, transdermal, Daily, Felicia Shah DO, 1 patch at 12/12/23 1317    lidocaine-prilocaine (Emla) cream, , Topical, Daily, Zan Condonuria, APRN-CNP, Given at 12/13/23 1426    melatonin tablet 5 mg, 5 mg, oral, Nightly, Zan Condonuria, APRN-CNP, 5 mg at 12/13/23 2041    oxyCODONE (Roxicodone) immediate release tablet 5 mg, 5 mg, oral, q6h PRN, Zan Condonuria, APRN-CNP, 5 mg at 12/14/23 0631    pantoprazole (ProtoNix) EC tablet 20 mg, 20 mg, oral, Daily before breakfast, Zan Condonuria, APRN-CNP, 20 mg at 12/13/23 1424    polyethylene glycol (Glycolax, Miralax) packet 17 g, 17 g, oral, BID, FELI Raya, 17 g at 12/13/23 2040    sennosides-docusate sodium (Katerina-Colace) 8.6-50 mg per tablet 1 tablet, 1 tablet, oral, Nightly, FELI Raya, 1 tablet at 12/13/23 2041    sevelamer carbonate (Renvela) tablet 800 mg, 800 mg, oral, TID with meals, Lonnie Skaggs MD, 800 mg at 12/13/23 1755    torsemide (Demadex) tablet 100 mg, 100 mg, oral, Daily, FELI Raya, 100 mg at 12/13/23 1423    Physical Exam  Physical Exam  Heart S1 S2 RRR, Lungs CTA, no  edema      Vital signs in last 24 hours:  Temp:  [36.5 °C (97.7 °F)-37.4 °C (99.3 °F)] 36.8 °C (98.2 °F)  Heart Rate:  [68-84] 79  Resp:  [10-21] 20  BP: (131-170)/(67-99) 141/80         Labs:  Results from last 7 days   Lab Units 12/14/23  0646   WBC AUTO x10*3/uL 7.2   RBC AUTO x10*6/uL 2.82*   HEMOGLOBIN g/dL 8.1*   HEMATOCRIT % 26.0*     Results from last 7 days   Lab Units 12/14/23  0646 12/13/23  1528 12/12/23  0715   SODIUM mmol/L 136   < > 137   POTASSIUM mmol/L 4.7   < > 4.8   CHLORIDE mmol/L 99   < > 100   CO2 mmol/L 25   < > 25   BUN mg/dL 61*   < > 70*   CREATININE mg/dL 7.47*   < > 8.80*   CALCIUM mg/dL 8.9   < > 9.1   PHOSPHORUS mg/dL  --   --  5.5*   MAGNESIUM mg/dL 1.95   < > 2.10    < > = values in this interval not displayed.            Assessment/Plan   Patient seen and examined while on dialysis, recent events, labs, medications reviewed. Will follow overall management per primary team, and continue regular dialysis.    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    Arrangements for o/p dialysis have been made at [facility] after discharge.           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 800 mg TID AC  Anemia of CKD: on Aranesp weekly   BP: acceptable during treatment   Renal Diet   Daily renal MVI   Continue 3 x per week hemodialysis    Principal Problem:    Arterial occlusion  Active Problems:    ALEXANDER (obstructive sleep apnea)    ESRD on hemodialysis (CMS/HCC)    SELENA (acute kidney injury) (CMS/HCC)    Peripheral vascular disease, unspecified (CMS/HCC)    Acute occlusion of popliteal artery due to thrombosis (CMS/HCC)    Left leg weakness    Critical limb ischemia of left lower extremity (CMS/HCC)    Toe ulcer, left, with unspecified severity (CMS/HCC)        Leia Murray MD  12/14/2023  8:52 AM

## 2023-12-14 NOTE — SIGNIFICANT EVENT
"Code Norma:    I was called to bedside by RN (15:13) due to patient being argumentative and verbally abusive. Per nursing staff, the patient returned from dialysis this AM and could not find his wallet. He has been argumentative the rest of the day and complained of pain but was refusing pain medication when offered. I attempted to speak with the patient about next steps to take to address his issues but the patient remained argumentative and stated he wanted to leave AMA. He stated he wanted the wound vac off and wanted to go to a different hospital. A hospital transfer was offered but the patient declined and stated that he will walk out if he is not allowed to leave soon.     I then reached out to podiatry who recommended wound vac removal and replacement with a wet to dry dressing if the patient were to leave AMA. They also expressed concern and emphasized the risks and benefits if the patient left.     I and Dr. Soto were called to bedside again a few minutes later (15:52) for a code violet. The nursing staff informed us that the patient's significant other had arrived and was escalating the situation and helping the patient pack up. We offered to remove the patient's wound vac and Ivs but the patient refused and stated to \"bill him.\" The patient was difficult to speak to as he frequently spoke over, cut off, or started speaking to his significant other while we attempted to speak with him. The risks and benefits of leaving AMA were presented to the patient which included infection, further amputations, and even death. He reported he understood the risks and is going to a different hospital.     Joseph Gibbs MD  Family Medicine  PGY-2       "

## 2023-12-14 NOTE — NURSING NOTE
Pt went for HD this morning before 7 am. Pt's bed was already made by the time this RN resumed. Per RN's report from HD, pt complained of pain throughout the treatment and pt was given Tylenol 650 mg, Oxycodone 5 mg and Dilaudid 1 mg. When pt came back from HD, he reported his wallet missing. The CTA helped search the room for his wallet but did not find it. At this point, this RN already gave the pt all his due medication including Tylenol 975 mg.   Pt got agitated after a while due to his missing wallet and needed more pain medication. This RN pulled Oxycodone 10 mg to medicate the patient for his pain. At this point, pt was already screaming, cursing and acting violent. Despite the fact that the nursing staffs were trying to resolve pt's problems, pt continued to scream, became inappropriate and dee dee khan was called to help deescalate the situation. MD and the code violet team at the bedside.   The  reached out to the laundry. Per the laundry, no wallet was found at the moment. The unit will be notified incase the laundry found any wallet by next week Wednesday.   Pt requested to leave AMA. The provider educated the patient on the risk of leaving AMA with the IV and the wound vac. Pt refused to allow the team to remove the IV access and the portable wound vac which was placed at OR yesterday after LTMA despite the education provided.   Pt was picked up by his girlfriend.  Of note, pt's wife called to speak to the RN earlier and RN called the pt's wife back and left her a message.  Birdie Whitney RN

## 2023-12-14 NOTE — CARE PLAN
The patient's goals for the shift include  pain relief.    The clinical goals for the shift include patient will remain free from falls during shift      Problem: Fall/Injury  Goal: Not fall by end of shift  12/14/2023 0017 by Betty Betts RN  Outcome: Progressing  12/14/2023 0017 by Betty Betts RN  Outcome: Progressing  12/14/2023 0017 by Betty Betts RN  Outcome: Progressing  Goal: Be free from injury by end of the shift  12/14/2023 0017 by Betty Betts RN  Outcome: Progressing  12/14/2023 0017 by Betty Betts RN  Outcome: Progressing  12/14/2023 0017 by Betty Betts RN  Outcome: Progressing  Goal: Verbalize understanding of personal risk factors for fall in the hospital  12/14/2023 0017 by Betty Betts RN  Outcome: Progressing  12/14/2023 0017 by Betty Betts RN  Outcome: Progressing  12/14/2023 0017 by Betty Betts RN  Outcome: Progressing  Goal: Verbalize understanding of risk factor reduction measures to prevent injury from fall in the home  12/14/2023 0017 by Betty Betts RN  Outcome: Progressing  12/14/2023 0017 by Betty Betts RN  Outcome: Progressing  12/14/2023 0017 by Betty Betts RN  Outcome: Progressing  Goal: Use assistive devices by end of the shift  12/14/2023 0017 by Betty Betts RN  Outcome: Progressing  12/14/2023 0017 by Betty Betts RN  Outcome: Progressing  12/14/2023 0017 by Betty Betts RN  Outcome: Progressing  Goal: Pace activities to prevent fatigue by end of the shift  12/14/2023 0017 by Betty Betts RN  Outcome: Progressing  12/14/2023 0017 by Betty Betts RN  Outcome: Progressing  12/14/2023 0017 by Betty Betts RN  Outcome: Progressing     Problem: Skin  Goal: Prevent/minimize sheer/friction injuries  Outcome: Progressing  Flowsheets (Taken 12/14/2023 0017)  Prevent/minimize sheer/friction injuries: HOB 30 degrees or less

## 2023-12-14 NOTE — DISCHARGE SUMMARY
Discharge Diagnosis  Arterial occlusion    Issues Requiring Follow-Up  Nephrology- hemodialysis   Podiatry- s/p L TMA   Primary Care - post hospital follow up       Test Results Pending At Discharge  Pending Labs       Order Current Status    Fungal Culture/Smear Collected (12/13/23 9549)    Fungal Culture/Smear In process    Surgical Pathology Exam In process    Tissue/Wound Culture/Smear Preliminary result    Tissue/Wound Culture/Smear Preliminary result            Hospital Course  Kwadwo Hoyt is a 57 y.o. male with a PMHx of DM c/b neuropathy, HTN, CKD stage 5, HFrEF (EF 50% in 11/2021), & ALEXANDER, who presents to the ER with left leg and arm weakness. LLE distal pulses were not palpable and CTA aorta & iliofemoral runoff showed complete left popliteal occlusion. Heparin gtt was started and vascular medicine were consulted but recommended no surgical intervention, recommend PVR/ZAC first which showed severe disease at the femoral popliteal level.   Neuro were also consulted for concern of stroke given patients left sided weakness. Stroke workup negative, although Neuro suggested stroke follow up in 4-6 weeks, discharge with ziopatch / loop recorder. Echo obtained 11/25 showed HFpEF (EF 60-65%). Vascular Surgery planned for angiogram, however since Cr and electrolytes continued to worsen, Nephrology was consulted and the angiogram delayed. Upon admission, Cr 6.06 and increased to 11.17 plus potassium stayed elevated at 5.5 mmol/L. A temporary HD catheter place by IR on 11/28th and received HD. Nephrology suggested worsening creatinine likely due to DONA (11/24) with hemodynamic injury (fluctuating BP). Patient with labile BP ranging from 165, 193, to 122.  Neprology Podiatry consulted for dry gangrene of left hallux. Nephrology consulted for worsening Cr and electrolyte status.  Tunneled catheter placed by IR 11/30. Pt to c/w dialysis and underwent LLE angiogram with Vascular Surgery on 12/1 and peripheral  angioplasty 12/6. Vascular surgery signed off.  Vascular Medicine consulted for evaluation of provoked vs. Unprovoked arterial occlusion workup as well as provide recommendation on transition to DOAC with duration of treatment. Vasc Medicine recommended to continue heparin gtt, ASA, Plavix, and to obtain Limited echo with bubble study and CTA chest, along with hypercoagulable lab workup. Podiatry scheduled TMA of left foot with dry gangrene on 12/11 who recommend hgb >7.5 for procedure. Due to OR schedules and emergenices, the procedure was scheduled for 12/13.  Patient underwent L foot TMA on 12/13 with podiatry. PT/OT were consulted to see patient for post-surgery evaluation. On 12/14, a code violet was called on the patient at around 1545. The patient refused to have his wound vac and IV removed. He verbally told us he knows the risks of leaving including getting an infection, losing his Left foot and death.       Follow up:   SW have set up patient for outpatient HD with Lori form [facility] on TTS.         Pertinent Physical Exam At Time of Discharge  Physical Exam  Constitutional:       Appearance: Normal appearance.   HENT:      Head: Normocephalic.   Cardiovascular:      Rate and Rhythm: Normal rate and regular rhythm.      Heart sounds: Normal heart sounds.   Pulmonary:      Effort: Pulmonary effort is normal.      Breath sounds: Normal breath sounds.   Abdominal:      General: Bowel sounds are normal.      Palpations: Abdomen is soft.   Skin:     General: Skin is warm.      Comments: Dressing noted to L foot with wound vac.    Neurological:      Mental Status: He is alert.         Home Medications     Medication List      ASK your doctor about these medications     allopurinol 100 mg tablet; Commonly known as: Zyloprim   amLODIPine 10 mg tablet; Commonly known as: Norvasc   aspirin 81 mg EC tablet   calcitriol 0.25 mcg capsule; Commonly known as: Rocaltrol   carvedilol 25 mg tablet; Commonly known as: Coreg    gabapentin 300 mg capsule; Commonly known as: Neurontin   insulin lispro 100 unit/mL injection; Commonly known as: HumaLOG   Lantus U-100 Insulin 100 unit/mL injection; Generic drug: insulin   glargine   sildenafil 50 mg tablet; Commonly known as: Viagra   simvastatin 20 mg tablet; Commonly known as: Zocor   torsemide 20 mg tablet; Commonly known as: Demadex       Outpatient Follow-Up  Future Appointments   Date Time Provider Department Center   1/16/2024  1:00 PM [facility] 4 [id number] [facility]   1/16/2024  2:00 PM Ron Hickman DO [id number] [facility]   1/18/2024  1:00 PM Lexi Story MD [id number] [facility]   1/31/2024  9:30 AM Nava Guerra MD [id number] [facility]       Beverly Soto MD

## 2023-12-15 LAB
BACTERIA SPEC CULT: NORMAL
GRAM STN SPEC: NORMAL
GRAM STN SPEC: NORMAL

## 2023-12-17 LAB
BACTERIA SPEC CULT: ABNORMAL
GRAM STN SPEC: ABNORMAL
GRAM STN SPEC: ABNORMAL

## 2023-12-26 ENCOUNTER — LAB REQUISITION (OUTPATIENT)
Dept: LAB | Facility: HOSPITAL | Age: 58
End: 2023-12-26
Payer: MEDICARE

## 2023-12-26 LAB
ALBUMIN SERPL BCP-MCNC: 3.5 G/DL (ref 3.4–5)
ANION GAP SERPL CALC-SCNC: 15 MMOL/L (ref 10–20)
BUN SERPL-MCNC: 52 MG/DL (ref 6–23)
CALCIUM SERPL-MCNC: 9.4 MG/DL (ref 8.6–10.6)
CHLORIDE SERPL-SCNC: 102 MMOL/L (ref 98–107)
CO2 SERPL-SCNC: 25 MMOL/L (ref 21–32)
CREAT SERPL-MCNC: 6.3 MG/DL (ref 0.5–1.3)
GFR SERPL CREATININE-BSD FRML MDRD: 10 ML/MIN/1.73M*2
GLUCOSE SERPL-MCNC: 196 MG/DL (ref 74–99)
PHOSPHATE SERPL-MCNC: 4.4 MG/DL (ref 2.5–4.9)
POTASSIUM SERPL-SCNC: 4.5 MMOL/L (ref 3.5–5.3)
SODIUM SERPL-SCNC: 137 MMOL/L (ref 136–145)

## 2023-12-26 PROCEDURE — 80069 RENAL FUNCTION PANEL: CPT

## 2023-12-28 ENCOUNTER — LAB REQUISITION (OUTPATIENT)
Dept: LAB | Facility: HOSPITAL | Age: 58
End: 2023-12-28
Payer: MEDICARE

## 2023-12-28 PROCEDURE — 87040 BLOOD CULTURE FOR BACTERIA: CPT

## 2023-12-28 PROCEDURE — 87075 CULTR BACTERIA EXCEPT BLOOD: CPT

## 2024-01-01 LAB — BACTERIA BLD CULT: NORMAL

## 2024-01-02 ENCOUNTER — LAB REQUISITION (OUTPATIENT)
Dept: LAB | Facility: HOSPITAL | Age: 59
End: 2024-01-02
Payer: MEDICARE

## 2024-01-02 LAB
ALBUMIN SERPL BCP-MCNC: 3.5 G/DL (ref 3.4–5)
ANION GAP SERPL CALC-SCNC: 15 MMOL/L (ref 10–20)
BUN SERPL-MCNC: 53 MG/DL (ref 6–23)
CALCIUM SERPL-MCNC: 9.4 MG/DL (ref 8.6–10.6)
CHLORIDE SERPL-SCNC: 100 MMOL/L (ref 98–107)
CO2 SERPL-SCNC: 27 MMOL/L (ref 21–32)
CREAT SERPL-MCNC: 6.96 MG/DL (ref 0.5–1.3)
FUNGUS SPEC CULT: NORMAL
FUNGUS SPEC FUNGUS STN: NORMAL
GFR SERPL CREATININE-BSD FRML MDRD: 9 ML/MIN/1.73M*2
GLUCOSE SERPL-MCNC: 180 MG/DL (ref 74–99)
PHOSPHATE SERPL-MCNC: 4.4 MG/DL (ref 2.5–4.9)
POTASSIUM SERPL-SCNC: 4.1 MMOL/L (ref 3.5–5.3)
SODIUM SERPL-SCNC: 138 MMOL/L (ref 136–145)

## 2024-01-02 PROCEDURE — 80069 RENAL FUNCTION PANEL: CPT

## 2024-01-10 LAB
LABORATORY COMMENT REPORT: NORMAL
PATH REPORT.FINAL DX SPEC: NORMAL
PATH REPORT.GROSS SPEC: NORMAL
PATH REPORT.RELEVANT HX SPEC: NORMAL
PATH REPORT.TOTAL CANCER: NORMAL

## 2024-01-16 ENCOUNTER — APPOINTMENT (OUTPATIENT)
Dept: CARDIOLOGY | Facility: CLINIC | Age: 59
End: 2024-01-16
Payer: MEDICARE

## 2024-01-16 ENCOUNTER — APPOINTMENT (OUTPATIENT)
Dept: VASCULAR MEDICINE | Facility: HOSPITAL | Age: 59
End: 2024-01-16
Payer: MEDICARE

## 2024-01-23 ENCOUNTER — LAB REQUISITION (OUTPATIENT)
Dept: LAB | Facility: HOSPITAL | Age: 59
End: 2024-01-23
Payer: MEDICARE

## 2024-01-23 LAB
ALBUMIN SERPL BCP-MCNC: 3.5 G/DL (ref 3.4–5)
ANION GAP SERPL CALC-SCNC: 18 MMOL/L (ref 10–20)
BUN SERPL-MCNC: 51 MG/DL (ref 6–23)
CALCIUM SERPL-MCNC: 9.7 MG/DL (ref 8.6–10.6)
CHLORIDE SERPL-SCNC: 100 MMOL/L (ref 98–107)
CO2 SERPL-SCNC: 24 MMOL/L (ref 21–32)
CREAT SERPL-MCNC: 7.22 MG/DL (ref 0.5–1.3)
EGFRCR SERPLBLD CKD-EPI 2021: 8 ML/MIN/1.73M*2
GLUCOSE SERPL-MCNC: 186 MG/DL (ref 74–99)
PHOSPHATE SERPL-MCNC: 4.4 MG/DL (ref 2.5–4.9)
POTASSIUM SERPL-SCNC: 4.7 MMOL/L (ref 3.5–5.3)
SODIUM SERPL-SCNC: 137 MMOL/L (ref 136–145)

## 2024-01-23 PROCEDURE — 80069 RENAL FUNCTION PANEL: CPT

## 2024-01-29 PROBLEM — L03.116 CELLULITIS OF LEFT FOOT: Status: ACTIVE | Noted: 2017-12-22

## 2024-01-29 PROBLEM — I51.9 LEFT VENTRICULAR SYSTOLIC DYSFUNCTION: Status: ACTIVE | Noted: 2024-01-29

## 2024-01-29 PROBLEM — N28.9 WORSENING RENAL FUNCTION: Status: ACTIVE | Noted: 2023-07-28

## 2024-01-29 PROBLEM — M86.171 ACUTE OSTEOMYELITIS OF RIGHT FOOT (MULTI): Status: ACTIVE | Noted: 2022-02-22

## 2024-01-29 PROBLEM — I50.32 CHRONIC HEART FAILURE WITH PRESERVED EJECTION FRACTION (MULTI): Status: ACTIVE | Noted: 2022-09-05

## 2024-01-29 PROBLEM — Z20.822 CONTACT WITH AND (SUSPECTED) EXPOSURE TO COVID-19: Status: ACTIVE | Noted: 2021-11-22

## 2024-01-29 PROBLEM — I16.1 HYPERTENSIVE EMERGENCY: Status: ACTIVE | Noted: 2022-09-05

## 2024-01-29 PROBLEM — T81.31XD WOUND DEHISCENCE, SURGICAL, SUBSEQUENT ENCOUNTER: Status: ACTIVE | Noted: 2018-10-17

## 2024-01-29 PROBLEM — E55.9 VITAMIN D DEFICIENCY: Status: ACTIVE | Noted: 2022-08-24

## 2024-01-29 PROBLEM — N25.81 SECONDARY HYPERPARATHYROIDISM (MULTI): Status: ACTIVE | Noted: 2022-08-24

## 2024-01-29 PROBLEM — A49.02 MRSA INFECTION: Status: ACTIVE | Noted: 2023-04-02

## 2024-01-29 PROBLEM — I11.0 CARDIOMYOPATHY DUE TO HYPERTENSION, WITH HEART FAILURE (MULTI): Status: ACTIVE | Noted: 2021-01-15

## 2024-01-29 PROBLEM — J45.909 ASTHMA (HHS-HCC): Status: ACTIVE | Noted: 2017-04-19

## 2024-01-29 PROBLEM — Z89.9: Status: ACTIVE | Noted: 2018-10-17

## 2024-01-29 PROBLEM — R20.2 PARESTHESIAS: Status: ACTIVE | Noted: 2019-05-13

## 2024-01-29 PROBLEM — F41.1 GENERALIZED ANXIETY DISORDER: Status: ACTIVE | Noted: 2023-04-02

## 2024-01-29 PROBLEM — E11.51 PERIPHERAL ANGIOPATHY DUE TO TYPE 2 DIABETES MELLITUS (MULTI): Status: ACTIVE | Noted: 2021-09-16

## 2024-01-29 PROBLEM — R76.8 RED BLOOD CELL ANTIBODY POSITIVE: Chronic | Status: ACTIVE | Noted: 2024-01-22

## 2024-01-29 PROBLEM — R60.9 EDEMA: Status: ACTIVE | Noted: 2022-08-24

## 2024-01-29 PROBLEM — I50.42 CHRONIC COMBINED SYSTOLIC AND DIASTOLIC CONGESTIVE HEART FAILURE (MULTI): Status: ACTIVE | Noted: 2021-01-15

## 2024-01-29 PROBLEM — L03.90 CELLULITIS: Status: ACTIVE | Noted: 2024-01-07

## 2024-01-29 PROBLEM — M86.9 OSTEOMYELITIS OF TOE OF LEFT FOOT (MULTI): Status: ACTIVE | Noted: 2017-08-09

## 2024-01-29 PROBLEM — F33.2 SEVERE EPISODE OF RECURRENT MAJOR DEPRESSIVE DISORDER, WITHOUT PSYCHOTIC FEATURES (MULTI): Status: ACTIVE | Noted: 2023-04-02

## 2024-01-29 PROBLEM — M86.9: Status: ACTIVE | Noted: 2017-12-23

## 2024-01-29 PROBLEM — Z89.432 STATUS POST AMPUTATION OF LEFT FOOT THROUGH METATARSAL BONE (MULTI): Status: ACTIVE | Noted: 2023-12-14

## 2024-01-29 PROBLEM — M79.672 PAIN IN LEFT FOOT: Status: ACTIVE | Noted: 2019-05-13

## 2024-01-29 PROBLEM — M86.10 ACUTE OSTEOMYELITIS (MULTI): Status: ACTIVE | Noted: 2023-04-02

## 2024-01-29 PROBLEM — H52.4 PRESBYOPIA: Status: ACTIVE | Noted: 2023-04-02

## 2024-01-29 PROBLEM — M54.50 LUMBAGO: Status: ACTIVE | Noted: 2023-04-02

## 2024-01-29 PROBLEM — E11.69: Status: ACTIVE | Noted: 2017-12-23

## 2024-01-29 PROBLEM — E11.42 DIABETIC POLYNEUROPATHY ASSOCIATED WITH TYPE 2 DIABETES MELLITUS (MULTI): Status: ACTIVE | Noted: 2019-05-13

## 2024-01-29 PROBLEM — R81 GLYCOSURIA: Status: ACTIVE | Noted: 2024-01-29

## 2024-01-29 PROBLEM — M25.569 KNEE PAIN: Status: ACTIVE | Noted: 2024-01-29

## 2024-01-29 PROBLEM — E11.628 DIABETIC FOOT INFECTION (MULTI): Status: ACTIVE | Noted: 2017-08-06

## 2024-01-29 PROBLEM — I96 GANGRENE, NOT ELSEWHERE CLASSIFIED (MULTI): Status: ACTIVE | Noted: 2021-09-16

## 2024-01-29 PROBLEM — B95.1 STREPTOCOCCUS, GROUP B, AS THE CAUSE OF DISEASES CLASSIFIED ELSEWHERE: Status: ACTIVE | Noted: 2021-09-16

## 2024-01-29 PROBLEM — L08.9 DIABETIC FOOT INFECTION (MULTI): Status: ACTIVE | Noted: 2017-08-06

## 2024-01-29 PROBLEM — R29.6 FREQUENT FALLS: Status: ACTIVE | Noted: 2024-01-16

## 2024-01-29 PROBLEM — M17.11 ARTHRITIS OF RIGHT KNEE: Status: ACTIVE | Noted: 2024-01-29

## 2024-01-29 PROBLEM — Z89.429 ABSENCE OF TOE (MULTI): Status: ACTIVE | Noted: 2021-11-15

## 2024-01-29 PROBLEM — Z98.62 HISTORY OF ANGIOPLASTY OF PERIPHERAL VESSEL: Status: ACTIVE | Noted: 2023-11-24

## 2024-01-29 PROBLEM — I20.9 ANGINA PECTORIS (CMS-HCC): Status: ACTIVE | Noted: 2019-03-07

## 2024-01-29 PROBLEM — I80.8 THROMBOPHLEBITIS ARM: Status: ACTIVE | Noted: 2017-12-26

## 2024-01-29 PROBLEM — R80.9 PROTEINURIA: Status: ACTIVE | Noted: 2021-01-14

## 2024-01-29 PROBLEM — I43 CARDIOMYOPATHY DUE TO HYPERTENSION, WITH HEART FAILURE (MULTI): Status: ACTIVE | Noted: 2021-01-15

## 2024-01-29 PROBLEM — N52.9 ED (ERECTILE DYSFUNCTION) OF ORGANIC ORIGIN: Status: ACTIVE | Noted: 2017-04-19

## 2024-01-29 PROBLEM — E87.20 METABOLIC ACIDOSIS: Status: ACTIVE | Noted: 2023-04-02

## 2024-01-29 PROBLEM — E79.0 HYPERURICEMIA: Status: ACTIVE | Noted: 2022-08-24

## 2024-01-29 PROBLEM — E87.5 HYPERKALEMIA: Status: ACTIVE | Noted: 2022-08-24

## 2024-01-29 PROBLEM — R78.81 GRAM-POSITIVE BACTEREMIA: Status: ACTIVE | Noted: 2024-01-29

## 2024-01-29 PROBLEM — B35.1 TINEA UNGUIUM: Status: ACTIVE | Noted: 2023-04-02

## 2024-01-29 PROBLEM — Z99.2 ESRD (END STAGE RENAL DISEASE) ON DIALYSIS (MULTI): Status: ACTIVE | Noted: 2023-12-16

## 2024-01-29 PROBLEM — T87.81 DEHISCENCE OF AMPUTATION STUMP (MULTI): Status: ACTIVE | Noted: 2022-01-25

## 2024-01-29 PROBLEM — M17.9 DEGENERATIVE JOINT DISEASE OF KNEE: Status: ACTIVE | Noted: 2024-01-29

## 2024-01-29 PROBLEM — F17.200 NICOTINE USE DISORDER: Status: ACTIVE | Noted: 2021-01-20

## 2024-01-29 PROBLEM — Z98.890 OTHER SPECIFIED POSTPROCEDURAL STATES: Status: ACTIVE | Noted: 2021-11-22

## 2024-01-29 PROBLEM — I50.20 HEART FAILURE WITH REDUCED EJECTION FRACTION (MULTI): Status: ACTIVE | Noted: 2023-11-24

## 2024-01-29 PROBLEM — N18.5 STAGE 5 CHRONIC KIDNEY DISEASE (MULTI): Status: ACTIVE | Noted: 2021-02-19

## 2024-01-29 PROBLEM — M87.9 OSTEONECROSIS, UNSPECIFIED (MULTI): Status: ACTIVE | Noted: 2021-11-22

## 2024-01-29 PROBLEM — E11.40 TYPE 2 DIABETES MELLITUS WITH DIABETIC NEUROPATHY, UNSPECIFIED (MULTI): Status: ACTIVE | Noted: 2020-10-13

## 2024-01-29 PROBLEM — N18.6 ESRD (END STAGE RENAL DISEASE) ON DIALYSIS (MULTI): Status: ACTIVE | Noted: 2023-12-16

## 2024-01-29 PROBLEM — H35.039 HYPERTENSIVE RETINOPATHY, UNSPECIFIED EYE: Status: ACTIVE | Noted: 2023-04-02

## 2024-01-29 PROBLEM — N18.32 STAGE 3B CHRONIC KIDNEY DISEASE (MULTI): Status: ACTIVE | Noted: 2021-11-15

## 2024-01-29 PROBLEM — Z96.9 RETAINED ORTHOPEDIC HARDWARE: Status: ACTIVE | Noted: 2017-12-23

## 2024-01-29 PROBLEM — A41.9 SEPTICEMIA (MULTI): Status: ACTIVE | Noted: 2023-04-02

## 2024-01-29 PROBLEM — I38 ENDOCARDITIS: Status: ACTIVE | Noted: 2021-09-15

## 2024-01-29 PROBLEM — Z86.14 PERSONAL HISTORY OF METHICILLIN RESISTANT STAPHYLOCOCCUS AUREUS INFECTION: Status: ACTIVE | Noted: 2021-03-05

## 2024-01-30 ENCOUNTER — LAB REQUISITION (OUTPATIENT)
Dept: LAB | Facility: HOSPITAL | Age: 59
End: 2024-01-30
Payer: MEDICARE

## 2024-01-30 LAB
ALBUMIN SERPL BCP-MCNC: 3.5 G/DL (ref 3.4–5)
ANION GAP SERPL CALC-SCNC: 14 MMOL/L (ref 10–20)
BUN SERPL-MCNC: 46 MG/DL (ref 6–23)
CALCIUM SERPL-MCNC: 9.6 MG/DL (ref 8.6–10.6)
CHLORIDE SERPL-SCNC: 102 MMOL/L (ref 98–107)
CO2 SERPL-SCNC: 27 MMOL/L (ref 21–32)
CREAT SERPL-MCNC: 5.29 MG/DL (ref 0.5–1.3)
EGFRCR SERPLBLD CKD-EPI 2021: 12 ML/MIN/1.73M*2
GLUCOSE SERPL-MCNC: 108 MG/DL (ref 74–99)
HOLD SPECIMEN: NORMAL
PHOSPHATE SERPL-MCNC: 3.6 MG/DL (ref 2.5–4.9)
POTASSIUM SERPL-SCNC: 4.7 MMOL/L (ref 3.5–5.3)
SODIUM SERPL-SCNC: 138 MMOL/L (ref 136–145)
VANCOMYCIN SERPL-MCNC: 9.7 UG/ML (ref 5–20)

## 2024-01-30 PROCEDURE — 80069 RENAL FUNCTION PANEL: CPT

## 2024-01-30 PROCEDURE — 80202 ASSAY OF VANCOMYCIN: CPT

## 2024-02-06 ENCOUNTER — LAB REQUISITION (OUTPATIENT)
Dept: LAB | Facility: HOSPITAL | Age: 59
End: 2024-02-06
Payer: MEDICARE

## 2024-02-06 LAB — VANCOMYCIN SERPL-MCNC: 7.8 UG/ML (ref 5–20)

## 2024-02-06 PROCEDURE — 80202 ASSAY OF VANCOMYCIN: CPT

## 2024-02-13 ENCOUNTER — LAB REQUISITION (OUTPATIENT)
Dept: LAB | Facility: HOSPITAL | Age: 59
End: 2024-02-13
Payer: MEDICARE

## 2024-02-13 LAB
ALBUMIN SERPL BCP-MCNC: 3.6 G/DL (ref 3.4–5)
ANION GAP SERPL CALC-SCNC: 14 MMOL/L (ref 10–20)
BUN SERPL-MCNC: 52 MG/DL (ref 6–23)
CALCIUM SERPL-MCNC: 9.6 MG/DL (ref 8.6–10.6)
CHLORIDE SERPL-SCNC: 105 MMOL/L (ref 98–107)
CO2 SERPL-SCNC: 24 MMOL/L (ref 21–32)
CREAT SERPL-MCNC: 5.34 MG/DL (ref 0.5–1.3)
EGFRCR SERPLBLD CKD-EPI 2021: 12 ML/MIN/1.73M*2
GLUCOSE SERPL-MCNC: 131 MG/DL (ref 74–99)
PHOSPHATE SERPL-MCNC: 2.9 MG/DL (ref 2.5–4.9)
POTASSIUM SERPL-SCNC: 3.8 MMOL/L (ref 3.5–5.3)
SODIUM SERPL-SCNC: 139 MMOL/L (ref 136–145)

## 2024-02-13 PROCEDURE — 80069 RENAL FUNCTION PANEL: CPT

## 2024-02-20 ENCOUNTER — LAB REQUISITION (OUTPATIENT)
Dept: LAB | Facility: HOSPITAL | Age: 59
End: 2024-02-20
Payer: MEDICARE

## 2024-02-20 LAB
ALBUMIN SERPL BCP-MCNC: 3.6 G/DL (ref 3.4–5)
ANION GAP SERPL CALC-SCNC: 15 MMOL/L (ref 10–20)
BUN SERPL-MCNC: 45 MG/DL (ref 6–23)
CALCIUM SERPL-MCNC: 9.2 MG/DL (ref 8.6–10.6)
CHLORIDE SERPL-SCNC: 104 MMOL/L (ref 98–107)
CO2 SERPL-SCNC: 25 MMOL/L (ref 21–32)
CREAT SERPL-MCNC: 5.89 MG/DL (ref 0.5–1.3)
EGFRCR SERPLBLD CKD-EPI 2021: 10 ML/MIN/1.73M*2
GLUCOSE SERPL-MCNC: 108 MG/DL (ref 74–99)
PHOSPHATE SERPL-MCNC: 3.8 MG/DL (ref 2.5–4.9)
POTASSIUM SERPL-SCNC: 4.1 MMOL/L (ref 3.5–5.3)
SODIUM SERPL-SCNC: 140 MMOL/L (ref 136–145)
VANCOMYCIN SERPL-MCNC: 9.7 UG/ML (ref 5–20)

## 2024-02-20 PROCEDURE — 80202 ASSAY OF VANCOMYCIN: CPT

## 2024-02-20 PROCEDURE — 80069 RENAL FUNCTION PANEL: CPT

## 2024-02-27 ENCOUNTER — LAB REQUISITION (OUTPATIENT)
Dept: LAB | Facility: HOSPITAL | Age: 59
End: 2024-02-27
Payer: MEDICARE

## 2024-02-27 LAB
ALBUMIN SERPL BCP-MCNC: 3.7 G/DL (ref 3.4–5)
ANION GAP SERPL CALC-SCNC: 14 MMOL/L (ref 10–20)
BUN SERPL-MCNC: 57 MG/DL (ref 6–23)
CALCIUM SERPL-MCNC: 9.4 MG/DL (ref 8.6–10.6)
CHLORIDE SERPL-SCNC: 105 MMOL/L (ref 98–107)
CO2 SERPL-SCNC: 26 MMOL/L (ref 21–32)
CREAT SERPL-MCNC: 6.89 MG/DL (ref 0.5–1.3)
EGFRCR SERPLBLD CKD-EPI 2021: 9 ML/MIN/1.73M*2
GLUCOSE SERPL-MCNC: 126 MG/DL (ref 74–99)
HOLD SPECIMEN: NORMAL
PHOSPHATE SERPL-MCNC: 3.4 MG/DL (ref 2.5–4.9)
POTASSIUM SERPL-SCNC: 4.1 MMOL/L (ref 3.5–5.3)
SODIUM SERPL-SCNC: 141 MMOL/L (ref 136–145)
VANCOMYCIN SERPL-MCNC: 10.5 UG/ML (ref 5–20)

## 2024-02-27 PROCEDURE — 80202 ASSAY OF VANCOMYCIN: CPT

## 2024-02-27 PROCEDURE — 80069 RENAL FUNCTION PANEL: CPT

## 2024-03-05 ENCOUNTER — LAB REQUISITION (OUTPATIENT)
Dept: LAB | Facility: HOSPITAL | Age: 59
End: 2024-03-05
Payer: MEDICARE

## 2024-03-05 LAB
ALBUMIN SERPL BCP-MCNC: 3.9 G/DL (ref 3.4–5)
ANION GAP SERPL CALC-SCNC: 16 MMOL/L (ref 10–20)
BUN SERPL-MCNC: 66 MG/DL (ref 6–23)
CALCIUM SERPL-MCNC: 9.6 MG/DL (ref 8.6–10.6)
CHLORIDE SERPL-SCNC: 101 MMOL/L (ref 98–107)
CO2 SERPL-SCNC: 26 MMOL/L (ref 21–32)
CREAT SERPL-MCNC: 7.2 MG/DL (ref 0.5–1.3)
EGFRCR SERPLBLD CKD-EPI 2021: 8 ML/MIN/1.73M*2
GLUCOSE SERPL-MCNC: 116 MG/DL (ref 74–99)
PHOSPHATE SERPL-MCNC: 4.2 MG/DL (ref 2.5–4.9)
POTASSIUM SERPL-SCNC: 4.6 MMOL/L (ref 3.5–5.3)
SODIUM SERPL-SCNC: 138 MMOL/L (ref 136–145)

## 2024-03-05 PROCEDURE — 80069 RENAL FUNCTION PANEL: CPT

## 2024-05-02 ENCOUNTER — LAB REQUISITION (OUTPATIENT)
Dept: LAB | Facility: HOSPITAL | Age: 59
End: 2024-05-02
Payer: MEDICARE

## 2024-05-02 LAB — PHOSPHATE SERPL-MCNC: 5.2 MG/DL (ref 2.5–4.9)

## 2024-05-02 PROCEDURE — 84100 ASSAY OF PHOSPHORUS: CPT

## 2024-06-27 ENCOUNTER — LAB REQUISITION (OUTPATIENT)
Dept: LAB | Facility: HOSPITAL | Age: 59
End: 2024-06-27
Payer: MEDICARE

## 2024-06-27 PROCEDURE — 87075 CULTR BACTERIA EXCEPT BLOOD: CPT

## 2024-06-27 PROCEDURE — 87040 BLOOD CULTURE FOR BACTERIA: CPT

## 2024-06-27 PROCEDURE — 87205 SMEAR GRAM STAIN: CPT

## 2024-06-30 LAB
BACTERIA BLD AEROBE CULT: ABNORMAL
BACTERIA BLD CULT: ABNORMAL
BACTERIA BLD CULT: NORMAL
GRAM STN SPEC: ABNORMAL

## 2024-08-01 ENCOUNTER — LAB REQUISITION (OUTPATIENT)
Dept: LAB | Facility: HOSPITAL | Age: 59
End: 2024-08-01
Payer: MEDICARE

## 2024-08-01 PROCEDURE — 87205 SMEAR GRAM STAIN: CPT

## 2024-08-01 PROCEDURE — 87075 CULTR BACTERIA EXCEPT BLOOD: CPT

## 2024-08-01 PROCEDURE — 87040 BLOOD CULTURE FOR BACTERIA: CPT

## 2024-08-01 PROCEDURE — 87186 SC STD MICRODIL/AGAR DIL: CPT

## 2024-08-01 PROCEDURE — 87077 CULTURE AEROBIC IDENTIFY: CPT

## 2024-08-04 LAB
BACTERIA BLD AEROBE CULT: ABNORMAL
BACTERIA BLD CULT: ABNORMAL
GRAM STN SPEC: ABNORMAL
GRAM STN SPEC: ABNORMAL

## 2024-08-07 LAB
BACTERIA BLD AEROBE CULT: ABNORMAL
BACTERIA BLD CULT: ABNORMAL
GRAM STN SPEC: ABNORMAL
GRAM STN SPEC: ABNORMAL

## (undated) DEVICE — SUTURE, MONOCRYL, 3-0, 27 IN, SH, UNDYED

## (undated) DEVICE — BANDAGE, ELASTIC, SELF-CLOSE, 4 IN, HONEYCOMB, STERILE

## (undated) DEVICE — TUBESET, IRRIGATION, BONE CUTTER BONESCALPEL

## (undated) DEVICE — TIP, SUCTION, YANKAUER, FLEXIBLE

## (undated) DEVICE — GUIDEWIRE, ANGLE TIP,  .035 DIA, 180 CM, 3 CM TIP"

## (undated) DEVICE — Device

## (undated) DEVICE — GUIDEWIRE, STIFF SHAFT, ANGLE TIP, .035 DIA, 180 CM,  3 CM TIP"

## (undated) DEVICE — SHEATH, PINNACLE, 10 CM,  4FR INTRODUCER, 4FR DIA, 2.5 CM DIALATOR

## (undated) DEVICE — DRAPE, TIBURON, SPLIT SHEET, REINF ADH STRIP, 77X122

## (undated) DEVICE — GLOVE, SURGICAL, PROTEXIS PI MICRO, 5.5, PF, LF

## (undated) DEVICE — DRESSING, PREVENA, PEEL AND PLACE, 13CM

## (undated) DEVICE — NEEDLE, PERCUTANEOUS ENTRY, THINWALL, W/O BASEPLATE, 18 G X 7 CM

## (undated) DEVICE — DRAPE, PAD, PREP, W/ 9 IN CUFF, 24 X 41, LF, NS

## (undated) DEVICE — CLOSURE DEVICE, VASCULAR, MYNX CONTROL, 5FR

## (undated) DEVICE — ACCESSORY KIT, JETI NON STERILE, DISP

## (undated) DEVICE — CATHETER, BALLOON, CHOCOLATE, 3.5MM X 120MM, OTW PTA

## (undated) DEVICE — PROBE, CYLINDRICAL, SUCTION, TITANIUM

## (undated) DEVICE — THROMBECTOMY SYSTEM, JETI 6FR

## (undated) DEVICE — COVER PROBE, SOFT FLEX W/ GEL, 5 X 48 IN (13X122CM)

## (undated) DEVICE — DEVICE, INFLATION, ENCORE 20

## (undated) DEVICE — CATHETER, ARMADA 18PTA, 5.0MM X 150MM X 150CM

## (undated) DEVICE — PROTECTOR, NERVE, ULNAR, PINK

## (undated) DEVICE — GUIDEWIRE, GOLD TIP, 45 DEG ANGLE, 300 CM, 3CM TIP

## (undated) DEVICE — DRESSING, GAUZE, 16 PLY, 4 X 4 IN, STERILE

## (undated) DEVICE — THERAPY UNIT, PREVENA PLUS 125

## (undated) DEVICE — STRAP, CIRCUMFERENTIAL, 2 X 76""

## (undated) DEVICE — GUIDEWIRE, STEELCORE .018 X 300CM

## (undated) DEVICE — CATHETER, NAVICROSS, 0.035 X 150CM, ANGLED TIP

## (undated) DEVICE — BANDAGE, ELASTIC, SELF-CLOSE, 6 IN, HONEYCOMB, STERILE

## (undated) DEVICE — DRAPE, IRRIGATION, W/POUCH, ADHESIVE STRIP, STERI DRAPE, 19 X 23 IN, DISPOSABLE, STERILE

## (undated) DEVICE — ACCESS KIT, S-MAK MINI, 4FR 10CM 0.018IN 40CM, NT/PT, ECHO ENHANCE NEEDLE

## (undated) DEVICE — GUIDEWIRE, SPARTA CORE,  .014 X 300, 5CM

## (undated) DEVICE — SET, INTRODUCER CHECK FLOW 6F X 45CM

## (undated) DEVICE — GUIDEWIRE, HI-TORQUE, VERSACORE, 260CM, FLOPPY

## (undated) DEVICE — NEEDLE, ENTRY, PERCUTANEOUS, 21 G X 2.5 CM

## (undated) DEVICE — BANDAGE, GAUZE, CONFORMING, KERLIX, 6 PLY, 4.5 IN X 4.1 YD

## (undated) DEVICE — GUIDEWIRE, COMMAND ST, HI-TORQUE, 0.18 X 300CM

## (undated) DEVICE — GEL, ULTRASOUND, AQUASONIC 100, 20 GM, STERILE

## (undated) DEVICE — COVER, CART, 45 X 27 X 48 IN, CLEAR

## (undated) DEVICE — DRESSING, ISLAND, ADHESIVE, TELFA, 4 X 8 IN

## (undated) DEVICE — MANIFOLD, 4 PORT NEPTUNE STANDARD

## (undated) DEVICE — CATHETER, BALLOON, PACIFIC PLUS PTA, 3.0 X 120 X 150

## (undated) DEVICE — SPONGE, LAP, XRAY DECT, 18IN X 18IN, W/MASTER DMT, STERILE

## (undated) DEVICE — CATHETER, INPACT ADMIRAL, PACLITAXEL COATED , 6FR, 6MM X 200MM X 130CM

## (undated) DEVICE — PREP TRAY, SKIN, DRY, W/GLOVES

## (undated) DEVICE — CATHETER, ARMADA 18PTA, 6.0MM X 40MM X 150CM

## (undated) DEVICE — TOWEL, OR, XRAY DETECT 5 PK, WHITE, 17X26, W/DMT TAG, ST

## (undated) DEVICE — DRAPE, SHEET, EXTREMITY, W/ARM BOARD COVERS, 87 X 106 X 128 IN, DISPOSABLE, LF, STERILE

## (undated) DEVICE — SYRINGE, 20 CC, LUER LOCK, MONOJECT, W/O CAP, LF

## (undated) DEVICE — CATHETER, CXI SUPPORT, .018 2.6F X 65CM, STR TIP

## (undated) DEVICE — SYRINGE, LUER LOCK, 12ML

## (undated) DEVICE — CURETTE, DERMAL, DISPOSABLE, 4MM

## (undated) DEVICE — DEVICE, CLOSURE, PERCLOSE, PROSTYLE

## (undated) DEVICE — GOWN, SURGICAL, SMARTGOWN, XLARGE, STERILE

## (undated) DEVICE — DRAPE, INSTRUMENT, W/POUCH, STERI DRAPE, 7 X 11 IN, DISPOSABLE, STERILE

## (undated) DEVICE — SHEATH, PINNACLE, 10 CM,  5FR INTRODUCER, 5FR DIA, 2.5 CM DIALATOR